# Patient Record
Sex: MALE | Race: WHITE | NOT HISPANIC OR LATINO | Employment: OTHER | URBAN - NONMETROPOLITAN AREA
[De-identification: names, ages, dates, MRNs, and addresses within clinical notes are randomized per-mention and may not be internally consistent; named-entity substitution may affect disease eponyms.]

---

## 2017-02-21 ENCOUNTER — OFFICE VISIT (OUTPATIENT)
Dept: GASTROENTEROLOGY | Facility: CLINIC | Age: 67
End: 2017-02-21

## 2017-02-21 VITALS
DIASTOLIC BLOOD PRESSURE: 91 MMHG | SYSTOLIC BLOOD PRESSURE: 158 MMHG | HEART RATE: 76 BPM | WEIGHT: 193.9 LBS | BODY MASS INDEX: 28.72 KG/M2 | HEIGHT: 69 IN

## 2017-02-21 DIAGNOSIS — Z12.11 ENCOUNTER FOR SCREENING FOR MALIGNANT NEOPLASM OF COLON: Primary | ICD-10-CM

## 2017-02-21 PROCEDURE — PTNOCHG PR CUSTOM PT NO CHARGE VISIT: Performed by: INTERNAL MEDICINE

## 2017-02-21 RX ORDER — HYDROCHLOROTHIAZIDE 25 MG/1
12.5 TABLET ORAL DAILY
COMMUNITY

## 2017-02-21 RX ORDER — PRAVASTATIN SODIUM 40 MG
20 TABLET ORAL NIGHTLY
COMMUNITY
End: 2023-02-16

## 2017-02-21 RX ORDER — FINASTERIDE 5 MG/1
5 TABLET, FILM COATED ORAL NIGHTLY
COMMUNITY

## 2017-02-21 RX ORDER — POTASSIUM CITRATE 5 MEQ/1
10 TABLET, EXTENDED RELEASE ORAL 2 TIMES DAILY WITH MEALS
Status: ON HOLD | COMMUNITY
End: 2017-09-06 | Stop reason: SDUPTHER

## 2017-02-21 RX ORDER — SODIUM, POTASSIUM,MAG SULFATES 17.5-3.13G
1 SOLUTION, RECONSTITUTED, ORAL ORAL EVERY 12 HOURS
Qty: 1 BOTTLE | Refills: 0 | Status: ON HOLD | OUTPATIENT
Start: 2017-02-21 | End: 2017-04-03

## 2017-02-21 RX ORDER — SILDENAFIL 100 MG/1
100 TABLET, FILM COATED ORAL DAILY PRN
COMMUNITY

## 2017-02-21 RX ORDER — DEXTROSE AND SODIUM CHLORIDE 5; .45 G/100ML; G/100ML
30 INJECTION, SOLUTION INTRAVENOUS CONTINUOUS PRN
Status: CANCELLED | OUTPATIENT
Start: 2017-02-21

## 2017-02-21 NOTE — PROGRESS NOTES
"[facility]      Chief Complaint:   Chief Complaint   Patient presents with   • Colonoscopy     ref [facility]       Subjective     HPI:   Patient for screening colonoscopy.  Patient not had a colonoscopy greater than 10 years.    Plan; we'll schedule patient for colonoscopy    Past Medical History:   History reviewed. No pertinent past medical history.    Family History:  History reviewed. No pertinent family history.    Social History:   reports that he has never smoked. He does not have any smokeless tobacco history on file. He reports that he drinks alcohol. He reports that he does not use illicit drugs.    Medications:   Current Outpatient Prescriptions   Medication Sig Dispense Refill   • finasteride (PROSCAR) 5 MG tablet Take 5 mg by mouth Daily.     • hydrochlorothiazide (HYDRODIURIL) 25 MG tablet Take 25 mg by mouth Daily.     • potassium citrate (UROCIT-K) 5 MEQ (540 MG) CR tablet Take 10 mEq by mouth 2 (Two) Times a Day With Meals.     • pravastatin (PRAVACHOL) 40 MG tablet Take 40 mg by mouth Daily.     • sildenafil (VIAGRA) 100 MG tablet Take 100 mg by mouth Daily As Needed for erectile dysfunction.     • sodium-potassium-magnesium sulfates (SUPREP) solution oral solution Take 1 bottle by mouth Every 12 (Twelve) Hours. 1 bottle 0     No current facility-administered medications for this visit.        Allergies:  Review of patient's allergies indicates no known allergies.    ROS:    Review of Systems  Objective     Blood pressure 158/91, pulse 76, height 69\" (175.3 cm), weight 193 lb 14.4 oz (88 kg).    Physical Exam     Assessment/Plan   Vangie was seen today for colonoscopy.    Diagnoses and all orders for this visit:    Encounter for screening for malignant neoplasm of colon  -     Case Request; Standing  -     dextrose 5 % and sodium chloride 0.45 % infusion; Infuse 30 mL/hr into a venous catheter Continuous As Needed (Start Prior to Procedure).  -     Case Request  -     " sodium-potassium-magnesium sulfates (SUPREP) solution oral solution; Take 1 bottle by mouth Every 12 (Twelve) Hours.    Other orders  -     Implement Anesthesia Orders Day of Procedure; Standing  -     Obtain Informed Consent; Standing  -     Verify Informed Consent; Standing  -     POC Glucose Fingerstick; Standing        COLONOSCOPY (N/A)     Diagnosis Plan   1. Encounter for screening for malignant neoplasm of colon  Case Request    dextrose 5 % and sodium chloride 0.45 % infusion    Case Request    sodium-potassium-magnesium sulfates (SUPREP) solution oral solution       Anticipated Surgical Procedure:  No orders of the defined types were placed in this encounter.      The risks, benefits, and alternatives of this procedure have been discussed with the patient or the responsible party- the patient understands and agrees to proceed.

## 2017-04-03 ENCOUNTER — HOSPITAL ENCOUNTER (OUTPATIENT)
Facility: HOSPITAL | Age: 67
Setting detail: HOSPITAL OUTPATIENT SURGERY
Discharge: HOME | End: 2017-04-03
Attending: INTERNAL MEDICINE | Admitting: INTERNAL MEDICINE

## 2017-04-03 ENCOUNTER — ANESTHESIA EVENT (OUTPATIENT)
Dept: GASTROENTEROLOGY | Facility: HOSPITAL | Age: 67
End: 2017-04-03

## 2017-04-03 ENCOUNTER — ANESTHESIA (OUTPATIENT)
Dept: GASTROENTEROLOGY | Facility: HOSPITAL | Age: 67
End: 2017-04-03

## 2017-04-03 VITALS
WEIGHT: 185.41 LBS | DIASTOLIC BLOOD PRESSURE: 62 MMHG | TEMPERATURE: 97.4 F | BODY MASS INDEX: 27.46 KG/M2 | RESPIRATION RATE: 18 BRPM | HEART RATE: 62 BPM | HEIGHT: 69 IN | OXYGEN SATURATION: 95 % | SYSTOLIC BLOOD PRESSURE: 115 MMHG

## 2017-04-03 DIAGNOSIS — Z12.11 ENCOUNTER FOR SCREENING FOR MALIGNANT NEOPLASM OF COLON: ICD-10-CM

## 2017-04-03 PROCEDURE — 25010000002 PROPOFOL 10 MG/ML EMULSION: Performed by: NURSE ANESTHETIST, CERTIFIED REGISTERED

## 2017-04-03 PROCEDURE — 45380 COLONOSCOPY AND BIOPSY: CPT | Performed by: INTERNAL MEDICINE

## 2017-04-03 PROCEDURE — 88305 TISSUE EXAM BY PATHOLOGIST: CPT | Performed by: INTERNAL MEDICINE

## 2017-04-03 PROCEDURE — 88305 TISSUE EXAM BY PATHOLOGIST: CPT | Performed by: PATHOLOGY

## 2017-04-03 PROCEDURE — 25010000002 FENTANYL CITRATE (PF) 100 MCG/2ML SOLUTION: Performed by: NURSE ANESTHETIST, CERTIFIED REGISTERED

## 2017-04-03 PROCEDURE — 25010000002 MIDAZOLAM PER 1 MG: Performed by: NURSE ANESTHETIST, CERTIFIED REGISTERED

## 2017-04-03 RX ORDER — FENTANYL CITRATE 50 UG/ML
INJECTION, SOLUTION INTRAMUSCULAR; INTRAVENOUS AS NEEDED
Status: DISCONTINUED | OUTPATIENT
Start: 2017-04-03 | End: 2017-04-03 | Stop reason: SURG

## 2017-04-03 RX ORDER — DEXTROSE AND SODIUM CHLORIDE 5; .45 G/100ML; G/100ML
30 INJECTION, SOLUTION INTRAVENOUS CONTINUOUS PRN
Status: DISCONTINUED | OUTPATIENT
Start: 2017-04-03 | End: 2017-04-03 | Stop reason: HOSPADM

## 2017-04-03 RX ORDER — MIDAZOLAM HYDROCHLORIDE 1 MG/ML
INJECTION INTRAMUSCULAR; INTRAVENOUS AS NEEDED
Status: DISCONTINUED | OUTPATIENT
Start: 2017-04-03 | End: 2017-04-03 | Stop reason: SURG

## 2017-04-03 RX ORDER — PROPOFOL 10 MG/ML
VIAL (ML) INTRAVENOUS AS NEEDED
Status: DISCONTINUED | OUTPATIENT
Start: 2017-04-03 | End: 2017-04-03 | Stop reason: SURG

## 2017-04-03 RX ADMIN — MIDAZOLAM 1 MG: 1 INJECTION INTRAMUSCULAR; INTRAVENOUS at 11:25

## 2017-04-03 RX ADMIN — FENTANYL CITRATE 50 MCG: 50 INJECTION, SOLUTION INTRAMUSCULAR; INTRAVENOUS at 11:25

## 2017-04-03 RX ADMIN — PROPOFOL 50 MG: 10 INJECTION, EMULSION INTRAVENOUS at 11:45

## 2017-04-03 RX ADMIN — DEXTROSE AND SODIUM CHLORIDE: 5; 450 INJECTION, SOLUTION INTRAVENOUS at 11:24

## 2017-04-03 RX ADMIN — MIDAZOLAM 1 MG: 1 INJECTION INTRAMUSCULAR; INTRAVENOUS at 11:45

## 2017-04-03 RX ADMIN — FENTANYL CITRATE 50 MCG: 50 INJECTION, SOLUTION INTRAMUSCULAR; INTRAVENOUS at 11:45

## 2017-04-03 RX ADMIN — PROPOFOL 50 MG: 10 INJECTION, EMULSION INTRAVENOUS at 11:33

## 2017-04-03 NOTE — ANESTHESIA PREPROCEDURE EVALUATION
Anesthesia Evaluation     Patient summary reviewed and Nursing notes reviewed      Airway   Mallampati: II  TM distance: >3 FB  Neck ROM: full  no difficulty expected  Dental      Pulmonary - negative pulmonary ROS and normal exam   Cardiovascular - negative cardio ROS and normal exam        Neuro/Psych- negative ROS  GI/Hepatic/Renal/Endo - negative ROS     Musculoskeletal (-) negative ROS    Abdominal  - normal exam   Substance History - negative use     OB/GYN negative ob/gyn ROS         Other                                    Anesthesia Plan    ASA 3     MAC     intravenous induction

## 2017-04-03 NOTE — ANESTHESIA POSTPROCEDURE EVALUATION
Patient: Vangie Lopez    Procedure Summary     Date Anesthesia Start Anesthesia Stop Room / Location    04/03/17 1129 1149 [facility] ENDOSCOPY 1 / [facility] ENDOSCOPY       Procedure Diagnosis Surgeon Provider    COLONOSCOPY (N/A ) Encounter for screening for malignant neoplasm of colon  (Encounter for screening for malignant neoplasm of colon [Z12.11]) MD Chandrika Irwin, SHIVA          Anesthesia Type: MAC  Last vitals  /62 (04/03/17 1148)    Temp 97.9 °F (36.6 °C) (04/03/17 1134)    Pulse 60 (04/03/17 1148)   Resp 18 (04/03/17 1148)    SpO2 95 % (04/03/17 1148)      Post Anesthesia Care and Evaluation    Patient location during evaluation: bedside  Patient participation: complete - patient participated  Level of consciousness: awake and alert  Pain management: adequate  Airway patency: patent  Anesthetic complications: No anesthetic complications    Cardiovascular status: acceptable  Respiratory status: acceptable  Hydration status: acceptable

## 2017-04-03 NOTE — H&P
"   Progress Notes  Encounter Date: 4/3/2017  Michael Ang MD   Gastroenterology   Expand All Collapse All    []Hide copied text  []Hover for attribution information  [facility]        Chief Complaint:        Chief Complaint   Patient presents with   • Colonoscopy       [facility]            Subjective      HPI:   Patient for screening colonoscopy. Patient not had a colonoscopy greater than 10 years.     Plan; we'll schedule patient for colonoscopy     Past Medical History:    Medical History    History reviewed. No pertinent past medical history.        Family History:  History reviewed. No pertinent family history.     Social History:   reports that he has never smoked. He does not have any smokeless tobacco history on file. He reports that he drinks alcohol. He reports that he does not use illicit drugs.     Medications:    Current Medications           Current Outpatient Prescriptions   Medication Sig Dispense Refill   • finasteride (PROSCAR) 5 MG tablet Take 5 mg by mouth Daily.       • hydrochlorothiazide (HYDRODIURIL) 25 MG tablet Take 25 mg by mouth Daily.       • potassium citrate (UROCIT-K) 5 MEQ (540 MG) CR tablet Take 10 mEq by mouth 2 (Two) Times a Day With Meals.       • pravastatin (PRAVACHOL) 40 MG tablet Take 40 mg by mouth Daily.       • sildenafil (VIAGRA) 100 MG tablet Take 100 mg by mouth Daily As Needed for erectile dysfunction.       • sodium-potassium-magnesium sulfates (SUPREP) solution oral solution Take 1 bottle by mouth Every 12 (Twelve) Hours. 1 bottle 0      No current facility-administered medications for this visit.             Allergies:  Review of patient's allergies indicates no known allergies.     ROS:   Review of Systems     Objective          Blood pressure 158/91, pulse 76, height 69\" (175.3 cm), weight 193 lb 14.4 oz (88 kg).     Physical Exam         Assessment/Plan       Vangie was seen today for colonoscopy.     Diagnoses and all orders for this " visit:     Encounter for screening for malignant neoplasm of colon  - Case Request; Standing  - dextrose 5 % and sodium chloride 0.45 % infusion; Infuse 30 mL/hr into a venous catheter Continuous As Needed (Start Prior to Procedure).  - Case Request  - sodium-potassium-magnesium sulfates (SUPREP) solution oral solution; Take 1 bottle by mouth Every 12 (Twelve) Hours.     Other orders  - Implement Anesthesia Orders Day of Procedure; Standing  - Obtain Informed Consent; Standing  - Verify Informed Consent; Standing  - POC Glucose Fingerstick; Standing           COLONOSCOPY (N/A)       Diagnosis Plan   1. Encounter for screening for malignant neoplasm of colon  Case Request     dextrose 5 % and sodium chloride 0.45 % infusion     Case Request     sodium-potassium-magnesium sulfates (SUPREP) solution oral solution         Anticipated Surgical Procedure:  No orders of the defined types were placed in this encounter.        The risks, benefits, and alternatives of this procedure have been discussed with the patient or the responsible party- the patient understands and agrees to proceed.                                                                                                           Office Visit on 2/21/2017              Detailed Report

## 2017-04-05 LAB
LAB AP CASE REPORT: NORMAL
Lab: NORMAL
PATH REPORT.FINAL DX SPEC: NORMAL
PATH REPORT.GROSS SPEC: NORMAL

## 2017-05-11 ENCOUNTER — OFFICE VISIT (OUTPATIENT)
Dept: GASTROENTEROLOGY | Facility: CLINIC | Age: 67
End: 2017-05-11

## 2017-05-11 VITALS
HEIGHT: 69 IN | DIASTOLIC BLOOD PRESSURE: 92 MMHG | HEART RATE: 89 BPM | WEIGHT: 187.9 LBS | BODY MASS INDEX: 27.83 KG/M2 | SYSTOLIC BLOOD PRESSURE: 152 MMHG

## 2017-05-11 DIAGNOSIS — K63.5 COLON POLYPS: Primary | ICD-10-CM

## 2017-05-11 PROCEDURE — 99213 OFFICE O/P EST LOW 20 MIN: CPT | Performed by: INTERNAL MEDICINE

## 2017-09-06 ENCOUNTER — APPOINTMENT (OUTPATIENT)
Dept: CT IMAGING | Facility: HOSPITAL | Age: 67
End: 2017-09-06

## 2017-09-06 ENCOUNTER — APPOINTMENT (OUTPATIENT)
Dept: GENERAL RADIOLOGY | Facility: HOSPITAL | Age: 67
End: 2017-09-06

## 2017-09-06 ENCOUNTER — ANESTHESIA EVENT (OUTPATIENT)
Dept: PERIOP | Facility: HOSPITAL | Age: 67
End: 2017-09-06

## 2017-09-06 ENCOUNTER — PREP FOR SURGERY (OUTPATIENT)
Dept: OTHER | Facility: HOSPITAL | Age: 67
End: 2017-09-06

## 2017-09-06 ENCOUNTER — HOSPITAL ENCOUNTER (OUTPATIENT)
Facility: HOSPITAL | Age: 67
Setting detail: OBSERVATION
End: 2017-09-11
Attending: EMERGENCY MEDICINE | Admitting: FAMILY MEDICINE

## 2017-09-06 ENCOUNTER — ANESTHESIA (OUTPATIENT)
Dept: PERIOP | Facility: HOSPITAL | Age: 67
End: 2017-09-06

## 2017-09-06 DIAGNOSIS — S93.315A: ICD-10-CM

## 2017-09-06 DIAGNOSIS — S82.009A PATELLA FRACTURE: ICD-10-CM

## 2017-09-06 DIAGNOSIS — Z74.09 IMPAIRED PHYSICAL MOBILITY: ICD-10-CM

## 2017-09-06 DIAGNOSIS — Z74.09 IMPAIRED MOBILITY AND ACTIVITIES OF DAILY LIVING: ICD-10-CM

## 2017-09-06 DIAGNOSIS — S82.009A PATELLA FRACTURE: Primary | ICD-10-CM

## 2017-09-06 DIAGNOSIS — S92.415A CLOSED NONDISPLACED FRACTURE OF PROXIMAL PHALANX OF LEFT GREAT TOE, INITIAL ENCOUNTER: ICD-10-CM

## 2017-09-06 DIAGNOSIS — S92.252A CLOSED DISPLACED FRACTURE OF NAVICULAR BONE OF LEFT FOOT, INITIAL ENCOUNTER: ICD-10-CM

## 2017-09-06 DIAGNOSIS — Z78.9 IMPAIRED MOBILITY AND ACTIVITIES OF DAILY LIVING: ICD-10-CM

## 2017-09-06 DIAGNOSIS — S63.501A SPRAIN OF RIGHT WRIST, INITIAL ENCOUNTER: ICD-10-CM

## 2017-09-06 DIAGNOSIS — S82.041A CLOSED DISPLACED COMMINUTED FRACTURE OF RIGHT PATELLA, INITIAL ENCOUNTER: Primary | ICD-10-CM

## 2017-09-06 LAB
ANION GAP SERPL CALCULATED.3IONS-SCNC: 12 MMOL/L (ref 5–15)
BUN BLD-MCNC: 19 MG/DL (ref 7–21)
BUN/CREAT SERPL: 23.5 (ref 7–25)
CALCIUM SPEC-SCNC: 9.3 MG/DL (ref 8.4–10.2)
CHLORIDE SERPL-SCNC: 97 MMOL/L (ref 95–110)
CO2 SERPL-SCNC: 26 MMOL/L (ref 22–31)
CREAT BLD-MCNC: 0.81 MG/DL (ref 0.7–1.3)
GFR SERPL CREATININE-BSD FRML MDRD: 95 ML/MIN/1.73 (ref 60–113)
GLUCOSE BLD-MCNC: 166 MG/DL (ref 60–100)
POTASSIUM BLD-SCNC: 4.4 MMOL/L (ref 3.5–5.1)
SODIUM BLD-SCNC: 135 MMOL/L (ref 137–145)

## 2017-09-06 PROCEDURE — 28576 TREAT FOOT DISLOCATION: CPT | Performed by: PODIATRIST

## 2017-09-06 PROCEDURE — 25010000002 ONDANSETRON PER 1 MG: Performed by: NURSE ANESTHETIST, CERTIFIED REGISTERED

## 2017-09-06 PROCEDURE — C1713 ANCHOR/SCREW BN/BN,TIS/BN: HCPCS | Performed by: PODIATRIST

## 2017-09-06 PROCEDURE — G0378 HOSPITAL OBSERVATION PER HR: HCPCS

## 2017-09-06 PROCEDURE — 25010000002 HYDROMORPHONE PER 4 MG: Performed by: EMERGENCY MEDICINE

## 2017-09-06 PROCEDURE — 25010000002 FENTANYL CITRATE (PF) 100 MCG/2ML SOLUTION: Performed by: NURSE ANESTHETIST, CERTIFIED REGISTERED

## 2017-09-06 PROCEDURE — 76000 FLUOROSCOPY <1 HR PHYS/QHP: CPT

## 2017-09-06 PROCEDURE — 99226 PR SBSQ OBSERVATION CARE/DAY 35 MINUTES: CPT | Performed by: PODIATRIST

## 2017-09-06 PROCEDURE — 25010000003 CEFAZOLIN PER 500 MG: Performed by: NURSE ANESTHETIST, CERTIFIED REGISTERED

## 2017-09-06 PROCEDURE — 25010000002 DEXAMETHASONE PER 1 MG: Performed by: NURSE ANESTHETIST, CERTIFIED REGISTERED

## 2017-09-06 PROCEDURE — 93005 ELECTROCARDIOGRAM TRACING: CPT | Performed by: ANESTHESIOLOGY

## 2017-09-06 PROCEDURE — 25010000002 MIDAZOLAM PER 1 MG: Performed by: NURSE ANESTHETIST, CERTIFIED REGISTERED

## 2017-09-06 PROCEDURE — 80048 BASIC METABOLIC PNL TOTAL CA: CPT | Performed by: ANESTHESIOLOGY

## 2017-09-06 PROCEDURE — 99225 PR SBSQ OBSERVATION CARE/DAY 25 MINUTES: CPT | Performed by: ORTHOPAEDIC SURGERY

## 2017-09-06 PROCEDURE — 73700 CT LOWER EXTREMITY W/O DYE: CPT

## 2017-09-06 PROCEDURE — 25010000002 PROPOFOL 10 MG/ML EMULSION: Performed by: NURSE ANESTHETIST, CERTIFIED REGISTERED

## 2017-09-06 PROCEDURE — 73110 X-RAY EXAM OF WRIST: CPT

## 2017-09-06 PROCEDURE — 73562 X-RAY EXAM OF KNEE 3: CPT

## 2017-09-06 PROCEDURE — 25010000002 HYDROMORPHONE PER 4 MG: Performed by: FAMILY MEDICINE

## 2017-09-06 PROCEDURE — 73620 X-RAY EXAM OF FOOT: CPT

## 2017-09-06 PROCEDURE — 93010 ELECTROCARDIOGRAM REPORT: CPT | Performed by: INTERNAL MEDICINE

## 2017-09-06 PROCEDURE — 70450 CT HEAD/BRAIN W/O DYE: CPT

## 2017-09-06 PROCEDURE — 99285 EMERGENCY DEPT VISIT HI MDM: CPT

## 2017-09-06 PROCEDURE — 25010000002 ONDANSETRON PER 1 MG: Performed by: EMERGENCY MEDICINE

## 2017-09-06 DEVICE — IMPLANTABLE DEVICE
Type: IMPLANTABLE DEVICE | Site: FOOT | Status: FUNCTIONAL
Brand: MICROAIRE®

## 2017-09-06 RX ORDER — ONDANSETRON 2 MG/ML
4 INJECTION INTRAMUSCULAR; INTRAVENOUS ONCE
Status: COMPLETED | OUTPATIENT
Start: 2017-09-06 | End: 2017-09-06

## 2017-09-06 RX ORDER — SODIUM CHLORIDE 0.9 % (FLUSH) 0.9 %
1-10 SYRINGE (ML) INJECTION AS NEEDED
Status: DISCONTINUED | OUTPATIENT
Start: 2017-09-06 | End: 2017-09-11 | Stop reason: HOSPADM

## 2017-09-06 RX ORDER — SODIUM CHLORIDE 9 MG/ML
100 INJECTION, SOLUTION INTRAVENOUS CONTINUOUS
Status: DISCONTINUED | OUTPATIENT
Start: 2017-09-06 | End: 2017-09-11 | Stop reason: HOSPADM

## 2017-09-06 RX ORDER — PROPOFOL 10 MG/ML
VIAL (ML) INTRAVENOUS AS NEEDED
Status: DISCONTINUED | OUTPATIENT
Start: 2017-09-06 | End: 2017-09-06 | Stop reason: SURG

## 2017-09-06 RX ORDER — FINASTERIDE 5 MG/1
5 TABLET, FILM COATED ORAL DAILY
Status: DISCONTINUED | OUTPATIENT
Start: 2017-09-06 | End: 2017-09-11 | Stop reason: HOSPADM

## 2017-09-06 RX ORDER — ONDANSETRON 2 MG/ML
INJECTION INTRAMUSCULAR; INTRAVENOUS AS NEEDED
Status: DISCONTINUED | OUTPATIENT
Start: 2017-09-06 | End: 2017-09-06 | Stop reason: SURG

## 2017-09-06 RX ORDER — NALOXONE HCL 0.4 MG/ML
0.2 VIAL (ML) INJECTION AS NEEDED
Status: DISCONTINUED | OUTPATIENT
Start: 2017-09-06 | End: 2017-09-07 | Stop reason: HOSPADM

## 2017-09-06 RX ORDER — BACITRACIN 50000 [IU]/1
INJECTION, POWDER, FOR SOLUTION INTRAMUSCULAR AS NEEDED
Status: DISCONTINUED | OUTPATIENT
Start: 2017-09-06 | End: 2017-09-11 | Stop reason: HOSPADM

## 2017-09-06 RX ORDER — ONDANSETRON 2 MG/ML
4 INJECTION INTRAMUSCULAR; INTRAVENOUS ONCE AS NEEDED
Status: DISCONTINUED | OUTPATIENT
Start: 2017-09-06 | End: 2017-09-07 | Stop reason: HOSPADM

## 2017-09-06 RX ORDER — DEXAMETHASONE SODIUM PHOSPHATE 4 MG/ML
INJECTION, SOLUTION INTRA-ARTICULAR; INTRALESIONAL; INTRAMUSCULAR; INTRAVENOUS; SOFT TISSUE AS NEEDED
Status: DISCONTINUED | OUTPATIENT
Start: 2017-09-06 | End: 2017-09-06 | Stop reason: SURG

## 2017-09-06 RX ORDER — BACTERIOSTATIC SODIUM CHLORIDE 0.9 %
VIAL (ML) INJECTION AS NEEDED
Status: DISCONTINUED | OUTPATIENT
Start: 2017-09-06 | End: 2017-09-11 | Stop reason: HOSPADM

## 2017-09-06 RX ORDER — HYDROCHLOROTHIAZIDE 25 MG/1
25 TABLET ORAL DAILY
Status: DISCONTINUED | OUTPATIENT
Start: 2017-09-07 | End: 2017-09-11 | Stop reason: HOSPADM

## 2017-09-06 RX ORDER — FENTANYL CITRATE 50 UG/ML
INJECTION, SOLUTION INTRAMUSCULAR; INTRAVENOUS AS NEEDED
Status: DISCONTINUED | OUTPATIENT
Start: 2017-09-06 | End: 2017-09-06 | Stop reason: SURG

## 2017-09-06 RX ORDER — SODIUM CHLORIDE 9 MG/ML
INJECTION, SOLUTION INTRAVENOUS CONTINUOUS PRN
Status: DISCONTINUED | OUTPATIENT
Start: 2017-09-06 | End: 2017-09-06 | Stop reason: SURG

## 2017-09-06 RX ORDER — ACETAMINOPHEN 650 MG/1
650 SUPPOSITORY RECTAL ONCE AS NEEDED
Status: DISCONTINUED | OUTPATIENT
Start: 2017-09-06 | End: 2017-09-07 | Stop reason: HOSPADM

## 2017-09-06 RX ORDER — LABETALOL HYDROCHLORIDE 5 MG/ML
5 INJECTION, SOLUTION INTRAVENOUS
Status: DISCONTINUED | OUTPATIENT
Start: 2017-09-06 | End: 2017-09-07 | Stop reason: HOSPADM

## 2017-09-06 RX ORDER — FAMOTIDINE 40 MG/1
40 TABLET, FILM COATED ORAL DAILY
Status: DISCONTINUED | OUTPATIENT
Start: 2017-09-06 | End: 2017-09-11 | Stop reason: HOSPADM

## 2017-09-06 RX ORDER — POTASSIUM CITRATE 10 MEQ/1
10 TABLET, EXTENDED RELEASE ORAL 2 TIMES DAILY WITH MEALS
Status: DISCONTINUED | OUTPATIENT
Start: 2017-09-06 | End: 2017-09-11 | Stop reason: HOSPADM

## 2017-09-06 RX ORDER — DIPHENHYDRAMINE HYDROCHLORIDE 50 MG/ML
12.5 INJECTION INTRAMUSCULAR; INTRAVENOUS
Status: DISCONTINUED | OUTPATIENT
Start: 2017-09-06 | End: 2017-09-07 | Stop reason: HOSPADM

## 2017-09-06 RX ORDER — POTASSIUM CITRATE 10 MEQ/1
10 TABLET, EXTENDED RELEASE ORAL 2 TIMES DAILY WITH MEALS
COMMUNITY

## 2017-09-06 RX ORDER — CEFAZOLIN SODIUM 1 G/3ML
INJECTION, POWDER, FOR SOLUTION INTRAMUSCULAR; INTRAVENOUS AS NEEDED
Status: DISCONTINUED | OUTPATIENT
Start: 2017-09-06 | End: 2017-09-06 | Stop reason: SURG

## 2017-09-06 RX ORDER — ACETAMINOPHEN 325 MG/1
650 TABLET ORAL ONCE AS NEEDED
Status: DISCONTINUED | OUTPATIENT
Start: 2017-09-06 | End: 2017-09-07 | Stop reason: HOSPADM

## 2017-09-06 RX ORDER — SODIUM CHLORIDE, SODIUM GLUCONATE, SODIUM ACETATE, POTASSIUM CHLORIDE, AND MAGNESIUM CHLORIDE 526; 502; 368; 37; 30 MG/100ML; MG/100ML; MG/100ML; MG/100ML; MG/100ML
INJECTION, SOLUTION INTRAVENOUS CONTINUOUS PRN
Status: DISCONTINUED | OUTPATIENT
Start: 2017-09-06 | End: 2017-09-06 | Stop reason: SURG

## 2017-09-06 RX ORDER — LIDOCAINE HYDROCHLORIDE 20 MG/ML
INJECTION, SOLUTION INFILTRATION; PERINEURAL AS NEEDED
Status: DISCONTINUED | OUTPATIENT
Start: 2017-09-06 | End: 2017-09-06 | Stop reason: SURG

## 2017-09-06 RX ORDER — ATORVASTATIN CALCIUM 10 MG/1
10 TABLET, FILM COATED ORAL DAILY
Status: DISCONTINUED | OUTPATIENT
Start: 2017-09-06 | End: 2017-09-11 | Stop reason: HOSPADM

## 2017-09-06 RX ORDER — ROCURONIUM BROMIDE 10 MG/ML
INJECTION, SOLUTION INTRAVENOUS AS NEEDED
Status: DISCONTINUED | OUTPATIENT
Start: 2017-09-06 | End: 2017-09-06 | Stop reason: SURG

## 2017-09-06 RX ORDER — ONDANSETRON 2 MG/ML
4 INJECTION INTRAMUSCULAR; INTRAVENOUS EVERY 6 HOURS PRN
Status: DISCONTINUED | OUTPATIENT
Start: 2017-09-06 | End: 2017-09-11 | Stop reason: HOSPADM

## 2017-09-06 RX ORDER — MIDAZOLAM HYDROCHLORIDE 1 MG/ML
INJECTION INTRAMUSCULAR; INTRAVENOUS AS NEEDED
Status: DISCONTINUED | OUTPATIENT
Start: 2017-09-06 | End: 2017-09-06 | Stop reason: SURG

## 2017-09-06 RX ORDER — SILDENAFIL CITRATE 20 MG/1
20 TABLET ORAL 3 TIMES DAILY
Status: DISCONTINUED | OUTPATIENT
Start: 2017-09-06 | End: 2017-09-11 | Stop reason: HOSPADM

## 2017-09-06 RX ORDER — FLUMAZENIL 0.1 MG/ML
0.2 INJECTION INTRAVENOUS AS NEEDED
Status: DISCONTINUED | OUTPATIENT
Start: 2017-09-06 | End: 2017-09-07 | Stop reason: HOSPADM

## 2017-09-06 RX ORDER — EPHEDRINE SULFATE 50 MG/ML
5 INJECTION, SOLUTION INTRAVENOUS ONCE AS NEEDED
Status: DISCONTINUED | OUTPATIENT
Start: 2017-09-06 | End: 2017-09-07 | Stop reason: HOSPADM

## 2017-09-06 RX ADMIN — PROPOFOL 30 MG: 10 INJECTION, EMULSION INTRAVENOUS at 22:35

## 2017-09-06 RX ADMIN — HYDROMORPHONE HYDROCHLORIDE 1 MG: 1 INJECTION, SOLUTION INTRAMUSCULAR; INTRAVENOUS; SUBCUTANEOUS at 14:48

## 2017-09-06 RX ADMIN — MIDAZOLAM 2 MG: 1 INJECTION INTRAMUSCULAR; INTRAVENOUS at 21:44

## 2017-09-06 RX ADMIN — LIDOCAINE HYDROCHLORIDE 40 MG: 20 INJECTION, SOLUTION INFILTRATION; PERINEURAL at 21:54

## 2017-09-06 RX ADMIN — FENTANYL CITRATE 50 MCG: 50 INJECTION, SOLUTION INTRAMUSCULAR; INTRAVENOUS at 23:40

## 2017-09-06 RX ADMIN — FENTANYL CITRATE 100 MCG: 50 INJECTION, SOLUTION INTRAMUSCULAR; INTRAVENOUS at 21:54

## 2017-09-06 RX ADMIN — SODIUM CHLORIDE: 9 INJECTION, SOLUTION INTRAVENOUS at 21:40

## 2017-09-06 RX ADMIN — SODIUM CHLORIDE: 9 INJECTION, SOLUTION INTRAVENOUS at 23:30

## 2017-09-06 RX ADMIN — ROCURONIUM BROMIDE 30 MG: 10 INJECTION INTRAVENOUS at 21:54

## 2017-09-06 RX ADMIN — PROPOFOL 140 MG: 10 INJECTION, EMULSION INTRAVENOUS at 21:54

## 2017-09-06 RX ADMIN — FENTANYL CITRATE 50 MCG: 50 INJECTION, SOLUTION INTRAMUSCULAR; INTRAVENOUS at 22:35

## 2017-09-06 RX ADMIN — ONDANSETRON 4 MG: 2 INJECTION INTRAMUSCULAR; INTRAVENOUS at 12:34

## 2017-09-06 RX ADMIN — PROPOFOL 20 MG: 10 INJECTION, EMULSION INTRAVENOUS at 23:33

## 2017-09-06 RX ADMIN — SODIUM CHLORIDE, SODIUM GLUCONATE, SODIUM ACETATE, POTASSIUM CHLORIDE, AND MAGNESIUM CHLORIDE: 526; 502; 368; 37; 30 INJECTION, SOLUTION INTRAVENOUS at 22:13

## 2017-09-06 RX ADMIN — HYDROMORPHONE HYDROCHLORIDE 1 MG: 1 INJECTION, SOLUTION INTRAMUSCULAR; INTRAVENOUS; SUBCUTANEOUS at 19:46

## 2017-09-06 RX ADMIN — ONDANSETRON 4 MG: 2 INJECTION INTRAMUSCULAR; INTRAVENOUS at 23:30

## 2017-09-06 RX ADMIN — HYDROMORPHONE HYDROCHLORIDE 1 MG: 1 INJECTION, SOLUTION INTRAMUSCULAR; INTRAVENOUS; SUBCUTANEOUS at 17:00

## 2017-09-06 RX ADMIN — CEFAZOLIN SODIUM 2 G: 1 INJECTION, POWDER, FOR SOLUTION INTRAMUSCULAR; INTRAVENOUS at 21:58

## 2017-09-06 RX ADMIN — DEXAMETHASONE SODIUM PHOSPHATE 4 MG: 4 INJECTION, SOLUTION INTRAMUSCULAR; INTRAVENOUS at 22:03

## 2017-09-06 RX ADMIN — HYDROMORPHONE HYDROCHLORIDE 1 MG: 1 INJECTION, SOLUTION INTRAMUSCULAR; INTRAVENOUS; SUBCUTANEOUS at 12:35

## 2017-09-07 PROBLEM — S92.252A CLOSED DISPLACED FRACTURE OF NAVICULAR BONE OF LEFT FOOT: Status: ACTIVE | Noted: 2017-09-06

## 2017-09-07 PROBLEM — S93.315A: Status: ACTIVE | Noted: 2017-09-06

## 2017-09-07 LAB
DEPRECATED RDW RBC AUTO: 41.4 FL (ref 35.1–43.9)
ERYTHROCYTE [DISTWIDTH] IN BLOOD BY AUTOMATED COUNT: 12.6 % (ref 11.5–14.5)
HCT VFR BLD AUTO: 35.9 % (ref 39–49)
HGB BLD-MCNC: 12.6 G/DL (ref 13.7–17.3)
MCH RBC QN AUTO: 31.6 PG (ref 26.5–34)
MCHC RBC AUTO-ENTMCNC: 35.1 G/DL (ref 31.5–36.3)
MCV RBC AUTO: 90 FL (ref 80–98)
PLATELET # BLD AUTO: 160 10*3/MM3 (ref 150–450)
PMV BLD AUTO: 9.5 FL (ref 8–12)
RBC # BLD AUTO: 3.99 10*6/MM3 (ref 4.37–5.74)
WBC NRBC COR # BLD: 10.16 10*3/MM3 (ref 3.2–9.8)

## 2017-09-07 PROCEDURE — 25010000002 HYDROMORPHONE PER 4 MG: Performed by: FAMILY MEDICINE

## 2017-09-07 PROCEDURE — G0378 HOSPITAL OBSERVATION PER HR: HCPCS

## 2017-09-07 PROCEDURE — 94799 UNLISTED PULMONARY SVC/PX: CPT

## 2017-09-07 PROCEDURE — 94760 N-INVAS EAR/PLS OXIMETRY 1: CPT

## 2017-09-07 PROCEDURE — 85027 COMPLETE CBC AUTOMATED: CPT | Performed by: FAMILY MEDICINE

## 2017-09-07 PROCEDURE — 99224 PR SBSQ OBSERVATION CARE/DAY 15 MINUTES: CPT | Performed by: ORTHOPAEDIC SURGERY

## 2017-09-07 RX ORDER — HYDROMORPHONE HCL 110MG/55ML
1 PATIENT CONTROLLED ANALGESIA SYRINGE INTRAVENOUS
Status: DISCONTINUED | OUTPATIENT
Start: 2017-09-07 | End: 2017-09-08 | Stop reason: SDUPTHER

## 2017-09-07 RX ORDER — ACETAMINOPHEN 325 MG/1
650 TABLET ORAL EVERY 6 HOURS PRN
Status: DISCONTINUED | OUTPATIENT
Start: 2017-09-07 | End: 2017-09-11 | Stop reason: HOSPADM

## 2017-09-07 RX ADMIN — HYDROMORPHONE HYDROCHLORIDE 1 MG: 2 INJECTION, SOLUTION INTRAMUSCULAR; INTRAVENOUS; SUBCUTANEOUS at 13:57

## 2017-09-07 RX ADMIN — SODIUM CHLORIDE 100 ML/HR: 900 INJECTION, SOLUTION INTRAVENOUS at 01:40

## 2017-09-07 RX ADMIN — HYDROCHLOROTHIAZIDE 25 MG: 25 TABLET ORAL at 09:09

## 2017-09-07 RX ADMIN — SODIUM CHLORIDE 100 ML/HR: 900 INJECTION, SOLUTION INTRAVENOUS at 11:57

## 2017-09-07 RX ADMIN — SILDENAFIL CITRATE 20 MG: 20 TABLET, FILM COATED ORAL at 09:09

## 2017-09-07 RX ADMIN — FINASTERIDE 5 MG: 5 TABLET, FILM COATED ORAL at 01:47

## 2017-09-07 RX ADMIN — POTASSIUM CITRATE 10 MEQ: 10 TABLET ORAL at 09:09

## 2017-09-07 RX ADMIN — HYDROMORPHONE HYDROCHLORIDE 1 MG: 2 INJECTION, SOLUTION INTRAMUSCULAR; INTRAVENOUS; SUBCUTANEOUS at 18:08

## 2017-09-07 RX ADMIN — FAMOTIDINE 40 MG: 40 TABLET ORAL at 01:47

## 2017-09-07 RX ADMIN — SILDENAFIL CITRATE 20 MG: 20 TABLET, FILM COATED ORAL at 01:47

## 2017-09-07 RX ADMIN — FAMOTIDINE 40 MG: 40 TABLET ORAL at 09:09

## 2017-09-07 RX ADMIN — HYDROMORPHONE HYDROCHLORIDE 1 MG: 2 INJECTION, SOLUTION INTRAMUSCULAR; INTRAVENOUS; SUBCUTANEOUS at 23:14

## 2017-09-07 RX ADMIN — SODIUM CHLORIDE 100 ML/HR: 900 INJECTION, SOLUTION INTRAVENOUS at 03:15

## 2017-09-07 RX ADMIN — POTASSIUM CITRATE 10 MEQ: 10 TABLET ORAL at 18:38

## 2017-09-07 RX ADMIN — HYDROMORPHONE HYDROCHLORIDE 1 MG: 2 INJECTION, SOLUTION INTRAMUSCULAR; INTRAVENOUS; SUBCUTANEOUS at 09:10

## 2017-09-07 RX ADMIN — ATORVASTATIN CALCIUM 10 MG: 10 TABLET, FILM COATED ORAL at 01:47

## 2017-09-07 RX ADMIN — SILDENAFIL CITRATE 20 MG: 20 TABLET, FILM COATED ORAL at 18:07

## 2017-09-07 NOTE — ANESTHESIA PREPROCEDURE EVALUATION
Anesthesia Evaluation     no history of anesthetic complications:  NPO Solid Status: > 8 hours  NPO Liquid Status: > 8 hours     Airway   Mallampati: II  TM distance: >3 FB  Neck ROM: full  possible difficult intubation  Dental - normal exam         Pulmonary - negative pulmonary ROS and normal exam   (-) not a smoker  Cardiovascular - normal exam  Exercise tolerance: good (4-7 METS)    ECG reviewed  Rhythm: regular  Rate: normal    (+) hypertension, hyperlipidemia  (-) angina, murmur      Neuro/Psych- negative ROS  GI/Hepatic/Renal/Endo    (+)  renal disease (hx of stones) stones,     ROS Comment: BPH    Musculoskeletal         ROS comment: Left ankle fracture  Pain 10/10 now  Abdominal    Substance History   (+) alcohol use (occ),   (-) drug use     OB/GYN          Other   (+) arthritis (hands)     History of cancer: skin.                                Anesthesia Plan    ASA 3 - emergent     general     intravenous induction   Anesthetic plan and risks discussed with patient.

## 2017-09-07 NOTE — ANESTHESIA POSTPROCEDURE EVALUATION
Patient: Vangie Lopez    Procedure Summary     Date Anesthesia Start Anesthesia Stop Room / Location    09/06/17 2146 2356  [facility] OR 11 / [facility]       Procedure Diagnosis Surgeon Provider    ANKLE EXTERNAL FIXATOR APPLICATION (Left Ankle) Closed displaced fracture of navicular bone of left foot, initial encounter; Closed dislocation of navicular bone of left foot, initial encounter  (Closed displaced fracture of navicular bone of left foot, initial encounter [S92.252A]; Closed dislocation of navicular bone of left foot, initial encounter [S93.315A]) ROBERT Issa MD          Anesthesia Type: general  Last vitals  BP   149/84 (09/06/17 2350)    Temp   98.5 °F (36.9 °C) (09/06/17 2350)    Pulse   86 (09/06/17 2350)   Resp   14 (09/06/17 2350)    SpO2   98 % (09/06/17 2350)      Post Anesthesia Care and Evaluation    Patient location during evaluation: PACU  Patient participation: complete - patient participated  Level of consciousness: awake and alert  Pain score: 2  Pain management: adequate  Airway patency: patent  Anesthetic complications: No anesthetic complications  PONV Status: none  Cardiovascular status: acceptable  Respiratory status: acceptable  Hydration status: acceptable

## 2017-09-07 NOTE — ANESTHESIA PROCEDURE NOTES
Airway    General Information and Staff    Patient location during procedure: OR  CRNA: RASHMI MERRITT    Indications and Patient Condition  Indications for airway management: airway protection    Preoxygenated: yes  Mask difficulty assessment: 1 - vent by mask    Final Airway Details  Final airway type: endotracheal airway      Successful airway: ETT  Cuffed: yes   Successful intubation technique: video laryngoscopy  Facilitating devices/methods: intubating stylet  Endotracheal tube insertion site: oral  Blade: Rupinder  Blade size: #3  ETT size: 7.5 mm  Cormack-Lehane Classification: grade I - full view of glottis  Placement verified by: chest auscultation and capnometry   Cuff volume (mL): 7  Measured from: lips  ETT to lips (cm): 22  Number of attempts at approach: 1

## 2017-09-08 ENCOUNTER — ANESTHESIA EVENT (OUTPATIENT)
Dept: PERIOP | Facility: HOSPITAL | Age: 67
End: 2017-09-08

## 2017-09-08 ENCOUNTER — ANESTHESIA (OUTPATIENT)
Dept: PERIOP | Facility: HOSPITAL | Age: 67
End: 2017-09-08

## 2017-09-08 ENCOUNTER — APPOINTMENT (OUTPATIENT)
Dept: GENERAL RADIOLOGY | Facility: HOSPITAL | Age: 67
End: 2017-09-08

## 2017-09-08 PROBLEM — I10 ESSENTIAL HYPERTENSION: Chronic | Status: ACTIVE | Noted: 2017-09-08

## 2017-09-08 PROBLEM — E78.2 MIXED HYPERLIPIDEMIA: Chronic | Status: ACTIVE | Noted: 2017-09-08

## 2017-09-08 LAB
ALBUMIN SERPL-MCNC: 3.4 G/DL (ref 3.4–4.8)
ALBUMIN/GLOB SERPL: 1.4 G/DL (ref 1.1–1.8)
ALP SERPL-CCNC: 84 U/L (ref 38–126)
ALT SERPL W P-5'-P-CCNC: 39 U/L (ref 21–72)
ANION GAP SERPL CALCULATED.3IONS-SCNC: 7 MMOL/L (ref 5–15)
AST SERPL-CCNC: 23 U/L (ref 17–59)
BASOPHILS # BLD AUTO: 0.02 10*3/MM3 (ref 0–0.2)
BASOPHILS NFR BLD AUTO: 0.2 % (ref 0–2)
BILIRUB SERPL-MCNC: 0.8 MG/DL (ref 0.2–1.3)
BUN BLD-MCNC: 16 MG/DL (ref 7–21)
BUN/CREAT SERPL: 20 (ref 7–25)
CALCIUM SPEC-SCNC: 8.6 MG/DL (ref 8.4–10.2)
CHLORIDE SERPL-SCNC: 100 MMOL/L (ref 95–110)
CO2 SERPL-SCNC: 29 MMOL/L (ref 22–31)
CREAT BLD-MCNC: 0.8 MG/DL (ref 0.7–1.3)
DEPRECATED RDW RBC AUTO: 41.4 FL (ref 35.1–43.9)
EOSINOPHIL # BLD AUTO: 0.21 10*3/MM3 (ref 0–0.7)
EOSINOPHIL NFR BLD AUTO: 2.5 % (ref 0–7)
ERYTHROCYTE [DISTWIDTH] IN BLOOD BY AUTOMATED COUNT: 12.7 % (ref 11.5–14.5)
GFR SERPL CREATININE-BSD FRML MDRD: 97 ML/MIN/1.73 (ref 60–113)
GLOBULIN UR ELPH-MCNC: 2.4 GM/DL (ref 2.3–3.5)
GLUCOSE BLD-MCNC: 137 MG/DL (ref 60–100)
HCT VFR BLD AUTO: 34 % (ref 39–49)
HGB BLD-MCNC: 11.9 G/DL (ref 13.7–17.3)
IMM GRANULOCYTES # BLD: 0.04 10*3/MM3 (ref 0–0.02)
IMM GRANULOCYTES NFR BLD: 0.5 % (ref 0–0.5)
LYMPHOCYTES # BLD AUTO: 1.45 10*3/MM3 (ref 0.6–4.2)
LYMPHOCYTES NFR BLD AUTO: 17.5 % (ref 10–50)
MCH RBC QN AUTO: 31.5 PG (ref 26.5–34)
MCHC RBC AUTO-ENTMCNC: 35 G/DL (ref 31.5–36.3)
MCV RBC AUTO: 89.9 FL (ref 80–98)
MONOCYTES # BLD AUTO: 1.01 10*3/MM3 (ref 0–0.9)
MONOCYTES NFR BLD AUTO: 12.2 % (ref 0–12)
NEUTROPHILS # BLD AUTO: 5.54 10*3/MM3 (ref 2–8.6)
NEUTROPHILS NFR BLD AUTO: 67.1 % (ref 37–80)
PLATELET # BLD AUTO: 144 10*3/MM3 (ref 150–450)
PMV BLD AUTO: 9.4 FL (ref 8–12)
POTASSIUM BLD-SCNC: 3.6 MMOL/L (ref 3.5–5.1)
PROT SERPL-MCNC: 5.8 G/DL (ref 6.3–8.6)
RBC # BLD AUTO: 3.78 10*6/MM3 (ref 4.37–5.74)
SODIUM BLD-SCNC: 136 MMOL/L (ref 137–145)
WBC NRBC COR # BLD: 8.27 10*3/MM3 (ref 3.2–9.8)

## 2017-09-08 PROCEDURE — 27524 TREAT KNEECAP FRACTURE: CPT | Performed by: ORTHOPAEDIC SURGERY

## 2017-09-08 PROCEDURE — 25010000002 HYDROMORPHONE PER 4 MG: Performed by: FAMILY MEDICINE

## 2017-09-08 PROCEDURE — 99024 POSTOP FOLLOW-UP VISIT: CPT | Performed by: PODIATRIST

## 2017-09-08 PROCEDURE — 25010000002 MORPHINE PER 10 MG: Performed by: ORTHOPAEDIC SURGERY

## 2017-09-08 PROCEDURE — 25010000002 MIDAZOLAM PER 1 MG: Performed by: NURSE ANESTHETIST, CERTIFIED REGISTERED

## 2017-09-08 PROCEDURE — 76000 FLUOROSCOPY <1 HR PHYS/QHP: CPT

## 2017-09-08 PROCEDURE — G0378 HOSPITAL OBSERVATION PER HR: HCPCS

## 2017-09-08 PROCEDURE — 25010000002 HYDROMORPHONE PER 4 MG: Performed by: NURSE ANESTHETIST, CERTIFIED REGISTERED

## 2017-09-08 PROCEDURE — 25010000002 PROPOFOL 10 MG/ML EMULSION: Performed by: NURSE ANESTHETIST, CERTIFIED REGISTERED

## 2017-09-08 PROCEDURE — 80053 COMPREHEN METABOLIC PANEL: CPT | Performed by: FAMILY MEDICINE

## 2017-09-08 PROCEDURE — 85025 COMPLETE CBC W/AUTO DIFF WBC: CPT | Performed by: FAMILY MEDICINE

## 2017-09-08 PROCEDURE — C1713 ANCHOR/SCREW BN/BN,TIS/BN: HCPCS | Performed by: ORTHOPAEDIC SURGERY

## 2017-09-08 PROCEDURE — 73560 X-RAY EXAM OF KNEE 1 OR 2: CPT

## 2017-09-08 PROCEDURE — 25010000002 ONDANSETRON PER 1 MG: Performed by: NURSE ANESTHETIST, CERTIFIED REGISTERED

## 2017-09-08 PROCEDURE — 94799 UNLISTED PULMONARY SVC/PX: CPT

## 2017-09-08 PROCEDURE — 25010000003 CEFAZOLIN PER 500 MG: Performed by: ORTHOPAEDIC SURGERY

## 2017-09-08 PROCEDURE — 25010000002 FENTANYL CITRATE (PF) 100 MCG/2ML SOLUTION: Performed by: NURSE ANESTHETIST, CERTIFIED REGISTERED

## 2017-09-08 PROCEDURE — L1830 KO IMMOB CANVAS LONG PRE OTS: HCPCS | Performed by: ORTHOPAEDIC SURGERY

## 2017-09-08 DEVICE — IMPLANTABLE DEVICE: Type: IMPLANTABLE DEVICE | Site: PATELLA | Status: FUNCTIONAL

## 2017-09-08 RX ORDER — LIDOCAINE HYDROCHLORIDE 20 MG/ML
INJECTION, SOLUTION INFILTRATION; PERINEURAL AS NEEDED
Status: DISCONTINUED | OUTPATIENT
Start: 2017-09-08 | End: 2017-09-08 | Stop reason: SURG

## 2017-09-08 RX ORDER — ONDANSETRON 2 MG/ML
4 INJECTION INTRAMUSCULAR; INTRAVENOUS ONCE AS NEEDED
Status: DISCONTINUED | OUTPATIENT
Start: 2017-09-08 | End: 2017-09-08 | Stop reason: HOSPADM

## 2017-09-08 RX ORDER — HYDROMORPHONE HCL 110MG/55ML
0.5 PATIENT CONTROLLED ANALGESIA SYRINGE INTRAVENOUS
Status: DISCONTINUED | OUTPATIENT
Start: 2017-09-08 | End: 2017-09-08 | Stop reason: HOSPADM

## 2017-09-08 RX ORDER — MORPHINE SULFATE 4 MG/ML
4 INJECTION, SOLUTION INTRAMUSCULAR; INTRAVENOUS
Status: DISCONTINUED | OUTPATIENT
Start: 2017-09-08 | End: 2017-09-11 | Stop reason: HOSPADM

## 2017-09-08 RX ORDER — DOCUSATE SODIUM 100 MG/1
100 CAPSULE, LIQUID FILLED ORAL 2 TIMES DAILY PRN
Status: DISCONTINUED | OUTPATIENT
Start: 2017-09-08 | End: 2017-09-11 | Stop reason: HOSPADM

## 2017-09-08 RX ORDER — WARFARIN SODIUM 2.5 MG/1
2.5 TABLET ORAL
Status: DISCONTINUED | OUTPATIENT
Start: 2017-09-08 | End: 2017-09-09

## 2017-09-08 RX ORDER — PROPOFOL 10 MG/ML
VIAL (ML) INTRAVENOUS AS NEEDED
Status: DISCONTINUED | OUTPATIENT
Start: 2017-09-08 | End: 2017-09-08 | Stop reason: SURG

## 2017-09-08 RX ORDER — MORPHINE SULFATE 10 MG/ML
INJECTION, SOLUTION INTRAMUSCULAR; INTRAVENOUS AS NEEDED
Status: DISCONTINUED | OUTPATIENT
Start: 2017-09-08 | End: 2017-09-11 | Stop reason: HOSPADM

## 2017-09-08 RX ORDER — MIDAZOLAM HYDROCHLORIDE 1 MG/ML
INJECTION INTRAMUSCULAR; INTRAVENOUS AS NEEDED
Status: DISCONTINUED | OUTPATIENT
Start: 2017-09-08 | End: 2017-09-08 | Stop reason: SURG

## 2017-09-08 RX ORDER — ONDANSETRON 2 MG/ML
INJECTION INTRAMUSCULAR; INTRAVENOUS AS NEEDED
Status: DISCONTINUED | OUTPATIENT
Start: 2017-09-08 | End: 2017-09-08 | Stop reason: SURG

## 2017-09-08 RX ORDER — FENTANYL CITRATE 50 UG/ML
INJECTION, SOLUTION INTRAMUSCULAR; INTRAVENOUS AS NEEDED
Status: DISCONTINUED | OUTPATIENT
Start: 2017-09-08 | End: 2017-09-08 | Stop reason: SURG

## 2017-09-08 RX ORDER — BUPIVACAINE HYDROCHLORIDE AND EPINEPHRINE 2.5; 5 MG/ML; UG/ML
INJECTION, SOLUTION EPIDURAL; INFILTRATION; INTRACAUDAL; PERINEURAL AS NEEDED
Status: DISCONTINUED | OUTPATIENT
Start: 2017-09-08 | End: 2017-09-11 | Stop reason: HOSPADM

## 2017-09-08 RX ORDER — HYDROCODONE BITARTRATE AND ACETAMINOPHEN 10; 325 MG/1; MG/1
1 TABLET ORAL EVERY 4 HOURS PRN
Status: DISCONTINUED | OUTPATIENT
Start: 2017-09-08 | End: 2017-09-11 | Stop reason: HOSPADM

## 2017-09-08 RX ORDER — POTASSIUM CITRATE 10 MEQ/1
10 TABLET, EXTENDED RELEASE ORAL 2 TIMES DAILY WITH MEALS
Status: DISCONTINUED | OUTPATIENT
Start: 2017-09-08 | End: 2017-09-08 | Stop reason: SDUPTHER

## 2017-09-08 RX ORDER — ONDANSETRON 4 MG/1
4 TABLET, FILM COATED ORAL EVERY 6 HOURS PRN
Status: DISCONTINUED | OUTPATIENT
Start: 2017-09-08 | End: 2017-09-11 | Stop reason: HOSPADM

## 2017-09-08 RX ORDER — NALOXONE HCL 0.4 MG/ML
0.4 VIAL (ML) INJECTION
Status: DISCONTINUED | OUTPATIENT
Start: 2017-09-08 | End: 2017-09-11 | Stop reason: HOSPADM

## 2017-09-08 RX ADMIN — POTASSIUM CITRATE 10 MEQ: 10 TABLET ORAL at 08:59

## 2017-09-08 RX ADMIN — FINASTERIDE 5 MG: 5 TABLET, FILM COATED ORAL at 21:08

## 2017-09-08 RX ADMIN — CEFAZOLIN SODIUM 2 G: 1 INJECTION, POWDER, FOR SOLUTION INTRAMUSCULAR; INTRAVENOUS at 18:23

## 2017-09-08 RX ADMIN — SODIUM CHLORIDE 100 ML/HR: 900 INJECTION, SOLUTION INTRAVENOUS at 09:00

## 2017-09-08 RX ADMIN — FENTANYL CITRATE 25 MCG: 50 INJECTION, SOLUTION INTRAMUSCULAR; INTRAVENOUS at 14:11

## 2017-09-08 RX ADMIN — FENTANYL CITRATE 25 MCG: 50 INJECTION, SOLUTION INTRAMUSCULAR; INTRAVENOUS at 14:56

## 2017-09-08 RX ADMIN — FENTANYL CITRATE 25 MCG: 50 INJECTION, SOLUTION INTRAMUSCULAR; INTRAVENOUS at 15:20

## 2017-09-08 RX ADMIN — HYDROMORPHONE HYDROCHLORIDE 1 MG: 2 INJECTION, SOLUTION INTRAMUSCULAR; INTRAVENOUS; SUBCUTANEOUS at 06:00

## 2017-09-08 RX ADMIN — HYDROCHLOROTHIAZIDE 25 MG: 25 TABLET ORAL at 08:59

## 2017-09-08 RX ADMIN — SILDENAFIL CITRATE 20 MG: 20 TABLET, FILM COATED ORAL at 08:59

## 2017-09-08 RX ADMIN — HYDROMORPHONE HYDROCHLORIDE 0.5 MG: 2 INJECTION, SOLUTION INTRAMUSCULAR; INTRAVENOUS; SUBCUTANEOUS at 16:28

## 2017-09-08 RX ADMIN — FAMOTIDINE 40 MG: 40 TABLET ORAL at 08:59

## 2017-09-08 RX ADMIN — MORPHINE SULFATE 4 MG: 4 INJECTION, SOLUTION INTRAMUSCULAR; INTRAVENOUS at 19:11

## 2017-09-08 RX ADMIN — SODIUM CHLORIDE 100 ML/HR: 900 INJECTION, SOLUTION INTRAVENOUS at 04:27

## 2017-09-08 RX ADMIN — FENTANYL CITRATE 25 MCG: 50 INJECTION, SOLUTION INTRAMUSCULAR; INTRAVENOUS at 14:32

## 2017-09-08 RX ADMIN — LIDOCAINE HYDROCHLORIDE 50 MG: 20 INJECTION, SOLUTION INFILTRATION; PERINEURAL at 14:16

## 2017-09-08 RX ADMIN — HYDROMORPHONE HYDROCHLORIDE 0.5 MG: 2 INJECTION, SOLUTION INTRAMUSCULAR; INTRAVENOUS; SUBCUTANEOUS at 16:42

## 2017-09-08 RX ADMIN — ONDANSETRON 4 MG: 2 INJECTION INTRAMUSCULAR; INTRAVENOUS at 15:51

## 2017-09-08 RX ADMIN — SILDENAFIL CITRATE 20 MG: 20 TABLET, FILM COATED ORAL at 21:08

## 2017-09-08 RX ADMIN — WARFARIN SODIUM 2.5 MG: 2.5 TABLET ORAL at 18:22

## 2017-09-08 RX ADMIN — MIDAZOLAM 2 MG: 1 INJECTION INTRAMUSCULAR; INTRAVENOUS at 14:11

## 2017-09-08 RX ADMIN — PROPOFOL 150 MG: 10 INJECTION, EMULSION INTRAVENOUS at 14:16

## 2017-09-08 RX ADMIN — ATORVASTATIN CALCIUM 10 MG: 10 TABLET, FILM COATED ORAL at 08:59

## 2017-09-08 NOTE — ANESTHESIA PREPROCEDURE EVALUATION
Anesthesia Evaluation     no history of anesthetic complications:  NPO Solid Status: > 8 hours  NPO Liquid Status: > 8 hours     Airway   Mallampati: II  TM distance: >3 FB  Neck ROM: full  possible difficult intubation  Dental - normal exam         Pulmonary - negative pulmonary ROS and normal exam   (-) not a smoker  Cardiovascular - normal exam  Exercise tolerance: good (4-7 METS)    ECG reviewed  Rhythm: regular  Rate: normal    (+) hypertension, hyperlipidemia  (-) angina, murmur    ROS comment: Normal sinus rhythm  Normal ECG  No previous ECGs available  Confirmed by AMINATA    Neuro/Psych- negative ROS  GI/Hepatic/Renal/Endo    (+)  renal disease (hx of stones) stones,     ROS Comment: BPH    Musculoskeletal         ROS comment: Fell off ladder  Abdominal    Substance History   (+) alcohol use (occ),   (-) drug use     OB/GYN          Other   (+) arthritis (hands)   history of cancer (skin)                                      Anesthesia Plan    ASA 3     general and regional     intravenous induction   Anesthetic plan and risks discussed with patient.

## 2017-09-08 NOTE — ANESTHESIA POSTPROCEDURE EVALUATION
Patient: Vangie Lopez    Procedure Summary     Date Anesthesia Start Anesthesia Stop Room / Location    09/08/17 [id number] [facility] OR 08 / [facility]       Procedure Diagnosis Surgeon Provider    OPEN REDUCTION INTERNAL FIXATION RIGHT PATELLA       (C-ARM#3) (Right Knee) Patella fracture  (Patella fracture [S82.009A]) MD Wade Caraballo MD          Anesthesia Type: general, regional  Last vitals  BP        Temp        Pulse       Resp        SpO2          Post Anesthesia Care and Evaluation    Patient location during evaluation: PACU  Patient participation: complete - patient participated  Level of consciousness: awake and alert  Pain management: adequate  Airway patency: patent  Anesthetic complications: No anesthetic complications    Cardiovascular status: acceptable  Respiratory status: acceptable  Hydration status: acceptable

## 2017-09-08 NOTE — ANESTHESIA PROCEDURE NOTES
Peripheral Block    Patient location during procedure: OR  Reason for block: at surgeon's request and post-op pain management  Performed by  Anesthesiologist: LUIS EDUARDO KEVIN  Preanesthetic Checklist  Completed: patient identified, site marked, surgical consent, pre-op evaluation, timeout performed, IV checked, risks and benefits discussed and monitors and equipment checked  Prep:  Pt Position: supine  Sterile barriers:cap, gloves and mask  Prep: ChloraPrep  Patient monitoring: blood pressure monitoring, continuous pulse oximetry and EKG  Procedure  Sedation:yes    Guidance:ultrasound guided  ULTRASOUND INTERPRETATION. Using ultrasound guidance a gauge needle was placed in close proximity to the femoral nerve, at which point, under ultrasound guidance anesthetic was injected in the area of the nerve and spread of the anesthesia was seen on ultrasound in close proximity thereto.  There were no abnormalities seen on ultrasound; a digital image was taken; and the patient tolerated the procedure with no complications. Images:still images obtained    Laterality:right  Block Type:femoral  Injection Technique:single-shot  Needle Type:long-bevel  Needle Gauge:21 G    Medications  Comment:UltraSound guided  Needle seen throughout  Local surrounded nerve well  No complications  Local Injected:ropivacaine 0.5% Local Amount Injected:30mL  Post Assessment  Injection Assessment: negative aspiration for heme, no paresthesia on injection and incremental injection  Patient Tolerance:comfortable throughout block  Complications:no

## 2017-09-08 NOTE — ANESTHESIA PROCEDURE NOTES
Airway  Urgency: elective    Airway not difficult    General Information and Staff    Patient location during procedure: OR  CRNA: ULISES ROD    Indications and Patient Condition  Indications for airway management: airway protection    Preoxygenated: yes  MILS maintained throughout  Mask difficulty assessment: 0 - not attempted    Final Airway Details  Final airway type: supraglottic airway      Successful airway: classic  Size 4    Number of attempts at approach: 1

## 2017-09-09 PROBLEM — S82.009A PATELLA FRACTURE: Status: ACTIVE | Noted: 2017-09-06

## 2017-09-09 LAB
HOLD SPECIMEN: NORMAL
INR PPP: 1.11 (ref 0.8–1.2)
PROTHROMBIN TIME: 14.2 SECONDS (ref 11.1–15.3)

## 2017-09-09 PROCEDURE — 93010 ELECTROCARDIOGRAM REPORT: CPT | Performed by: INTERNAL MEDICINE

## 2017-09-09 PROCEDURE — 85610 PROTHROMBIN TIME: CPT | Performed by: ORTHOPAEDIC SURGERY

## 2017-09-09 PROCEDURE — 25010000002 ENOXAPARIN PER 10 MG: Performed by: ORTHOPAEDIC SURGERY

## 2017-09-09 PROCEDURE — G8981 BODY POS CURRENT STATUS: HCPCS

## 2017-09-09 PROCEDURE — G0378 HOSPITAL OBSERVATION PER HR: HCPCS

## 2017-09-09 PROCEDURE — 97162 PT EVAL MOD COMPLEX 30 MIN: CPT

## 2017-09-09 PROCEDURE — 97530 THERAPEUTIC ACTIVITIES: CPT

## 2017-09-09 PROCEDURE — G8982 BODY POS GOAL STATUS: HCPCS

## 2017-09-09 PROCEDURE — 25010000002 MORPHINE PER 10 MG: Performed by: ORTHOPAEDIC SURGERY

## 2017-09-09 PROCEDURE — 93005 ELECTROCARDIOGRAM TRACING: CPT | Performed by: INTERNAL MEDICINE

## 2017-09-09 PROCEDURE — 25010000003 CEFAZOLIN PER 500 MG: Performed by: ORTHOPAEDIC SURGERY

## 2017-09-09 PROCEDURE — 97535 SELF CARE MNGMENT TRAINING: CPT

## 2017-09-09 RX ORDER — WARFARIN SODIUM 5 MG/1
5 TABLET ORAL
Status: DISCONTINUED | OUTPATIENT
Start: 2017-09-09 | End: 2017-09-11

## 2017-09-09 RX ADMIN — FINASTERIDE 5 MG: 5 TABLET, FILM COATED ORAL at 20:21

## 2017-09-09 RX ADMIN — ATORVASTATIN CALCIUM 10 MG: 10 TABLET, FILM COATED ORAL at 08:50

## 2017-09-09 RX ADMIN — HYDROCODONE BITARTRATE AND ACETAMINOPHEN 1 TABLET: 10; 325 TABLET ORAL at 08:56

## 2017-09-09 RX ADMIN — POTASSIUM CITRATE 10 MEQ: 10 TABLET ORAL at 17:48

## 2017-09-09 RX ADMIN — HYDROCODONE BITARTRATE AND ACETAMINOPHEN 1 TABLET: 10; 325 TABLET ORAL at 22:13

## 2017-09-09 RX ADMIN — CEFAZOLIN SODIUM 2 G: 1 INJECTION, POWDER, FOR SOLUTION INTRAMUSCULAR; INTRAVENOUS at 01:30

## 2017-09-09 RX ADMIN — MORPHINE SULFATE 4 MG: 4 INJECTION, SOLUTION INTRAMUSCULAR; INTRAVENOUS at 11:21

## 2017-09-09 RX ADMIN — HYDROCODONE BITARTRATE AND ACETAMINOPHEN 1 TABLET: 10; 325 TABLET ORAL at 06:03

## 2017-09-09 RX ADMIN — ENOXAPARIN SODIUM 40 MG: 40 INJECTION SUBCUTANEOUS at 23:30

## 2017-09-09 RX ADMIN — MORPHINE SULFATE 4 MG: 4 INJECTION, SOLUTION INTRAMUSCULAR; INTRAVENOUS at 14:20

## 2017-09-09 RX ADMIN — SODIUM CHLORIDE 100 ML/HR: 900 INJECTION, SOLUTION INTRAVENOUS at 11:21

## 2017-09-09 RX ADMIN — MORPHINE SULFATE 4 MG: 4 INJECTION, SOLUTION INTRAMUSCULAR; INTRAVENOUS at 17:48

## 2017-09-09 RX ADMIN — SILDENAFIL CITRATE 20 MG: 20 TABLET, FILM COATED ORAL at 20:21

## 2017-09-09 RX ADMIN — SODIUM CHLORIDE 100 ML/HR: 900 INJECTION, SOLUTION INTRAVENOUS at 01:34

## 2017-09-09 RX ADMIN — WARFARIN SODIUM 5 MG: 5 TABLET ORAL at 17:48

## 2017-09-09 RX ADMIN — HYDROCHLOROTHIAZIDE 25 MG: 25 TABLET ORAL at 08:50

## 2017-09-09 RX ADMIN — SILDENAFIL CITRATE 20 MG: 20 TABLET, FILM COATED ORAL at 08:50

## 2017-09-09 RX ADMIN — FAMOTIDINE 40 MG: 40 TABLET ORAL at 08:50

## 2017-09-09 RX ADMIN — HYDROCODONE BITARTRATE AND ACETAMINOPHEN 1 TABLET: 10; 325 TABLET ORAL at 01:34

## 2017-09-09 RX ADMIN — ENOXAPARIN SODIUM 40 MG: 40 INJECTION SUBCUTANEOUS at 01:29

## 2017-09-09 RX ADMIN — METOPROLOL TARTRATE 25 MG: 25 TABLET ORAL at 02:18

## 2017-09-09 RX ADMIN — SILDENAFIL CITRATE 20 MG: 20 TABLET, FILM COATED ORAL at 17:48

## 2017-09-09 RX ADMIN — MORPHINE SULFATE 4 MG: 4 INJECTION, SOLUTION INTRAMUSCULAR; INTRAVENOUS at 03:01

## 2017-09-09 RX ADMIN — POTASSIUM CITRATE 10 MEQ: 10 TABLET ORAL at 08:51

## 2017-09-10 LAB
ALBUMIN SERPL-MCNC: 3.4 G/DL (ref 3.4–4.8)
ALBUMIN/GLOB SERPL: 1.3 G/DL (ref 1.1–1.8)
ALP SERPL-CCNC: 94 U/L (ref 38–126)
ALT SERPL W P-5'-P-CCNC: 37 U/L (ref 21–72)
ANION GAP SERPL CALCULATED.3IONS-SCNC: 8 MMOL/L (ref 5–15)
AST SERPL-CCNC: 50 U/L (ref 17–59)
BASOPHILS # BLD AUTO: 0.01 10*3/MM3 (ref 0–0.2)
BASOPHILS NFR BLD AUTO: 0.1 % (ref 0–2)
BILIRUB SERPL-MCNC: 1.1 MG/DL (ref 0.2–1.3)
BUN BLD-MCNC: 15 MG/DL (ref 7–21)
BUN/CREAT SERPL: 20.8 (ref 7–25)
CALCIUM SPEC-SCNC: 9.1 MG/DL (ref 8.4–10.2)
CHLORIDE SERPL-SCNC: 98 MMOL/L (ref 95–110)
CO2 SERPL-SCNC: 30 MMOL/L (ref 22–31)
CREAT BLD-MCNC: 0.72 MG/DL (ref 0.7–1.3)
DEPRECATED RDW RBC AUTO: 40.9 FL (ref 35.1–43.9)
EOSINOPHIL # BLD AUTO: 0.25 10*3/MM3 (ref 0–0.7)
EOSINOPHIL NFR BLD AUTO: 2.5 % (ref 0–7)
ERYTHROCYTE [DISTWIDTH] IN BLOOD BY AUTOMATED COUNT: 12.5 % (ref 11.5–14.5)
GFR SERPL CREATININE-BSD FRML MDRD: 109 ML/MIN/1.73 (ref 49–113)
GLOBULIN UR ELPH-MCNC: 2.7 GM/DL (ref 2.3–3.5)
GLUCOSE BLD-MCNC: 159 MG/DL (ref 60–100)
HCT VFR BLD AUTO: 37 % (ref 39–49)
HCT VFR BLD AUTO: 37 % (ref 39–49)
HGB BLD-MCNC: 13.2 G/DL (ref 13.7–17.3)
HGB BLD-MCNC: 13.2 G/DL (ref 13.7–17.3)
HOLD SPECIMEN: NORMAL
IMM GRANULOCYTES # BLD: 0.03 10*3/MM3 (ref 0–0.02)
IMM GRANULOCYTES NFR BLD: 0.3 % (ref 0–0.5)
INR PPP: 1.3 (ref 0.8–1.2)
LYMPHOCYTES # BLD AUTO: 1.11 10*3/MM3 (ref 0.6–4.2)
LYMPHOCYTES NFR BLD AUTO: 10.9 % (ref 10–50)
MCH RBC QN AUTO: 32 PG (ref 26.5–34)
MCHC RBC AUTO-ENTMCNC: 35.7 G/DL (ref 31.5–36.3)
MCV RBC AUTO: 89.8 FL (ref 80–98)
MONOCYTES # BLD AUTO: 1.2 10*3/MM3 (ref 0–0.9)
MONOCYTES NFR BLD AUTO: 11.8 % (ref 0–12)
NEUTROPHILS # BLD AUTO: 7.58 10*3/MM3 (ref 2–8.6)
NEUTROPHILS NFR BLD AUTO: 74.4 % (ref 37–80)
NRBC BLD MANUAL-RTO: 0 /100 WBC (ref 0–0)
PLATELET # BLD AUTO: 206 10*3/MM3 (ref 150–450)
PMV BLD AUTO: 9.4 FL (ref 8–12)
POTASSIUM BLD-SCNC: 3.7 MMOL/L (ref 3.5–5.1)
PROT SERPL-MCNC: 6.1 G/DL (ref 6.3–8.6)
PROTHROMBIN TIME: ABNORMAL SECONDS (ref 11.1–15.3)
RBC # BLD AUTO: 4.12 10*6/MM3 (ref 4.37–5.74)
SODIUM BLD-SCNC: 136 MMOL/L (ref 137–145)
WBC NRBC COR # BLD: 10.18 10*3/MM3 (ref 3.2–9.8)

## 2017-09-10 PROCEDURE — 85014 HEMATOCRIT: CPT | Performed by: ORTHOPAEDIC SURGERY

## 2017-09-10 PROCEDURE — 80053 COMPREHEN METABOLIC PANEL: CPT | Performed by: FAMILY MEDICINE

## 2017-09-10 PROCEDURE — 85610 PROTHROMBIN TIME: CPT | Performed by: ORTHOPAEDIC SURGERY

## 2017-09-10 PROCEDURE — G0378 HOSPITAL OBSERVATION PER HR: HCPCS

## 2017-09-10 PROCEDURE — 25010000002 ENOXAPARIN PER 10 MG: Performed by: ORTHOPAEDIC SURGERY

## 2017-09-10 PROCEDURE — 97530 THERAPEUTIC ACTIVITIES: CPT

## 2017-09-10 PROCEDURE — 85025 COMPLETE CBC W/AUTO DIFF WBC: CPT | Performed by: FAMILY MEDICINE

## 2017-09-10 PROCEDURE — 85018 HEMOGLOBIN: CPT | Performed by: ORTHOPAEDIC SURGERY

## 2017-09-10 PROCEDURE — 25010000002 MORPHINE PER 10 MG: Performed by: ORTHOPAEDIC SURGERY

## 2017-09-10 RX ADMIN — POTASSIUM CITRATE 10 MEQ: 10 TABLET ORAL at 17:23

## 2017-09-10 RX ADMIN — SILDENAFIL CITRATE 20 MG: 20 TABLET, FILM COATED ORAL at 17:23

## 2017-09-10 RX ADMIN — HYDROCODONE BITARTRATE AND ACETAMINOPHEN 1 TABLET: 10; 325 TABLET ORAL at 06:27

## 2017-09-10 RX ADMIN — SILDENAFIL CITRATE 20 MG: 20 TABLET, FILM COATED ORAL at 20:36

## 2017-09-10 RX ADMIN — FAMOTIDINE 40 MG: 40 TABLET ORAL at 09:43

## 2017-09-10 RX ADMIN — FINASTERIDE 5 MG: 5 TABLET, FILM COATED ORAL at 20:36

## 2017-09-10 RX ADMIN — POTASSIUM CITRATE 10 MEQ: 10 TABLET ORAL at 09:43

## 2017-09-10 RX ADMIN — WARFARIN SODIUM 5 MG: 5 TABLET ORAL at 17:22

## 2017-09-10 RX ADMIN — SILDENAFIL CITRATE 20 MG: 20 TABLET, FILM COATED ORAL at 09:43

## 2017-09-10 RX ADMIN — HYDROCHLOROTHIAZIDE 25 MG: 25 TABLET ORAL at 09:43

## 2017-09-10 RX ADMIN — MORPHINE SULFATE 4 MG: 4 INJECTION, SOLUTION INTRAMUSCULAR; INTRAVENOUS at 13:17

## 2017-09-10 RX ADMIN — ATORVASTATIN CALCIUM 10 MG: 10 TABLET, FILM COATED ORAL at 09:43

## 2017-09-10 RX ADMIN — ENOXAPARIN SODIUM 40 MG: 40 INJECTION SUBCUTANEOUS at 23:01

## 2017-09-10 RX ADMIN — HYDROCODONE BITARTRATE AND ACETAMINOPHEN 1 TABLET: 10; 325 TABLET ORAL at 15:47

## 2017-09-10 RX ADMIN — SODIUM CHLORIDE 100 ML/HR: 900 INJECTION, SOLUTION INTRAVENOUS at 09:43

## 2017-09-11 ENCOUNTER — HOSPITAL ENCOUNTER (INPATIENT)
Facility: HOSPITAL | Age: 67
LOS: 11 days | Discharge: HOME HEALTH | End: 2017-09-22
Attending: FAMILY MEDICINE | Admitting: FAMILY MEDICINE

## 2017-09-11 VITALS
HEART RATE: 119 BPM | WEIGHT: 150.5 LBS | DIASTOLIC BLOOD PRESSURE: 76 MMHG | OXYGEN SATURATION: 94 % | TEMPERATURE: 98.5 F | SYSTOLIC BLOOD PRESSURE: 142 MMHG | RESPIRATION RATE: 18 BRPM | HEIGHT: 69 IN | BODY MASS INDEX: 22.29 KG/M2

## 2017-09-11 DIAGNOSIS — S92.252D CLOSED DISPLACED FRACTURE OF NAVICULAR BONE OF LEFT FOOT WITH ROUTINE HEALING, SUBSEQUENT ENCOUNTER: ICD-10-CM

## 2017-09-11 DIAGNOSIS — S82.041D CLOSED DISPLACED COMMINUTED FRACTURE OF RIGHT PATELLA WITH ROUTINE HEALING, SUBSEQUENT ENCOUNTER: ICD-10-CM

## 2017-09-11 DIAGNOSIS — Z78.9 IMPAIRED MOBILITY AND ADLS: ICD-10-CM

## 2017-09-11 DIAGNOSIS — T07.XXXA MULTIPLE TRAUMA: Primary | ICD-10-CM

## 2017-09-11 DIAGNOSIS — R26.9 ABNORMALITY OF GAIT AND MOBILITY: ICD-10-CM

## 2017-09-11 DIAGNOSIS — Z74.09 IMPAIRED MOBILITY AND ADLS: ICD-10-CM

## 2017-09-11 DIAGNOSIS — M62.81 MUSCLE WEAKNESS (GENERALIZED): ICD-10-CM

## 2017-09-11 PROBLEM — S62.002A: Status: ACTIVE | Noted: 2017-09-06

## 2017-09-11 PROBLEM — Z51.89 ENCOUNTER FOR REHABILITATION: Status: ACTIVE | Noted: 2017-09-11

## 2017-09-11 PROBLEM — S63.035A: Status: ACTIVE | Noted: 2017-09-06

## 2017-09-11 PROBLEM — S82.001A FRACTURE OF PATELLA, RIGHT, CLOSED: Status: ACTIVE | Noted: 2017-09-06

## 2017-09-11 LAB
HOLD SPECIMEN: NORMAL
INR PPP: 1.64 (ref 0.8–1.2)
PROTHROMBIN TIME: 19.5 SECONDS (ref 11.1–15.3)
WHOLE BLOOD HOLD SPECIMEN: NORMAL

## 2017-09-11 PROCEDURE — G8979 MOBILITY GOAL STATUS: HCPCS | Performed by: PHYSICAL THERAPIST

## 2017-09-11 PROCEDURE — 97167 OT EVAL HIGH COMPLEX 60 MIN: CPT

## 2017-09-11 PROCEDURE — 97530 THERAPEUTIC ACTIVITIES: CPT | Performed by: PHYSICAL THERAPIST

## 2017-09-11 PROCEDURE — 97542 WHEELCHAIR MNGMENT TRAINING: CPT

## 2017-09-11 PROCEDURE — 94799 UNLISTED PULMONARY SVC/PX: CPT

## 2017-09-11 PROCEDURE — 97110 THERAPEUTIC EXERCISES: CPT

## 2017-09-11 PROCEDURE — 99024 POSTOP FOLLOW-UP VISIT: CPT | Performed by: PODIATRIST

## 2017-09-11 PROCEDURE — 25010000002 ENOXAPARIN PER 10 MG: Performed by: FAMILY MEDICINE

## 2017-09-11 PROCEDURE — G8988 SELF CARE GOAL STATUS: HCPCS

## 2017-09-11 PROCEDURE — 99222 1ST HOSP IP/OBS MODERATE 55: CPT | Performed by: FAMILY MEDICINE

## 2017-09-11 PROCEDURE — G0378 HOSPITAL OBSERVATION PER HR: HCPCS

## 2017-09-11 PROCEDURE — 97535 SELF CARE MNGMENT TRAINING: CPT

## 2017-09-11 PROCEDURE — 85610 PROTHROMBIN TIME: CPT | Performed by: ORTHOPAEDIC SURGERY

## 2017-09-11 PROCEDURE — G8987 SELF CARE CURRENT STATUS: HCPCS

## 2017-09-11 PROCEDURE — 97163 PT EVAL HIGH COMPLEX 45 MIN: CPT | Performed by: PHYSICAL THERAPIST

## 2017-09-11 PROCEDURE — G8978 MOBILITY CURRENT STATUS: HCPCS | Performed by: PHYSICAL THERAPIST

## 2017-09-11 PROCEDURE — 97140 MANUAL THERAPY 1/> REGIONS: CPT

## 2017-09-11 PROCEDURE — 97530 THERAPEUTIC ACTIVITIES: CPT

## 2017-09-11 RX ORDER — CALCIUM CARBONATE 200(500)MG
2 TABLET,CHEWABLE ORAL 2 TIMES DAILY PRN
Status: DISCONTINUED | OUTPATIENT
Start: 2017-09-11 | End: 2017-09-11 | Stop reason: HOSPADM

## 2017-09-11 RX ORDER — ACETAMINOPHEN 325 MG/1
650 TABLET ORAL EVERY 4 HOURS PRN
Status: DISCONTINUED | OUTPATIENT
Start: 2017-09-11 | End: 2017-09-11 | Stop reason: HOSPADM

## 2017-09-11 RX ORDER — SENNA AND DOCUSATE SODIUM 50; 8.6 MG/1; MG/1
1 TABLET, FILM COATED ORAL 2 TIMES DAILY
Status: DISCONTINUED | OUTPATIENT
Start: 2017-09-11 | End: 2017-09-11 | Stop reason: HOSPADM

## 2017-09-11 RX ORDER — SODIUM CHLORIDE 0.9 % (FLUSH) 0.9 %
1-10 SYRINGE (ML) INJECTION AS NEEDED
Status: DISCONTINUED | OUTPATIENT
Start: 2017-09-11 | End: 2017-09-11

## 2017-09-11 RX ORDER — FAMOTIDINE 40 MG/1
40 TABLET, FILM COATED ORAL DAILY
Status: CANCELLED | OUTPATIENT
Start: 2017-09-12

## 2017-09-11 RX ORDER — ONDANSETRON 4 MG/1
4 TABLET, FILM COATED ORAL EVERY 6 HOURS PRN
Status: CANCELLED | OUTPATIENT
Start: 2017-09-11

## 2017-09-11 RX ORDER — ONDANSETRON 4 MG/1
4 TABLET, FILM COATED ORAL EVERY 6 HOURS PRN
Status: DISCONTINUED | OUTPATIENT
Start: 2017-09-11 | End: 2017-09-22 | Stop reason: HOSPADM

## 2017-09-11 RX ORDER — ACETAMINOPHEN 325 MG/1
650 TABLET ORAL EVERY 6 HOURS PRN
Status: DISCONTINUED | OUTPATIENT
Start: 2017-09-11 | End: 2017-09-22 | Stop reason: HOSPADM

## 2017-09-11 RX ORDER — FINASTERIDE 5 MG/1
5 TABLET, FILM COATED ORAL NIGHTLY
Status: DISCONTINUED | OUTPATIENT
Start: 2017-09-11 | End: 2017-09-22 | Stop reason: HOSPADM

## 2017-09-11 RX ORDER — WARFARIN SODIUM 4 MG/1
4 TABLET ORAL
Status: CANCELLED | OUTPATIENT
Start: 2017-09-11

## 2017-09-11 RX ORDER — POLYETHYLENE GLYCOL 3350 17 G/17G
17 POWDER, FOR SOLUTION ORAL DAILY
Status: CANCELLED | OUTPATIENT
Start: 2017-09-11

## 2017-09-11 RX ORDER — ATORVASTATIN CALCIUM 10 MG/1
10 TABLET, FILM COATED ORAL DAILY
Status: CANCELLED | OUTPATIENT
Start: 2017-09-12

## 2017-09-11 RX ORDER — SILDENAFIL CITRATE 20 MG/1
20 TABLET ORAL 3 TIMES DAILY
Status: CANCELLED | OUTPATIENT
Start: 2017-09-11

## 2017-09-11 RX ORDER — POTASSIUM CITRATE 10 MEQ/1
10 TABLET, EXTENDED RELEASE ORAL 2 TIMES DAILY WITH MEALS
Status: DISCONTINUED | OUTPATIENT
Start: 2017-09-11 | End: 2017-09-22 | Stop reason: HOSPADM

## 2017-09-11 RX ORDER — ACETAMINOPHEN 325 MG/1
650 TABLET ORAL EVERY 6 HOURS PRN
Status: CANCELLED | OUTPATIENT
Start: 2017-09-11

## 2017-09-11 RX ORDER — HYDROCODONE BITARTRATE AND ACETAMINOPHEN 10; 325 MG/1; MG/1
1 TABLET ORAL EVERY 4 HOURS PRN
Status: CANCELLED | OUTPATIENT
Start: 2017-09-11 | End: 2017-09-18

## 2017-09-11 RX ORDER — SENNA AND DOCUSATE SODIUM 50; 8.6 MG/1; MG/1
1 TABLET, FILM COATED ORAL 2 TIMES DAILY
Status: DISCONTINUED | OUTPATIENT
Start: 2017-09-11 | End: 2017-09-22 | Stop reason: HOSPADM

## 2017-09-11 RX ORDER — NALOXONE HCL 0.4 MG/ML
0.4 VIAL (ML) INJECTION
Status: CANCELLED | OUTPATIENT
Start: 2017-09-11

## 2017-09-11 RX ORDER — POTASSIUM CITRATE 10 MEQ/1
10 TABLET, EXTENDED RELEASE ORAL 2 TIMES DAILY WITH MEALS
Status: CANCELLED | OUTPATIENT
Start: 2017-09-11

## 2017-09-11 RX ORDER — BISACODYL 10 MG
10 SUPPOSITORY, RECTAL RECTAL DAILY PRN
Status: CANCELLED | OUTPATIENT
Start: 2017-09-11

## 2017-09-11 RX ORDER — HYDROCHLOROTHIAZIDE 25 MG/1
25 TABLET ORAL DAILY
Status: DISCONTINUED | OUTPATIENT
Start: 2017-09-12 | End: 2017-09-22 | Stop reason: HOSPADM

## 2017-09-11 RX ORDER — ATORVASTATIN CALCIUM 10 MG/1
10 TABLET, FILM COATED ORAL DAILY
Status: DISCONTINUED | OUTPATIENT
Start: 2017-09-12 | End: 2017-09-22 | Stop reason: HOSPADM

## 2017-09-11 RX ORDER — FAMOTIDINE 40 MG/1
40 TABLET, FILM COATED ORAL DAILY
Status: DISCONTINUED | OUTPATIENT
Start: 2017-09-12 | End: 2017-09-22 | Stop reason: HOSPADM

## 2017-09-11 RX ORDER — CALCIUM CARBONATE 200(500)MG
2 TABLET,CHEWABLE ORAL 2 TIMES DAILY PRN
Status: DISCONTINUED | OUTPATIENT
Start: 2017-09-11 | End: 2017-09-22 | Stop reason: HOSPADM

## 2017-09-11 RX ORDER — BISACODYL 10 MG
10 SUPPOSITORY, RECTAL RECTAL DAILY PRN
Status: DISCONTINUED | OUTPATIENT
Start: 2017-09-11 | End: 2017-09-22 | Stop reason: HOSPADM

## 2017-09-11 RX ORDER — SODIUM CHLORIDE 9 MG/ML
100 INJECTION, SOLUTION INTRAVENOUS CONTINUOUS
Status: CANCELLED | OUTPATIENT
Start: 2017-09-11

## 2017-09-11 RX ORDER — WARFARIN SODIUM 4 MG/1
4 TABLET ORAL
Status: DISCONTINUED | OUTPATIENT
Start: 2017-09-11 | End: 2017-09-14

## 2017-09-11 RX ORDER — HYDROCODONE BITARTRATE AND ACETAMINOPHEN 10; 325 MG/1; MG/1
1 TABLET ORAL EVERY 4 HOURS PRN
Status: DISCONTINUED | OUTPATIENT
Start: 2017-09-11 | End: 2017-09-22 | Stop reason: HOSPADM

## 2017-09-11 RX ORDER — POLYETHYLENE GLYCOL 3350 17 G/17G
17 POWDER, FOR SOLUTION ORAL DAILY
Status: DISCONTINUED | OUTPATIENT
Start: 2017-09-11 | End: 2017-09-11 | Stop reason: HOSPADM

## 2017-09-11 RX ORDER — CALCIUM CARBONATE 200(500)MG
2 TABLET,CHEWABLE ORAL 2 TIMES DAILY PRN
Status: CANCELLED | OUTPATIENT
Start: 2017-09-11

## 2017-09-11 RX ORDER — HYDROCHLOROTHIAZIDE 25 MG/1
25 TABLET ORAL DAILY
Status: CANCELLED | OUTPATIENT
Start: 2017-09-12

## 2017-09-11 RX ORDER — MORPHINE SULFATE 4 MG/ML
4 INJECTION, SOLUTION INTRAMUSCULAR; INTRAVENOUS
Status: CANCELLED | OUTPATIENT
Start: 2017-09-11 | End: 2017-09-18

## 2017-09-11 RX ORDER — DOCUSATE SODIUM 100 MG/1
100 CAPSULE, LIQUID FILLED ORAL 2 TIMES DAILY PRN
Status: CANCELLED | OUTPATIENT
Start: 2017-09-11

## 2017-09-11 RX ORDER — FINASTERIDE 5 MG/1
5 TABLET, FILM COATED ORAL DAILY
Status: CANCELLED | OUTPATIENT
Start: 2017-09-11

## 2017-09-11 RX ORDER — SODIUM CHLORIDE 0.9 % (FLUSH) 0.9 %
1-10 SYRINGE (ML) INJECTION AS NEEDED
Status: CANCELLED | OUTPATIENT
Start: 2017-09-11

## 2017-09-11 RX ORDER — WARFARIN SODIUM 4 MG/1
4 TABLET ORAL
Status: DISCONTINUED | OUTPATIENT
Start: 2017-09-11 | End: 2017-09-11 | Stop reason: HOSPADM

## 2017-09-11 RX ORDER — ONDANSETRON 2 MG/ML
4 INJECTION INTRAMUSCULAR; INTRAVENOUS EVERY 6 HOURS PRN
Status: CANCELLED | OUTPATIENT
Start: 2017-09-11

## 2017-09-11 RX ORDER — BISACODYL 10 MG
10 SUPPOSITORY, RECTAL RECTAL DAILY PRN
Status: DISCONTINUED | OUTPATIENT
Start: 2017-09-11 | End: 2017-09-11 | Stop reason: HOSPADM

## 2017-09-11 RX ORDER — POLYETHYLENE GLYCOL 3350 17 G/17G
17 POWDER, FOR SOLUTION ORAL DAILY
Status: DISCONTINUED | OUTPATIENT
Start: 2017-09-11 | End: 2017-09-22 | Stop reason: HOSPADM

## 2017-09-11 RX ORDER — SENNA AND DOCUSATE SODIUM 50; 8.6 MG/1; MG/1
1 TABLET, FILM COATED ORAL 2 TIMES DAILY
Status: CANCELLED | OUTPATIENT
Start: 2017-09-11

## 2017-09-11 RX ADMIN — POTASSIUM CITRATE 10 MEQ: 10 TABLET ORAL at 17:19

## 2017-09-11 RX ADMIN — HYDROCHLOROTHIAZIDE 25 MG: 25 TABLET ORAL at 09:27

## 2017-09-11 RX ADMIN — HYDROCODONE BITARTRATE AND ACETAMINOPHEN 1 TABLET: 10; 325 TABLET ORAL at 10:22

## 2017-09-11 RX ADMIN — SODIUM CHLORIDE 100 ML/HR: 900 INJECTION, SOLUTION INTRAVENOUS at 07:15

## 2017-09-11 RX ADMIN — SILDENAFIL CITRATE 20 MG: 20 TABLET, FILM COATED ORAL at 09:27

## 2017-09-11 RX ADMIN — HYDROCODONE BITARTRATE AND ACETAMINOPHEN 1 TABLET: 10; 325 TABLET ORAL at 15:33

## 2017-09-11 RX ADMIN — ENOXAPARIN SODIUM 40 MG: 40 INJECTION SUBCUTANEOUS at 20:20

## 2017-09-11 RX ADMIN — WARFARIN SODIUM 4 MG: 4 TABLET ORAL at 17:19

## 2017-09-11 RX ADMIN — ATORVASTATIN CALCIUM 10 MG: 10 TABLET, FILM COATED ORAL at 09:27

## 2017-09-11 RX ADMIN — SENNOSIDES AND DOCUSATE SODIUM 1 TABLET: 8.6; 5 TABLET ORAL at 17:19

## 2017-09-11 RX ADMIN — POLYETHYLENE GLYCOL 3350 17 G: 17 POWDER, FOR SOLUTION ORAL at 15:17

## 2017-09-11 RX ADMIN — FINASTERIDE 5 MG: 5 TABLET, FILM COATED ORAL at 20:21

## 2017-09-11 RX ADMIN — FAMOTIDINE 40 MG: 40 TABLET ORAL at 09:27

## 2017-09-11 RX ADMIN — POTASSIUM CITRATE 10 MEQ: 10 TABLET ORAL at 09:26

## 2017-09-11 NOTE — PROGRESS NOTES
LOS: 0 days   Patient Care Team:  Renan Rodríguez MD as PCP - General (Family Medicine)    Chief Complaint:  S/p orif of rt patella fracture.      Subjective  no significant pain.      Objective dressing dry. No complaints.      Vital Signs  Temp:  [97.8 °F (36.6 °C)-99.6 °F (37.6 °C)] 97.8 °F (36.6 °C)  Heart Rate:  [] 108  Resp:  [18] 18  BP: (117-142)/(56-76) 136/66      Assessment/Plan  s/p orif of patellar fracture rt.      Principal Problem:    Multiple trauma  Active Problems:    Closed displaced comminuted fracture of right patella    Closed displaced fracture of navicular bone of left foot    Essential hypertension    Mixed hyperlipidemia    Osteoarthritis of hands, bilateral    BPH (benign prostatic hyperplasia)    Glaucoma    Encounter for rehabilitation        Checo Rutherford MD  09/11/17  3:36 PM

## 2017-09-11 NOTE — PLAN OF CARE
Problem: Patient Care Overview (Adult)  Goal: Plan of Care Review  Outcome: Ongoing (interventions implemented as appropriate)    09/11/17 1459   Coping/Psychosocial Response Interventions   Plan Of Care Reviewed With patient   Patient Care Overview   Progress no change   Outcome Evaluation   Outcome Summary/Follow up Plan new admit         Problem: Fall Risk (Adult)  Goal: Identify Related Risk Factors and Signs and Symptoms  Outcome: Outcome(s) achieved Date Met:  09/11/17  Goal: Absence of Falls  Outcome: Ongoing (interventions implemented as appropriate)

## 2017-09-11 NOTE — H&P
[address] [address]  T - [phone]     H/P NOTE         SUBJECTIVE:   Patient Care Team:  Renan Rodríguez MD as PCP - General (Family Medicine)    Chief Complaint:    Multiple trauma of the bilateral lower extremities    Subjective     Patient is 67 y.o. male presents with trauma to both lower extremities.  He was working on his roof on September 6 as he was coming off of the roof he fell off of the latter to the ground.  He sustained an navicular fracture dislocation of the left foot and a comminuted fracture of the right patella.  He was evaluated in the emergency room and it was felt that his foot was at risk for transformation to an open fracture and he underwent closed reduction with pinning per Dr. Rivera of podiatry.  He was admitted to the hospital followed by Dr. Rivera.,  Dr. Rutherford orthopedics and the hospitalist service.  On 9/8/17 he underwent open reduction internal fixation of the patella by Dr. Rutherford with pinning and closure.  His workup otherwise was unremarkable and he's done fairly well.  We have been asked to admit him to the acute rehabilitation unit for intensive rehabilitation at this time he is nonweightbearing on both lower extremities but he does plan to return home and needs to be able to transfer and do activities of daily living.  He'll not be ambulatory until he is weightbearing. .      ROS/HISTORY/ CURRENT MEDICATIONS/OBJECTIVE/VS/PE:       History:     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   • Hyperlipidemia    • Multiple trauma 09/06/2017   • Osteoarthritis of hands, bilateral     retired due to  arthritis,    He's had glaucoma and he is blind in his right eye he retired due to osteoarthritis in his hands.  He does have a history of kidney stones and been removed at times as well      Past Surgical History:   Procedure Laterality Date   • COLONOSCOPY  12/08/2006   • COLONOSCOPY N/A 4/3/2017    Procedure: COLONOSCOPY;  Surgeon: Michael Ang MD;  Location: [facility], [place] ENDOSCOPY;  Service:    • CYSTOSCOPY     • EXTERNAL FIXATION ANKLE FRACTURE Left 9/6/2017    Procedure: ANKLE EXTERNAL FIXATOR APPLICATION;  Surgeon: Simone Rivera DPM;  Location: [facility] [place] OR;  Service:    • KNEE SURGERY     • PATELLA OPEN REDUCTION INTERNAL FIXATION Right 9/8/2017    Procedure: OPEN REDUCTION INTERNAL FIXATION RIGHT PATELLA       (C-ARM#3);  Surgeon: Checo Rutherford MD;  Location: [facility] [place] OR;  Service:    • SCAPHOID FRACTURE SURGERY Left 09/07/2017    fracture dislocation left foot, closed reduction with pinning   • SHOULDER SURGERY       Family History   Problem Relation Age of Onset   • Alzheimer's disease Mother    • Heart attack Father      Social History   Substance Use Topics   • Smoking status: Never Smoker   • Smokeless tobacco: None   • Alcohol use Yes      Comment: social drinker/rarely uses alcohol     He lives in a house with his wife.  There are 2 steps to enter the house he does not have to go up steps in the home.  Prior to this he was independent did not use any assistive device.    Prescriptions Prior to Admission   Medication Sig Dispense Refill Last Dose   • finasteride (PROSCAR) 5 MG tablet Take 5 mg by mouth Every Night.   9/11/2017 at 2100   • hydrochlorothiazide (HYDRODIURIL) 25 MG tablet Take 12.5 mg by mouth Daily. Pt takes 1/2 (25 mg) tablet = 12.5 mg per dose.   9/11/2017 at 0700   • potassium citrate (UROCIT-K) 10 MEQ (1080 MG) CR tablet Take 10 mEq by mouth 2 (Two) Times a Day With Meals.   9/11/2017 at 0700   • pravastatin (PRAVACHOL) 40 MG tablet Take 20 mg by mouth Every Night. Pt takes 1/2 (40 mg)  tablet = 20 mg per dose.   9/11/2017 at 2100   • sildenafil (VIAGRA) 100 MG tablet Take 100 mg by mouth Daily As Needed for erectile dysfunction.   9/11/2017 at Unknown time     Allergies:  Review of patient's allergies indicates no known allergies.    Current Medications:     Current Facility-Administered Medications:   •  acetaminophen (TYLENOL) tablet 650 mg, 650 mg, Oral, Q6H PRN, Alec Stephen MD  •  [START ON 9/12/2017] atorvastatin (LIPITOR) tablet 10 mg, 10 mg, Oral, Daily, Alec Stephen MD  •  bisacodyl (DULCOLAX) suppository 10 mg, 10 mg, Rectal, Daily PRN, Alec Stephen MD  •  calcium carbonate (TUMS) chewable tablet 500 mg (200 mg elemental), 2 tablet, Oral, BID PRN, Alec Stephen MD  •  [START ON 9/12/2017] enoxaparin (LOVENOX) syringe 40 mg, 40 mg, Subcutaneous, Q24H, Alec Stephen MD  •  [START ON 9/12/2017] famotidine (PEPCID) tablet 40 mg, 40 mg, Oral, Daily, Alec Stephen MD  •  finasteride (PROSCAR) tablet 5 mg, 5 mg, Oral, Daily, Alec Stephen MD  •  [START ON 9/12/2017] hydrochlorothiazide (HYDRODIURIL) tablet 25 mg, 25 mg, Oral, Daily, Alec Stephen MD  •  HYDROcodone-acetaminophen (NORCO)  MG per tablet 1 tablet, 1 tablet, Oral, Q4H PRN, Alec Stephen MD  •  magnesium hydroxide (MILK OF MAGNESIA) suspension 2400 mg/10mL 10 mL, 10 mL, Oral, BID PRN, Alec Stephen MD  •  ondansetron (ZOFRAN) tablet 4 mg, 4 mg, Oral, Q6H PRN, Alec Stephen MD  •  Pharmacy to dose warfarin, , Does not apply, Continuous PRN, Alec Stephen MD  •  polyethylene glycol (MIRALAX) powder 17 g, 17 g, Oral, Daily, Alec Stephen MD  •  potassium citrate (UROCIT-K) CR tablet 10 mEq, 10 mEq, Oral, BID With Meals, Alec Stephen MD  •  sennosides-docusate sodium (SENOKOT-S) 8.6-50 MG tablet 1 tablet, 1 tablet, Oral, BID, Alec Stephen MD  •  sodium chloride 0.9 % flush 1-10 mL, 1-10 mL, Intravenous, PRN, Alec Stephen MD  •   warfarin (COUMADIN) tablet 4 mg, 4 mg, Oral, Daily, Alec Stephen MD      REVIEW OF SYSTEMS:  Review of Systems   Constitutional: Positive for activity change. Negative for chills, fatigue and fever.        Activity changed at the time of his accident because of inability to ambulate   HENT: Negative.    Eyes: Positive for visual disturbance.        He can see only in the right eye because of history of glaucoma and this has not changed recently   Respiratory: Negative.    Cardiovascular: Negative.    Gastrointestinal: Negative.    Endocrine: Negative.    Genitourinary: Positive for frequency. Negative for dysuria, enuresis and flank pain.        He does use Viagra for erectile dysfunction  He has had kidney stones removed in the past   Musculoskeletal: Positive for arthralgias and gait problem.        Prior to his injury he had complaints of pain in his hands from time to time his back  Since his injury he's had some pain in his wrist bilaterally in the fall   Skin: Negative.    Allergic/Immunologic: Negative.    Neurological: Negative for dizziness, seizures, syncope, facial asymmetry, speech difficulty, weakness, light-headedness, numbness and headaches.   Hematological: Negative.    Psychiatric/Behavioral: Negative.        Objective     Physical Exam:   Temp:  [97.8 °F (36.6 °C)-99.6 °F (37.6 °C)] 97.8 °F (36.6 °C)  Heart Rate:  [] 108  Resp:  [18] 18  BP: (117-142)/(56-76) 136/66    Physical Exam:    Physical Exam   Constitutional: He is oriented to person, place, and time. He appears well-developed and well-nourished. No distress.   HENT:   Head: Normocephalic.   Nose: Nose normal.   Mouth/Throat: Oropharynx is clear and moist. No oropharyngeal exudate.   He does have abrasions in the anterior scalp eschar present   Eyes: Conjunctivae and EOM are normal. Pupils are equal, round, and reactive to light. Right eye exhibits no discharge. Left eye exhibits no discharge. No scleral icterus.   Neck:  Normal range of motion. Neck supple. No thyromegaly present.   Cardiovascular: Normal rate and regular rhythm.  Exam reveals no gallop and no friction rub.    No murmur heard.  Pulmonary/Chest: Effort normal and breath sounds normal. No respiratory distress. He has no wheezes. He has no rales. He exhibits no tenderness.   Abdominal: Soft. Bowel sounds are normal. He exhibits no distension.   Musculoskeletal: He exhibits tenderness. He exhibits no edema.   He has a knee immobilizer and dressing on the right leg down to the foot  On the left leg he has thick dressing and posterior splint present above the knee to the toes  No swelling present at the toes   Neurological: He is alert and oriented to person, place, and time. He displays normal reflexes. No cranial nerve deficit. Coordination normal.   Skin: Skin is warm and dry. He is not diaphoretic.   Multiple abrasions noted at the wrist at the left elbow and the scalp anteriorly    Lower extremities are enlarged dressings and these were not removed they been changed today   Psychiatric: He has a normal mood and affect. His behavior is normal. Judgment and thought content normal.   Nursing note and vitals reviewed.           Results Review:      INR today 1.64  Sodium 136 potassium 3.7 BUN 15 creatinine 0.72  It is noted that his fingerstick was 159 this morning 139 yesterday  Hemoglobin 13.2 hematocrit 37.4                  I did review the x-ray films and fluoroscopy during surgery    I reviewed the patient's  clinical results.  I reviewed the patient's  imaging results and agree with the interpretation.   I reviewed the therapy notes   ASSESSMENT/PLAN:   Assessment/Plan   Principal Problem:    Multiple trauma  Active Problems:    Closed displaced comminuted fracture of right patella    Closed displaced fracture of navicular bone of left foot    Osteoarthritis of hands, bilateral    BPH (benign prostatic hyperplasia)    Glaucoma    Essential hypertension    Mixed  hyperlipidemia    Encounter for rehabilitation    He does have hyperglycemia as well    He is to be admitted to the acute rehabilitation unit for intensive rehabilitation.  At this time he is not ambulatory until weightbearing restrictions are decreased.  During his stay we will follow his anticoagulation and also recheck sugar with a hemoglobin A1c tomorrow.  It is expected that he'll participate 3 hours a day make measurable improvement and be able to return home at discharge      I discussed the patients findings and my recommendations with patient.      Alec Stephen MD  09/11/17  2:26 PM

## 2017-09-11 NOTE — THERAPY EVALUATION
Inpatient Rehabilitation - Physical Therapy Initial Evaluation  [facility]     Patient Name: Vangie Lopez  : 1950  MRN: [id number]  Today's Date: 2017   Onset of Illness/Injury or Date of Surgery Date: 17  Date of Referral to PT: 17  Referring Physician: Dr. Stephen      Admit Date: 2017     Visit Dx:    ICD-10-CM ICD-9-CM   1. Abnormality of gait and mobility R26.9 781.2   2. Muscle weakness (generalized) M62.81 728.87     Patient Active Problem List   Diagnosis   • Encounter for screening for malignant neoplasm of colon   • Closed displaced comminuted fracture of right patella   • Closed displaced fracture of navicular bone of left foot   • Closed dislocation of navicular bone of left foot   • Patella fracture   • Essential hypertension   • Mixed hyperlipidemia   • Osteoarthritis of hands, bilateral   • BPH (benign prostatic hyperplasia)   • Glaucoma   • Hyperlipidemia   • Closed fracture of navicular bone with dislocation of perilunate joint of left wrist   • Fracture of patella, right, closed   • Multiple trauma   • Encounter for rehabilitation     Past Medical History:   Diagnosis Date   • BPH (benign prostatic hyperplasia)    • Cancer     skin cancer   • Closed fracture of navicular bone with dislocation of perilunate joint of left wrist 2017    closed reduction pinning  17   • Essential hypertension 2017   • Fracture of patella, right, closed 2017    orif 17   • Glaucoma     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   • Hyperlipidemia    • Multiple trauma 2017   • Osteoarthritis of hands, bilateral     retired due to arthritis,      Past Surgical History:   Procedure Laterality Date   • COLONOSCOPY  2006   • COLONOSCOPY N/A 4/3/2017    Procedure: COLONOSCOPY;  Surgeon: Michael Ang MD;  Location: [facility] ENDOSCOPY;  Service:    • CYSTOSCOPY     • EXTERNAL FIXATION ANKLE FRACTURE Left 2017    Procedure: ANKLE EXTERNAL FIXATOR APPLICATION;   Surgeon: Simone Rivera DPM;  Location: [facility];  Service:    • KNEE SURGERY     • PATELLA OPEN REDUCTION INTERNAL FIXATION Right 9/8/2017    Procedure: OPEN REDUCTION INTERNAL FIXATION RIGHT PATELLA       (C-ARM#3);  Surgeon: Checo Rutherford MD;  Location: [facility];  Service:    • SCAPHOID FRACTURE SURGERY Left 09/07/2017    fracture dislocation left foot, closed reduction with pinning   • SHOULDER SURGERY            PT ASSESSMENT (last 72 hours)      PT Evaluation       09/11/17 1500 09/11/17 1458    Rehab Evaluation    Document Type evaluation  -     Subjective Information agree to therapy;complains of;pain  -LM     Patient Effort, Rehab Treatment good  -LM     Symptoms Noted During/After Treatment increased pain  -LM     Symptoms Noted Comment Dr. Rutherford entered room during initial evaluation.  He states he does not want pt in a w/c - only wants sliding transfers to stretcher chair at this time.  PT relayed info to Dr. Stephen post eval.  -     General Information    Patient Profile Review yes  -LM     Onset of Illness/Injury or Date of Surgery Date 09/06/17  -     Referring Physician Dr. Stephen  -     General Observations Pt lying in bed with HOB elevated.  Noted to have KI on LLE.  Pt very pleasant and agreeable to PT eval.  -LM     Pertinent History Of Current Problem Pt fell off a ladder when working on his roof on 9/6/17.  s/p closed reduction and percutaneous fixation of L navicular fx/dislocation - 9/7/17 by Dr. Rivera;  s/p ORIF of R patella fx - 9/8/17 by Dr. Rutherford.  Pt transferred to ARU on 9/11/17 for further therapy.  -LM     Precautions/Limitations fall precautions;non-weight bearing status   NWB BLE's; R Knee Immobilizer on at all times  -LM     Prior Level of Function independent:;all household mobility;gait;ADL's;cooking;cleaning;driving;yard work  -     Equipment Currently Used at Home commode;bath bench;shower chair;walker, rolling;wheelchair   Tripod Cane; Did not use any DME -  belonged to his mother  -LM none;wheelchair   bsc  -MB    Plans/Goals Discussed With patient;agreed upon  -LM     Risks Reviewed patient:;LOB;increased discomfort  -LM     Benefits Reviewed patient:;improve function;increase independence;increase strength;increase balance  -LM     Living Environment    Lives With spouse  -LM     Living Arrangements house  -LM     Home Accessibility stairs to enter home  -LM     Number of Stairs to Enter Home 2  -LM     Stair Railings at Home none  -LM     Transportation Available car;family or friend will provide  -LM     Living Environment Comment Pt's wife is currently in [place].  Wife waiting on grandAdvanced Patient Careby to be born and will be traveling back with the .  Spoke with pt about importance of building a ramp.  -LM     Clinical Impression    Date of Referral to PT 17  -LM     PT Diagnosis Abnormality of gait and mobility; Muscle Weakness  -LM     Criteria for Skilled Therapeutic Interventions Met yes;treatment indicated  -LM     Pathology/Pathophysiology Noted (Describe Specifically for Each System) musculoskeletal  -LM     Impairments Found (describe specific impairments) aerobic capacity/endurance;gait, locomotion, and balance;muscle performance;ROM  -LM     Functional Limitations in Following Categories (Describe Specific Limitations) self-care;home management  -LM     Rehab Potential good, to achieve stated therapy goals  -LM     Predicted Duration of Therapy Intervention (days/wks) Until discharge or all goals met  -LM     Vital Signs    Pre Systolic BP Rehab 138  -LM     Pre Treatment Diastolic BP 70  -LM     Post Systolic BP Rehab 149  -LM     Post Treatment Diastolic BP 72  -LM     Pretreatment Heart Rate (beats/min) 95  -LM     Posttreatment Heart Rate (beats/min) 94  -LM     Pre SpO2 (%) 98  -LM     O2 Delivery Pre Treatment room air  -LM     Post SpO2 (%) 99  -LM     O2 Delivery Post Treatment room air  -LM     Pre Patient Position Supine  -LM     Post  Patient Position Supine  -LM     Pain Assessment    Pain Assessment 0-10  -LM     Pain Score 4  -LM     Post Pain Score 6  -LM     Pain Type Surgical pain  -LM     Pain Location Knee  -LM     Pain Orientation Right  -LM     Pain Intervention(s) Medication (See MAR);Repositioned;Ambulation/increased activity   RN notified and gave pain meds  -LM     Vision Assessment/Intervention    Visual Impairment WFL with corrective lenses   Reading Glasses  -LM     Cognitive Assessment/Intervention    Current Cognitive/Communication Assessment functional  -LM     Orientation Status oriented x 4  -LM     Follows Commands/Answers Questions 75% of the time;100% of the time  -LM     Personal Safety WNL/WFL  -LM     Personal Safety Interventions fall prevention program maintained;gait belt;muscle strengthening facilitated;nonskid shoes/slippers when out of bed  -LM     ROM (Range of Motion)    General ROM Detail RLE - Decreased active hip flex - AAROM WFL; Knee not tested; Decreased active ankle DF; LLE - Hip AROM WFL; Knee flex - 79 degrees; Knee ext AROM WFL; Ankle - NT due to casted - pt able to actively wiggle toes  -LM     General LE Assessment    ROM Detail L Knee flex - 79 degrees  -LM     MMT (Manual Muscle Testing)    General MMT Assessment Detail No resistance given 2* to pt NWB bilaterally; Due to ROM - RLE - Hip flex - 2-/5; Knee - not tested; Ankle DF - 2+/5; LLE - Hip flex - 3/5; Knee flex - 2+/5; Knee ext - 3/5; Ankle - not tested  -LM     Mobility Assessment/Training    Extremity Weight-Bearing Status left lower extremity;right lower extremity  -LM     Left Lower Extremity Weight-Bearing non weight-bearing  -LM     Right Lower Extremity Weight-Bearing non weight-bearing  -LM     Bed Mobility, Assessment/Treatment    Bed Mobility, Assistive Device overhead trapeze  -LM     Bed Mob, Supine to Sit, [place] minimum assist (75% patient effort)  -LM     Bed Mob, Sit to Supine, [place] contact guard assist  -LM      Transfer Assessment/Treatment    Transfers, Bed-Chair [place] minimum assist (75% patient effort);1 person + 1 person to manage equipment  -LM     Transfers, Chair-Bed [place] minimum assist (75% patient effort);1 person + 1 person to manage equipment  -LM     Transfers, Sit-Stand [place] not appropriate to assess  -LM     Transfers, Stand-Sit [place] not appropriate to assess  -LM     Transfer, Impairments ROM decreased;strength decreased  -LM     Transfer, Comment Stretcher chair flattened and placed flush with the bed.  One person stabilized RLE and one person held stretcher chair to make sure it didn't move.  -LM     Gait Assessment/Treatment    Gait, [place] Level not appropriate to assess  -LM     Stairs Assessment/Treatment    Stairs, [place] Level not appropriate to assess  -LM     Sensory Assessment/Intervention    Light Touch LLE;RLE  -LM     LLE Light Touch mild impairment   Unable to feel light touch on big toe  -LM     RLE Light Touch WNL  -LM     Edema Management    Edema Amount right:;left:;minimal;moderate   LE's  -LM     Positioning and Restraints    Pre-Treatment Position in bed  -LM     Post Treatment Position bed  -LM     In Bed supine;call light within reach;encouraged to call for assist  -LM       09/11/17 1040 09/11/17 0921    Rehab Evaluation    Document Type therapy note (daily note)  -AM evaluation  -RB    Subjective Information agree to therapy;complains of;pain  -AM agree to therapy;complains of;pain  -RB    Patient Effort, Rehab Treatment good  -AM good  -RB    Symptoms Noted During/After Treatment none  -AM increased pain  -RB    General Information    Patient Profile Review  yes  -RB    Onset of Illness/Injury or Date of Surgery Date  09/06/17  -RB    Referring Physician  Migue Martin MD.  -RB    General Observations  Supine in bed with LEs elevated and IV.  -RB    Pertinent History Of Current Problem  Hosp after fall from ladder where he sustained fx of L  mavicular bone and R patella.  ORIF R patilla 9/8/17 and navicular fixation on 9/7/17.  -RB    Precautions/Limitations brace on when up;fall precautions;non-weight bearing status  -AM fall precautions;non-weight bearing status  -RB    Prior Level of Function  independent:;gait;transfer;ADL's  -RB    Equipment Currently Used at Home  none   has W/C and BSC.  -RB    Plans/Goals Discussed With  patient  -RB    Risks Reviewed  patient:  -RB    Barriers to Rehab  none identified  -RB    Living Environment    Lives With  spouse  -RB    Living Arrangements  house  -RB    Home Accessibility  stairs to enter home  -RB    Number of Stairs to Enter Home  2  -RB    Stair Railings at Home  none  -RB    Living Environment Comment  Wife not home for ~ one wk.  Pt has T/S with non skid surface and seat.  -RB    Vital Signs    Pre Systolic BP Rehab 144  -  -RB    Pre Treatment Diastolic BP 78  -AM 76  -RB    Post Systolic BP Rehab 138  -  -RB    Post Treatment Diastolic BP 75  -AM 73  -RB    Pretreatment Heart Rate (beats/min) 106  -  -RB    Posttreatment Heart Rate (beats/min) 111  -  -RB    Pre SpO2 (%) 95  -AM 95  -RB    O2 Delivery Pre Treatment room air  -AM room air  -RB    Post SpO2 (%) 96  -AM 93  -RB    O2 Delivery Post Treatment room air  -AM room air  -RB    Pre Patient Position Sitting  -AM Supine  -RB    Intra Patient Position Supine  -AM     Post Patient Position Sitting  -AM Sitting  -RB    Pain Assessment    Pain Assessment 0-10  -AM 0-10  -RB    Pain Score 6  -AM 4  -RB    Post Pain Score 4  -AM 6  -RB    Pain Type Acute pain;Surgical pain  -AM Acute pain  -RB    Pain Location Knee  -AM Knee  -RB    Pain Orientation Right  -AM Right  -RB    Pain Descriptors Sore  -AM     Pain Frequency Constant/continuous  -AM     Date Pain First Started 09/06/17  -AM     Clinical Progression Gradually improving  -AM     Patient's Stated Pain Goal No pain  -AM     Pain Intervention(s) Medication (See  MAR);Repositioned  -AM Repositioned  -RB    Result of Injury Yes  -AM     Work-Related Injury No  -AM     Multiple Pain Sites No  -AM     Vision Assessment/Intervention    Visual Impairment  WFL with corrective lenses   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  -RB    Cognitive Assessment/Intervention    Current Cognitive/Communication Assessment functional  -AM functional  -RB    Orientation Status oriented x 4  -AM oriented x 4  -RB    Follows Commands/Answers Questions 100% of the time  -% of the time  -RB    Personal Safety  WNL/WFL  -RB    Personal Safety Interventions gait belt;nonskid shoes/slippers when out of bed;supervised activity  -AM gait belt;nonskid shoes/slippers when out of bed;supervised activity  -RB    ROM (Range of Motion)    General ROM lower extremity range of motion deficits identified  -AM no range of motion deficits identified  -RB    General ROM Detail  B UEs.  -RB    General LE Assessment    ROM knee, right: LE ROM deficit;ankle, left: LE ROM deficit  -AM     MMT (Manual Muscle Testing)    General MMT Assessment  upper extremity strength deficits identified  -RB    General MMT Assessment Detail  3+/5 , 4/5 elbows and wrists and 4+/5 for B shoulders.  -RB    Mobility Assessment/Training    Extremity Weight-Bearing Status left lower extremity;right lower extremity  -AM left lower extremity;right lower extremity  -RB    Left Lower Extremity Weight-Bearing non weight-bearing  -AM non weight-bearing  -RB    Right Lower Extremity Weight-Bearing non weight-bearing  -AM non weight-bearing  -RB    Bed Mobility, Assessment/Treatment    Bed Mobility, Assistive Device head of bed elevated  -AM bed rails;head of bed elevated  -RB    Bed Mobility, Roll Left, [place] not tested  -AM     Bed Mobility, Roll Right, [place] not tested  -AM moderate assist (50% patient effort)  -RB    Bed Mobility, Scoot/Bridge, [place] not tested  -AM minimum assist (75% patient effort)  -RB    Bed Mob, Supine  to Sit, [place] contact guard assist  -AM     Bed Mob, Sit to Supine, [place] contact guard assist  -AM     Bed Mob, Sidelying to Sit, [place] not tested  -AM     Bed Mob, Sit to Sidelying, [place] not tested  -AM     Bed Mobility, Impairments ROM decreased;strength decreased;pain  -AM     Transfer Assessment/Treatment    Transfers, Bed-Chair [place] contact guard assist  -AM dependent (less than 25% patient effort)   of 3.  -RB    Transfers, Chair-Bed [place] contact guard assist  -AM     Transfers, Bed-Chair-Bed, Assist Device other (see comments)   none  -AM     Transfers, Sit-Stand [place] not appropriate to assess  -AM     Transfers, Stand-Sit [place] not appropriate to assess  -AM     Toilet Transfer, [place] not tested  -AM     Walk-In Shower Transfer, [place] not tested  -AM     Bathtub Transfer, [place] not tested  -AM     Transfer, Maintain Weight Bearing Status able to maintain weight bearing status  -AM     Transfer, Impairments ROM decreased;strength decreased;pain  -AM     Gait Assessment/Treatment    Gait, [place] Level not appropriate to assess  -AM     Stairs Assessment/Treatment    Stairs, [place] Level not appropriate to assess  -AM     Orthotics Prosthetics    Additional Documentation Orthosis Location (Group);Orthosis Management/Training (Group)  -AM     Orthosis Location    Orthosis Location/Type lower extremity  -AM     Orthosis, Lower Extremity Right:;knee immobilizer  -AM     Orthosis Management/Training    Orthosis Fabrication Detail Knee Immobilizer  -AM     Orthosis Indications restrict motion  -AM     Orthosis Wear Schedule wear full time  -AM     Sensory Assessment/Intervention    Light Touch  LUE;RUE  -RB    LUE Light Touch  WNL  -RB    RUE Light Touch  WNL  -RB    Positioning and Restraints    Pre-Treatment Position sitting in chair/recliner  -AM in bed  -RB    Post Treatment Position wheelchair  -AM chair  -RB    In Bed   call light within reach;supine  -RB    In Wheelchair sitting;call light within reach;encouraged to call for assist;legs elevated  -AM       09/10/17 1300 09/10/17 0831    Rehab Evaluation    Document Type therapy note (daily note)  -MAGNO therapy note (daily note)  -MAGNO    Subjective Information agree to therapy  -MAGNO agree to therapy  -MAGNO    Patient Effort, Rehab Treatment good  -MAGNO good  -MAGNO    General Information    Precautions/Limitations fall precautions;non-weight bearing status;other (see comments)   vital signs. NWB R LE and L LE  -MAGNO fall precautions;non-weight bearing status;other (see comments)   vital signs. NWB R LE and L LE  -MAGNO    Vital Signs    Pre Systolic BP Rehab 130  -MAGNO 142  -MAGNO    Pre Treatment Diastolic BP 64  -MAGNO 70  -MAGNO    Post Systolic BP Rehab 141  -MAGNO 156  -MAGNO    Post Treatment Diastolic BP 66  -MAGNO 77  -MAGON    Pretreatment Heart Rate (beats/min) 108  -MAGNO 111  -MAGNO    Posttreatment Heart Rate (beats/min) 111  -MAGNO 108  -MAGNO    Pre SpO2 (%) 95  -MAGNO 94  -MAGNO    O2 Delivery Pre Treatment room air  -MAGNO room air  -MAGNO    Post SpO2 (%) 97  -MAGNO 96  -MAGNO    O2 Delivery Post Treatment room air  -MAGNO room air  -MAGNO    Pre Patient Position Sitting  -MAGNO Supine  -MAGNO    Post Patient Position Supine  -MAGNO Sitting  -MAGNO    Pain Assessment    Pain Assessment 0-10  -MAGNO 0-10  -MAGNO    Pain Score 8  -MAGNO 5  -MAGNO    Post Pain Score 8  -MAGNO 5  -MAGNO    Pain Type Acute pain  -MAGNO Acute pain  -MAGNO    Pain Location Knee  -MAGNO     Pain Orientation Right  -MAGNO     Pain Intervention(s) Repositioned   nsg notified of pts pain  -MAGNO Medication (See MAR)  -MAGNO    Vision Assessment/Intervention    Visual Impairment visual impairment, right   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  -MAGNO visual impairment, right   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  -MAGNO    Cognitive Assessment/Intervention    Current Cognitive/Communication Assessment functional  -MAGNO functional  -MAGNO    Orientation Status oriented x 4  -MAGNO oriented x 4  -MAGNO    Follows Commands/Answers Questions 100% of  the time  -MAGNO 100% of the time  -MAGNO    Personal Safety WNL/WFL  -MAGNO WNL/WFL  -MAGNO    Mobility Assessment/Training    Extremity Weight-Bearing Status left lower extremity;right lower extremity  -MAGNO     Left Lower Extremity Weight-Bearing non weight-bearing  -MAGNO     Right Lower Extremity Weight-Bearing non weight-bearing   unless otherwise oredered by MD  -MAGNO     Bed Mobility, Assessment/Treatment    Bed Mobility, Roll Left, [place] not tested  -MAGNO not tested  -    Transfer Assessment/Treatment    Transfers, Bed-Chair [place]  dependent (less than 25% patient effort)   assist x3  -MAGNO    Transfers, Chair-Bed [place] dependent (less than 25% patient effort)   assist x3.   -MAGNO     Positioning and Restraints    Pre-Treatment Position sitting in chair/recliner  -MAGNO in bed  -MAGNO    Post Treatment Position bed  -MAGNO chair  -MAGNO    In Bed supine;call light within reach;encouraged to call for assist;exit alarm on;patient within staff view   all needs met.   -MAGNO     In Chair  reclined;call light within reach;encouraged to call for assist   all needs met.   -MAGNO      09/10/17 0733 09/09/17 0954    Rehab Evaluation    Document Type  evaluation  -JCA    Subjective Information  agree to therapy;complains of;pain  -JCA    Patient Effort, Rehab Treatment  good  -JCA    General Information    Patient Profile Review  yes  -JCA    Onset of Illness/Injury or Date of Surgery Date  09/06/17  -JCA    Referring Physician  Dr. Checo Rutherford  -JCA    General Observations  Pt supine;noted IV, LE's elevated, bed gatched, exit alarm armed  -JCA    Pertinent History Of Current Problem  Pt admitted to [facility] after fall from ladder fxing L navicula and R patella. Pt underwent closed reduction and percutaneous fixation of L navicular fx/dislocation 9/7/2017 and ORIF R patella 9/8/2017  -JCA    Precautions/Limitations  fall precautions;non-weight bearing status;other (see comments)   vital signs  -JCA    Prior Level of Function   independent:;all household mobility;community mobility;ADL's;home management;using stairs;driving  -JCA    Equipment Currently Used at Home  none   has w/c and BSC   -JCA    Plans/Goals Discussed With  patient  -JCA    Risks Reviewed  patient:  -JCA    Benefits Reviewed  patient:  -JCA    Barriers to Rehab  none identified  -JCA    Living Environment    Lives With  spouse  -JCA    Living Arrangements  house  -JCA    Home Accessibility  stairs to enter home  -JCA    Number of Stairs to Enter Home  2  -JCA    Stair Railings at Home  none  -JCA    Living Environment Comment  Pt independent with ADL's,IADL's prior. Wife has been staying in hospital in [place] with dtr since 4th of July in hospital there. Dtr is expecting and is being treated for heart failure  -JCA    Clinical Impression    Date of Referral to PT  09/08/17  -JCA    PT Diagnosis  impaired mobility due to s/p multitrauma with ORIF fx R patella and closed reduction/fixiation L navicula.  -JCA    Prognosis  per MD  -JCA    Patient/Family Goals Statement  Return home with assistance  -JCA    Criteria for Skilled Therapeutic Interventions Met  yes  -JCA    Rehab Potential  good, to achieve stated therapy goals  -JCA    Predicted Duration of Therapy Intervention (days/wks)  until discharge  -JCA    Vital Signs    Pre Systolic BP Rehab  118  -JCA    Pre Treatment Diastolic BP  56  -JCA    Pretreatment Heart Rate (beats/min)  87  -JCA    Posttreatment Heart Rate (beats/min)  89  -JCA    Pre SpO2 (%)  97  -JCA    O2 Delivery Pre Treatment  room air  -JCA    Post SpO2 (%)  93  -JCA    O2 Delivery Post Treatment  room air  -JCA    Pre Patient Position  Supine  -JCA    Post Patient Position  Sitting   recling in orthochair  -JCA    Pain Assessment    Pain Assessment  0-10  -JCA    Pain Score  10  -JCA    Post Pain Score  8  -JCA    Pain Type  Acute pain  -JCA    Pain Location  Knee  -JCA    Pain Orientation  Right  -JCA    Pain Intervention(s)   "Repositioned;Medication (See MAR)   RN notified, checking to see if pt due pain med  -[facility]    Vision Assessment/Intervention    Visual Impairment  WFL with corrective lenses   \"blind R eye due to glaucoma\"  -[facility]    Cognitive Assessment/Intervention    Current Cognitive/Communication Assessment  functional  -[facility]    Orientation Status  oriented x 4  -A    Follows Commands/Answers Questions  100% of the time  -[facility]    Personal Safety  WNL/WFL  -[facility]    ROM (Range of Motion)    General ROM  upper extremity range of motion deficits identified;lower extremity range of motion deficits identified  -[facility]    General UE Assessment    ROM Detail  RUE: AROM WFL;LUE: AROM WFL except pt unable to make complete fist due to edema/soreness L wrist s/p fall(pt wearing splint for comfort)  -[facility]    General LE Assessment    ROM Detail  RLE: AROM WFL ankle/foot, knee not tested due to immobilizer,pain/s/p ORIF; hip WFL;LLE: pt able to move toes slightly foot/ankle stabilized by splint s/p surgeryAROM WFL knee, hip  -[facility]    MMT (Manual Muscle Testing)    General MMT Assessment  upper extremity strength deficits identified;lower extremity strength deficits identified  -[facility]    Upper Extremity    Upper Ext Manual Muscle Testing Detail  RUE: 5/5; LUE: 3-/5  due to wrist pain/swelling ;wrist not tested due to splint/pains/p fall;4+/5 elbow, shoulder  -[facility]    Lower Extremity    Lower Ext Manual Muscle Testing Detail  RLE: resistance not given due to pain/s/p surgery observed at least 3/5 with range assessment, knee not tested due to immobilizer,pain and s/p surgery, hip grossly 4/5  -[facility]    Mobility Assessment/Training    Extremity Weight-Bearing Status  left lower extremity;right lower extremity  -A    Left Lower Extremity Weight-Bearing  non weight-bearing  -[facility]    Right Lower Extremity Weight-Bearing  non weight-bearing   unless otherwise ordered by MD  -A    Bed Mobility, Assessment/Treatment    Bed Mobility, Roll Left, [place]  " conditional independence  -JCA    Transfer Assessment/Treatment    Transfers, Bed-Chair [place]  dependent (less than 25% patient effort)   o4 to ortho chair  -JCA    Gait Assessment/Treatment    Gait, [place] Level  not tested   at time of eval;order for transfer to stretchair  -JCA    Gait, Comment  Pt NWB BLE at present;gait not applicable at this time  -JCA    Sensory Assessment/Intervention    Light Touch LUE;RUE;LLE;RLE  -CM LUE;RUE;LLE;RLE  -JCA    LUE Light Touch WNL  -CM WNL  -JCA    RUE Light Touch WNL  -CM WNL  -JCA    LLE Light Touch WNL  -CM WNL  -JCA    RLE Light Touch WNL  -CM WNL  -JCA    Edema Management    Edema Amount  right:;left:;minimal;moderate  -JCA    Edema Interventions  elevation  -JCA    Positioning and Restraints    Pre-Treatment Position  in bed  -JCA    Post Treatment Position  chair  -JCA    In Chair  call light within reach;encouraged to call for assist;reclined;RLE elevated;LLE elevated  -JCA      User Key  (r) = Recorded By, (t) = Taken By, (c) = Cosigned By    Initials Name Provider Type    LM Isela Chris, PT Physical Therapist    RB Axel Perez, OT Occupational Therapist    HELEN Abel, PT Physical Therapist    CM Sherrie Peña, RN Registered Nurse    BUDDY Dominique, RN Registered Nurse    MAGNO Pendleton, PTA Physical Therapy Assistant    SUSIE Young, PTA Physical Therapy Assistant          Physical Therapy Education     Title: PT OT SLP Therapies (Active)     Topic: Physical Therapy (Active)     Point: Mobility training (Active)    Learning Progress Summary    Learner Readiness Method Response Comment Documented by Status   Patient Acceptance E NR Reviewed safety with transfers.  Explained that Dr. Rutherford only wants stretcher chair used at this time. LM 09/11/17 6071 Active               Point: Precautions (Active)    Learning Progress Summary    Learner Readiness Method Response Comment Documented by Status   Patient Acceptance E NR Reviewed safety  with transfers.  Explained that Dr. Rutherford only wants stretcher chair used at this time. LM 09/11/17 1636 Active                      User Key     Initials Effective Dates Name Provider Type Discipline    LM 06/15/16 -  Isela Chris, PT Physical Therapist PT                PT Recommendation and Plan  Anticipated Discharge Disposition: home with home health  Planned Therapy Interventions: balance training, bed mobility training, home exercise program, motor coordination training, neuromuscular re-education, patient/family education, postural re-education, ROM (Range of Motion), strengthening, stretching, transfer training  PT Frequency: other (see comments) (5-14 times/wk)  Plan of Care Review  Plan Of Care Reviewed With: patient  Outcome Summary/Follow up Plan: PT evaluation completed on this date.  Pt completed bed mobility with Juan Carlos and t/f'd from bed<-->stretcher chair with MinAx1 + 1 person to hold stretcher chair in place.  Pt is normally very independent and will benefit from skilled PT to improve mobility and safety prior to d/c.           IP PT Goals       09/11/17 1500 09/11/17 1040 09/10/17 1300    Bed Mobility PT LTG    Bed Mobility PT LTG, Date Established 09/11/17  -LM      Bed Mobility PT LTG, Time to Achieve by discharge  -LM      Bed Mobility PT LTG, Activity Type supine to sit/sit to supine  -LM      Bed Mobility PT LTG, [place] Level conditional independence  -LM      Bed Mobility PT Goal  LTG, Assist Device overhead trapeze;bed rails  -LM      Bed Mobility PT LTG, Outcome goal ongoing  -LM      Transfer Training PT LTG    Transfer Training PT LTG, Date Established 09/11/17  -LM      Transfer Training PT LTG, Time to Achieve by discharge  -LM      Transfer Training PT LTG, Activity Type bed to chair /chair to bed  -LM      Transfer Training PT LTG, [place] Level conditional independence  -LM      Transfer Training PT LTG, Assist Device --   AAD  -LM      Transfer Training PT  LTG, Date Goal  Reviewed  09/11/17  -AM 09/10/17  -MAGNO    Transfer Training PT LTG, Outcome goal ongoing  -LM goal not met  -AM goal ongoing  -MAGNO    Transfer Training PT LTG, Reason Goal Not Met  discharged from facility  -AM     Range of Motion PT LTG    Range of Motion Goal PT LTG, Date Established 09/11/17  -LM      Range of Motion Goal PT LTG, Time to Achieve by discharge  -LM      Range fo Motion Goal PT LTG, Joint L knee  -LM      Range of Motion Goal PT LTG, AROM Measure Knee flex - 110 degrees  -LM      Range of Motion Goal PT LTG, Outcome goal ongoing  -LM      Patient Education PT LTG    Patient Education PT LTG, Date Goal Reviewed  09/11/17  -AM 09/10/17  -MAGNO    Patient Education PT LTG Outcome  goal met  -AM goal ongoing  -MAGNO    Caregiver Training PT LTG    Caregiver Training PT LTG, Date Goal Reviewed  09/11/17  -AM 09/10/17  -    Caregiver Training PT LTG, Outcome  goal not met  -AM goal ongoing  -MAGNO    Caregiver Training PT LTG, Reason Goal Not Met  discharged from facility  -AM     Physical Therapy PT STG    Physical Therapy PT STG, Date Goal Reviewed   09/10/17  -    Physical Therapy PT STG, Outcome   goal met  -MAGNO      09/09/17 1626          Transfer Training PT LTG    Transfer Training PT LTG, Date Established 09/09/17  -JCA      Transfer Training PT LTG, Time to Achieve by discharge  -JCA      Transfer Training PT LTG, Activity Type bed to chair /chair to bed  -JCA      Transfer Training PT LTG, Additional Goal Once MD allows, pt will perform lateral transfer with conditional independence  -A      Transfer Training PT LTG, Outcome goal ongoing  -JCA      Patient Education PT LTG    Patient Education PT LTG, Date Established 09/09/17  -JCA      Patient Education PT LTG, Time to Achieve by discharge  -JCA      Patient Education PT LTG, Education Type precaution per surgeon;transfers;bed mobility;pain management;home safety  -JCA      Patient Education PT LTG Outcome goal ongoing  -JCA      Caregiver Training  PT LTG    Caregiver Training PT LTG, Date Established 09/09/17  -[facility]      Caregiver Training PT LTG, Time to Achieve by discharge  -[facility]      Caregiver Training PT LTG, Activity Type home caregiver will demonstrate understanding assisting pt as needed with transfers/mobility as well as verbalize understanding of home care instructions  -[facility]      Caregiver Training PT LTG, Outcome goal ongoing  -[facility]      Physical Therapy PT STG    Physical Therapy PT STG, Date Established 09/09/17  -[facility]      Physical Therapy PT STG, Time to Achieve by discharge  -[facility]      Physical Therapy PT STG, Activity Type Pt will tolerate OOB 3-4 hours per day  -[facility]      Physical Therapy PT STG, Outcome goal ongoing  -[facility]        User Key  (r) = Recorded By, (t) = Taken By, (c) = Cosigned By    Initials Name Provider Type    LM Isela Chris, PT Physical Therapist    HELEN Abel, PT Physical Therapist    MAGNO Pendleton, PTA Physical Therapy Assistant    AM Jim Young, PTA Physical Therapy Assistant                Outcome Measures       09/11/17 1500 09/11/17 1040 09/11/17 0921    How much help from another person do you currently need...    Turning from your back to your side while in flat bed without using bedrails? 3  -LM 3  -AM     Moving from lying on back to sitting on the side of a flat bed without bedrails? 3  -LM 3  -AM     Moving to and from a bed to a chair (including a wheelchair)? 3  -LM 3  -AM     Standing up from a chair using your arms (e.g., wheelchair, bedside chair)? 1  -LM 1  -AM     Climbing 3-5 steps with a railing? 1  -LM 1  -AM     To walk in hospital room? 1  -LM 1  -AM     AM-PAC 6 Clicks Score 12  -LM 12  -AM     How much help from another is currently needed...    Putting on and taking off regular lower body clothing?   2  -RB    Bathing (including washing, rinsing, and drying)   2  -RB    Toileting (which includes using toilet bed pan or urinal)   2  -RB    Putting on and taking off regular upper body  clothing   3  -RB    Taking care of personal grooming (such as brushing teeth)   3  -RB    Eating meals   4  -RB    Score   16  -RB    Functional Assessment    Outcome Measure Options AM-PAC 6 Clicks Basic Mobility (PT)  -LM AM-PAC 6 Clicks Basic Mobility (PT)  -AM AM-PAC 6 Clicks Daily Activity (OT)  -RB      09/10/17 0831 09/09/17 0954       How much help from another person do you currently need...    Turning from your back to your side while in flat bed without using bedrails? 3  -MAGNO 3  -JCA     Moving from lying on back to sitting on the side of a flat bed without bedrails? 3  -MAGNO 3  -JCA     Moving to and from a bed to a chair (including a wheelchair)? 2  -MAGNO 2  -JCA     Standing up from a chair using your arms (e.g., wheelchair, bedside chair)? 1  -MAGNO 1  -JCA     Climbing 3-5 steps with a railing? 1  -MAGNO 1  -JCA     To walk in hospital room? 1  -MAGNO 1  -JCA     AM-PAC 6 Clicks Score 11  -MAGNO 11  -JCA     Functional Assessment    Outcome Measure Options AM-PAC 6 Clicks Basic Mobility (PT)  -MAGNO AM-PAC 6 Clicks Basic Mobility (PT)  -JCA       User Key  (r) = Recorded By, (t) = Taken By, (c) = Cosigned By    Initials Name Provider Type    YOEL Chris, PT Physical Therapist    RB Axel Perez, OT Occupational Therapist    HELEN Abel, PT Physical Therapist    MAGNO All Pendleton, PTA Physical Therapy Assistant    AM Jim Young, PTA Physical Therapy Assistant           Time Calculation:         PT Charges       09/11/17 1500 09/11/17 1040       Time Calculation    Start Time 1500  -LM 1040  -AM     Stop Time 1555  -LM 1120  -AM     Time Calculation (min) 55 min  -LM 40 min  -AM     PT Received On 09/11/17  -LM 09/11/17  -AM     PT - Next Appointment  09/11/17  -AM     PT Goal Re-Cert Due Date 09/24/17  -LM      Time Calculation- PT    Total Timed Code Minutes- PT 25 minute(s)  -LM 40 minute(s)  -AM       User Key  (r) = Recorded By, (t) = Taken By, (c) = Cosigned By    Initials Name Provider Type    LM  Isela Chris, PT Physical Therapist    AM Jim Young PTA Physical Therapy Assistant          Therapy Charges for Today     Code Description Service Date Service Provider Modifiers Qty    32834312371 HC PT MOBILITY CURRENT 9/11/2017 Isela Chris, PT GP, CL 1    87821962287 HC PT MOBILITY PROJECTED 9/11/2017 Isela Chris, PT GP, CJ 1    62143262885 HC PT EVAL HIGH COMPLEXITY 2 9/11/2017 Isela Chris PT GP 1    79071744573 HC PT THERAPEUTIC ACT EA 15 MIN 9/11/2017 Isela Chris PT GP 2          PT G-Codes  PT Professional Judgement Used?: Yes  Outcome Measure Options: AM-PAC 6 Clicks Basic Mobility (PT)  Score: 12  Functional Limitation: Mobility: Walking and moving around  Mobility: Walking and Moving Around Current Status (): At least 60 percent but less than 80 percent impaired, limited or restricted  Mobility: Walking and Moving Around Goal Status (): At least 20 percent but less than 40 percent impaired, limited or restricted      Isela Chris PT  9/11/2017

## 2017-09-11 NOTE — PROGRESS NOTES
LOS: 0 days   Patient Care Team:  Renan Rodríguez MD as PCP - General (Family Medicine)    Chief Complaint: Multiple lower extremity trauma    Subjective   Patient seen at bedside resting comfortably.  No acute distress.  Splint to left lower extremity is clean, dry and intact.    History taken from: patient    Objective     Vital Signs  Temp:  [97.8 °F (36.6 °C)-99.6 °F (37.6 °C)] 97.8 °F (36.6 °C)  Heart Rate:  [] 108  Resp:  [18] 18  BP: (117-142)/(56-76) 136/66    Objective    Left lower extremity exam: Splint is clean, dry and intact.  Capillary fill time is immediate to the distal toes.  Sensation is intact to the distal digits. Fracture blister noted to dorsal foot.  Negative Homans    Results Review:     I reviewed the patient's new clinical results.      Assessment/Plan     Principal Problem:    Multiple trauma  Active Problems:    Closed displaced comminuted fracture of right patella    Closed displaced fracture of navicular bone of left foot    Essential hypertension    Mixed hyperlipidemia    Osteoarthritis of hands, bilateral    BPH (benign prostatic hyperplasia)    Glaucoma    Encounter for rehabilitation      Assessment & Plan    Nonweightbearing to left lower extremity   Ice behind knee and elevate  Sterile dressing change  Fracture blister drained and dressed  Pain control   DVT prophylaxis  Please do not hesitate to call with questions.    Simone Rivera DPM  09/11/17  6:53 PM

## 2017-09-11 NOTE — PLAN OF CARE
Problem: Patient Care Overview (Adult)  Goal: Plan of Care Review  Outcome: Ongoing (interventions implemented as appropriate)    09/11/17 1500   Coping/Psychosocial Response Interventions   Plan Of Care Reviewed With patient   Outcome Evaluation   Outcome Summary/Follow up Plan PT evaluation completed on this date. Pt completed bed mobility with Juan Carlos and t/f'd from bed<-->stretcher chair with MinAx1 + 1 person to hold stretcher chair in place. Pt is normally very independent and will benefit from skilled PT to improve mobility and safety prior to d/c.          Problem: Inpatient Physical Therapy  Goal: Bed Mobility Goal LTG- PT  Outcome: Ongoing (interventions implemented as appropriate)    09/11/17 1500   Bed Mobility PT LTG   Bed Mobility PT LTG, Date Established 09/11/17   Bed Mobility PT LTG, Time to Achieve by discharge   Bed Mobility PT LTG, Activity Type supine to sit/sit to supine   Bed Mobility PT LTG, [place] Level conditional independence   Bed Mobility PT Goal LTG, Assist Device overhead trapeze;bed rails   Bed Mobility PT LTG, Outcome goal ongoing       Goal: Transfer Training Goal 1 LTG- PT  Outcome: Ongoing (interventions implemented as appropriate)    09/11/17 1500   Transfer Training PT LTG   Transfer Training PT LTG, Date Established 09/11/17   Transfer Training PT LTG, Time to Achieve by discharge   Transfer Training PT LTG, Activity Type bed to chair /chair to bed   Transfer Training PT LTG, [place] Level conditional independence   Transfer Training PT LTG, Assist Device (AAD)   Transfer Training PT LTG, Outcome goal ongoing       Goal: Range of Motion Goal LTG- PT  Outcome: Ongoing (interventions implemented as appropriate)    09/11/17 1500   Range of Motion PT LTG   Range of Motion Goal PT LTG, Date Established 09/11/17   Range of Motion Goal PT LTG, Time to Achieve by discharge   Range fo Motion Goal PT LTG, Joint L knee   Range of Motion Goal PT LTG, AROM Measure Knee flex - 110  degrees   Range of Motion Goal PT LTG, Outcome goal ongoing

## 2017-09-11 NOTE — NURSING NOTE
"Patient was given a copy of the \"Privacy Act Statement-Health Care Records\" concerning data collection information for patients in Inpatient Rehabilitation Facilities.  "

## 2017-09-12 LAB
ALBUMIN SERPL-MCNC: 3.6 G/DL (ref 3.4–4.8)
ALBUMIN/GLOB SERPL: 1.2 G/DL (ref 1.1–1.8)
ALP SERPL-CCNC: 97 U/L (ref 38–126)
ALT SERPL W P-5'-P-CCNC: 92 U/L (ref 21–72)
ANION GAP SERPL CALCULATED.3IONS-SCNC: 10 MMOL/L (ref 5–15)
AST SERPL-CCNC: 54 U/L (ref 17–59)
BASOPHILS # BLD AUTO: 0.02 10*3/MM3 (ref 0–0.2)
BASOPHILS NFR BLD AUTO: 0.2 % (ref 0–2)
BILIRUB SERPL-MCNC: 1.1 MG/DL (ref 0.2–1.3)
BUN BLD-MCNC: 18 MG/DL (ref 7–21)
BUN/CREAT SERPL: 24 (ref 7–25)
CALCIUM SPEC-SCNC: 9.2 MG/DL (ref 8.4–10.2)
CHLORIDE SERPL-SCNC: 95 MMOL/L (ref 95–110)
CO2 SERPL-SCNC: 30 MMOL/L (ref 22–31)
CREAT BLD-MCNC: 0.75 MG/DL (ref 0.7–1.3)
DEPRECATED RDW RBC AUTO: 39.9 FL (ref 35.1–43.9)
EOSINOPHIL # BLD AUTO: 0.38 10*3/MM3 (ref 0–0.7)
EOSINOPHIL NFR BLD AUTO: 3.9 % (ref 0–7)
ERYTHROCYTE [DISTWIDTH] IN BLOOD BY AUTOMATED COUNT: 12.5 % (ref 11.5–14.5)
GFR SERPL CREATININE-BSD FRML MDRD: 104 ML/MIN/1.73 (ref 49–113)
GLOBULIN UR ELPH-MCNC: 2.9 GM/DL (ref 2.3–3.5)
GLUCOSE BLD-MCNC: 171 MG/DL (ref 60–100)
GLUCOSE BLDC GLUCOMTR-MCNC: 275 MG/DL (ref 70–130)
HBA1C MFR BLD: 5.9 % (ref 4–5.6)
HCT VFR BLD AUTO: 33.4 % (ref 39–49)
HGB BLD-MCNC: 12 G/DL (ref 13.7–17.3)
IMM GRANULOCYTES # BLD: 0.03 10*3/MM3 (ref 0–0.02)
IMM GRANULOCYTES NFR BLD: 0.3 % (ref 0–0.5)
INR PPP: 2.01 (ref 0.8–1.2)
IRON 24H UR-MRATE: <10 MCG/DL (ref 49–181)
IRON SATN MFR SERPL: ABNORMAL % (ref 20–55)
LYMPHOCYTES # BLD AUTO: 1.06 10*3/MM3 (ref 0.6–4.2)
LYMPHOCYTES NFR BLD AUTO: 10.9 % (ref 10–50)
MAGNESIUM SERPL-MCNC: 2 MG/DL (ref 1.6–2.3)
MCH RBC QN AUTO: 31.5 PG (ref 26.5–34)
MCHC RBC AUTO-ENTMCNC: 35.9 G/DL (ref 31.5–36.3)
MCV RBC AUTO: 87.7 FL (ref 80–98)
MONOCYTES # BLD AUTO: 1.11 10*3/MM3 (ref 0–0.9)
MONOCYTES NFR BLD AUTO: 11.4 % (ref 0–12)
NEUTROPHILS # BLD AUTO: 7.1 10*3/MM3 (ref 2–8.6)
NEUTROPHILS NFR BLD AUTO: 73.3 % (ref 37–80)
NRBC BLD MANUAL-RTO: 0 /100 WBC (ref 0–0)
PLATELET # BLD AUTO: 312 10*3/MM3 (ref 150–450)
PMV BLD AUTO: 8.9 FL (ref 8–12)
POTASSIUM BLD-SCNC: 3.7 MMOL/L (ref 3.5–5.1)
PROT SERPL-MCNC: 6.5 G/DL (ref 6.3–8.6)
PROTHROMBIN TIME: 22.9 SECONDS (ref 11.1–15.3)
RBC # BLD AUTO: 3.81 10*6/MM3 (ref 4.37–5.74)
SODIUM BLD-SCNC: 135 MMOL/L (ref 137–145)
TIBC SERPL-MCNC: 249 MCG/DL (ref 261–462)
TSH SERPL DL<=0.05 MIU/L-ACNC: 2 MIU/ML (ref 0.46–4.68)
WBC NRBC COR # BLD: 9.7 10*3/MM3 (ref 3.2–9.8)

## 2017-09-12 PROCEDURE — 83540 ASSAY OF IRON: CPT | Performed by: FAMILY MEDICINE

## 2017-09-12 PROCEDURE — 97164 PT RE-EVAL EST PLAN CARE: CPT | Performed by: PHYSICAL THERAPIST

## 2017-09-12 PROCEDURE — 25010000002 INFLUENZA VAC SPLIT QUAD SUSPENSION: Performed by: FAMILY MEDICINE

## 2017-09-12 PROCEDURE — 85025 COMPLETE CBC W/AUTO DIFF WBC: CPT | Performed by: FAMILY MEDICINE

## 2017-09-12 PROCEDURE — 83550 IRON BINDING TEST: CPT | Performed by: FAMILY MEDICINE

## 2017-09-12 PROCEDURE — 97167 OT EVAL HIGH COMPLEX 60 MIN: CPT

## 2017-09-12 PROCEDURE — 97535 SELF CARE MNGMENT TRAINING: CPT

## 2017-09-12 PROCEDURE — G8987 SELF CARE CURRENT STATUS: HCPCS

## 2017-09-12 PROCEDURE — 99232 SBSQ HOSP IP/OBS MODERATE 35: CPT | Performed by: FAMILY MEDICINE

## 2017-09-12 PROCEDURE — 84443 ASSAY THYROID STIM HORMONE: CPT | Performed by: FAMILY MEDICINE

## 2017-09-12 PROCEDURE — 83735 ASSAY OF MAGNESIUM: CPT | Performed by: FAMILY MEDICINE

## 2017-09-12 PROCEDURE — 80053 COMPREHEN METABOLIC PANEL: CPT | Performed by: FAMILY MEDICINE

## 2017-09-12 PROCEDURE — G0008 ADMIN INFLUENZA VIRUS VAC: HCPCS | Performed by: FAMILY MEDICINE

## 2017-09-12 PROCEDURE — G8988 SELF CARE GOAL STATUS: HCPCS

## 2017-09-12 PROCEDURE — 97110 THERAPEUTIC EXERCISES: CPT

## 2017-09-12 PROCEDURE — 97110 THERAPEUTIC EXERCISES: CPT | Performed by: PHYSICAL THERAPIST

## 2017-09-12 PROCEDURE — 97530 THERAPEUTIC ACTIVITIES: CPT

## 2017-09-12 PROCEDURE — 90682 RIV4 VACC RECOMBINANT DNA IM: CPT | Performed by: FAMILY MEDICINE

## 2017-09-12 PROCEDURE — 85610 PROTHROMBIN TIME: CPT | Performed by: FAMILY MEDICINE

## 2017-09-12 PROCEDURE — 82962 GLUCOSE BLOOD TEST: CPT

## 2017-09-12 PROCEDURE — 83036 HEMOGLOBIN GLYCOSYLATED A1C: CPT | Performed by: FAMILY MEDICINE

## 2017-09-12 PROCEDURE — 97542 WHEELCHAIR MNGMENT TRAINING: CPT | Performed by: PHYSICAL THERAPIST

## 2017-09-12 PROCEDURE — 97530 THERAPEUTIC ACTIVITIES: CPT | Performed by: PHYSICAL THERAPIST

## 2017-09-12 RX ORDER — FERROUS SULFATE TAB EC 324 MG (65 MG FE EQUIVALENT) 324 (65 FE) MG
324 TABLET DELAYED RESPONSE ORAL
Status: DISCONTINUED | OUTPATIENT
Start: 2017-09-13 | End: 2017-09-22 | Stop reason: HOSPADM

## 2017-09-12 RX ADMIN — Medication 1 TABLET: at 13:16

## 2017-09-12 RX ADMIN — FAMOTIDINE 40 MG: 40 TABLET ORAL at 08:34

## 2017-09-12 RX ADMIN — HYDROCODONE BITARTRATE AND ACETAMINOPHEN 1 TABLET: 10; 325 TABLET ORAL at 17:06

## 2017-09-12 RX ADMIN — POTASSIUM CITRATE 10 MEQ: 10 TABLET ORAL at 17:06

## 2017-09-12 RX ADMIN — ATORVASTATIN CALCIUM 10 MG: 10 TABLET, FILM COATED ORAL at 08:34

## 2017-09-12 RX ADMIN — POTASSIUM CITRATE 10 MEQ: 10 TABLET ORAL at 08:34

## 2017-09-12 RX ADMIN — WARFARIN SODIUM 4 MG: 4 TABLET ORAL at 17:07

## 2017-09-12 RX ADMIN — HYDROCODONE BITARTRATE AND ACETAMINOPHEN 1 TABLET: 10; 325 TABLET ORAL at 08:38

## 2017-09-12 RX ADMIN — HYDROCODONE BITARTRATE AND ACETAMINOPHEN 1 TABLET: 10; 325 TABLET ORAL at 13:16

## 2017-09-12 RX ADMIN — SENNOSIDES AND DOCUSATE SODIUM 1 TABLET: 8.6; 5 TABLET ORAL at 17:07

## 2017-09-12 RX ADMIN — FINASTERIDE 5 MG: 5 TABLET, FILM COATED ORAL at 20:09

## 2017-09-12 RX ADMIN — ACETAMINOPHEN 650 MG: 325 TABLET ORAL at 11:20

## 2017-09-12 RX ADMIN — HYDROCHLOROTHIAZIDE 25 MG: 25 TABLET ORAL at 08:34

## 2017-09-12 RX ADMIN — SENNOSIDES AND DOCUSATE SODIUM 1 TABLET: 8.6; 5 TABLET ORAL at 08:34

## 2017-09-12 RX ADMIN — INFLUENZA A VIRUS A/MICHIGAN/45/2015 X-275 (H1N1) ANTIGEN (FORMALDEHYDE INACTIVATED), INFLUENZA A VIRUS A/HONG KONG/4801/2014 X-263B (H3N2) ANTIGEN (FORMALDEHYDE INACTIVATED), INFLUENZA B VIRUS B/PHUKET/3073/2013 ANTIGEN (FORMALDEHYDE INACTIVATED), AND INFLUENZA B VIRUS B/BRISBANE/60/2008 ANTIGEN (FORMALDEHYDE INACTIVATED) 0.5 ML: 15; 15; 15; 15 INJECTION, SUSPENSION INTRAMUSCULAR at 18:21

## 2017-09-12 RX ADMIN — POLYETHYLENE GLYCOL 3350 17 G: 17 POWDER, FOR SOLUTION ORAL at 08:35

## 2017-09-12 NOTE — PROGRESS NOTES
"Anticoagulation by Pharmacy - Warfarin Day 5 of 28    Vangie Lopez is a 67 y.o.male  [Ht: 69\" (175.3 cm); Wt:  ] on Warfarin 4 mg PO  for indication of VTE prophylaxis s/p ortho procedure.    Goal INR: 1.7-2.5  Today's INR:   Lab Results   Component Value Date    INR 2.01 (H) 09/12/2017         Lab Results   Component Value Date    INR 2.01 (H) 09/12/2017    INR 1.64 (H) 09/11/2017    INR 1.30 (H) 09/10/2017    PROTIME 22.9 (H) 09/12/2017    PROTIME 19.5 (H) 09/11/2017    PROTIME  09/10/2017      Comment:      Value not calculated for this test- fingerstick pt performed     Lab Results   Component Value Date    HGB 12.0 (L) 09/12/2017    HGB 13.2 (L) 09/10/2017    HGB 13.2 (L) 09/10/2017     Lab Results   Component Value Date    HCT 33.4 (L) 09/12/2017    HCT 37.0 (L) 09/10/2017    HCT 37.0 (L) 09/10/2017     Lab Results   Component Value Date     09/12/2017     09/10/2017     (L) 09/08/2017     Assessment/Plan:  Reviewed above labs. INR is 2.01.  INR is at goal. No changes in medication list. Concurrent medications include none. Pt did receive dose of warfarin last night.  Will continue current dose of  4 mg. Rx will continue to follow and adjust dose accordingly.  Today is day 5 of therapy.  .    Sagrario Nair [facility]  09/12/17 2:03 PM     "

## 2017-09-12 NOTE — PLAN OF CARE
Problem: Patient Care Overview (Adult)  Goal: Plan of Care Review  Outcome: Ongoing (interventions implemented as appropriate)    09/12/17 0755   Coping/Psychosocial Response Interventions   Plan Of Care Reviewed With patient   Outcome Evaluation   Outcome Summary/Follow up Plan OT evaluation completed this date. Pt. required mod VC at times to maintain precautions/WB status as per MD. Pt. required average set-up A for all grooming and UB dressing, mod/max A for bed mobility, max A to bathe back and buttocks, max/total A for LB bathing and LB dressing. Pt. would benefit from continued skilled OT services to address adherence to precautions/WB status, ADL performance, bed mobility, t/f, and independence with daily tasks. Recommend d/c to home with 24/7 care and  therapy or SNF.         Problem: Inpatient Occupational Therapy  Goal: Bed Mobility Goal LTG- OT  Outcome: Ongoing (interventions implemented as appropriate)    09/12/17 0755   Bed Mobility OT LTG   Bed Mobility OT LTG, Date Established 09/12/17   Bed Mobility OT LTG, Time to Achieve by discharge   Bed Mobility OT LTG, Activity Type all bed mobility   Bed Mobility OT LTG, [place] Level supervision required;minimum assist (75% patient effort)  (AE/adaptive techniques- RLE in knee immobilizer and extended)       Goal: Transfer Training Goal 1 LTG- OT  Outcome: Ongoing (interventions implemented as appropriate)    09/12/17 0755   Transfer Training OT LTG   Transfer Training OT LTG, Date Established 09/12/17   Transfer Training OT LTG, Time to Achieve by discharge   Transfer Training OT LTG, Activity Type (BSC, w/c)   Transfer Training OT LTG, [place] Level minimum assist (75% patient effort);contact guard assist  (RLE in knee immobilizer and extended at all times.)       Goal: Patient Education Goal LTG- OT  Outcome: Ongoing (interventions implemented as appropriate)    09/12/17 0755   Patient Education OT LTG   Patient Education OT LTG, Date  Established 09/12/17   Patient Education OT LTG, Time to Achieve by discharge   Patient Education OT LTG, Education Type HEP;precautions per surgeon;positioning;posture/body mechanics;home safety;adaptive equipment mgmt   Patient Education OT LTG, Education Understanding independent;demonstrates adequately;verbalizes understanding       Goal: ADL Goal LTG- OT  Outcome: Ongoing (interventions implemented as appropriate)    09/12/17 0755   ADL OT LTG   ADL OT LTG, Date Established 09/12/17   ADL OT LTG, Time to Achieve by discharge   ADL OT LTG, Activity Type ADL skills   ADL OT LTG, Additional Goal set-up with self-feeding, grooming, UB dressing/bathing; min A with toileting; mod A with LB dressing/bathing

## 2017-09-12 NOTE — PLAN OF CARE
Problem: Patient Care Overview (Adult)  Goal: Plan of Care Review  Outcome: Ongoing (interventions implemented as appropriate)  Goal: Adult Individualization and Mutuality  Outcome: Ongoing (interventions implemented as appropriate)  Goal: Discharge Needs Assessment  Outcome: Ongoing (interventions implemented as appropriate)    Problem: Orthopaedic Fracture (Adult)  Goal: Signs and Symptoms of Listed Potential Problems Will be Absent or Manageable (Orthopaedic Fracture)  Outcome: Ongoing (interventions implemented as appropriate)    Problem: Fall Risk (Adult)  Goal: Absence of Falls  Outcome: Ongoing (interventions implemented as appropriate)

## 2017-09-12 NOTE — PLAN OF CARE
Problem: Patient Care Overview (Adult)  Goal: Plan of Care Review  Outcome: Ongoing (interventions implemented as appropriate)    09/12/17 1028 09/12/17 1455   Coping/Psychosocial Response Interventions   Plan Of Care Reviewed With --  patient   Patient Care Overview   Progress --  progress toward functional goals as expected   Outcome Evaluation   Outcome Summary/Follow up Plan pt transfered to EOB with min assist to keep R LE supported, pt will bemnefit from continued PT services --          Problem: Inpatient Physical Therapy  Goal: Bed Mobility Goal LTG- PT  Outcome: Ongoing (interventions implemented as appropriate)    09/11/17 1500   Bed Mobility PT LTG   Bed Mobility PT LTG, Date Established 09/11/17   Bed Mobility PT LTG, Time to Achieve by discharge   Bed Mobility PT LTG, Activity Type supine to sit/sit to supine   Bed Mobility PT LTG, [place] Level conditional independence   Bed Mobility PT Goal LTG, Assist Device overhead trapeze;bed rails   Bed Mobility PT LTG, Outcome goal ongoing       Goal: Transfer Training Goal 1 LTG- PT  Outcome: Ongoing (interventions implemented as appropriate)    09/11/17 1500   Transfer Training PT LTG   Transfer Training PT LTG, Date Established 09/11/17   Transfer Training PT LTG, Time to Achieve by discharge   Transfer Training PT LTG, Activity Type bed to chair /chair to bed   Transfer Training PT LTG, [place] Level conditional independence   Transfer Training PT LTG, Assist Device (AAD)   Transfer Training PT LTG, Outcome goal ongoing       Goal: Range of Motion Goal LTG- PT  Outcome: Ongoing (interventions implemented as appropriate)    09/11/17 1500   Range of Motion PT LTG   Range of Motion Goal PT LTG, Date Established 09/11/17   Range of Motion Goal PT LTG, Time to Achieve by discharge   Range fo Motion Goal PT LTG, Joint L knee   Range of Motion Goal PT LTG, AROM Measure Knee flex - 110 degrees   Range of Motion Goal PT LTG, Outcome goal ongoing

## 2017-09-12 NOTE — THERAPY TREATMENT NOTE
Inpatient Rehabilitation - Occupational Therapy Treatment Note  [facility]     Patient Name: Vangie Lopez  : 1950  MRN: [id number]  Today's Date: 2017  Onset of Illness/Injury or Date of Surgery Date: 17  Date of Referral to OT: 17  Referring Physician: JOSE ALEJANDRO Stephen MD      Admit Date: 2017    Visit Dx:     ICD-10-CM ICD-9-CM   1. Abnormality of gait and mobility R26.9 781.2   2. Muscle weakness (generalized) M62.81 728.87   3. Impaired mobility and ADLs Z74.09 799.89     Patient Active Problem List   Diagnosis   • Encounter for screening for malignant neoplasm of colon   • Closed displaced comminuted fracture of right patella   • Closed displaced fracture of navicular bone of left foot   • Closed dislocation of navicular bone of left foot   • Patella fracture   • Essential hypertension   • Mixed hyperlipidemia   • Osteoarthritis of hands, bilateral   • BPH (benign prostatic hyperplasia)   • Glaucoma   • Hyperlipidemia   • Closed fracture of navicular bone with dislocation of perilunate joint of left wrist   • Fracture of patella, right, closed   • Multiple trauma   • Encounter for rehabilitation             Adult Rehabilitation Note       17 1455 17 1028 17 1040    Rehab Assessment/Intervention    Discipline occupational therapy assistant  -RC (P)  physical therapy assistant  -TA physical therapy assistant  -AM    Document Type therapy note (daily note)  -RC (P)  therapy note (daily note)  -TA therapy note (daily note)  -AM    Subjective Information agree to therapy  -RC (P)  agree to therapy  -TA agree to therapy;complains of;pain  -AM    Patient Effort, Rehab Treatment good  -RC (P)  good  -TA good  -AM    Symptoms Noted During/After Treatment   none  -AM    Symptoms Noted Comment  (P)  BLE non weight bearing,knee immobilizer R LE  -TA     Precautions/Limitations fall precautions;non-weight bearing status;brace on when up   brace on all time elevate r  lle all time l le after up 3o mi  -RC (P)  fall precautions;non-weight bearing status   Non weight bearing B LE  -TA brace on when up;fall precautions;non-weight bearing status  -AM    Specific Treatment Considerations --   in w/c only 2 hr at a time, keep r knee straght  -      Equipment Issued to Patient   gait belt  -AM    Recorded by [RC] IRASEMA Herring/L [TA] Geri Juan PTA [AM] Jim Young PTA    Vital Signs    Pre Systolic BP Rehab  (P)  146  -  -AM    Pre Treatment Diastolic BP  (P)  73  -TA 78  -AM    Post Systolic BP Rehab  (P)  147  -  -AM    Post Treatment Diastolic BP  (P)  70  -TA 75  -AM    Pretreatment Heart Rate (beats/min)  (P)  102  -  -AM    Intratreatment Heart Rate (beats/min)  (P)  96  -TA     Posttreatment Heart Rate (beats/min)  (P)  103  -  -AM    Pre SpO2 (%)  (P)  98  -TA 95  -AM    O2 Delivery Pre Treatment  (P)  room air  -TA room air  -AM    Intra SpO2 (%)  (P)  98  -TA     Post SpO2 (%)  (P)  97  -TA 96  -AM    O2 Delivery Post Treatment   room air  -AM    Pre Patient Position  (P)  Supine  -TA Sitting  -AM    Intra Patient Position   Supine  -AM    Post Patient Position  (P)  Supine  -TA Sitting  -AM    Recorded by  [TA] Geri Juan PTA [AM] Jim Young PTA    Pain Assessment    Pain Assessment  (P)  0-10  -TA 0-10  -AM    Pain Score 0  -RC  6  -AM    Post Pain Score 0  -RC  4  -AM    Pain Type   Acute pain;Surgical pain  -AM    Pain Location   Knee  -AM    Pain Orientation   Right  -AM    Pain Descriptors   Sore  -AM    Pain Frequency   Constant/continuous  -AM    Date Pain First Started   09/06/17  -AM    Clinical Progression   Gradually improving  -AM    Patient's Stated Pain Goal   No pain  -AM    Pain Intervention(s)   Medication (See MAR);Repositioned  -AM    Result of Injury   Yes  -AM    Work-Related Injury   No  -AM    Multiple Pain Sites   No  -AM    Recorded by [RC] IRASEMA Herring/L [TA] Geri Juan PTA [AM]  Jim Young PTA    Cognitive Assessment/Intervention    Current Cognitive/Communication Assessment   functional  -AM    Orientation Status   oriented x 4  -AM    Follows Commands/Answers Questions   100% of the time  -AM    Personal Safety Interventions   gait belt;nonskid shoes/slippers when out of bed;supervised activity  -AM    Recorded by   [AM] Jim Young PTA    ROM (Range of Motion)    General ROM   lower extremity range of motion deficits identified  -AM    Recorded by   [AM] Jim Young PTA    General LE Assessment    ROM   knee, right: LE ROM deficit;ankle, left: LE ROM deficit  -AM    Recorded by   [AM] Jim Young PTA    Mobility Assessment/Training    Extremity Weight-Bearing Status   left lower extremity;right lower extremity  -AM    Left Lower Extremity Weight-Bearing   non weight-bearing  -AM    Right Lower Extremity Weight-Bearing   non weight-bearing  -AM    Recorded by   [AM] Jim Young PTA    Bed Mobility, Assessment/Treatment    Bed Mobility, Assistive Device   head of bed elevated  -AM    Bed Mobility, Roll Left, Piatt   not tested  -AM    Bed Mobility, Roll Right, Piatt   not tested  -AM    Bed Mobility, Scoot/Bridge, Piatt   not tested  -AM    Bed Mob, Supine to Sit, Piatt   contact guard assist  -AM    Bed Mob, Sit to Supine, Piatt   contact guard assist  -AM    Bed Mob, Sidelying to Sit, Piatt   not tested  -AM    Bed Mob, Sit to Sidelying, Piatt   not tested  -AM    Bed Mobility, Impairments   ROM decreased;strength decreased;pain  -AM    Recorded by   [AM] Jim Young PTA    Transfer Assessment/Treatment    Transfers, Bed-Chair Piatt   contact guard assist  -AM    Transfers, Chair-Bed Piatt minimum assist (75% patient effort)  -RC  contact guard assist  -AM    Transfers, Bed-Chair-Bed, Assist Device sliding board  -RC  other (see comments)   none  -AM    Transfers, Sit-Stand Piatt   not  appropriate to assess  -AM    Transfers, Stand-Sit Gable   not appropriate to assess  -AM    Toilet Transfer, Gable   not tested  -AM    Walk-In Shower Transfer, Gable   not tested  -AM    Bathtub Transfer, Gable   not tested  -AM    Transfer, Maintain Weight Bearing Status   able to maintain weight bearing status  -AM    Transfer, Impairments   ROM decreased;strength decreased;pain  -AM    Recorded by [RC] IRASEMA Herring/FREEDOM  [AM] Jim Young PTA    Gait Assessment/Treatment    Gait, Gable Level   not appropriate to assess  -AM    Recorded by   [AM] Jim Young PTA    Stairs Assessment/Treatment    Stairs, Gable Level   not appropriate to assess  -AM    Recorded by   [AM] Jim Young PTA    Wheelchair Training/Management    Wheelchair Transfer Type --   sliding board to l side  -      Wheelchair Task Training Comment --   requires someont to hold r le and vc's  -      Wheelchair, Distance Propelled 200  -RC      Recorded by [RC] IRASEMA Herring/L      Therapy Exercises    Bilateral Upper Extremity AROM:;15 reps  -      BUE Resistance theraband   rowing blue band 2 sets  -RC      Recorded by [RC] IRASEMA Herring/L      Orthotics Prosthetics    Additional Documentation   Orthosis Location (Group);Orthosis Management/Training (Group)  -AM    Recorded by   [AM] Jim Young PTA    Orthosis Location    Orthosis Location/Type   lower extremity  -AM    Orthosis, Lower Extremity   Right:;knee immobilizer  -AM    Recorded by   [AM] Jim Young PTA    Orthosis Management/Training    Orthosis Fabrication Detail   Knee Immobilizer  -AM    Orthosis Indications   restrict motion  -AM    Orthosis Wear Schedule   wear full time  -AM    Recorded by   [AM] Jim Young PTA    Positioning and Restraints    Pre-Treatment Position sitting in chair/recliner  -  sitting in chair/recliner  -AM    Post Treatment Position bed  -  wheelchair  -AM     In Bed call light within reach;encouraged to call for assist;with nsg  -RC      In Wheelchair   sitting;call light within reach;encouraged to call for assist;legs elevated  -AM    Recorded by [RC] IRASEMA Herring/FREEDOM  [AM] Jim Young PTA      09/10/17 1300 09/10/17 0831       Rehab Assessment/Intervention    Discipline physical therapy assistant  -MAGNO physical therapy assistant  -MAGNO     Document Type therapy note (daily note)  -MAGNO therapy note (daily note)  -MAGNO     Subjective Information agree to therapy  -MAGNO agree to therapy  -MAGON     Patient Effort, Rehab Treatment good  -MAGNO good  -MAGNO     Precautions/Limitations fall precautions;non-weight bearing status;other (see comments)   vital signs. NWB R LE and L LE  -MAGNO fall precautions;non-weight bearing status;other (see comments)   vital signs. NWB R LE and L LE  -MAGNO     Recorded by [MANGO] All Pendleton PTA [MAGNO] All Pendleton PTA     Vital Signs    Pre Systolic BP Rehab 130  -MAGNO 142  -MAGNO     Pre Treatment Diastolic BP 64  -MAGNO 70  -MAGNO     Post Systolic BP Rehab 141  -MAGNO 156  -MAGNO     Post Treatment Diastolic BP 66  -MAGNO 77  -MAGNO     Pretreatment Heart Rate (beats/min) 108  -MAGNO 111  -MAGNO     Posttreatment Heart Rate (beats/min) 111  -MAGNO 108  -MAGNO     Pre SpO2 (%) 95  -MAGNO 94  -MAGNO     O2 Delivery Pre Treatment room air  -MAGNO room air  -MAGNO     Post SpO2 (%) 97  -MAGNO 96  -MAGNO     O2 Delivery Post Treatment room air  -MAGNO room air  -MAGNO     Pre Patient Position Sitting  -MAGNO Supine  -MAGNO     Post Patient Position Supine  -MAGNO Sitting  -MAGNO     Recorded by [MAGNO] All Pendleton PTA [MAGNO] All Pendleton PTA     Pain Assessment    Pain Assessment 0-10  -MAGNO 0-10  -MAGNO     Pain Score 8  -MAGNO 5  -MAGNO     Post Pain Score 8  -MAGNO 5  -MAGNO     Pain Type Acute pain  -MAGNO Acute pain  -MAGNO     Pain Location Knee  -MAGNO      Pain Orientation Right  -MAGNO      Pain Intervention(s) Repositioned   nsg notified of pts pain  -MAGNO Medication (See MAR)  -MAGNO     Recorded by [MAGNO] All Pendleton PTA [MAGNO]  All Pendleton PTA     Vision Assessment/Intervention    Visual Impairment visual impairment, right   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  -MAGNO visual impairment, right   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  -MAGNO     Recorded by [MAGNO] All Pendleton PTA [MAGNO] All Pendleton PTA     Cognitive Assessment/Intervention    Current Cognitive/Communication Assessment functional  -MAGNO functional  -MAGNO     Orientation Status oriented x 4  -MAGNO oriented x 4  -MAGNO     Follows Commands/Answers Questions 100% of the time  -MAGNO 100% of the time  -MAGNO     Personal Safety WNL/WFL  -MAGNO WNL/WFL  -MAGNO     Recorded by [MAGNO] All Pendleton PTA [MAGNO] All Pendleton PTA     Mobility Assessment/Training    Extremity Weight-Bearing Status left lower extremity;right lower extremity  -MAGON      Left Lower Extremity Weight-Bearing non weight-bearing  -MAGNO      Right Lower Extremity Weight-Bearing non weight-bearing   unless otherwise oredered by MD  -MAGNO      Recorded by [MAGNO] All Pendleton PTA      Bed Mobility, Assessment/Treatment    Bed Mobility, Roll Left, [place] not tested  -MAGNO not tested  -MAGNO     Recorded by [MAGNO] All Pendleton PTA [MAGNO] All Pendleton PTA     Transfer Assessment/Treatment    Transfers, Bed-Chair Waller  dependent (less than 25% patient effort)   assist x3  -MAGNO     Transfers, Chair-Bed Waller dependent (less than 25% patient effort)   assist x3.   -MAGNO      Recorded by [MAGNO] All Pendleton PTA [MAGNO] All Pendleton PTA     Positioning and Restraints    Pre-Treatment Position sitting in chair/recliner  -MAGNO in bed  -MAGNO     Post Treatment Position bed  -MAGNO chair  -MAGNO     In Bed supine;call light within reach;encouraged to call for assist;exit alarm on;patient within staff view   all needs met.   -MAGNO      In Chair  reclined;call light within reach;encouraged to call for assist   all needs met.   -MAGNO     Recorded by [MAGNO] All Pendleton PTA [MAGNO] All Pendleton PTA       User Key  (r) = Recorded By, (t) = Taken By, (c) =  Cosigned By    Initials Name Effective Dates    TA Geri Juan, PTA 10/17/16 -     MAGNO All Pendleton, PTA 10/17/16 -     AM Jim Young, PTA 10/17/16 -     RC Shruthi Jean-Baptiste, VILLALPANDO/L 10/17/16 -                 OT Goals       09/12/17 1455 09/12/17 0755 09/11/17 1609    Bed Mobility OT LTG    Bed Mobility OT LTG, Date Established  09/12/17  -BH (r) SP (t) BH (c)     Bed Mobility OT LTG, Time to Achieve  by discharge  -BH (r) SP (t) BH (c)     Bed Mobility OT LTG, Activity Type  all bed mobility  -BH (r) SP (t) BH (c)     Bed Mobility OT LTG, [place] Level  supervision required;minimum assist (75% patient effort)   AE/adaptive techniques- RLE in knee immobilizer and extended  -BH (r) SP (t) BH (c)     Bed Mobility OT LTG, Date Goal Reviewed 09/12/17  -      Bed Mobility OT LTG, Outcome goal ongoing  -RC      Transfer Training OT LTG    Transfer Training OT LTG, Date Established  09/12/17  -BH (r) SP (t) BH (c)     Transfer Training OT LTG, Time to Achieve  by discharge  -BH (r) SP (t) BH (c)     Transfer Training OT LTG, Activity Type  --   BSC, w/c  -BH (r) SP (t) BH (c)     Transfer Training OT LTG, [place] Level  minimum assist (75% patient effort);contact guard assist   RLE in knee immobilizer and extended at all times.  -BH (r) SP (t) BH (c)     Transfer Training OT LTG, Date Goal Reviewed 09/12/17  -RC  09/04/17  -SG    Transfer Training OT LTG, Outcome goal ongoing  -RC  goal not met  -SG    Transfer Training OT LTG, Reason Goal Not Met   discharged from facility  -SG    Strength OT LTG    Strength Goal OT LTG, Date Goal Reviewed   09/11/17  -SG    Strength Goal OT LTG, Outcome   goal not met  -SG    Strength Goal OT LTG, Reason Goal Not Met   discharged from facility  -SG    Dynamic Sitting Balance OT LTG    Dynamic Sitting Balance OT LTG, Date Goal Reviewed   09/11/17  -SG    Dynamic Sitting Balance OT LTG, Outcome   goal not met  -SG    Dynamic Sitting Balance OT LTG, Reason Goal Not Met    discharged from facility  -    Patient Education OT LTG    Patient Education OT LTG, Date Established  09/12/17  -BH (r) SP (t) BH (c)     Patient Education OT LTG, Time to Achieve  by discharge  -BH (r) SP (t) BH (c)     Patient Education OT LTG, Education Type  HEP;precautions per surgeon;positioning;posture/body mechanics;home safety;adaptive equipment mgmt  -BH (r) SP (t) BH (c)     Patient Education OT LTG, Education Understanding  independent;demonstrates adequately;verbalizes understanding  -BH (r) SP (t) BH (c)     Patient Education OT LTG, Date Goal Reviewed 09/12/17  -      Patient Education OT LTG Outcome goal ongoing  -      ADL OT LTG    ADL OT LTG, Date Established  09/12/17  -BH (r) SP (t) BH (c)     ADL OT LTG, Time to Achieve  by discharge  -BH (r) SP (t) BH (c)     ADL OT LTG, Activity Type  ADL skills  -BH (r) SP (t) BH (c)     ADL OT LTG, Additional Goal  set-up with self-feeding, grooming, UB dressing/bathing; min A with toileting; mod A with LB dressing/bathing  -BH (r) SP (t) BH (c)     ADL OT LTG, Date Goal Reviewed 09/12/17  -  09/11/17  -    ADL OT LTG, Outcome goal ongoing  -  goal not met  -    ADL OT LTG, Reason Goal Not Met   discharged from facility  -      09/11/17 1333          Transfer Training OT LTG    Transfer Training OT LTG, Date Established 09/11/17  -RB      Transfer Training OT LTG, Time to Achieve by discharge  -RB      Transfer Training OT LTG, Activity Type all transfers  -RB      Transfer Training OT LTG, [place] Level supervision required;contact guard assist  -RB      Transfer Training OT LTG, Assist Device other (see comments)   Sliding board if needed.  -RB      Strength OT LTG    Strength Goal OT LTG, Date Established 09/11/17  -RB      Strength Goal OT LTG, Time to Achieve by discharge  -RB      Strength Goal OT LTG, Measure to Achieve 2 sets of 10 reps all planes with 3-4 lb dumbells for B UE strength to increase independence with functional  mobility/transfers.  -RB      Dynamic Sitting Balance OT LTG    Dynamic Sitting Balance OT LTG, Date Established 09/11/17  -RB      Dynamic Sitting Balance OT LTG, Time to Achieve by discharge  -RB      Dynamic Sitting Balance OT LTG, [place] Level supervision required   10-15 minutes with functional activity.  -RB      Dynamic Sitting Balance OT LTG, Assist Device bed rails  -RB      ADL OT LTG    ADL OT LTG, Date Established 09/11/17  -RB      ADL OT LTG, Time to Achieve by discharge  -RB      ADL OT LTG, Activity Type ADL skills   Sponge bath and dress.  -RB      ADL OT LTG, [place] Level contact guard;min assist  -RB        User Key  (r) = Recorded By, (t) = Taken By, (c) = Cosigned By    Initials Name Provider Type    RB Axel Perez, OT Occupational Therapist    BH Isabel Jones, OTR/L Occupational Therapist    RC Shruthi Jean-Baptiste, VILLALPANDO/L Occupational Therapy Assistant    SP Kenya Juárez, OT Student OT Student    SAHARA Rai, OT Occupational Therapist          Occupational Therapy Education     Title: PT OT SLP Therapies (Active)     Topic: Occupational Therapy (Active)     Point: ADL training (Done)    Description: Instruct learner(s) on proper safety adaptation and remediation techniques during self care or transfers.   Instruct in proper use of assistive devices.    Learning Progress Summary    Learner Readiness Method Response Comment Documented by Status   Patient Acceptance E VU Educated on OT and POC. Educated about adhering to weight bearing precautions. Educated about safety with ADL and bed mobility. SP 09/12/17 1159 Done               Point: Precautions (Done)    Description: Instruct learner(s) on prescribed precautions during self-care and functional transfers.    Learning Progress Summary    Learner Readiness Method Response Comment Documented by Status   Patient Acceptance E VU Educated on OT and POC. Educated about adhering to weight bearing precautions. Educated about safety  with ADL and bed mobility.  09/12/17 1159 Done               Point: Body mechanics (Done)    Description: Instruct learner(s) on proper positioning and spine alignment during self-care, functional mobility activities and/or exercises.    Learning Progress Summary    Learner Readiness Method Response Comment Documented by Status   Patient Acceptance E VU Educated on OT and POC. Educated about adhering to weight bearing precautions. Educated about safety with ADL and bed mobility.  09/12/17 1159 Done                      User Key     Initials Effective Dates Name Provider Type Discipline     01/31/17 -  Kenya Juárez OT Student OT Student OT                  OT Recommendation and Plan  Anticipated Equipment Needs At Discharge: hospital bed  Anticipated Discharge Disposition: home with 24/7 care, home with home health  Planned Therapy Interventions: activity intolerance, adaptive equipment training, ADL retraining, bed mobility training, balance training, energy conservation, fine motor coordination training, home exercise program, motor coordination training, strengthening, transfer training  Therapy Frequency: other (see comments) (3-14x/wk)  Plan of Care Review  Plan Of Care Reviewed With: patient  Progress: progress toward functional goals as expected  Outcome Summary/Follow up Plan: pt required @ to keep r le elevated during transfer   cont poc        Outcome Measures       09/12/17 0755 09/11/17 1500 09/11/17 1040    How much help from another person do you currently need...    Turning from your back to your side while in flat bed without using bedrails?  3  -LM 3  -AM    Moving from lying on back to sitting on the side of a flat bed without bedrails?  3  -LM 3  -AM    Moving to and from a bed to a chair (including a wheelchair)?  3  -LM 3  -AM    Standing up from a chair using your arms (e.g., wheelchair, bedside chair)?  1  -LM 1  -AM    Climbing 3-5 steps with a railing?  1  -LM 1  -AM    To walk in  hospital room?  1  -LM 1  -AM    AM-PAC 6 Clicks Score  12  -LM 12  -AM    How much help from another is currently needed...    Putting on and taking off regular lower body clothing? 1  -BH (r) SP (t) BH (c)      Bathing (including washing, rinsing, and drying) 2  -BH (r) SP (t) BH (c)      Toileting (which includes using toilet bed pan or urinal) 2  -BH (r) SP (t) BH (c)      Putting on and taking off regular upper body clothing 3  -BH (r) SP (t) BH (c)      Taking care of personal grooming (such as brushing teeth) 3  -BH (r) SP (t) BH (c)      Eating meals 4  -BH (r) SP (t) BH (c)      Score 15  -BH (r) SP (t)      Functional Assessment    Outcome Measure Options AM-PAC 6 Clicks Daily Activity (OT)  -BH (r) SP (t) BH (c) AM-PAC 6 Clicks Basic Mobility (PT)  -LM AM-PAC 6 Clicks Basic Mobility (PT)  -AM      09/11/17 0921 09/10/17 0831       How much help from another person do you currently need...    Turning from your back to your side while in flat bed without using bedrails?  3  -MAGNO     Moving from lying on back to sitting on the side of a flat bed without bedrails?  3  -MAGNO     Moving to and from a bed to a chair (including a wheelchair)?  2  -MAGNO     Standing up from a chair using your arms (e.g., wheelchair, bedside chair)?  1  -MAGNO     Climbing 3-5 steps with a railing?  1  -MAGNO     To walk in hospital room?  1  -MAGNO     AM-PAC 6 Clicks Score  11  -MAGNO     How much help from another is currently needed...    Putting on and taking off regular lower body clothing? 2  -RB      Bathing (including washing, rinsing, and drying) 2  -RB      Toileting (which includes using toilet bed pan or urinal) 2  -RB      Putting on and taking off regular upper body clothing 3  -RB      Taking care of personal grooming (such as brushing teeth) 3  -RB      Eating meals 4  -RB      Score 16  -RB      Functional Assessment    Outcome Measure Options AM-PAC 6 Clicks Daily Activity (OT)  -RB AM-PAC 6 Clicks Basic Mobility (PT)  -MAGNO        User Key  (r) = Recorded By, (t) = Taken By, (c) = Cosigned By    Initials Name Provider Type    LM Isela Chris, PT Physical Therapist    RB Axel Perez, OT Occupational Therapist     Isabel Jones, OTR/L Occupational Therapist    MAGNO Pendleton, PTA Physical Therapy Assistant    AM Jim Young, PTA Physical Therapy Assistant    SP Kenya Juárez, OT Student OT Student           Time Calculation:         Time Calculation- OT       09/12/17 1513 09/12/17 1406       Time Calculation- OT    OT Start Time 1455  - 0755  -     OT Stop Time 1510  -RC 0925  -     OT Time Calculation (min) 15 min  - 90 min  -     Total Timed Code Minutes- OT 15 minute(s)  - 70 minute(s)  -     OT Received On 09/12/17  - 09/12/17  -     OT Goal Re-Cert Due Date  09/25/17  -       User Key  (r) = Recorded By, (t) = Taken By, (c) = Cosigned By    Initials Name Provider Type     Isabel Jones, OTR/L Occupational Therapist     Shruthi Jean-Baptiste VILLALPANDO/L Occupational Therapy Assistant           Therapy Charges for Today     Code Description Service Date Service Provider Modifiers Qty    88568035893 HC OT THER PROC EA 15 MIN 9/12/2017 IRASEMA Herring/L GO 1          OT G-codes  OT Professional Judgement Used?: Yes  OT Functional Scales Options: AM-PAC 6 Clicks Daily Activity (OT)  Score: 15  Functional Limitation: Self care  Self Care Current Status (): At least 40 percent but less than 60 percent impaired, limited or restricted  Self Care Goal Status (): At least 20 percent but less than 40 percent impaired, limited or restricted    IRASEMA Herring/FREEDOM  9/12/2017

## 2017-09-12 NOTE — PLAN OF CARE
Problem: Patient Care Overview (Adult)  Goal: Plan of Care Review  Outcome: Ongoing (interventions implemented as appropriate)    09/12/17 1455   Coping/Psychosocial Response Interventions   Plan Of Care Reviewed With patient   Patient Care Overview   Progress progress toward functional goals as expected   Outcome Evaluation   Outcome Summary/Follow up Plan pt required @ to keep r le elevated during transfer cont poc         Problem: Inpatient Occupational Therapy  Goal: Bed Mobility Goal LTG- OT  Outcome: Ongoing (interventions implemented as appropriate)    09/12/17 1455   Bed Mobility OT LTG   Bed Mobility OT LTG, Date Goal Reviewed 09/12/17   Bed Mobility OT LTG, Outcome goal ongoing       Goal: Transfer Training Goal 1 LTG- OT  Outcome: Ongoing (interventions implemented as appropriate)    09/12/17 1455   Transfer Training OT LTG   Transfer Training OT LTG, Date Goal Reviewed 09/12/17   Transfer Training OT LTG, Outcome goal ongoing       Goal: Patient Education Goal LTG- OT  Outcome: Ongoing (interventions implemented as appropriate)    09/12/17 1455   Patient Education OT LTG   Patient Education OT LTG, Date Goal Reviewed 09/12/17   Patient Education OT LTG Outcome goal ongoing       Goal: ADL Goal LTG- OT  Outcome: Ongoing (interventions implemented as appropriate)    09/12/17 1455   ADL OT LTG   ADL OT LTG, Date Goal Reviewed 09/12/17   ADL OT LTG, Outcome goal ongoing

## 2017-09-12 NOTE — THERAPY RE-EVALUATION
Inpatient Rehabilitation - Physical Therapy Re-Evaluation  [facility]     Patient Name: Vangie Lopez  : 1950  MRN: [id number]  Today's Date: 2017   Onset of Illness/Injury or Date of Surgery Date: 17  Date of Referral to PT: 17  Referring Physician: Dr. Stephen      Admit Date: 2017     Visit Dx:    ICD-10-CM ICD-9-CM   1. Abnormality of gait and mobility R26.9 781.2   2. Muscle weakness (generalized) M62.81 728.87   3. Impaired mobility and ADLs Z74.09 799.89     Patient Active Problem List   Diagnosis   • Encounter for screening for malignant neoplasm of colon   • Closed displaced comminuted fracture of right patella   • Closed displaced fracture of navicular bone of left foot   • Closed dislocation of navicular bone of left foot   • Patella fracture   • Essential hypertension   • Mixed hyperlipidemia   • Osteoarthritis of hands, bilateral   • BPH (benign prostatic hyperplasia)   • Glaucoma   • Hyperlipidemia   • Closed fracture of navicular bone with dislocation of perilunate joint of left wrist   • Fracture of patella, right, closed   • Multiple trauma   • Encounter for rehabilitation     Past Medical History:   Diagnosis Date   • BPH (benign prostatic hyperplasia)    • Cancer     skin cancer   • Closed fracture of navicular bone with dislocation of perilunate joint of left wrist 2017    closed reduction pinning  17   • Essential hypertension 2017   • Fracture of patella, right, closed 2017    orif 17   • Glaucoma     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   • Hyperlipidemia    • Multiple trauma 2017   • Osteoarthritis of hands, bilateral     retired due to arthritis,      Past Surgical History:   Procedure Laterality Date   • COLONOSCOPY  2006   • COLONOSCOPY N/A 4/3/2017    Procedure: COLONOSCOPY;  Surgeon: Michael Ang MD;  Location: [facility] ENDOSCOPY;  Service:    • CYSTOSCOPY     • EXTERNAL FIXATION ANKLE FRACTURE Left 2017    Procedure:  ANKLE EXTERNAL FIXATOR APPLICATION;  Surgeon: Simone Rivera DPM;  Location: [facility];  Service:    • KNEE SURGERY     • PATELLA OPEN REDUCTION INTERNAL FIXATION Right 9/8/2017    Procedure: OPEN REDUCTION INTERNAL FIXATION RIGHT PATELLA       (C-ARM#3);  Surgeon: Checo Ruthreford MD;  Location: [facility];  Service:    • SCAPHOID FRACTURE SURGERY Left 09/07/2017    fracture dislocation left foot, closed reduction with pinning   • SHOULDER SURGERY            PT ASSESSMENT (last 72 hours)      PT Evaluation       09/12/17 1455 09/12/17 1355    Rehab Evaluation    Document Type therapy note (daily note)  -RC re-evaluation  -LM    Subjective Information agree to therapy  -RC agree to therapy;complains of;pain  -LM    Patient Effort, Rehab Treatment good  -RC good  -LM    Symptoms Noted Comment  New orders written from Dr. Stephen after he spoke with Dr. Rutherford.  Pt okay to transfer to w/c with R knee extended at all times and LLE extended if sitting 30 minutes or greater.  Should not be up in w/c longer than 2 hours.  Continue NWB BLE.  -LM    General Information    Patient Profile Review  yes  -LM    Referring Physician  Dr. Stephen  -LM    Precautions/Limitations fall precautions;non-weight bearing status;brace on when up   brace on all time elevate r lle all time l le after up 3o mi  -RC fall precautions;non-weight bearing status   NWB BLE; R Knee Immobilizer at all times  -LM    Plans/Goals Discussed With  patient;agreed upon  -LM    Risks Reviewed  patient:;LOB;increased discomfort  -LM    Benefits Reviewed  patient:;increase independence;improve function;increase strength;increase balance  -LM    Vital Signs    Pre Systolic BP Rehab  141  -LM    Pre Treatment Diastolic BP  73  -LM    Post Systolic BP Rehab  134  -LM    Post Treatment Diastolic BP  71  -LM    Pretreatment Heart Rate (beats/min)  102  -LM    Posttreatment Heart Rate (beats/min)  101  -LM    Pre SpO2 (%)  97  -LM    O2 Delivery Pre Treatment  room  air  -LM    Post SpO2 (%)  97  -LM    O2 Delivery Post Treatment  room air  -LM    Pre Patient Position  Supine  -LM    Post Patient Position  Sitting  -LM    Pain Assessment    Pain Assessment  0-10  -LM    Pain Score 0  -RC 4  -LM    Post Pain Score 0  -RC 6  -LM    Pain Type  Surgical pain  -LM    Pain Location  Knee   And L Ankle  -LM    Pain Orientation  Right  -LM    Pain Intervention(s)  Repositioned  -LM    Cognitive Assessment/Intervention    Current Cognitive/Communication Assessment  functional  -LM    Orientation Status  oriented x 4  -LM    Follows Commands/Answers Questions  100% of the time  -LM    Personal Safety  WNL/WFL  -LM    Personal Safety Interventions  fall prevention program maintained;gait belt;muscle strengthening facilitated;nonskid shoes/slippers when out of bed  -LM    Bed Mobility, Assessment/Treatment    Bed Mobility, Assistive Device  bed rails;head of bed elevated  -LM    Bed Mob, Supine to Sit, [place]  minimum assist (75% patient effort)  -LM    Bed Mob, Sit to Supine, [place]  not tested  -LM    Transfer Assessment/Treatment    Transfers, Bed-Chair [place]  minimum assist (75% patient effort)  -LM    Transfers, Chair-Bed [place] minimum assist (75% patient effort)  -RC not tested  -LM    Transfers, Bed-Chair-Bed, Assist Device sliding board  -RC sliding board  -LM    Gait Assessment/Treatment    Gait, [place] Level  not appropriate to assess  -LM    Stairs Assessment/Treatment    Stairs, [place] Level  not appropriate to assess  -LM    Wheelchair Training/Management    Wheelchair Transfer Type --   sliding board to l side  -     Wheelchair Task Training Comment --   requires someont to hold r le and vc's  -     Wheelchair, Distance Propelled 200  - 200 feet with SBA  -    Wheelchair Training Comment  Pt educated on how/when to use brakes & how to self-propel.  Leg rests switched out with troughs for better support/comfort.  -LM    Therapy  Exercises    Right Lower Extremity  AROM:;15 reps;sitting;ankle pumps/circles  -LM    Left Lower Extremity  AROM:;15 reps;sitting;heel slides;LAQ;hip flexion   Completed HS for approximately 3 minutes  -    Bilateral Upper Extremity AROM:;15 reps  -RC     BUE Resistance theraband   rowing blue band 2 sets  -RC     Positioning and Restraints    Pre-Treatment Position sitting in chair/recliner  -RC in bed  -LM    Post Treatment Position bed  -RC wheelchair  -LM    In Bed call light within reach;encouraged to call for assist;with nsg  -RC sitting;with OT  -LM      09/12/17 1037 09/12/17 1028    Rehab Evaluation    Document Type  therapy note (daily note)  -TA    Subjective Information  agree to therapy  -TA    Patient Effort, Rehab Treatment  good  -TA    Symptoms Noted Comment  BLE non weight bearing,knee immobilizer R LE  -TA    General Information    Precautions/Limitations  fall precautions;non-weight bearing status   Non weight bearing B LE  -TA    Living Environment    Lives With spouse  -CC     Living Arrangements house  -     Vital Signs    Pre Systolic BP Rehab  146  -TA    Pre Treatment Diastolic BP  73  -TA    Post Systolic BP Rehab  147  -TA    Post Treatment Diastolic BP  70  -TA    Pretreatment Heart Rate (beats/min)  102  -TA    Intratreatment Heart Rate (beats/min)  96  -TA    Posttreatment Heart Rate (beats/min)  103  -TA    Pre SpO2 (%)  98  -TA    O2 Delivery Pre Treatment  room air  -TA    Intra SpO2 (%)  98  -TA    Post SpO2 (%)  97  -TA    Pre Patient Position  Supine  -TA    Post Patient Position  Supine  -TA    Pain Assessment    Pain Assessment  0-10  -TA    Pain Score  0  -TA    Post Pain Score  6  -TA    Pain Type  Surgical pain  -TA    Pain Location  Knee  -TA    Pain Orientation  Right  -TA    Cognitive Assessment/Intervention    Current Cognitive/Communication Assessment  functional  -TA    Orientation Status  oriented x 4  -TA    Follows Commands/Answers Questions  100% of the time   -TA    Personal Safety  mild impairment  -TA    Personal Safety Interventions  fall prevention program maintained  -TA    Bed Mobility, Assessment/Treatment    Bed Mobility, Assistive Device  bed rails;head of bed elevated;overhead trapeze  -TA    Bed Mobility, Roll Left, [place]  minimum assist (75% patient effort);nonverbal cues required (demo/gesture)   x 3  -TA    Bed Mobility, Roll Right, [place]  minimum assist (75% patient effort);verbal cues required   x 3  -TA    Bed Mobility, Scoot/Bridge, [place]  contact guard assist  -TA    Bed Mob, Supine to Sit, [place]  minimum assist (75% patient effort)   for R LE x 2  -TA    Bed Mob, Sit to Supine, [place]  minimum assist (75% patient effort)    for  R LE x 2  -TA    Bed Mobility, Comment  pt Assisted to EOB x 2, pt's R  LE supported by PTA  -TA    Therapy Exercises    Right Lower Extremity  AROM:;20 reps;ankle pumps/circles;supine   2 sets  -TA    Left Lower Extremity  AROM:;20 reps;quad sets;supine   2 sets  -TA    Bilateral Lower Extremities  AROM:;20 reps;supine;glut sets   2 sets  -TA    Trunk Exercises  supine;10 reps   pull-ups  with trapeze bar  -TA    Positioning and Restraints    Pre-Treatment Position  in bed  -TA    Post Treatment Position  bed  -TA    In Bed  supine;call light within reach;with family/caregiver  -TA      09/12/17 0755 09/12/17 0718    Rehab Evaluation    Document Type evaluation  -BH (r) SP (t) BH (c)     Subjective Information agree to therapy;pain  -BH (r) SP (t) BH (c)     Patient Effort, Rehab Treatment good  -BH (r) SP (t) BH (c)     Symptoms Noted During/After Treatment increased pain;fatigue  -BH (r) SP (t) BH (c)     Symptoms Noted Comment BLE non weight bear, knee immobilizer on at all times with RLE and RLE to be extended at all times. Dr. Stephen spoke with Dr. Price, pt allowed to t/f to BSC and w/c with RLE extended and LLE supported (if longer than 30 minutes ). Pt can only be up in w/c for 2  hours. Please see OT order/Dr. Stephen notes for further clarity. Pt has a left wrist cock up splint for LUE for comfort during act as pt wants, MD stated nothing broken can wear as needed for comfort.   - (r) SP (t) BH (c)     General Information    Patient Profile Review yes  -BH (r) SP (t) BH (c)     Onset of Illness/Injury or Date of Surgery Date 09/06/17  - (r) SP (t) BH (c)     Referring Physician JOSE ALEJANDRO Stephen MD  - (r) SP (t) BH (c)     General Observations Pt. supine in bed, HOB elevated. ORIF of R patella. Room air.  - (r) SP (t) BH (c)     Pertinent History Of Current Problem Pt. fell off a ladder when working on his roof 9/6/17. s/p closed reduction and percutaneous fixation of L navicular fx/dislocation 9/7/17  - (r) SP (t) BH (c)     Precautions/Limitations fall precautions;non-weight bearing status   NWB BLE, R knee immobilizer on at all times  - (r) SP (t) BH (c)     Prior Level of Function independent:;all household mobility;transfer;ADL's;home management;driving;using stairs  - (r) SP (t) BH (c)     Equipment Currently Used at Home --   BSC, bath bench, shower chair, w/c, tripod cane; not used  - (r) SP (t) BH (c)     Plans/Goals Discussed With patient  - (r) SP (t) BH (c)     Risks Reviewed patient:;LOB;dizziness;increased discomfort;change in vital signs  - (r) SP (t) BH (c)     Benefits Reviewed patient:;improve function;increase independence;increase strength;increase balance  - (r) SP (t) BH (c)     Barriers to Rehab medically complex  - (r) SP (t) BH (c)     Living Environment    Lives With spouse  - (r) SP (t) BH (c)     Living Arrangements house  - (r) SP (t) BH (c)     Home Accessibility stairs to enter home;bed and bath on same level;tub/shower is not walk in  - (r) SP (t) BH (c)     Number of Stairs to Enter Home 2  -BH (r) SP (t) BH (c)     Stair Railings at Home none  -BH (r) SP (t) BH (c)     Transportation Available car;family or friend will provide  -BH  (r) SP (t) BH (c)     Living Environment Comment Pt. wife in [place]. Wife waiting on grandchild to be born and will travel back with .Pt. reports he will be building ramps.  -BH (r) SP (t) BH (c)     Vital Signs    Pre Systolic BP Rehab 124  -BH (r) SP (t) BH (c)     Pre Treatment Diastolic BP 72  -BH (r) SP (t) BH (c)     Post Systolic BP Rehab 132  -BH (r) SP (t) BH (c)     Post Treatment Diastolic BP 74  -BH (r) SP (t) BH (c)     Pretreatment Heart Rate (beats/min) 102  -BH (r) SP (t) BH (c)     Intratreatment Heart Rate (beats/min) 106  -BH (r) SP (t) BH (c)     Posttreatment Heart Rate (beats/min) 114  -BH (r) SP (t) BH (c)     Pre SpO2 (%) 96  -BH (r) SP (t) BH (c)     O2 Delivery Pre Treatment room air  -BH (r) SP (t) BH (c)     Intra SpO2 (%) 98  -BH (r) SP (t) BH (c)     O2 Delivery Intra Treatment room air  -BH (r) SP (t) BH (c)     Post SpO2 (%) 96  -BH (r) SP (t) BH (c)     O2 Delivery Post Treatment room air  -BH (r) SP (t) BH (c)     Pre Patient Position Supine  -BH (r) SP (t) BH (c)     Intra Patient Position Supine  -BH (r) SP (t) BH (c)     Post Patient Position Supine  -BH (r) SP (t) BH (c)     Pain Assessment    Pain Assessment 0-10  -BH (r) SP (t) BH (c)     Pain Score 6  -BH (r) SP (t) BH (c)     Post Pain Score 4   gas pains, R knee pain  -BH (r) SP (t) BH (c)     Pain Type Surgical pain  -BH (r) SP (t) BH (c)     Pain Location --   L foot, R knee, occasional burning in L hip  -BH (r) SP (t) BH (c)     Pain Descriptors Sore;Burning  -BH (r) SP (t) BH (c)     Pain Frequency Constant/continuous   L hip with certain movements  -BH (r) SP (t) BH (c)     Pain Intervention(s) Medication (See MAR);Repositioned   RN administered meds  -BH (r) SP (t) BH (c)     Vision Assessment/Intervention    Visual Impairment WFL with corrective lenses   reading glasses- decreased low vision in R eye  -BH (r) SP (t) BH (c)     Cognitive Assessment/Intervention    Current Cognitive/Communication Assessment  functional  - (r) SP (t)  (c)     Orientation Status oriented x 4  - (r) SP (t)  (c)     Follows Commands/Answers Questions 100% of the time  - (r) SP (t)  (c)     Personal Safety mild impairment   difficulty maintaining WB status for bed mobility  - (r) SP (t)  (c)     Personal Safety Interventions fall prevention program maintained;muscle strengthening facilitated;supervised activity  - (r) SP (t)  (c)     ROM (Range of Motion)    General ROM no range of motion deficits identified  - (r) SP (t)  (c)     General ROM Detail RUE WFL, good digit to nose, good digit to thumb opposition  - (r) SP (t)  (c)     MMT (Manual Muscle Testing)    General MMT Assessment upper extremity strength deficits identified  - (r) SP (t)  (c)     General MMT Assessment Detail grossly B  4+/5, grossly BUE 4/5  - (r) SP (t)  (c)     Bed Mobility, Assessment/Treatment    Bed Mobility, Assistive Device overhead trapeze;bed rails;draw sheet  - (r) SP (t)  (c)     Bed Mobility, Roll Left, Foard verbal cues required;moderate assist (50% patient effort)  - (r) SP (t)  (c)     Bed Mobility, Comment Required mod-max A with draw sheet for repositioning toward HOB. Assist with rolling was for protection of RLE to stay in proper position, pt was able to use his hands and bed rails to roll.   - (r) SP (t)  (c)     Transfer Assessment/Treatment    Transfer, Comment defer  - (r) SP (t)  (c)     Sensory Assessment/Intervention    Light Touch LUE;RUE  - (r) SP (t)  (c) LLE;RLE  -MC    LUE Light Touch WNL  - (r) SP (t) BH (c) WNL  -MC    RUE Light Touch WNL  - (r) SP (t) BH (c) WNL  -MC    LLE Light Touch  mild impairment   Unable to feel light touch on big toe  -    RLE Light Touch  WNL  -    Edema Management    Edema Amount left:   in LLE, noted moderate edema; couldn't fully assess RLE  - (r) SP (t) BH (c)     Positioning and Restraints    Pre-Treatment Position in bed  -BH (r)  SP (t) BH (c)     Post Treatment Position bed  -BH (r) SP (t) BH (c)     In Bed call light within reach;encouraged to call for assist;notified nsg  -BH (r) SP (t) BH (c)       09/11/17 2100 09/11/17 1500    Rehab Evaluation    Document Type  evaluation  -LM    Subjective Information  agree to therapy;complains of;pain  -LM    Patient Effort, Rehab Treatment  good  -LM    Symptoms Noted During/After Treatment  increased pain  -LM    Symptoms Noted Comment  Dr. Rutherford entered room during initial evaluation.  He states he does not want pt in a w/c - only wants sliding transfers to stretcher chair at this time.  PT relayed info to Dr. Stephen post eval.  -LM    General Information    Patient Profile Review  yes  -LM    Onset of Illness/Injury or Date of Surgery Date  09/06/17  -    Referring Physician  Dr. Stephen  -    General Observations  Pt lying in bed with HOB elevated.  Noted to have KI on LLE.  Pt very pleasant and agreeable to PT eval.  -LM    Pertinent History Of Current Problem  Pt fell off a ladder when working on his roof on 9/6/17.  s/p closed reduction and percutaneous fixation of L navicular fx/dislocation - 9/7/17 by Dr. Rivera;  s/p ORIF of R patella fx - 9/8/17 by Dr. Rutherford.  Pt transferred to ARU on 9/11/17 for further therapy.  -LM    Precautions/Limitations  fall precautions;non-weight bearing status   NWB BLE's; R Knee Immobilizer on at all times  -LM    Prior Level of Function  independent:;all household mobility;gait;ADL's;cooking;cleaning;driving;yard work  -LM    Equipment Currently Used at Home  commode;bath bench;shower chair;walker, rolling;wheelchair   Tripod Cane; Did not use any DME - belonged to his mother  -LM    Plans/Goals Discussed With  patient;agreed upon  -LM    Risks Reviewed  patient:;LOB;increased discomfort  -LM    Benefits Reviewed  patient:;improve function;increase independence;increase strength;increase balance  -LM    Living Environment    Lives With  spouse  -LM     Living Arrangements  house  -LM    Home Accessibility  stairs to enter home  -LM    Number of Stairs to Enter Home  2  -LM    Stair Railings at Home  none  -LM    Transportation Available  car;family or friend will provide  -LM    Living Environment Comment  Pt's wife is currently in [place].  Wife waiting on grandbaby to be born and will be traveling back with the .  Spoke with pt about importance of building a ramp.  -LM    Clinical Impression    Date of Referral to PT  17  -LM    PT Diagnosis  Abnormality of gait and mobility; Muscle Weakness  -LM    Criteria for Skilled Therapeutic Interventions Met  yes;treatment indicated  -LM    Pathology/Pathophysiology Noted (Describe Specifically for Each System)  musculoskeletal  -LM    Impairments Found (describe specific impairments)  aerobic capacity/endurance;gait, locomotion, and balance;muscle performance;ROM  -LM    Functional Limitations in Following Categories (Describe Specific Limitations)  self-care;home management  -LM    Rehab Potential  good, to achieve stated therapy goals  -LM    Predicted Duration of Therapy Intervention (days/wks)  Until discharge or all goals met  -LM    Vital Signs    Pre Systolic BP Rehab  138  -LM    Pre Treatment Diastolic BP  70  -LM    Post Systolic BP Rehab  149  -LM    Post Treatment Diastolic BP  72  -LM    Pretreatment Heart Rate (beats/min)  95  -LM    Posttreatment Heart Rate (beats/min)  94  -LM    Pre SpO2 (%)  98  -LM    O2 Delivery Pre Treatment  room air  -LM    Post SpO2 (%)  99  -LM    O2 Delivery Post Treatment  room air  -LM    Pre Patient Position  Supine  -LM    Post Patient Position  Supine  -LM    Pain Assessment    Pain Assessment  0-10  -LM    Pain Score  4  -LM    Post Pain Score  6  -LM    Pain Type  Surgical pain  -LM    Pain Location  Knee  -LM    Pain Orientation  Right  -LM    Pain Intervention(s)  Medication (See MAR);Repositioned;Ambulation/increased activity   RN notified and gave pain  meds  -LM    Vision Assessment/Intervention    Visual Impairment  WFL with corrective lenses   Reading Glasses  -LM    Cognitive Assessment/Intervention    Current Cognitive/Communication Assessment  functional  -LM    Orientation Status  oriented x 4  -LM    Follows Commands/Answers Questions  75% of the time;100% of the time  -LM    Personal Safety  WNL/WFL  -LM    Personal Safety Interventions  fall prevention program maintained;gait belt;muscle strengthening facilitated;nonskid shoes/slippers when out of bed  -LM    ROM (Range of Motion)    General ROM Detail  RLE - Decreased active hip flex - AAROM WFL; Knee not tested; Decreased active ankle DF; LLE - Hip AROM WFL; Knee flex - 79 degrees; Knee ext AROM WFL; Ankle - NT due to casted - pt able to actively wiggle toes  -LM    General LE Assessment    ROM Detail  L Knee flex - 79 degrees  -LM    MMT (Manual Muscle Testing)    General MMT Assessment Detail  No resistance given 2* to pt NWB bilaterally; Due to ROM - RLE - Hip flex - 2-/5; Knee - not tested; Ankle DF - 2+/5; LLE - Hip flex - 3/5; Knee flex - 2+/5; Knee ext - 3/5; Ankle - not tested  -LM    Mobility Assessment/Training    Extremity Weight-Bearing Status  left lower extremity;right lower extremity  -LM    Left Lower Extremity Weight-Bearing  non weight-bearing  -LM    Right Lower Extremity Weight-Bearing  non weight-bearing  -LM    Bed Mobility, Assessment/Treatment    Bed Mobility, Assistive Device  overhead trapeze  -LM    Bed Mob, Supine to Sit, [place]  minimum assist (75% patient effort)  -LM    Bed Mob, Sit to Supine, [place]  contact guard assist  -LM    Transfer Assessment/Treatment    Transfers, Bed-Chair [place]  minimum assist (75% patient effort);1 person + 1 person to manage equipment  -LM    Transfers, Chair-Bed [place]  minimum assist (75% patient effort);1 person + 1 person to manage equipment  -LM    Transfers, Sit-Stand [place]  not appropriate to assess  -LM     Transfers, Stand-Sit [place]  not appropriate to assess  -LM    Transfer, Impairments  ROM decreased;strength decreased  -LM    Transfer, Comment  Stretcher chair flattened and placed flush with the bed.  One person stabilized RLE and one person held stretcher chair to make sure it didn't move.  -LM    Gait Assessment/Treatment    Gait, [place] Level  not appropriate to assess  -LM    Stairs Assessment/Treatment    Stairs, [place] Level  not appropriate to assess  -LM    Sensory Assessment/Intervention    Light Touch LLE;RLE  -NW LLE;RLE  -LM    LUE Light Touch WNL  -NW     RUE Light Touch WNL  -NW     LLE Light Touch mild impairment   Unable to feel light touch on big toe  -NW mild impairment   Unable to feel light touch on big toe  -LM    RLE Light Touch WNL  -NW WNL  -LM    Edema Management    Edema Amount  right:;left:;minimal;moderate   LE's  -LM    Positioning and Restraints    Pre-Treatment Position  in bed  -LM    Post Treatment Position  bed  -LM    In Bed  supine;call light within reach;encouraged to call for assist  -LM      09/11/17 1458 09/11/17 1040    Rehab Evaluation    Document Type  therapy note (daily note)  -AM    Subjective Information  agree to therapy;complains of;pain  -AM    Patient Effort, Rehab Treatment  good  -AM    Symptoms Noted During/After Treatment  none  -AM    General Information    Precautions/Limitations  brace on when up;fall precautions;non-weight bearing status  -AM    Equipment Currently Used at Home none;wheelchair   bsc  -MB     Vital Signs    Pre Systolic BP Rehab  144  -AM    Pre Treatment Diastolic BP  78  -AM    Post Systolic BP Rehab  138  -AM    Post Treatment Diastolic BP  75  -AM    Pretreatment Heart Rate (beats/min)  106  -AM    Posttreatment Heart Rate (beats/min)  111  -AM    Pre SpO2 (%)  95  -AM    O2 Delivery Pre Treatment  room air  -AM    Post SpO2 (%)  96  -AM    O2 Delivery Post Treatment  room air  -AM    Pre Patient Position  Sitting   -AM    Intra Patient Position  Supine  -AM    Post Patient Position  Sitting  -AM    Pain Assessment    Pain Assessment  0-10  -AM    Pain Score  6  -AM    Post Pain Score  4  -AM    Pain Type  Acute pain;Surgical pain  -AM    Pain Location  Knee  -AM    Pain Orientation  Right  -AM    Pain Descriptors  Sore  -AM    Pain Frequency  Constant/continuous  -AM    Date Pain First Started  09/06/17  -AM    Clinical Progression  Gradually improving  -AM    Patient's Stated Pain Goal  No pain  -AM    Pain Intervention(s)  Medication (See MAR);Repositioned  -AM    Result of Injury  Yes  -AM    Work-Related Injury  No  -AM    Multiple Pain Sites  No  -AM    Cognitive Assessment/Intervention    Current Cognitive/Communication Assessment  functional  -AM    Orientation Status  oriented x 4  -AM    Follows Commands/Answers Questions  100% of the time  -AM    Personal Safety Interventions  gait belt;nonskid shoes/slippers when out of bed;supervised activity  -AM    ROM (Range of Motion)    General ROM  lower extremity range of motion deficits identified  -AM    General LE Assessment    ROM  knee, right: LE ROM deficit;ankle, left: LE ROM deficit  -AM    Mobility Assessment/Training    Extremity Weight-Bearing Status  left lower extremity;right lower extremity  -AM    Left Lower Extremity Weight-Bearing  non weight-bearing  -AM    Right Lower Extremity Weight-Bearing  non weight-bearing  -AM    Bed Mobility, Assessment/Treatment    Bed Mobility, Assistive Device  head of bed elevated  -AM    Bed Mobility, Roll Left, [place]  not tested  -AM    Bed Mobility, Roll Right, [place]  not tested  -AM    Bed Mobility, Scoot/Bridge, [place]  not tested  -AM    Bed Mob, Supine to Sit, [place]  contact guard assist  -AM    Bed Mob, Sit to Supine, [place]  contact guard assist  -AM    Bed Mob, Sidelying to Sit, [place]  not tested  -AM    Bed Mob, Sit to Sidelying, [place]  not tested  -AM    Bed Mobility,  Impairments  ROM decreased;strength decreased;pain  -AM    Transfer Assessment/Treatment    Transfers, Bed-Chair [place]  contact guard assist  -AM    Transfers, Chair-Bed [place]  contact guard assist  -AM    Transfers, Bed-Chair-Bed, Assist Device  other (see comments)   none  -AM    Transfers, Sit-Stand [place]  not appropriate to assess  -AM    Transfers, Stand-Sit [place]  not appropriate to assess  -AM    Toilet Transfer, [place]  not tested  -AM    Walk-In Shower Transfer, [place]  not tested  -AM    Bathtub Transfer, [place]  not tested  -AM    Transfer, Maintain Weight Bearing Status  able to maintain weight bearing status  -AM    Transfer, Impairments  ROM decreased;strength decreased;pain  -AM    Gait Assessment/Treatment    Gait, [place] Level  not appropriate to assess  -AM    Stairs Assessment/Treatment    Stairs, [place] Level  not appropriate to assess  -AM    Orthotics Prosthetics    Additional Documentation  Orthosis Location (Group);Orthosis Management/Training (Group)  -AM    Orthosis Location    Orthosis Location/Type  lower extremity  -AM    Orthosis, Lower Extremity  Right:;knee immobilizer  -AM    Orthosis Management/Training    Orthosis Fabrication Detail  Knee Immobilizer  -AM    Orthosis Indications  restrict motion  -AM    Orthosis Wear Schedule  wear full time  -AM    Positioning and Restraints    Pre-Treatment Position  sitting in chair/recliner  -AM    Post Treatment Position  wheelchair  -AM    In Wheelchair  sitting;call light within reach;encouraged to call for assist;legs elevated  -AM      09/11/17 0921 09/10/17 1300    Rehab Evaluation    Document Type evaluation  -RB therapy note (daily note)  -MAGNO    Subjective Information agree to therapy;complains of;pain  -RB agree to therapy  -MAGNO    Patient Effort, Rehab Treatment good  -RB good  -MAGNO    Symptoms Noted During/After Treatment increased pain  -RB     General Information    Patient  Profile Review yes  -RB     Onset of Illness/Injury or Date of Surgery Date 09/06/17  -RB     Referring Physician Migue Martin MD.  -RB     General Observations Supine in bed with LEs elevated and IV.  -RB     Pertinent History Of Current Problem Hosp after fall from ladder where he sustained fx of L mavicular bone and R patella.  ORIF R patilla 9/8/17 and navicular fixation on 9/7/17.  -RB     Precautions/Limitations fall precautions;non-weight bearing status  -RB fall precautions;non-weight bearing status;other (see comments)   vital signs. NWB R LE and L LE  -MAGNO    Prior Level of Function independent:;gait;transfer;ADL's  -RB     Equipment Currently Used at Home none   has W/C and BSC.  -RB     Plans/Goals Discussed With patient  -RB     Risks Reviewed patient:  -RB     Barriers to Rehab none identified  -RB     Living Environment    Lives With spouse  -RB     Living Arrangements house  -RB     Home Accessibility stairs to enter home  -RB     Number of Stairs to Enter Home 2  -RB     Stair Railings at Home none  -RB     Living Environment Comment Wife not home for ~ one wk.  Pt has T/S with non skid surface and seat.  -RB     Vital Signs    Pre Systolic BP Rehab 142  -  -MAGNO    Pre Treatment Diastolic BP 76  -RB 64  -MAGNO    Post Systolic BP Rehab 132  -  -MAGNO    Post Treatment Diastolic BP 73  -RB 66  -MAGNO    Pretreatment Heart Rate (beats/min) 116  -  -MAGNO    Posttreatment Heart Rate (beats/min) 105  -  -MAGNO    Pre SpO2 (%) 95  -RB 95  -MAGNO    O2 Delivery Pre Treatment room air  -RB room air  -MAGNO    Post SpO2 (%) 93  -RB 97  -MAGNO    O2 Delivery Post Treatment room air  -RB room air  -MAGNO    Pre Patient Position Supine  -RB Sitting  -MAGNO    Post Patient Position Sitting  -RB Supine  -MAGNO    Pain Assessment    Pain Assessment 0-10  -RB 0-10  -MAGNO    Pain Score 4  -RB 8  -MAGNO    Post Pain Score 6  -RB 8  -MAGNO    Pain Type Acute pain  -RB Acute pain  -MAGNO    Pain Location Knee  -RB Knee  -MAGNO    Pain  Orientation Right  -RB Right  -MAGNO    Pain Intervention(s) Repositioned  -RB Repositioned   nsg notified of pts pain  -MAGNO    Vision Assessment/Intervention    Visual Impairment WFL with corrective lenses   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  -RB visual impairment, right   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  -MAGNO    Cognitive Assessment/Intervention    Current Cognitive/Communication Assessment functional  -RB functional  -MAGNO    Orientation Status oriented x 4  -RB oriented x 4  -MAGNO    Follows Commands/Answers Questions 100% of the time  -% of the time  -MAGNO    Personal Safety WNL/WFL  -RB WNL/WFL  -MAGNO    Personal Safety Interventions gait belt;nonskid shoes/slippers when out of bed;supervised activity  -RB     ROM (Range of Motion)    General ROM no range of motion deficits identified  -RB     General ROM Detail B UEs.  -RB     MMT (Manual Muscle Testing)    General MMT Assessment upper extremity strength deficits identified  -RB     General MMT Assessment Detail 3+/5 , 4/5 elbows and wrists and 4+/5 for B shoulders.  -RB     Mobility Assessment/Training    Extremity Weight-Bearing Status left lower extremity;right lower extremity  -RB left lower extremity;right lower extremity  -MAGNO    Left Lower Extremity Weight-Bearing non weight-bearing  -RB non weight-bearing  -MAGNO    Right Lower Extremity Weight-Bearing non weight-bearing  -RB non weight-bearing   unless otherwise oredered by MD  -MAGNO    Bed Mobility, Assessment/Treatment    Bed Mobility, Assistive Device bed rails;head of bed elevated  -RB     Bed Mobility, Roll Left, [place]  not tested  -MAGNO    Bed Mobility, Roll Right, [place] moderate assist (50% patient effort)  -RB     Bed Mobility, Scoot/Bridge, [place] minimum assist (75% patient effort)  -RB     Transfer Assessment/Treatment    Transfers, Bed-Chair [place] dependent (less than 25% patient effort)   of 3.  -RB     Transfers, Chair-Bed [place]  dependent (less than 25% patient effort)    assist x3.   -MAGNO    Sensory Assessment/Intervention    Light Touch LUE;RUE  -RB     LUE Light Touch WNL  -RB     RUE Light Touch WNL  -RB     Positioning and Restraints    Pre-Treatment Position in bed  -RB sitting in chair/recliner  -MAGNO    Post Treatment Position chair  -RB bed  -MAGNO    In Bed call light within reach;supine  -RB supine;call light within reach;encouraged to call for assist;exit alarm on;patient within staff view   all needs met.   -MAGNO      09/10/17 0831 09/10/17 0733    Rehab Evaluation    Document Type therapy note (daily note)  -MAGNO     Subjective Information agree to therapy  -MAGNO     Patient Effort, Rehab Treatment good  -MAGNO     General Information    Precautions/Limitations fall precautions;non-weight bearing status;other (see comments)   vital signs. NWB R LE and L LE  -     Vital Signs    Pre Systolic BP Rehab 142  -MAGNO     Pre Treatment Diastolic BP 70  -MAGNO     Post Systolic BP Rehab 156  -MAGNO     Post Treatment Diastolic BP 77  -MAGNO     Pretreatment Heart Rate (beats/min) 111  -MAGNO     Posttreatment Heart Rate (beats/min) 108  -MAGNO     Pre SpO2 (%) 94  -MAGNO     O2 Delivery Pre Treatment room air  -MAGNO     Post SpO2 (%) 96  -MAGNO     O2 Delivery Post Treatment room air  -MAGNO     Pre Patient Position Supine  -MAGNO     Post Patient Position Sitting  -     Pain Assessment    Pain Assessment 0-10  -MAGNO     Pain Score 5  -MAGNO     Post Pain Score 5  -MAGNO     Pain Type Acute pain  -MAGNO     Pain Intervention(s) Medication (See MAR)  -MAGNO     Vision Assessment/Intervention    Visual Impairment visual impairment, right   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  -     Cognitive Assessment/Intervention    Current Cognitive/Communication Assessment functional  -     Orientation Status oriented x 4  -MAGNO     Follows Commands/Answers Questions 100% of the time  -MAGNO     Personal Safety WNL/WFL  -MAGNO     Bed Mobility, Assessment/Treatment    Bed Mobility, Roll Left, [place] not tested  -MAGNO     Transfer Assessment/Treatment     Transfers, Bed-Chair [place] dependent (less than 25% patient effort)   assist x3  -MAGNO     Sensory Assessment/Intervention    Light Touch  LUE;RUE;LLE;RLE  -CM    LUE Light Touch  WNL  -CM    RUE Light Touch  WNL  -CM    LLE Light Touch  WNL  -CM    RLE Light Touch  WNL  -CM    Positioning and Restraints    Pre-Treatment Position in bed  -MAGNO     Post Treatment Position chair  -MAGNO     In Chair reclined;call light within reach;encouraged to call for assist   all needs met.   -MAGNO       User Key  (r) = Recorded By, (t) = Taken By, (c) = Cosigned By    Initials Name Provider Type    LM Isela Chris, PT Physical Therapist    RB Axel Perez, OT Occupational Therapist    BH Isabel Jones, OTR/L Occupational Therapist    CM Sherrie Peña, RN Registered Nurse    CC Lisa Larios, \[facility]\""W     MB Rosanne Dominique, RN Registered Nurse    SARY Abreu, RN Registered Nurse    NW Gladis Ring, RN Registered Nurse    TA Geri Juan, PTA Physical Therapy Assistant    MAGNO Pendleton, PTA Physical Therapy Assistant    AM Jim Young, PTA Physical Therapy Assistant    RC Shruthi Jean-Baptiste, VILLALPANDO/L Occupational Therapy Assistant    SP Kenya Juárez, OT Student OT Student          Physical Therapy Education     Title: PT OT SLP Therapies (Active)     Topic: Physical Therapy (Active)     Point: Mobility training (Active)    Learning Progress Summary    Learner Readiness Method Response Comment Documented by Status   Patient Acceptance E NR Reviewed transferring towards stronger side and maintaining NWB with activity.  09/12/17 1608 Active    Acceptance E NR  TA 09/12/17 1515 Active    Acceptance E NR Reviewed safety with transfers.  Explained that Dr. Rutherford only wants stretcher chair used at this time.  09/11/17 1636 Active               Point: Precautions (Active)    Learning Progress Summary    Learner Readiness Method Response Comment Documented by Status   Patient Acceptance E NR Reviewed  transferring towards stronger side and maintaining NWB with activity. LM 09/12/17 1608 Active    Acceptance E NR  TA 09/12/17 1515 Active    Acceptance E NR Reviewed safety with transfers.  Explained that Dr. Rutherford only wants stretcher chair used at this time. LM 09/11/17 1636 Active                      User Key     Initials Effective Dates Name Provider Type Discipline    LM 06/15/16 -  Isela Chris, PT Physical Therapist PT    TA 10/17/16 -  Geri Juan PTA Physical Therapy Assistant PT                PT Recommendation and Plan  Anticipated Discharge Disposition: home with home health  Planned Therapy Interventions: balance training, bed mobility training, home exercise program, motor coordination training, neuromuscular re-education, patient/family education, postural re-education, ROM (Range of Motion), strengthening, stretching, transfer training  PT Frequency: other (see comments) (5-14 times/wk)  Plan of Care Review  Plan Of Care Reviewed With: patient  Outcome Summary/Follow up Plan: PT re-evaluation completed 2* to new orders received from Dr. Stephen/Sangeeta.  Goals modified.  Pt transferred to w/c using SB with [place] and self propelled w/c 200 feet with SBA.  Pt will benefit from skilled PT to improve mobility and safety prior to d/c.          IP PT Goals       09/12/17 1355 09/11/17 1500 09/11/17 1040    Bed Mobility PT LTG    Bed Mobility PT LTG, Date Established  09/11/17  -LM     Bed Mobility PT LTG, Time to Achieve  by discharge  -LM     Bed Mobility PT LTG, Activity Type  supine to sit/sit to supine  -LM     Bed Mobility PT LTG, [place] Level  conditional independence  -LM     Bed Mobility PT Goal  LTG, Assist Device  overhead trapeze;bed rails  -LM     Bed Mobility PT LTG, Date Goal Reviewed 09/12/17  -LM      Bed Mobility PT LTG, Outcome goal ongoing  -LM goal ongoing  -LM     Transfer Training PT LTG    Transfer Training PT LTG, Date Established  09/11/17  -LM     Transfer Training PT LTG,  Time to Achieve  by discharge  -LM     Transfer Training PT LTG, Activity Type  bed to chair /chair to bed  -LM     Transfer Training PT LTG, [place] Level  conditional independence  -LM     Transfer Training PT LTG, Assist Device  --   AAD  -LM     Transfer Training PT  LTG, Date Goal Reviewed 09/12/17  -LM  09/11/17  -AM    Transfer Training PT LTG, Outcome goal ongoing  -LM goal ongoing  -LM goal not met  -AM    Transfer Training PT LTG, Reason Goal Not Met   discharged from facility  -AM    Range of Motion PT LTG    Range of Motion Goal PT LTG, Date Established  09/11/17  -LM     Range of Motion Goal PT LTG, Time to Achieve  by discharge  -LM     Range fo Motion Goal PT LTG, Joint  L knee  -LM     Range of Motion Goal PT LTG, AROM Measure  Knee flex - 110 degrees  -LM     Range of Motion Goal PT LTG, Date Goal Reviewed 09/12/17  -LM      Range of Motion Goal PT LTG, Outcome goal ongoing  -LM goal ongoing  -LM     Patient Education PT LTG    Patient Education PT LTG, Date Goal Reviewed   09/11/17  -AM    Patient Education PT LTG Outcome   goal met  -AM    Wheelchair Propulsion PT LTG    Wheelchair Propulsion Goal PT LTG, Date Established 09/12/17  -LM      Wheelchair Propulsion Goal PT LTG, Time to Achieve by discharge  -LM      Wheelchair Propulsion Goal PT LTG, [place] Level conditional independence  -LM      Wheelchair Propulsion Goal PT LTG, Distance to Achieve 300 feet  -LM      Wheelchair Propulsion Goal PT LTG, Outcome goal ongoing  -LM      Caregiver Training PT LTG    Caregiver Training PT LTG, Date Goal Reviewed   09/11/17  -AM    Caregiver Training PT LTG, Outcome   goal not met  -AM    Caregiver Training PT LTG, Reason Goal Not Met   discharged from facility  -AM    Physical Therapy PT LTG    Physical Therapy PT LTG, Date Established 09/12/17  -LM      Physical Therapy PT LTG, Time to Achieve by discharge  -LM      Physical Therapy PT LTG, Activity Type Pt will self propel w/c up/down ramp.   -      Physical Therapy PT LTG, [place] Level conditional independence  -      Physical Therapy PT LTG, Outcome goal ongoing  -LM        09/10/17 1300 09/09/17 1626       Transfer Training PT LTG    Transfer Training PT LTG, Date Established  09/09/17  -     Transfer Training PT LTG, Time to Achieve  by discharge  -     Transfer Training PT LTG, Activity Type  bed to chair /chair to bed  -     Transfer Training PT LTG, Additional Goal  Once MD allows, pt will perform lateral transfer with conditional independence  -     Transfer Training PT  LTG, Date Goal Reviewed 09/10/17  -[facility]      Transfer Training PT LTG, Outcome goal ongoing  -A goal ongoing  -     Patient Education PT LTG    Patient Education PT LTG, Date Established  09/09/17  -     Patient Education PT LTG, Time to Achieve  by discharge  -     Patient Education PT LTG, Education Type  precaution per surgeon;transfers;bed mobility;pain management;home safety  -     Patient Education PT LTG, Date Goal Reviewed 09/10/17  -[facility]      Patient Education PT LTG Outcome goal ongoing  -A goal ongoing  -     Caregiver Training PT LTG    Caregiver Training PT LTG, Date Established  09/09/17  -     Caregiver Training PT LTG, Time to Achieve  by discharge  -     Caregiver Training PT LTG, Activity Type  home caregiver will demonstrate understanding assisting pt as needed with transfers/mobility as well as verbalize understanding of home care instructions  -     Caregiver Training PT LTG, Date Goal Reviewed 09/10/17  -[facility]      Caregiver Training PT LTG, Outcome goal ongoing  -A goal ongoing  -     Physical Therapy PT STG    Physical Therapy PT STG, Date Established  09/09/17  -     Physical Therapy PT STG, Time to Achieve  by discharge  -     Physical Therapy PT STG, Activity Type  Pt will tolerate OOB 3-4 hours per day  -     Physical Therapy PT STG, Date Goal Reviewed 09/10/17  -[facility]      Physical Therapy PT STG, Outcome goal  met  -JCA goal ongoing  -MAGNO       User Key  (r) = Recorded By, (t) = Taken By, (c) = Cosigned By    Initials Name Provider Type    LM Isela Chris, PT Physical Therapist    MAGNO Abel, PT Physical Therapist    HELEN Pendleton, PTA Physical Therapy Assistant    SUSIE Young, DARIEN Physical Therapy Assistant                Outcome Measures       09/12/17 1500 09/12/17 1355 09/12/17 0755    How much help from another person do you currently need...    Turning from your back to your side while in flat bed without using bedrails? 3  -TA 3  -LM     Moving from lying on back to sitting on the side of a flat bed without bedrails? 3  -TA 3  -LM     Moving to and from a bed to a chair (including a wheelchair)? 3  -TA 3  -LM     Standing up from a chair using your arms (e.g., wheelchair, bedside chair)? 1  -TA 1  -LM     Climbing 3-5 steps with a railing? 1  -TA 1  -LM     To walk in hospital room? 1  -TA 1  -LM     AM-PAC 6 Clicks Score 12  -TA 12  -LM     How much help from another is currently needed...    Putting on and taking off regular lower body clothing?   1  -BH (r) SP (t) BH (c)    Bathing (including washing, rinsing, and drying)   2  -BH (r) SP (t) BH (c)    Toileting (which includes using toilet bed pan or urinal)   2  -BH (r) SP (t) BH (c)    Putting on and taking off regular upper body clothing   3  -BH (r) SP (t) BH (c)    Taking care of personal grooming (such as brushing teeth)   3  -BH (r) SP (t) BH (c)    Eating meals   4  -BH (r) SP (t) BH (c)    Score   15  -BH (r) SP (t)    Functional Assessment    Outcome Measure Options AM-PAC 6 Clicks Basic Mobility (PT)  -TA AM-PAC 6 Clicks Basic Mobility (PT)  -LM AM-PAC 6 Clicks Daily Activity (OT)  -BH (r) SP (t) BH (c)      09/11/17 1500 09/11/17 1040 09/11/17 0921    How much help from another person do you currently need...    Turning from your back to your side while in flat bed without using bedrails? 3  -LM 3  -AM     Moving from lying on back  to sitting on the side of a flat bed without bedrails? 3  -LM 3  -AM     Moving to and from a bed to a chair (including a wheelchair)? 3  -LM 3  -AM     Standing up from a chair using your arms (e.g., wheelchair, bedside chair)? 1  -LM 1  -AM     Climbing 3-5 steps with a railing? 1  -LM 1  -AM     To walk in hospital room? 1  -LM 1  -AM     AM-PAC 6 Clicks Score 12  -LM 12  -AM     How much help from another is currently needed...    Putting on and taking off regular lower body clothing?   2  -RB    Bathing (including washing, rinsing, and drying)   2  -RB    Toileting (which includes using toilet bed pan or urinal)   2  -RB    Putting on and taking off regular upper body clothing   3  -RB    Taking care of personal grooming (such as brushing teeth)   3  -RB    Eating meals   4  -RB    Score   16  -RB    Functional Assessment    Outcome Measure Options AM-PAC 6 Clicks Basic Mobility (PT)  -LM AM-PAC 6 Clicks Basic Mobility (PT)  -AM AM-PAC 6 Clicks Daily Activity (OT)  -RB      09/10/17 0831          How much help from another person do you currently need...    Turning from your back to your side while in flat bed without using bedrails? 3  -MAGNO      Moving from lying on back to sitting on the side of a flat bed without bedrails? 3  -MAGNO      Moving to and from a bed to a chair (including a wheelchair)? 2  -MAGNO      Standing up from a chair using your arms (e.g., wheelchair, bedside chair)? 1  -MAGNO      Climbing 3-5 steps with a railing? 1  -MAGNO      To walk in hospital room? 1  -MAGNO      AM-PAC 6 Clicks Score 11  -MAGNO      Functional Assessment    Outcome Measure Options AM-PAC 6 Clicks Basic Mobility (PT)  -MAGNO        User Key  (r) = Recorded By, (t) = Taken By, (c) = Cosigned By    Initials Name Provider Type    YOEL Chris, PT Physical Therapist    RB Axel Perez, OT Occupational Therapist    BH Isabel Jones, OTR/L Occupational Therapist    HOLLIS Juan, PTA Physical Therapy Assistant    MAGNO Pendleton,  PTA Physical Therapy Assistant    AM Jim Young, PTA Physical Therapy Assistant    DANY Juárez, OT Student OT Student           Time Calculation:         PT Charges       09/12/17 1519 09/12/17 1355       Time Calculation    Start Time 1028  -TA 1355  -LM     Stop Time 1124  -TA 1442  -LM     Time Calculation (min) 56 min  -TA 47 min  -LM     PT Received On  09/12/17  -LM     PT Goal Re-Cert Due Date  09/25/17  -LM     Time Calculation- PT    Total Timed Code Minutes- PT 56 minute(s)  -TA 47 minute(s)  -LM       User Key  (r) = Recorded By, (t) = Taken By, (c) = Cosigned By    Initials Name Provider Type    LM Isela Chris, PT Physical Therapist    HOLLIS Juan, PTA Physical Therapy Assistant          Therapy Charges for Today     Code Description Service Date Service Provider Modifiers Qty    43750509503 HC PT MOBILITY CURRENT 9/11/2017 Isela Chris, PT GP, CL 1    54443100623 HC PT MOBILITY PROJECTED 9/11/2017 Isela Chris, PT GP, CJ 1    51586109273 HC PT EVAL HIGH COMPLEXITY 2 9/11/2017 Isela Chris, PT GP 1    16209207708 HC PT THERAPEUTIC ACT EA 15 MIN 9/11/2017 Isela Chris, PT GP 2    81340102132 HC PT THERAPEUTIC ACT EA 15 MIN 9/12/2017 Isela Chris PT GP 1    66949932537 HC PT THER PROC EA 15 MIN 9/12/2017 Isela Chris, PT GP 1    90676671221 HC PT RE-EVAL ESTABLISHED PLAN 2 9/12/2017 Isela Chris, PT GP 1          PT G-Codes  PT Professional Judgement Used?: Yes  Outcome Measure Options: AM-PAC 6 Clicks Basic Mobility (PT)  Score: 12  Functional Limitation: Mobility: Walking and moving around  Mobility: Walking and Moving Around Current Status (): At least 60 percent but less than 80 percent impaired, limited or restricted  Mobility: Walking and Moving Around Goal Status (): At least 20 percent but less than 40 percent impaired, limited or restricted      Isela Chris PT  9/12/2017

## 2017-09-12 NOTE — PLAN OF CARE
Problem: Patient Care Overview (Adult)  Goal: Plan of Care Review  Outcome: Ongoing (interventions implemented as appropriate)  Goal: Adult Individualization and Mutuality  Outcome: Ongoing (interventions implemented as appropriate)  Goal: Discharge Needs Assessment  Outcome: Ongoing (interventions implemented as appropriate)    Problem: Orthopaedic Fracture (Adult)  Goal: Signs and Symptoms of Listed Potential Problems Will be Absent or Manageable (Orthopaedic Fracture)  Outcome: Ongoing (interventions implemented as appropriate)    Problem: Fall Risk (Adult)  Goal: Absence of Falls  Outcome: Ongoing (interventions implemented as appropriate)    09/12/17 0415   Fall Risk (Adult)   Absence of Falls achieves outcome

## 2017-09-12 NOTE — PROGRESS NOTES
[facility]     Patient Name: Vangie Lopez  MRN: [id number]  Today's Date: 9/12/2017    Admit Date: 9/11/2017 09/12/17 1037    Living Environment    Lives With spouse    Living Arrangements house               Demographic Summary       09/12/17 1027    Referral Information    Admission Type inpatient   acute rehab admission    Arrived From --   [facility]    Referral Source other (see comments)   [facility] psychosocial assessment    Record Reviewed medical record;history and physical    Primary Care Physician Information    Name Dr. Rodríguez ([facility])            Functional Status       09/12/17 1028    Functional Status Current    Ambulation 3-->assistive equipment and person    Transferring 3-->assistive equipment and person    Toileting 3-->assistive equipment and person    Bathing 2-->assistive person    Dressing 2-->assistive person    Eating 0-->independent    Communication 0-->understands/communicates without difficulty    Change in Functional Status Since Onset of Current Illness/Injury yes    Functional Status Prior    Ambulation 0-->independent    Transferring 0-->independent    Toileting 0-->independent    Bathing 0-->independent    Dressing 0-->independent    Eating 0-->independent    Communication 0-->understands/communicates without difficulty    Swallowing 0-->swallows foods/liquids without difficulty    Prior Functional Level Comment Pt was independent prior.    IADL    Medications independent    Meal Preparation independent    Housekeeping independent    Laundry independent    Shopping independent    Oral Care independent    IADL Comments Pt independent with IADLs, spouse performed some of these as well.    Activity Tolerance    Current Activity Limitations other (see comments)   pt reports nonweighbearing both lower extremities    Cognitive/Perceptual/Developmental    Current Mental Status/Cognitive Functioning no deficits noted    Recent Changes in Mental Status/Cognitive  Functioning no changes    Developmental Stage (Eriksson's Stages of Development) Stage 8 (66 years-death/Late Adulthood) Integrity vs. Despair    Cognitive/Perceptual/Developmental Comments no concerns    Employment/Financial    Employment/Finance Comments reported concerned about hospital bill- Nadine Poon CM discussed hema program to offset costs v/s payment plan with financial services.  [facility] and any new prescriptions he would like info. sent to Dr. Rodríguez to see if he is willing to order it for pt, so [facility] can [facility].            Psychosocial       09/12/17 1032    Values/Beliefs    Spiritual Care Comment no concerns noted    Emotional/Psychological    Affect no deficits noted   eutymic/appropriate    Mood congruent to situation   neutral    Verbal Skills no deficits noted    Current Interpersonal Conduct/Behavior appropriate to situation   firendly, cooperative    Mental Health Conditions/Symptoms denies   experiencing some situational stressors- hospitalized, injury, spouse with daughter, daughter to deliver Monday (2nd high risk pregnancy- lost first baby after birth)- concerned for daughter's health and baby's health, eager for wife to return home     Thought Process Alterations no deficits noted    Mental Health Treatment none    Emotional/Psychological Comments coping well with situational stressors, positive outlook, working toward learning as much as he can and making as much progress as he can so that he can return home, mood even, positive natural supports    Coping/Stress    Major Change/Loss/Stressor loss of independence;other (see comments);hospitalization   as above (concern for daughter and unborn grandchild)    Patient Personal Strengths able to adapt;strong support system;tolerant    Sources Of Support other family members;friend(s);spouse;adult child(lo)    Reaction To Health Status adjusting    Understanding Of Condition And Treatment adequate  understanding of medical condition;adequate understanding of treatment            Abuse/Neglect       09/12/17 1036    Home Safety    Feels Safe Living In Home yes    Abuse Screen    Do You Feel That You Are Treated Well By Your Partner/Spouse/Family Member? yes             Lisa Larios LCSW

## 2017-09-12 NOTE — THERAPY EVALUATION
Inpatient Rehabilitation - Occupational Therapy Initial Evaluation  [facility]     Patient Name: Vangie Lopez  : 1950  MRN: [id number]  Today's Date: 2017  Onset of Illness/Injury or Date of Surgery Date: 17  Date of Referral to OT: 17  Referring Physician: JOSE ALEJANDRO Stephen MD    Admit Date: 2017       ICD-10-CM ICD-9-CM   1. Abnormality of gait and mobility R26.9 781.2   2. Muscle weakness (generalized) M62.81 728.87   3. Impaired mobility and ADLs Z74.09 799.89     Patient Active Problem List   Diagnosis   • Encounter for screening for malignant neoplasm of colon   • Closed displaced comminuted fracture of right patella   • Closed displaced fracture of navicular bone of left foot   • Closed dislocation of navicular bone of left foot   • Patella fracture   • Essential hypertension   • Mixed hyperlipidemia   • Osteoarthritis of hands, bilateral   • BPH (benign prostatic hyperplasia)   • Glaucoma   • Hyperlipidemia   • Closed fracture of navicular bone with dislocation of perilunate joint of left wrist   • Fracture of patella, right, closed   • Multiple trauma   • Encounter for rehabilitation     Past Medical History:   Diagnosis Date   • BPH (benign prostatic hyperplasia)    • Cancer     skin cancer   • Closed fracture of navicular bone with dislocation of perilunate joint of left wrist 2017    closed reduction pinning  17   • Essential hypertension 2017   • Fracture of patella, right, closed 2017    orif 17   • Glaucoma     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   • Hyperlipidemia    • Multiple trauma 2017   • Osteoarthritis of hands, bilateral     retired due to arthritis,      Past Surgical History:   Procedure Laterality Date   • COLONOSCOPY  2006   • COLONOSCOPY N/A 4/3/2017    Procedure: COLONOSCOPY;  Surgeon: Michael Ang MD;  Location: [facility] ENDOSCOPY;  Service:    • CYSTOSCOPY     • EXTERNAL FIXATION ANKLE FRACTURE Left 2017    Procedure: ANKLE  EXTERNAL FIXATOR APPLICATION;  Surgeon: Simone Rivera DPM;  Location: [facility];  Service:    • KNEE SURGERY     • PATELLA OPEN REDUCTION INTERNAL FIXATION Right 9/8/2017    Procedure: OPEN REDUCTION INTERNAL FIXATION RIGHT PATELLA       (C-ARM#3);  Surgeon: Checo Rutherford MD;  Location: [facility];  Service:    • SCAPHOID FRACTURE SURGERY Left 09/07/2017    fracture dislocation left foot, closed reduction with pinning   • SHOULDER SURGERY            OT ASSESSMENT FLOWSHEET (last 72 hours)      OT Evaluation       09/12/17 1355 09/12/17 1037 09/12/17 1028 09/12/17 0755 09/12/17 0718    Rehab Evaluation    Document Type re-evaluation  -LM   evaluation  -BH (r) SP (t) BH (c)     Subjective Information agree to therapy;complains of;pain  -LM   agree to therapy;pain  -BH (r) SP (t) BH (c)     Patient Effort, Rehab Treatment    good  -BH (r) SP (t) BH (c)     Symptoms Noted During/After Treatment    increased pain;fatigue  -BH (r) SP (t) BH (c)     Symptoms Noted Comment    BLE non weight bear, knee immobilizer on at all times with RLE and RLE to be extended at all times. Dr. Stephen spoke with Dr. Price, pt allowed to t/f to BSC and w/c with RLE extended and LLE supported (if longer than 30 minutes ). Pt can only be up in w/c for 2 hours. Please see OT order/Dr. Stephen notes for further clarity. Pt has a left wrist cock up splint for LUE for comfort during act as pt wantMD dario stated nothing broken can wear as needed for comfort.   -BH (r) SP (t) BH (c)     General Information    Patient Profile Review yes  -LM   yes  -BH (r) SP (t) BH (c)     Onset of Illness/Injury or Date of Surgery Date    09/06/17  -BH (r) SP (t) BH (c)     Referring Physician    JOSE ALEJANDRO Stephen MD  -BH (r) SP (t) BH (c)     General Observations    Pt. supine in bed, HOB elevated. ORIF of R patella. Room air.  -BH (r) SP (t) BH (c)     Pertinent History Of Current Problem    Pt. fell off a ladder when working on his roof 9/6/17. s/p closed  reduction and percutaneous fixation of L navicular fx/dislocation 17  - (r) SP (t) BH (c)     Precautions/Limitations    fall precautions;non-weight bearing status   NWB BLE, R knee immobilizer on at all times  - (r) SP (t) BH (c)     Prior Level of Function    independent:;all household mobility;transfer;ADL's;home management;driving;using stairs  - (r) SP (t) BH (c)     Equipment Currently Used at Home    --   BSC, bath bench, shower chair, w/c, tripod cane; not used  - (r) SP (t) BH (c)     Plans/Goals Discussed With    patient  - (r) SP (t) BH (c)     Risks Reviewed    patient:;LOB;dizziness;increased discomfort;change in vital signs  - (r) SP (t) BH (c)     Benefits Reviewed    patient:;improve function;increase independence;increase strength;increase balance  - (r) SP (t) BH (c)     Barriers to Rehab    medically complex  - (r) SP (t) BH (c)     Living Environment    Lives With  spouse  -CC  spouse  - (r) SP (t) BH (c)     Living Arrangements  house  -  house  - (r) SP (t) BH (c)     Home Accessibility    stairs to enter home;bed and bath on same level;tub/shower is not walk in  - (r) SP (t) BH (c)     Number of Stairs to Enter Home    2  -BH (r) SP (t) BH (c)     Stair Railings at Home    none  - (r) SP (t) BH (c)     Transportation Available    car;family or friend will provide  - (r) SP (t) BH (c)     Living Environment Comment    Pt. wife in [place]. Wife waiting on grandchild to be born and will travel back with .Pt. reports he will be building ramps.  -BH (r) SP (t) BH (c)     Clinical Impression    Date of Referral to OT    17  -BH (r) SP (t) BH (c)     OT Diagnosis    impaired mobility and ADL  -BH (r) SP (t) BH (c)     Prognosis    good  -BH (r) SP (t) BH (c)     Functional Level At Time Of Evaluation    Pt. was able to groom himself with set-up A. He required mod/max A with bed mobility to ensure adherence to WB restrictions. Pt. was able to bathe his UB  with set-up A and required max A for bathing his back. Pt. required average of mod A for LB bathing. Required increased assist for vijaya cleaning and cleaning toes. Pt. required max A with LB dressing (total A for doffing/donning RLE, max A for LLE). Pt. required mod VC at times to maintain WB status. Pt. WFL BUE ROM with slight weakness in BUE.   -BH (r) SP (t) BH (c)     Impairments Found (describe specific impairments)    aerobic capacity/endurance;ergonomics and body mechanics;gait, locomotion, and balance;motor function;joint integrity and mobility;muscle performance  -BH (r) SP (t) BH (c)     Patient/Family Goals Statement    Pt. wants to return to home at [facility].  -BH (r) SP (t) BH (c)     Criteria for Skilled Therapeutic Interventions Met    yes;treatment indicated  -BH (r) SP (t) BH (c)     Rehab Potential    good, to achieve stated therapy goals  -BH (r) SP (t) BH (c)     Therapy Frequency    other (see comments)   3-14x/wk  -BH (r) SP (t) BH (c)     Predicted Duration of Therapy Intervention (days/wks)    until d/c  or until all goals met  -BH (r) SP (t) BH (c)     Anticipated Equipment Needs At Discharge    hospital bed  -BH (r) SP (t) BH (c)     Anticipated Discharge Disposition    home with /7 care;home with home health  -BH (r) SP (t) BH (c)     Functional Level Prior    Ambulation   0-->independent  -CC 0-->independent  -BH (r) SP (t) BH (c)     Transferring   0-->independent  -CC 0-->independent  -BH (r) SP (t) BH (c)     Toileting   0-->independent  -CC 0-->independent  -BH (r) SP (t) BH (c)     Bathing   0-->independent  -CC 0-->independent  -BH (r) SP (t) BH (c)     Dressing   0-->independent  -CC 0-->independent  -BH (r) SP (t) BH (c)     Eating   0-->independent  -CC 0-->independent  -BH (r) SP (t) BH (c)     Communication   0-->understands/communicates without difficulty  -CC 0-->understands/communicates without difficulty  -BH (r) SP (t) BH (c)     Swallowing   0-->swallows foods/liquids  without difficulty  -CC 0-->swallows foods/liquids without difficulty  -BH (r) SP (t) BH (c)     Prior Functional Level Comment   Pt was independent prior.  -CC      Vital Signs    Pre Systolic BP Rehab 141  -LM   124  -BH (r) SP (t) BH (c)     Pre Treatment Diastolic BP 73  -LM   72  -BH (r) SP (t) BH (c)     Post Systolic BP Rehab    132  -BH (r) SP (t) BH (c)     Post Treatment Diastolic BP    74  -BH (r) SP (t) BH (c)     Pretreatment Heart Rate (beats/min) 102  -LM   102  -BH (r) SP (t) BH (c)     Intratreatment Heart Rate (beats/min)    106  -BH (r) SP (t) BH (c)     Posttreatment Heart Rate (beats/min)    114  -BH (r) SP (t) BH (c)     Pre SpO2 (%) 97  -LM   96  -BH (r) SP (t) BH (c)     O2 Delivery Pre Treatment room air  -LM   room air  -BH (r) SP (t) BH (c)     Intra SpO2 (%)    98  -BH (r) SP (t) BH (c)     O2 Delivery Intra Treatment    room air  -BH (r) SP (t) BH (c)     Post SpO2 (%)    96  -BH (r) SP (t) BH (c)     O2 Delivery Post Treatment    room air  -BH (r) SP (t) BH (c)     Pre Patient Position    Supine  -BH (r) SP (t) BH (c)     Intra Patient Position    Supine  -BH (r) SP (t) BH (c)     Post Patient Position    Supine  -BH (r) SP (t) BH (c)     Pain Assessment    Pain Assessment 0-10  -LM   0-10  -BH (r) SP (t) BH (c)     Pain Score 4  -LM   6  -BH (r) SP (t) BH (c)     Post Pain Score    4   gas pains, R knee pain  -BH (r) SP (t) BH (c)     Pain Type Surgical pain  -LM   Surgical pain  -BH (r) SP (t) BH (c)     Pain Location Knee   And L Ankle  -LM   --   L foot, R knee, occasional burning in L hip  -BH (r) SP (t) BH (c)     Pain Orientation Right  -LM        Pain Descriptors    Sore;Burning  -BH (r) SP (t) BH (c)     Pain Frequency    Constant/continuous   L hip with certain movements  -BH (r) SP (t) BH (c)     Pain Intervention(s)    Medication (See MAR);Repositioned   RN administered meds  -BH (r) SP (t) BH (c)     Vision Assessment/Intervention    Visual Impairment    WFL with corrective  lenses   reading glasses- decreased low vision in R eye  - (r)  (t)  (c)     Cognitive Assessment/Intervention    Current Cognitive/Communication Assessment    functional  - (r)  (t)  (c)     Orientation Status    oriented x 4  - (r)  (t)  (c)     Follows Commands/Answers Questions    100% of the time  - (r) SP (t)  (c)     Personal Safety    mild impairment   difficulty maintaining WB status for bed mobility  - (r)  (t)  (c)     Personal Safety Interventions    fall prevention program maintained;muscle strengthening facilitated;supervised activity  - (r)  (t)  (c)     ROM (Range of Motion)    General ROM    no range of motion deficits identified  - (r) SP (t)  (c)     General ROM Detail    RUE WFL, good digit to nose, good digit to thumb opposition  - (r)  (t)  (c)     MMT (Manual Muscle Testing)    General MMT Assessment    upper extremity strength deficits identified  - (r)  (t)  (c)     General MMT Assessment Detail    grossly B  4+/5, grossly BUE 4/5  - (r)  (t)  (c)     Bed Mobility, Assessment/Treatment    Bed Mobility, Assistive Device    overhead trapeze;bed rails;draw sheet  - (r)  (t)  (c)     Bed Mobility, Roll Left, [place]    verbal cues required;moderate assist (50% patient effort)  - (r)  (t)  (c)     Bed Mobility, Comment    Required mod-max A with draw sheet for repositioning toward HOB. Assist with rolling was for protection of RLE to stay in proper position, pt was able to use his hands and bed rails to roll.   - (r) SP (t)  (c)     Transfer Assessment/Treatment    Transfer, Comment    defer  - (r) SP (t)  (c)     Functional Mobility    Functional Mobility- Comment    defer  - (r) SP (t)  (c)     Upper Body Bathing Assessment/Training    UB Bathing Assess/Train Assistive Device    other (see comments)   sponge bath with wipes  - (r) SP (t)  (c)     UB Bathing Assess/Train, Position    supine   HOB  elevated  - (r) SP (t) BH (c)     UB Bathing Assess/Train, [place] Level    set up required;supervision required;verbal cues required  - (r) SP (t) BH (c)     UB Bathing Assess/Train, Comment    Required max A for back.   - (r) SP (t) BH (c)     Lower Body Bathing Assessment/Training    LB Bathing Assess/Train Assistive Device    other (see comments)   sponge bath with wipes  - (r) SP (t) BH (c)     LB Bathing Assess/Train, Position    supine   HOB elevated  - (r) SP (t) BH (c)     LB Bathing Assess/Train, [place] Level    set up required;supervision required;moderate assist (50% patient effort)   total A with vijaya cleaning and cleaning L toes  - (r) SP (t) BH (c)     LB Bathing Assess/Train, Impairments    decreased flexibility;ROM decreased;strength decreased;impaired balance;coordination impaired;motor control impaired;pain;unable to maintain weight bearing status  - (r) SP (t)  (c)     LB Bathing Assess/Train, Comment    Required increased A while rolling to maintain WB status. Only cleaned LE with uncasted/braced areas.   - (r) SP (t)  (c)     Upper Body Dressing Assessment/Training    UB Dressing Assess/Train, Clothing Type    doffing:;donning:;t-shirt  - (r) SP (t)  (c)     UB Dressing Assess/Train, Position    supine   HOB elevated  - (r) SP (t) BH (c)     UB Dressing Assess/Train, [place]    set up required;supervision required;verbal cues required  - (r) SP (t)  (c)     UB Dressing Assess/Train, Impairments    ROM decreased;strength decreased;impaired balance;coordination impaired;motor control impaired;postural control impaired;pain  - (r) SP (t) BH (c)     UB Dressing Assess/Train, Comment    Had difficulty with donning and doffing shirt. Reports he has difficulty dressing in supine. extended time and effort required.   - (r) SP (t)  (c)     Lower Body Dressing Assessment/Training    LB Dressing Assess/Train, Clothing Type    doffing:;donning:;shorts   - (r) SP (t) BH (c)     LB Dressing Assess/Train, Position    supine  - (r) SP (t) BH (c)     LB Dressing Assess/Train, [place]    maximum assist (25% patient effort);1 person + 1 person to manage equipment;dependent (less than 25% patient effort)  - (r) SP (t) BH (c)     LB Dressing Assess/Train, Impairments    decreased flexibility;ROM decreased;strength decreased;impaired balance;coordination impaired;postural control impaired;pain;unable to maintain weight bearing status  - (r) SP (t) BH (c)     LB Dressing Assess/Train, Comment    Difficulty maintaining WB status with rolls, required mod VC. Total A for donning/doffing RLE, with mod A required for LLE.  - (r) SP (t) BH (c)     Grooming Assessment/Training    Grooming Assess/Train, Assistive Device    electric toothbrush  - (r) SP (t) BH (c)     Grooming Assess/Train, Position    supine   HOB elevated  - (r) SP (t) BH (c)     Grooming Assess/Train, [place] Level    set up required  - (r) SP (t)  (c)     Grooming Assess/Train, Comment    Pt. brushed teeth and shaved face while supine with HOB elevated, required set-up.   - (r) SP (t) BH (c)     Sensory Assessment/Intervention    Light Touch    LUE;RUE  - (r) SP (t) BH (c) LLE;RLE  -    LUE Light Touch    WNL  - (r) SP (t) BH (c) WNL  -    RUE Light Touch    WNL  - (r) SP (t) BH (c) WNL  -    LLE Light Touch     mild impairment   Unable to feel light touch on big toe  -    RLE Light Touch     WNL  -MC    Edema Management    Edema Amount    left:   in LLE, noted moderate edema; couldn't fully assess RLE  - (r) SP (t) BH (c)     General Therapy Interventions    Planned Therapy Interventions    activity intolerance;adaptive equipment training;ADL retraining;bed mobility training;balance training;energy conservation;fine motor coordination training;home exercise program;motor coordination training;strengthening;transfer training  - (r) SP (t) BH (c)     Positioning and  Restraints    Pre-Treatment Position    in bed  -BH (r) SP (t) BH (c)     Post Treatment Position    bed  -BH (r) SP (t) BH (c)     In Bed    call light within reach;encouraged to call for assist;notified nsg  -BH (r) SP (t) BH (c)       09/11/17 2100 09/11/17 1614 09/11/17 1502 09/11/17 1500 09/11/17 1458    Rehab Evaluation    Document Type    evaluation  -LM     Subjective Information    agree to therapy;complains of;pain  -LM     Patient Effort, Rehab Treatment    good  -LM     Symptoms Noted During/After Treatment    increased pain  -LM     Symptoms Noted Comment    Dr. Rutherford entered room during initial evaluation.  He states he does not want pt in a w/c - only wants sliding transfers to stretcher chair at this time.  PT relayed info to Dr. Stephen post eval.  -     General Information    Patient Profile Review    yes  -LM     Onset of Illness/Injury or Date of Surgery Date    09/06/17  -     Referring Physician    Dr. Stephen  -     General Observations    Pt lying in bed with HOB elevated.  Noted to have KI on LLE.  Pt very pleasant and agreeable to PT eval.  -LM     Pertinent History Of Current Problem    Pt fell off a ladder when working on his roof on 9/6/17.  s/p closed reduction and percutaneous fixation of L navicular fx/dislocation - 9/7/17 by Dr. Rivera;  s/p ORIF of R patella fx - 9/8/17 by Dr. Rutherford.  Pt transferred to ARU on 9/11/17 for further therapy.  -LM     Precautions/Limitations    fall precautions;non-weight bearing status   NWB BLE's; R Knee Immobilizer on at all times  -LM     Prior Level of Function    independent:;all household mobility;gait;ADL's;cooking;cleaning;driving;yard work  -LM     Equipment Currently Used at Home    commode;bath bench;shower chair;walker, rolling;wheelchair   Tripod Cane; Did not use any DME - belonged to his mother  -LM none;wheelchair   bsc  -MB    Plans/Goals Discussed With    patient;agreed upon  -LM     Risks Reviewed    patient:;LOB;increased  discomfort  -LM     Benefits Reviewed    patient:;improve function;increase independence;increase strength;increase balance  -LM     Living Environment    Lives With    spouse  -LM     Living Arrangements    house  -LM     Home Accessibility    stairs to enter home  -LM     Number of Stairs to Enter Home    2  -LM     Stair Railings at Home    none  -LM     Transportation Available    car;family or friend will provide  -LM     Living Environment Comment    Pt's wife is currently in [place].  Wife waiting on grandbaby to be born and will be traveling back with the .  Spoke with pt about importance of building a ramp.  -LM     Clinical Impression    Anticipated Discharge Disposition  inpatient rehabilitation facility  -SG       Functional Level Prior    Ambulation   0-->independent  -MC      Transferring   0-->independent  -MC      Toileting   0-->independent  -MC      Bathing   0-->independent  -MC      Dressing   0-->independent  -MC      Eating   0-->independent  -MC      Communication   0-->understands/communicates without difficulty  -MC      Swallowing   0-->swallows foods/liquids without difficulty  -MC      Prior Functional Level Comment   independent   -MC      Vital Signs    Pre Systolic BP Rehab    138  -LM     Pre Treatment Diastolic BP    70  -LM     Post Systolic BP Rehab    149  -LM     Post Treatment Diastolic BP    72  -LM     Pretreatment Heart Rate (beats/min)    95  -LM     Posttreatment Heart Rate (beats/min)    94  -LM     Pre SpO2 (%)    98  -LM     O2 Delivery Pre Treatment    room air  -LM     Post SpO2 (%)    99  -LM     O2 Delivery Post Treatment    room air  -LM     Pre Patient Position    Supine  -LM     Post Patient Position    Supine  -LM     Pain Assessment    Pain Assessment    0-10  -LM     Pain Score    4  -LM     Post Pain Score    6  -LM     Pain Type    Surgical pain  -LM     Pain Location    Knee  -LM     Pain Orientation    Right  -LM     Pain Intervention(s)     Medication (See MAR);Repositioned;Ambulation/increased activity   RN notified and gave pain meds  -LM     Vision Assessment/Intervention    Visual Impairment    WFL with corrective lenses   Reading Glasses  -LM     Cognitive Assessment/Intervention    Current Cognitive/Communication Assessment    functional  -LM     Orientation Status    oriented x 4  -LM     Follows Commands/Answers Questions    75% of the time;100% of the time  -LM     Personal Safety    WNL/WFL  -LM     Personal Safety Interventions    fall prevention program maintained;gait belt;muscle strengthening facilitated;nonskid shoes/slippers when out of bed  -LM     ROM (Range of Motion)    General ROM Detail    RLE - Decreased active hip flex - AAROM WFL; Knee not tested; Decreased active ankle DF; LLE - Hip AROM WFL; Knee flex - 79 degrees; Knee ext AROM WFL; Ankle - NT due to casted - pt able to actively wiggle toes  -LM     MMT (Manual Muscle Testing)    General MMT Assessment Detail    No resistance given 2* to pt NWB bilaterally; Due to ROM - RLE - Hip flex - 2-/5; Knee - not tested; Ankle DF - 2+/5; LLE - Hip flex - 3/5; Knee flex - 2+/5; Knee ext - 3/5; Ankle - not tested  -LM     Mobility Assessment/Training    Extremity Weight-Bearing Status    left lower extremity;right lower extremity  -LM     Left Lower Extremity Weight-Bearing    non weight-bearing  -LM     Right Lower Extremity Weight-Bearing    non weight-bearing  -LM     Bed Mobility, Assessment/Treatment    Bed Mobility, Assistive Device    overhead trapeze  -LM     Bed Mob, Supine to Sit, [place]    minimum assist (75% patient effort)  -LM     Bed Mob, Sit to Supine, [place]    contact guard assist  -LM     Transfer Assessment/Treatment    Transfers, Bed-Chair [place]    minimum assist (75% patient effort);1 person + 1 person to manage equipment  -LM     Transfers, Chair-Bed [place]    minimum assist (75% patient effort);1 person + 1 person to manage equipment  -LM      Transfers, Sit-Stand [place]    not appropriate to assess  -LM     Transfers, Stand-Sit [place]    not appropriate to assess  -LM     Transfer, Impairments    ROM decreased;strength decreased  -LM     Transfer, Comment    Stretcher chair flattened and placed flush with the bed.  One person stabilized RLE and one person held stretcher chair to make sure it didn't move.  -LM     Stairs Assessment/Treatment    Stairs, [place] Level    not appropriate to assess  -LM     Sensory Assessment/Intervention    Light Touch LLE;RLE  -NW   LLE;RLE  -LM     LUE Light Touch WNL  -NW        RUE Light Touch WNL  -NW        LLE Light Touch mild impairment   Unable to feel light touch on big toe  -NW   mild impairment   Unable to feel light touch on big toe  -LM     RLE Light Touch WNL  -NW   WNL  -LM     Edema Management    Edema Amount    right:;left:;minimal;moderate   LE's  -LM     Positioning and Restraints    Pre-Treatment Position    in bed  -LM     Post Treatment Position    bed  -LM     In Bed    supine;call light within reach;encouraged to call for assist  -LM     General LE Assessment    ROM Detail    L Knee flex - 79 degrees  -LM       09/11/17 1040 09/11/17 0921 09/10/17 1300 09/10/17 0831 09/10/17 0733    Rehab Evaluation    Document Type therapy note (daily note)  -AM evaluation  -RB therapy note (daily note)  -MAGNO therapy note (daily note)  -MAGNO     Subjective Information agree to therapy;complains of;pain  -AM agree to therapy;complains of;pain  -RB agree to therapy  -MAGNO agree to therapy  -MAGNO     Patient Effort, Rehab Treatment good  -AM good  -RB good  -MAGNO good  -MAGNO     Symptoms Noted During/After Treatment none  -AM increased pain  -RB       General Information    Patient Profile Review  yes  -RB       Onset of Illness/Injury or Date of Surgery Date  09/06/17  -RB       Referring Physician  Migue Martin MD.  -RB       General Observations  Supine in bed with LEs elevated and IV.  -RB       Pertinent  History Of Current Problem  Hosp after fall from ladder where he sustained fx of L mavicular bone and R patella.  ORIF R patilla 9/8/17 and navicular fixation on 9/7/17.  -RB       Precautions/Limitations brace on when up;fall precautions;non-weight bearing status  -AM fall precautions;non-weight bearing status  -RB fall precautions;non-weight bearing status;other (see comments)   vital signs. NWB R LE and L LE  -MAGNO fall precautions;non-weight bearing status;other (see comments)   vital signs. NWB R LE and L LE  -MAGNO     Prior Level of Function  independent:;gait;transfer;ADL's  -RB       Equipment Currently Used at Home  none   has W/C and BSC.  -RB       Plans/Goals Discussed With  patient  -RB       Risks Reviewed  patient:  -RB       Barriers to Rehab  none identified  -RB       Living Environment    Lives With  spouse  -RB       Living Arrangements  house  -RB       Home Accessibility  stairs to enter home  -RB       Number of Stairs to Enter Home  2  -RB       Stair Railings at Home  none  -RB       Living Environment Comment  Wife not home for ~ one wk.  Pt has T/S with non skid surface and seat.  -RB       Clinical Impression    Date of Referral to OT  09/10/17  -RB       OT Diagnosis  Impaired mobility and ADLs.  -RB       Functional Level At Time Of Evaluation  Impaired mobility and ADLs.  -RB       Impairments Found (describe specific impairments)  gait, locomotion, and balance;joint integrity and mobility  -RB       Criteria for Skilled Therapeutic Interventions Met  yes;treatment indicated  -RB       Rehab Potential  good, to achieve stated therapy goals  -RB       Therapy Frequency  --   3-14x/wk  -RB       Predicted Duration of Therapy Intervention (days/wks)  Until D/C.  -RB       Anticipated Equipment Needs At Discharge  front wheeled walker;wheelchair;hospital bed  -RB       Anticipated Discharge Disposition  home with 24/7 care;skilled nursing facility;inpatient rehabilitation facility  -RB        Functional Level Prior    Ambulation  0-->independent  -RB       Transferring  0-->independent  -RB       Toileting  0-->independent  -RB       Bathing  0-->independent  -RB       Dressing  0-->independent  -RB       Eating  0-->independent  -RB       Communication  0-->understands/communicates without difficulty  -RB       Swallowing  0-->swallows foods/liquids without difficulty  -RB       Vital Signs    Pre Systolic BP Rehab 144  -  -  -MAGNO 142  -MAGNO     Pre Treatment Diastolic BP 78  -AM 76  -RB 64  -MAGNO 70  -MAGNO     Post Systolic BP Rehab 138  -  -  -MAGNO 156  -MAGNO     Post Treatment Diastolic BP 75  -AM 73  -RB 66  -MAGNO 77  -MAGNO     Pretreatment Heart Rate (beats/min) 106  -  -  -MAGNO 111  -MAGNO     Posttreatment Heart Rate (beats/min) 111  -  -  -MAGNO 108  -MAGNO     Pre SpO2 (%) 95  -AM 95  -RB 95  -MAGNO 94  -MAGNO     O2 Delivery Pre Treatment room air  -AM room air  -RB room air  -MAGNO room air  -MAGNO     Post SpO2 (%) 96  -AM 93  -RB 97  -MAGNO 96  -MAGNO     O2 Delivery Post Treatment room air  -AM room air  -RB room air  -MAGNO room air  -MAGNO     Pre Patient Position Sitting  -AM Supine  -RB Sitting  -MAGNO Supine  -MAGNO     Intra Patient Position Supine  -AM        Post Patient Position Sitting  -AM Sitting  -RB Supine  -MAGNO Sitting  -MAGNO     Pain Assessment    Pain Assessment 0-10  -AM 0-10  -RB 0-10  -MAGNO 0-10  -MAGNO     Pain Score 6  -AM 4  -RB 8  -MAGNO 5  -MAGNO     Post Pain Score 4  -AM 6  -RB 8  -MAGNO 5  -MAGNO     Pain Type Acute pain;Surgical pain  -AM Acute pain  -RB Acute pain  -MAGNO Acute pain  -MAGNO     Pain Location Knee  -AM Knee  -RB Knee  -MAGNO      Pain Orientation Right  -AM Right  -RB Right  -MAGNO      Pain Descriptors Sore  -AM        Pain Frequency Constant/continuous  -AM        Date Pain First Started 09/06/17  -AM        Clinical Progression Gradually improving  -AM        Patient's Stated Pain Goal No pain  -AM        Pain Intervention(s) Medication (See MAR);Repositioned  -AM Repositioned   -RB Repositioned   nsg notified of pts pain  -MAGNO Medication (See MAR)  -MAGNO     Result of Injury Yes  -AM        Work-Related Injury No  -AM        Multiple Pain Sites No  -AM        Vision Assessment/Intervention    Visual Impairment  WFL with corrective lenses   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  -RB visual impairment, right   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  -MAGNO visual impairment, right   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  -MAGNO     Cognitive Assessment/Intervention    Current Cognitive/Communication Assessment functional  -AM functional  -RB functional  -MAGNO functional  -MAGNO     Orientation Status oriented x 4  -AM oriented x 4  -RB oriented x 4  -MAGNO oriented x 4  -MAGNO     Follows Commands/Answers Questions 100% of the time  -% of the time  -% of the time  -MAGNO 100% of the time  -MAGNO     Personal Safety  WNL/WFL  -RB WNL/WFL  -MAGNO WNL/WFL  -MAGNO     Personal Safety Interventions gait belt;nonskid shoes/slippers when out of bed;supervised activity  -AM gait belt;nonskid shoes/slippers when out of bed;supervised activity  -RB       ROM (Range of Motion)    General ROM lower extremity range of motion deficits identified  -AM no range of motion deficits identified  -RB       General ROM Detail  B UEs.  -RB       MMT (Manual Muscle Testing)    General MMT Assessment  upper extremity strength deficits identified  -RB       General MMT Assessment Detail  3+/5 , 4/5 elbows and wrists and 4+/5 for B shoulders.  -RB       Mobility Assessment/Training    Extremity Weight-Bearing Status left lower extremity;right lower extremity  -AM left lower extremity;right lower extremity  -RB left lower extremity;right lower extremity  -MAGNO      Left Lower Extremity Weight-Bearing non weight-bearing  -AM non weight-bearing  -RB non weight-bearing  -MAGNO      Right Lower Extremity Weight-Bearing non weight-bearing  -AM non weight-bearing  -RB non weight-bearing   unless otherwise oredered by MD  -MAGNO      Bed Mobility, Assessment/Treatment    Bed  Mobility, Assistive Device head of bed elevated  -AM bed rails;head of bed elevated  -RB       Bed Mobility, Roll Left, [place] not tested  -AM  not tested  -MAGNO not tested  -MAGNO     Bed Mobility, Roll Right, [place] not tested  -AM moderate assist (50% patient effort)  -RB       Bed Mobility, Scoot/Bridge, [place] not tested  -AM minimum assist (75% patient effort)  -RB       Bed Mob, Supine to Sit, [place] contact guard assist  -AM        Bed Mob, Sit to Supine, [place] contact guard assist  -AM        Bed Mob, Sidelying to Sit, [place] not tested  -AM        Bed Mob, Sit to Sidelying, [place] not tested  -AM        Bed Mobility, Impairments ROM decreased;strength decreased;pain  -AM        Transfer Assessment/Treatment    Transfers, Bed-Chair [place] contact guard assist  -AM dependent (less than 25% patient effort)   of 3.  -RB  dependent (less than 25% patient effort)   assist x3  -MAGNO     Transfers, Chair-Bed [place] contact guard assist  -AM  dependent (less than 25% patient effort)   assist x3.   -MAGNO      Transfers, Bed-Chair-Bed, Assist Device other (see comments)   none  -AM        Transfers, Sit-Stand [place] not appropriate to assess  -AM        Transfers, Stand-Sit [place] not appropriate to assess  -AM        Toilet Transfer, [place] not tested  -AM        Walk-In Shower Transfer, [place] not tested  -AM        Bathtub Transfer, [place] not tested  -AM        Transfer, Maintain Weight Bearing Status able to maintain weight bearing status  -AM        Transfer, Impairments ROM decreased;strength decreased;pain  -AM        Stairs Assessment/Treatment    Stairs, [place] Level not appropriate to assess  -AM        Orthotics Prosthetics    Additional Documentation Orthosis Location (Group);Orthosis Management/Training (Group)  -AM        Orthosis Location    Orthosis Location/Type lower extremity  -AM        Orthosis, Lower Extremity  Right:;knee immobilizer  -AM        Orthosis Management/Training    Orthosis Fabrication Detail Knee Immobilizer  -AM        Orthosis Indications restrict motion  -AM        Orthosis Wear Schedule wear full time  -AM        Sensory Assessment/Intervention    Light Touch  LUE;RUE  -RB   LUE;RUE;LLE;RLE  -CM    LUE Light Touch  WNL  -RB   WNL  -CM    RUE Light Touch  WNL  -RB   WNL  -CM    LLE Light Touch     WNL  -CM    RLE Light Touch     WNL  -CM    General Therapy Interventions    Planned Therapy Interventions  activity intolerance;adaptive equipment training;ADL retraining;balance training;bed mobility training;strengthening;transfer training  -RB       Activity Intolerance  fair  -RB       Positioning and Restraints    Pre-Treatment Position sitting in chair/recliner  -AM in bed  -RB sitting in chair/recliner  -MAGNO in bed  -MAGNO     Post Treatment Position wheelchair  -AM chair  -RB bed  -MAGNO chair  -MAGNO     In Bed  call light within reach;supine  -RB supine;call light within reach;encouraged to call for assist;exit alarm on;patient within staff view   all needs met.   -MAGNO      In Chair    reclined;call light within reach;encouraged to call for assist   all needs met.   -MAGNO     In Wheelchair sitting;call light within reach;encouraged to call for assist;legs elevated  -AM        General LE Assessment    ROM knee, right: LE ROM deficit;ankle, left: LE ROM deficit  -AM          User Key  (r) = Recorded By, (t) = Taken By, (c) = Cosigned By    Initials Name Effective Dates    LM Isela Chris, PT 06/15/16 -     RB Axel Perez, OT 06/15/16 -     BH Isabel Jones, OTR/L 10/17/16 -     CM Sherrie Peña, RN 10/17/16 -     CC Lisa Larios, [facility] 10/17/16 -     MB Rosanne Dominique, RN 10/17/16 -     MC Pauly Abreu, RN 10/17/16 -     NW Gladis Ring, RN 10/17/16 -     MAGNO All Pendleton, PTA 10/17/16 -     AM Jim Young, PTA 10/17/16 -     SP Kenya Juárez, OT Student 01/31/17 -     SG Aylin Rai, OT 08/03/17  -            Occupational Therapy Education     Title: PT OT SLP Therapies (Active)     Topic: Occupational Therapy (Active)     Point: ADL training (Done)    Description: Instruct learner(s) on proper safety adaptation and remediation techniques during self care or transfers.   Instruct in proper use of assistive devices.    Learning Progress Summary    Learner Readiness Method Response Comment Documented by Status   Patient Acceptance E VU Educated on OT and POC. Educated about adhering to weight bearing precautions. Educated about safety with ADL and bed mobility. SP 09/12/17 1159 Done               Point: Precautions (Done)    Description: Instruct learner(s) on prescribed precautions during self-care and functional transfers.    Learning Progress Summary    Learner Readiness Method Response Comment Documented by Status   Patient Acceptance E VU Educated on OT and POC. Educated about adhering to weight bearing precautions. Educated about safety with ADL and bed mobility. SP 09/12/17 1159 Done               Point: Body mechanics (Done)    Description: Instruct learner(s) on proper positioning and spine alignment during self-care, functional mobility activities and/or exercises.    Learning Progress Summary    Learner Readiness Method Response Comment Documented by Status   Patient Acceptance E VU Educated on OT and POC. Educated about adhering to weight bearing precautions. Educated about safety with ADL and bed mobility. SP 09/12/17 1159 Done                      User Key     Initials Effective Dates Name Provider Type Discipline     01/31/17 -  Kenya Juárez OT Student OT Student OT                  OT Recommendation and Plan  Anticipated Equipment Needs At Discharge: hospital bed  Anticipated Discharge Disposition: home with 24/7 care, home with home health  Planned Therapy Interventions: activity intolerance, adaptive equipment training, ADL retraining, bed mobility training, balance training, energy  conservation, fine motor coordination training, home exercise program, motor coordination training, strengthening, transfer training  Therapy Frequency: other (see comments) (3-14x/wk)  Plan of Care Review  Plan Of Care Reviewed With: patient  Outcome Summary/Follow up Plan: OT evaluation completed this date. Pt. required mod VC at times to maintain precautions/WB status as per MD. Pt. required average set-up A for all grooming and UB dressing, mod/max A for bed mobility, max A to bathe back and buttocks, max/total A for LB bathing and LB dressing. Pt. would benefit from continued skilled OT services to address adherence to precautions/WB status, ADL performance, bed mobility, t/f, and independence with daily tasks. Recommend d/c to home with 24/7 care and  therapy or SNF.          OT Goals       09/12/17 0755 09/11/17 1609 09/11/17 1333    Bed Mobility OT LTG    Bed Mobility OT LTG, Date Established 09/12/17  -BH (r) SP (t) BH (c)      Bed Mobility OT LTG, Time to Achieve by discharge  -BH (r) SP (t) BH (c)      Bed Mobility OT LTG, Activity Type all bed mobility  -BH (r) SP (t) BH (c)      Bed Mobility OT LTG, [place] Level supervision required;minimum assist (75% patient effort)   AE/adaptive techniques- RLE in knee immobilizer and extended  -BH (r) SP (t) BH (c)      Transfer Training OT LTG    Transfer Training OT LTG, Date Established 09/12/17  -BH (r) SP (t) BH (c)  09/11/17  -RB    Transfer Training OT LTG, Time to Achieve by discharge  -BH (r) SP (t) BH (c)  by discharge  -RB    Transfer Training OT LTG, Activity Type --   BSC, w/c  -BH (r) SP (t) BH (c)  all transfers  -RB    Transfer Training OT LTG, [place] Level minimum assist (75% patient effort);contact guard assist   RLE in knee immobilizer and extended at all times.  -BH (r) SP (t) BH (c)  supervision required;contact guard assist  -RB    Transfer Training OT LTG, Assist Device   other (see comments)   Sliding board if needed.  -RB     Transfer Training OT LTG, Date Goal Reviewed  09/04/17  -SG     Transfer Training OT LTG, Outcome  goal not met  -SG     Transfer Training OT LTG, Reason Goal Not Met  discharged from facility  -SG     Strength OT LTG    Strength Goal OT LTG, Date Established   09/11/17  -RB    Strength Goal OT LTG, Time to Achieve   by discharge  -RB    Strength Goal OT LTG, Measure to Achieve   2 sets of 10 reps all planes with 3-4 lb dumbells for B UE strength to increase independence with functional mobility/transfers.  -RB    Strength Goal OT LTG, Date Goal Reviewed  09/11/17  -SG     Strength Goal OT LTG, Outcome  goal not met  -SG     Strength Goal OT LTG, Reason Goal Not Met  discharged from facility  -SG     Dynamic Sitting Balance OT LTG    Dynamic Sitting Balance OT LTG, Date Established   09/11/17  -RB    Dynamic Sitting Balance OT LTG, Time to Achieve   by discharge  -RB    Dynamic Sitting Balance OT LTG, [place] Level   supervision required   10-15 minutes with functional activity.  -RB    Dynamic Sitting Balance OT LTG, Assist Device   bed rails  -RB    Dynamic Sitting Balance OT LTG, Date Goal Reviewed  09/11/17  -SG     Dynamic Sitting Balance OT LTG, Outcome  goal not met  -SG     Dynamic Sitting Balance OT LTG, Reason Goal Not Met  discharged from facility  -SG     Patient Education OT LTG    Patient Education OT LTG, Date Established 09/12/17  -BH (r) SP (t) BH (c)      Patient Education OT LTG, Time to Achieve by discharge  -BH (r) SP (t) BH (c)      Patient Education OT LTG, Education Type HEP;precautions per surgeon;positioning;posture/body mechanics;home safety;adaptive equipment mgmt  -BH (r) SP (t) BH (c)      Patient Education OT LTG, Education Understanding independent;demonstrates adequately;verbalizes understanding  -BH (r) SP (t) BH (c)      ADL OT LTG    ADL OT LTG, Date Established 09/12/17  -BH (r) SP (t) BH (c)  09/11/17  -RB    ADL OT LTG, Time to Achieve by discharge  -BH (r) SP (t) BH (c)   by discharge  -RB    ADL OT LTG, Activity Type ADL skills  -BH (r) SP (t) BH (c)  ADL skills   Sponge bath and dress.  -RB    ADL OT LTG, [place] Level   contact guard;min assist  -RB    ADL OT LTG, Additional Goal set-up with self-feeding, grooming, UB dressing/bathing; min A with toileting; mod A with LB dressing/bathing  -BH (r) SP (t) BH (c)      ADL OT LTG, Date Goal Reviewed  09/11/17  -SG     ADL OT LTG, Outcome  goal not met  -SG     ADL OT LTG, Reason Goal Not Met  discharged from facility  -SG       User Key  (r) = Recorded By, (t) = Taken By, (c) = Cosigned By    Initials Name Provider Type    CLAIR Perez, OT Occupational Therapist    NATALIA Jones, OTR/L Occupational Therapist    DANY Juárez, OT Student OT Student    SAHARA Rai, OT Occupational Therapist                Outcome Measures       09/12/17 0755 09/11/17 1500 09/11/17 1040    How much help from another person do you currently need...    Turning from your back to your side while in flat bed without using bedrails?  3  -LM 3  -AM    Moving from lying on back to sitting on the side of a flat bed without bedrails?  3  -LM 3  -AM    Moving to and from a bed to a chair (including a wheelchair)?  3  -LM 3  -AM    Standing up from a chair using your arms (e.g., wheelchair, bedside chair)?  1  -LM 1  -AM    Climbing 3-5 steps with a railing?  1  -LM 1  -AM    To walk in hospital room?  1  -LM 1  -AM    AM-PAC 6 Clicks Score  12  -LM 12  -AM    How much help from another is currently needed...    Putting on and taking off regular lower body clothing? 1  -BH (r) SP (t) BH (c)      Bathing (including washing, rinsing, and drying) 2  -BH (r) SP (t) BH (c)      Toileting (which includes using toilet bed pan or urinal) 2  -BH (r) SP (t) BH (c)      Putting on and taking off regular upper body clothing 3  -BH (r) SP (t) BH (c)      Taking care of personal grooming (such as brushing teeth) 3  -BH (r) SP (t) BH (c)      Eating meals 4   -BH (r) SP (t) BH (c)      Score 15  -BH (r) SP (t)      Functional Assessment    Outcome Measure Options AM-PAC 6 Clicks Daily Activity (OT)  -BH (r) SP (t) BH (c) AM-PAC 6 Clicks Basic Mobility (PT)  -LM AM-PAC 6 Clicks Basic Mobility (PT)  -AM      09/11/17 0921 09/10/17 0831       How much help from another person do you currently need...    Turning from your back to your side while in flat bed without using bedrails?  3  -MAGNO     Moving from lying on back to sitting on the side of a flat bed without bedrails?  3  -MAGNO     Moving to and from a bed to a chair (including a wheelchair)?  2  -MAGNO     Standing up from a chair using your arms (e.g., wheelchair, bedside chair)?  1  -MAGNO     Climbing 3-5 steps with a railing?  1  -MAGNO     To walk in hospital room?  1  -MAGON     AM-PAC 6 Clicks Score  11  -MAGNO     How much help from another is currently needed...    Putting on and taking off regular lower body clothing? 2  -RB      Bathing (including washing, rinsing, and drying) 2  -RB      Toileting (which includes using toilet bed pan or urinal) 2  -RB      Putting on and taking off regular upper body clothing 3  -RB      Taking care of personal grooming (such as brushing teeth) 3  -RB      Eating meals 4  -RB      Score 16  -RB      Functional Assessment    Outcome Measure Options AM-PAC 6 Clicks Daily Activity (OT)  -RB AM-PAC 6 Clicks Basic Mobility (PT)  -MAGNO       User Key  (r) = Recorded By, (t) = Taken By, (c) = Cosigned By    Initials Name Provider Type    LM Isela Chris, PT Physical Therapist    RB Axel Perez, OT Occupational Therapist    BH Isabel Jones, OTR/L Occupational Therapist    MAGNO Pendleton, PTA Physical Therapy Assistant    SUSIE Young, PTA Physical Therapy Assistant    DANY Juárez, OT Student OT Student          Time Calculation:   OT Start Time: 0755  OT Stop Time: 0925  OT Time Calculation (min): 90 min        OT G-codes  OT Professional Judgement Used?: Yes  OT Functional Scales  Options: AM-PAC 6 Clicks Daily Activity (OT)  Score: 15  Functional Limitation: Self care  Self Care Current Status (): At least 40 percent but less than 60 percent impaired, limited or restricted  Self Care Goal Status (): At least 20 percent but less than 40 percent impaired, limited or restricted    Kenya Juárze OT Student  9/12/2017

## 2017-09-12 NOTE — PLAN OF CARE
Problem: Patient Care Overview (Adult)  Goal: Plan of Care Review  Outcome: Ongoing (interventions implemented as appropriate)    09/12/17 1355   Coping/Psychosocial Response Interventions   Plan Of Care Reviewed With patient   Outcome Evaluation   Outcome Summary/Follow up Plan PT re-evaluation completed 2* to new orders received from Dr. Stephen/Sangeeta. Goals modified. Pt transferred to w/c using SB with [place] and self propelled w/c 200 feet with SBA. Pt will benefit from skilled PT to improve mobility and safety prior to d/c.         Problem: Inpatient Physical Therapy  Goal: Bed Mobility Goal LTG- PT  Outcome: Ongoing (interventions implemented as appropriate)    09/11/17 1500 09/12/17 1355   Bed Mobility PT LTG   Bed Mobility PT LTG, Date Established 09/11/17 --    Bed Mobility PT LTG, Time to Achieve by discharge --    Bed Mobility PT LTG, Activity Type supine to sit/sit to supine --    Bed Mobility PT LTG, [place] Level conditional independence --    Bed Mobility PT Goal LTG, Assist Device overhead trapeze;bed rails --    Bed Mobility PT LTG, Date Goal Reviewed --  09/12/17   Bed Mobility PT LTG, Outcome --  goal ongoing       Goal: Transfer Training Goal 1 LTG- PT  Outcome: Ongoing (interventions implemented as appropriate)    09/11/17 1500 09/12/17 1355   Transfer Training PT LTG   Transfer Training PT LTG, Date Established 09/11/17 --    Transfer Training PT LTG, Time to Achieve by discharge --    Transfer Training PT LTG, Activity Type bed to chair /chair to bed --    Transfer Training PT LTG, [place] Level conditional independence --    Transfer Training PT LTG, Assist Device (AAD) --    Transfer Training PT LTG, Date Goal Reviewed --  09/12/17   Transfer Training PT LTG, Outcome --  goal ongoing       Goal: Range of Motion Goal LTG- PT  Outcome: Ongoing (interventions implemented as appropriate)    09/11/17 1500 09/12/17 1355   Range of Motion PT LTG   Range of Motion Goal PT LTG, Date  Established 09/11/17 --    Range of Motion Goal PT LTG, Time to Achieve by discharge --    Range fo Motion Goal PT LTG, Joint L knee --    Range of Motion Goal PT LTG, AROM Measure Knee flex - 110 degrees --    Range of Motion Goal PT LTG, Date Goal Reviewed --  09/12/17   Range of Motion Goal PT LTG, Outcome --  goal ongoing       Goal: Wheelchair Propulsion Goal LTG- PT  Outcome: Ongoing (interventions implemented as appropriate)    09/12/17 1355   Wheelchair Propulsion PT LTG   Wheelchair Propulsion Goal PT LTG, Date Established 09/12/17   Wheelchair Propulsion Goal PT LTG, Time to Achieve by discharge   Wheelchair Propulsion Goal PT LTG, [place] Level conditional independence   Wheelchair Propulsion Goal PT LTG, Distance to Achieve 300 feet   Wheelchair Propulsion Goal PT LTG, Outcome goal ongoing       Goal: Physical Therapy Goal LTG- PT  Outcome: Ongoing (interventions implemented as appropriate)    09/12/17 1355   Physical Therapy PT LTG   Physical Therapy PT LTG, Date Established 09/12/17   Physical Therapy PT LTG, Time to Achieve by discharge   Physical Therapy PT LTG, Activity Type Pt will self propel w/c up/down ramp.   Physical Therapy PT LTG, [place] Level conditional independence   Physical Therapy PT LTG, Outcome goal ongoing

## 2017-09-12 NOTE — PROGRESS NOTES
LOS: 1 day   Patient Care Team:  Renan Rodríguez MD as PCP - General (Family Medicine)    Chief Complaint:   Multiple trauma          Interval History:     SUBJECTIVE:  Pain is fairly well controlled.  Slept well.  Eating fairly well.  Has not had a bowel movement and discussed options with him  History taken from:  The patient chart nursing therapy and discussed with Dr. Rutherford also reviewed Dr. Rivera chart notes    Objective     Vital Signs  Temp:  [97.8 °F (36.6 °C)-98.9 °F (37.2 °C)] 98.9 °F (37.2 °C)  Heart Rate:  [108-109] 109  Resp:  [18] 18  BP: (135-136)/(66-70) 135/70  There were no vitals filed for this visit.      Physical Exam:     General Appearance:    Alert, cooperative, in no acute distress   Head:    Normocephalic, without obvious abnormality, atraumatic   Eyes:            Lids and lashes normal, conjunctivae and sclerae normal, no   icterus, no pallor, corneas clear, PERRLA   Throat:   No oral lesions, no thrush, oral mucosa moist   Neck:   No adenopathy, supple, trachea midline, no thyromegaly, no   carotid bruit, no JVD       Lungs:     Clear to auscultation,respirations regular, even and                  Unlabored     Heart:    Regular rhythm and normal rate, normal S1 and S2, no            murmur, no gallop, no rub, no click    Chest Wall:    No abnormalities observed   Abdomen:     Normal bowel sounds, no masses, no organomegaly, soft        non-tender, non-distended, no guarding, no rebound                Tenderness    Extremities:   Knee immobilizer on the right leg dressing and posterior splint on the left leg        Skin:   No bleeding, bruising or rash   Lymph nodes:   No palpable adenopathy   Neurologic:   Cranial nerves 2 - 12 grossly intact, sensation intact, DTR       present and equal bilaterally        RESULTS REVIEW:     Lab Results (last 24 hours)     Procedure Component Value Units Date/Time    Protime-INR [[id number]]  (Abnormal) Collected:  09/12/17 0521    Specimen:  Blood  Updated:  09/12/17 0702     Protime 22.9 (H) Seconds      INR 2.01 (H)    Narrative:       Therapeutic range for most indications is 2.0-3.0 INR,  or 2.5-3.5 for mechanical heart valves.    CBC Auto Differential [[id number]]  (Abnormal) Collected:  09/12/17 0521    Specimen:  Blood Updated:  09/12/17 0703     WBC 9.70 10*3/mm3      RBC 3.81 (L) 10*6/mm3      Hemoglobin 12.0 (L) g/dL      Hematocrit 33.4 (L) %      MCV 87.7 fL      MCH 31.5 pg      MCHC 35.9 g/dL      RDW 12.5 %      RDW-SD 39.9 fl      MPV 8.9 fL      Platelets 312 10*3/mm3      Neutrophil % 73.3 %      Lymphocyte % 10.9 %      Monocyte % 11.4 %      Eosinophil % 3.9 %      Basophil % 0.2 %      Immature Grans % 0.3 %      Neutrophils, Absolute 7.10 10*3/mm3      Lymphocytes, Absolute 1.06 10*3/mm3      Monocytes, Absolute 1.11 (H) 10*3/mm3      Eosinophils, Absolute 0.38 10*3/mm3      Basophils, Absolute 0.02 10*3/mm3      Immature Grans, Absolute 0.03 (H) 10*3/mm3      nRBC 0.0 /100 WBC     Iron Profile [[id number]]  (Abnormal) Collected:  09/12/17 0521    Specimen:  Blood Updated:  09/12/17 0708     Iron <10 (L) mcg/dL      TIBC 249 (L) mcg/dL      Iron Saturation -- %       Unable to calculate.       Magnesium [[id number]]  (Normal) Collected:  09/12/17 0521    Specimen:  Blood Updated:  09/12/17 0718     Magnesium 2.0 mg/dL     Comprehensive Metabolic Panel [[id number]]  (Abnormal) Collected:  09/12/17 0521    Specimen:  Blood Updated:  09/12/17 0725     Glucose 171 (H) mg/dL      BUN 18 mg/dL      Creatinine 0.75 mg/dL      Sodium 135 (L) mmol/L      Potassium 3.7 mmol/L      Chloride 95 mmol/L      CO2 30.0 mmol/L      Calcium 9.2 mg/dL      Total Protein 6.5 g/dL      Albumin 3.60 g/dL      ALT (SGPT) 92 (H) U/L      AST (SGOT) 54 U/L      Alkaline Phosphatase 97 U/L      Total Bilirubin 1.1 mg/dL      eGFR Non African Amer 104 mL/min/1.73      Globulin 2.9 gm/dL      A/G Ratio 1.2 g/dL      BUN/Creatinine Ratio 24.0     Anion Gap 10.0 mmol/L      TSH [[id number]]  (Normal) Collected:  09/12/17 0521    Specimen:  Blood Updated:  09/12/17 0728     TSH 2.000 mIU/mL     Hemoglobin A1c [[id number]]  (Abnormal) Collected:  09/12/17 0521    Specimen:  Blood Updated:  09/12/17 0824     Hemoglobin A1C 5.9 (H) %         Imaging Results (last 72 hours)     ** No results found for the last 72 hours. **          Assessment/Plan     Principal Problem:    Multiple trauma  Active Problems:    Closed displaced comminuted fracture of right patella    Closed displaced fracture of navicular bone of left foot    Osteoarthritis of hands, bilateral    BPH (benign prostatic hyperplasia)    Glaucoma    Essential hypertension    Mixed hyperlipidemia    Encounter for rehabilitation        Lab work was reviewed today we'll replace his iron.  Discussed with Dr. Rutherford and will have right leg extended at all times.    Per Dr. Rivera recommendation extend the left leg when he is in wheelchair for any length of time  Glucose still mildly elevated but hemoglobin A1c 5.9 we'll check his fingersticks twice a day and follow  Blood pressure adequately controlled  He is anticoagulated and we'll DC Lovenox  Replace iron as well      Alec Stephen MD  09/12/17  10:53 AM

## 2017-09-12 NOTE — THERAPY TREATMENT NOTE
Inpatient Rehabilitation - Physical Therapy Treatment Note  [facility]     Patient Name: Vangie Lopez  : 1950  MRN: [id number]  Today's Date: 2017  Onset of Illness/Injury or Date of Surgery Date: 17  Date of Referral to PT: 17  Referring Physician: JOSE ALEJANDRO Stephen MD    Admit Date: 2017    Visit Dx:    ICD-10-CM ICD-9-CM   1. Abnormality of gait and mobility R26.9 781.2   2. Muscle weakness (generalized) M62.81 728.87   3. Impaired mobility and ADLs Z74.09 799.89     Patient Active Problem List   Diagnosis   • Encounter for screening for malignant neoplasm of colon   • Closed displaced comminuted fracture of right patella   • Closed displaced fracture of navicular bone of left foot   • Closed dislocation of navicular bone of left foot   • Patella fracture   • Essential hypertension   • Mixed hyperlipidemia   • Osteoarthritis of hands, bilateral   • BPH (benign prostatic hyperplasia)   • Glaucoma   • Hyperlipidemia   • Closed fracture of navicular bone with dislocation of perilunate joint of left wrist   • Fracture of patella, right, closed   • Multiple trauma   • Encounter for rehabilitation               Adult Rehabilitation Note       17 1455 17 1028 17 1040    Rehab Assessment/Intervention    Discipline occupational therapy assistant  -RC physical therapy assistant  -TA physical therapy assistant  -AM    Document Type therapy note (daily note)  -RC therapy note (daily note)  -TA therapy note (daily note)  -AM    Subjective Information agree to therapy  -RC agree to therapy  -TA agree to therapy;complains of;pain  -AM    Patient Effort, Rehab Treatment good  -RC good  -TA good  -AM    Symptoms Noted During/After Treatment   none  -AM    Symptoms Noted Comment  BLE non weight bearing,knee immobilizer R LE  -TA     Precautions/Limitations fall precautions;non-weight bearing status;brace on when up   brace on all time elevate r lle all time l le after up 3o  mi  -RC fall precautions;non-weight bearing status   Non weight bearing B LE  -TA brace on when up;fall precautions;non-weight bearing status  -AM    Specific Treatment Considerations --   in w/c only 2 hr at a time, keep r knee straght  -RC      Equipment Issued to Patient   gait belt  -AM    Recorded by [RC] IRASEMA Herring/FREEDOM [TA] Geri Juan, DARIEN [AM] Jim Young PTA    Vital Signs    Pre Systolic BP Rehab  146  -  -AM    Pre Treatment Diastolic BP  73  -TA 78  -AM    Post Systolic BP Rehab  147  -  -AM    Post Treatment Diastolic BP  70  -TA 75  -AM    Pretreatment Heart Rate (beats/min)  102  -  -AM    Intratreatment Heart Rate (beats/min)  96  -TA     Posttreatment Heart Rate (beats/min)  103  -  -AM    Pre SpO2 (%)  98  -TA 95  -AM    O2 Delivery Pre Treatment  room air  -TA room air  -AM    Intra SpO2 (%)  98  -TA     Post SpO2 (%)  97  -TA 96  -AM    O2 Delivery Post Treatment   room air  -AM    Pre Patient Position  Supine  -TA Sitting  -AM    Intra Patient Position   Supine  -AM    Post Patient Position  Supine  -TA Sitting  -AM    Recorded by  [TA] Geri Juan PTA [AM] Jim Young PTA    Pain Assessment    Pain Assessment  0-10  -TA 0-10  -AM    Pain Score 0  -RC 0  -TA 6  -AM    Post Pain Score 0  -RC 6  -TA 4  -AM    Pain Type  Surgical pain  -TA Acute pain;Surgical pain  -AM    Pain Location  Knee  -TA Knee  -AM    Pain Orientation  Right  -TA Right  -AM    Pain Descriptors   Sore  -AM    Pain Frequency   Constant/continuous  -AM    Date Pain First Started   09/06/17  -AM    Clinical Progression   Gradually improving  -AM    Patient's Stated Pain Goal   No pain  -AM    Pain Intervention(s)   Medication (See MAR);Repositioned  -AM    Result of Injury   Yes  -AM    Work-Related Injury   No  -AM    Multiple Pain Sites   No  -AM    Recorded by [RC] IRASEMA Herring/FREEDOM [TA] Geri Juan PTA [AM] Jim Young PTA    Cognitive Assessment/Intervention     Current Cognitive/Communication Assessment  functional  -TA functional  -AM    Orientation Status  oriented x 4  -TA oriented x 4  -AM    Follows Commands/Answers Questions  100% of the time  -% of the time  -AM    Personal Safety  mild impairment  -TA     Personal Safety Interventions  fall prevention program maintained  -TA gait belt;nonskid shoes/slippers when out of bed;supervised activity  -AM    Recorded by  [TA] Geri Juan PTA [AM] Jim Young PTA    ROM (Range of Motion)    General ROM   lower extremity range of motion deficits identified  -AM    Recorded by   [AM] Jim Young PTA    General LE Assessment    ROM   knee, right: LE ROM deficit;ankle, left: LE ROM deficit  -AM    Recorded by   [AM] Jim Young PTA    Mobility Assessment/Training    Extremity Weight-Bearing Status   left lower extremity;right lower extremity  -AM    Left Lower Extremity Weight-Bearing   non weight-bearing  -AM    Right Lower Extremity Weight-Bearing   non weight-bearing  -AM    Recorded by   [AM] Jim Young PTA    Bed Mobility, Assessment/Treatment    Bed Mobility, Assistive Device  bed rails;head of bed elevated;overhead trapeze  -TA head of bed elevated  -AM    Bed Mobility, Roll Left, [place]  minimum assist (75% patient effort);nonverbal cues required (demo/gesture)   x 3  -TA not tested  -AM    Bed Mobility, Roll Right, [place]  minimum assist (75% patient effort);verbal cues required   x 3  -TA not tested  -AM    Bed Mobility, Scoot/Bridge, [place]  contact guard assist  -TA not tested  -AM    Bed Mob, Supine to Sit, [place]  minimum assist (75% patient effort)   for R LE x 2  -TA contact guard assist  -AM    Bed Mob, Sit to Supine, [place]  minimum assist (75% patient effort)    for  R LE x 2  -TA contact guard assist  -AM    Bed Mob, Sidelying to Sit, [place]   not tested  -AM    Bed Mob, Sit to Sidelying, [place]   not tested  -AM    Bed Mobility,  Impairments   ROM decreased;strength decreased;pain  -AM    Bed Mobility, Comment  pt Assisted to EOB x 2, pt's R  LE supported by PTA  -TA     Recorded by  [TA] Geri Juan PTA [AM] Jim Young PTA    Transfer Assessment/Treatment    Transfers, Bed-Chair [place]   contact guard assist  -AM    Transfers, Chair-Bed [place] minimum assist (75% patient effort)  -  contact guard assist  -AM    Transfers, Bed-Chair-Bed, Assist Device sliding board  -  other (see comments)   none  -AM    Transfers, Sit-Stand [place]   not appropriate to assess  -AM    Transfers, Stand-Sit [place]   not appropriate to assess  -AM    Toilet Transfer, [place]   not tested  -AM    Walk-In Shower Transfer, [place]   not tested  -AM    Bathtub Transfer, [place]   not tested  -AM    Transfer, Maintain Weight Bearing Status   able to maintain weight bearing status  -AM    Transfer, Impairments   ROM decreased;strength decreased;pain  -AM    Recorded by [RC] Shruthi Jean-Baptiste VILLALPANDO/L  [AM] Jim Young PTA    Gait Assessment/Treatment    Gait, [place] Level   not appropriate to assess  -AM    Recorded by   [AM] Jim Young PTA    Stairs Assessment/Treatment    Stairs, [place] Level   not appropriate to assess  -AM    Recorded by   [AM] Jim Young PTA    Wheelchair Training/Management    Wheelchair Transfer Type --   sliding board to l side  -      Wheelchair Task Training Comment --   requires someont to hold r le and vc's  -      Wheelchair, Distance Propelled 200  -RC      Recorded by [RC] Shruthi Jean-Baptiste VILLALPANDO/L      Therapy Exercises    Right Lower Extremity  AROM:;20 reps;ankle pumps/circles;supine   2 sets  -TA     Left Lower Extremity  AROM:;20 reps;quad sets;supine   2 sets  -TA     Bilateral Lower Extremities  AROM:;20 reps;supine;glut sets   2 sets  -TA     Bilateral Upper Extremity AROM:;15 reps  -      BUE Resistance theraband   rowing blue band 2 sets  -       Trunk Exercises  supine;10 reps   pull-ups  with trapeze bar  -TA     Recorded by [RC] IRASEMA Herring/FREEDOM [TA] Geri Juan PTA     Orthotics Prosthetics    Additional Documentation   Orthosis Location (Group);Orthosis Management/Training (Group)  -AM    Recorded by   [AM] Jim Young PTA    Orthosis Location    Orthosis Location/Type   lower extremity  -AM    Orthosis, Lower Extremity   Right:;knee immobilizer  -AM    Recorded by   [AM] Jim Young PTA    Orthosis Management/Training    Orthosis Fabrication Detail   Knee Immobilizer  -AM    Orthosis Indications   restrict motion  -AM    Orthosis Wear Schedule   wear full time  -AM    Recorded by   [AM] Jim Young PTA    Positioning and Restraints    Pre-Treatment Position sitting in chair/recliner  -RC in bed  -TA sitting in chair/recliner  -AM    Post Treatment Position bed  -RC bed  -TA wheelchair  -AM    In Bed call light within reach;encouraged to call for assist;with nsg  -RC supine;call light within reach;with family/caregiver  -TA     In Wheelchair   sitting;call light within reach;encouraged to call for assist;legs elevated  -AM    Recorded by [RC] IRASEMA Herring/FREEDOM [TA] Geri Jaun PTA [AM] Jim Young PTA      09/10/17 1300 09/10/17 0831       Rehab Assessment/Intervention    Discipline physical therapy assistant  -MAGNO physical therapy assistant  -MAGNO     Document Type therapy note (daily note)  -MAGNO therapy note (daily note)  -MAGNO     Subjective Information agree to therapy  -MAGNO agree to therapy  -MAGNO     Patient Effort, Rehab Treatment good  -MAGNO good  -MAGNO     Precautions/Limitations fall precautions;non-weight bearing status;other (see comments)   vital signs. NWB R LE and L LE  -MAGNO fall precautions;non-weight bearing status;other (see comments)   vital signs. NWB R LE and L LE  -MAGNO     Recorded by [MAGNO] All Pendleton PTA [MAGNO] All Pendleton PTA     Vital Signs    Pre Systolic BP Rehab 130  -MAGNO 142  -MAGNO     Pre  Treatment Diastolic BP 64  -MAGNO 70  -MAGNO     Post Systolic BP Rehab 141  -MAGNO 156  -MAGNO     Post Treatment Diastolic BP 66  -MAGNO 77  -MAGNO     Pretreatment Heart Rate (beats/min) 108  -MAGNO 111  -MAGNO     Posttreatment Heart Rate (beats/min) 111  -MAGNO 108  -MAGNO     Pre SpO2 (%) 95  -MAGNO 94  -MAGNO     O2 Delivery Pre Treatment room air  -MAGNO room air  -MAGNO     Post SpO2 (%) 97  -MAGNO 96  -MAGNO     O2 Delivery Post Treatment room air  -MAGNO room air  -MAGNO     Pre Patient Position Sitting  -MAGNO Supine  -MAGNO     Post Patient Position Supine  -MAGNO Sitting  -MAGNO     Recorded by [MAGNO] All Pendleton PTA [MAGNO] All Pendleton PTA     Pain Assessment    Pain Assessment 0-10  -MAGNO 0-10  -MAGNO     Pain Score 8  -MAGNO 5  -MAGNO     Post Pain Score 8  -MAGNO 5  -MAGNO     Pain Type Acute pain  -MAGNO Acute pain  -MAGNO     Pain Location Knee  -MAGNO      Pain Orientation Right  -MAGNO      Pain Intervention(s) Repositioned   nsg notified of pts pain  -MAGNO Medication (See MAR)  -MAGNO     Recorded by [MAGNO] All Pendleton PTA [MAGNO] All Pendleton PTA     Vision Assessment/Intervention    Visual Impairment visual impairment, right   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  -MAGNO visual impairment, right   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  -MAGNO     Recorded by [MAGNO] All Pendleton PTA [MAGNO] All Pendleton PTA     Cognitive Assessment/Intervention    Current Cognitive/Communication Assessment functional  -MAGNO functional  -MAGNO     Orientation Status oriented x 4  -MAGNO oriented x 4  -MAGNO     Follows Commands/Answers Questions 100% of the time  -MAGNO 100% of the time  -MAGNO     Personal Safety WNL/WFL  -MAGNO WNL/WFL  -MAGNO     Recorded by [MAGNO] All Pendleton PTA [MAGNO] All Pendleton PTA     Mobility Assessment/Training    Extremity Weight-Bearing Status left lower extremity;right lower extremity  -MAGNO      Left Lower Extremity Weight-Bearing non weight-bearing  -MAGNO      Right Lower Extremity Weight-Bearing non weight-bearing   unless otherwise oredered by MD  -MAGNO      Recorded by [MAGNO] All Pendleton PTA      Bed  Mobility, Assessment/Treatment    Bed Mobility, Roll Left, [place] not tested  -MAGNO not tested  -MAGNO     Recorded by [MAGNO] All Pendleton PTA [MAGNO] All Pendleton PTA     Transfer Assessment/Treatment    Transfers, Bed-Chair [place]  dependent (less than 25% patient effort)   assist x3  -MAGNO     Transfers, Chair-Bed [place] dependent (less than 25% patient effort)   assist x3.   -MAGNO      Recorded by [MAGNO] All Pendleton PTA [MAGNO] All Pendleton PTA     Positioning and Restraints    Pre-Treatment Position sitting in chair/recliner  -MAGNO in bed  -MAGNO     Post Treatment Position bed  -MAGNO chair  -MAGNO     In Bed supine;call light within reach;encouraged to call for assist;exit alarm on;patient within staff view   all needs met.   -MAGNO      In Chair  reclined;call light within reach;encouraged to call for assist   all needs met.   -MAGNO     Recorded by [MAGNO] All Pendleton PTA [MAGNO] All Pendleton PTA       User Key  (r) = Recorded By, (t) = Taken By, (c) = Cosigned By    Initials Name Effective Dates    TA Geri Juan, PTA 10/17/16 -     MAGNO All Pendleton, PTA 10/17/16 -     AM Jim Young, PTA 10/17/16 -     RC Shruthi Jean-Baptiste, VILLALPANDO/FREEDOM 10/17/16 -                 IP PT Goals       09/11/17 1500 09/11/17 1040 09/10/17 1300    Bed Mobility PT LTG    Bed Mobility PT LTG, Date Established 09/11/17  -LM      Bed Mobility PT LTG, Time to Achieve by discharge  -LM      Bed Mobility PT LTG, Activity Type supine to sit/sit to supine  -LM      Bed Mobility PT LTG, [place] Level conditional independence  -LM      Bed Mobility PT Goal  LTG, Assist Device overhead trapeze;bed rails  -LM      Bed Mobility PT LTG, Outcome goal ongoing  -LM      Transfer Training PT LTG    Transfer Training PT LTG, Date Established 09/11/17  -LM      Transfer Training PT LTG, Time to Achieve by discharge  -LM      Transfer Training PT LTG, Activity Type bed to chair /chair to bed  -LM      Transfer Training PT LTG, [place]  Level conditional independence  -LM      Transfer Training PT LTG, Assist Device --   AAD  -LM      Transfer Training PT  LTG, Date Goal Reviewed  09/11/17  -AM 09/10/17  -MAGNO    Transfer Training PT LTG, Outcome goal ongoing  -LM goal not met  -AM goal ongoing  -MAGNO    Transfer Training PT LTG, Reason Goal Not Met  discharged from facility  -AM     Range of Motion PT LTG    Range of Motion Goal PT LTG, Date Established 09/11/17  -LM      Range of Motion Goal PT LTG, Time to Achieve by discharge  -LM      Range fo Motion Goal PT LTG, Joint L knee  -LM      Range of Motion Goal PT LTG, AROM Measure Knee flex - 110 degrees  -LM      Range of Motion Goal PT LTG, Outcome goal ongoing  -LM      Patient Education PT LTG    Patient Education PT LTG, Date Goal Reviewed  09/11/17  -AM 09/10/17  -MAGNO    Patient Education PT LTG Outcome  goal met  -AM goal ongoing  -MAGNO    Caregiver Training PT LTG    Caregiver Training PT LTG, Date Goal Reviewed  09/11/17  -AM 09/10/17  -MAGNO    Caregiver Training PT LTG, Outcome  goal not met  -AM goal ongoing  -MAGNO    Caregiver Training PT LTG, Reason Goal Not Met  discharged from facility  -AM     Physical Therapy PT STG    Physical Therapy PT STG, Date Goal Reviewed   09/10/17  -    Physical Therapy PT STG, Outcome   goal met  -MAGNO      09/09/17 1626          Transfer Training PT LTG    Transfer Training PT LTG, Date Established 09/09/17  -[facility]      Transfer Training PT LTG, Time to Achieve by discharge  -[facility]      Transfer Training PT LTG, Activity Type bed to chair /chair to bed  -[facility]      Transfer Training PT LTG, Additional Goal Once MD allows, pt will perform lateral transfer with conditional independence  -[facility]      Transfer Training PT LTG, Outcome goal ongoing  -[facility]      Patient Education PT LTG    Patient Education PT LTG, Date Established 09/09/17  -[facility]      Patient Education PT LTG, Time to Achieve by discharge  -[facility]      Patient Education PT LTG, Education Type precaution per  surgeon;transfers;bed mobility;pain management;home safety  -[facility]      Patient Education PT LTG Outcome goal ongoing  -[facility]      Caregiver Training PT LTG    Caregiver Training PT LTG, Date Established 09/09/17  -[facility]      Caregiver Training PT LTG, Time to Achieve by discharge  -[facility]      Caregiver Training PT LTG, Activity Type home caregiver will demonstrate understanding assisting pt as needed with transfers/mobility as well as verbalize understanding of home care instructions  -[facility]      Caregiver Training PT LTG, Outcome goal ongoing  -[facility]      Physical Therapy PT STG    Physical Therapy PT STG, Date Established 09/09/17  -[facility]      Physical Therapy PT STG, Time to Achieve by discharge  -[facility]      Physical Therapy PT STG, Activity Type Pt will tolerate OOB 3-4 hours per day  -[facility]      Physical Therapy PT STG, Outcome goal ongoing  -[facility]        User Key  (r) = Recorded By, (t) = Taken By, (c) = Cosigned By    Initials Name Provider Type    LM Isela Chris, PT Physical Therapist    HELEN Abel, PT Physical Therapist    MAGNO Pendleton, PTA Physical Therapy Assistant    SUSIE Young, PTA Physical Therapy Assistant          Physical Therapy Education     Title: PT OT SLP Therapies (Active)     Topic: Physical Therapy (Active)     Point: Mobility training (Active)    Learning Progress Summary    Learner Readiness Method Response Comment Documented by Status   Patient Acceptance E NR  TA 09/12/17 1515 Active    Acceptance E NR Reviewed safety with transfers.  Explained that Dr. Rutherford only wants stretcher chair used at this time.  09/11/17 1636 Active               Point: Precautions (Active)    Learning Progress Summary    Learner Readiness Method Response Comment Documented by Status   Patient Acceptance E NR  TA 09/12/17 1515 Active    Acceptance E NR Reviewed safety with transfers.  Explained that Dr. Rutherford only wants stretcher chair used at this time.  09/11/17 1636 Active                      User Key      Initials Effective Dates Name Provider Type Discipline    LM 06/15/16 -  Isela Chris, PT Physical Therapist PT    TA 10/17/16 -  Geri Juan PTA Physical Therapy Assistant PT                    PT Recommendation and Plan  Anticipated Discharge Disposition: home with home health  Planned Therapy Interventions: balance training, bed mobility training, home exercise program, motor coordination training, neuromuscular re-education, patient/family education, postural re-education, ROM (Range of Motion), strengthening, stretching, transfer training  PT Frequency: other (see comments) (5-14 times/wk)  Plan of Care Review  Outcome Summary/Follow up Plan: pt transfered to EOB with min assist to keep R LE supported, pt will bemnefit from continued PT services          Outcome Measures       09/12/17 1500 09/12/17 0755 09/11/17 1500    How much help from another person do you currently need...    Turning from your back to your side while in flat bed without using bedrails? 3  -TA  3  -LM    Moving from lying on back to sitting on the side of a flat bed without bedrails? 3  -TA  3  -LM    Moving to and from a bed to a chair (including a wheelchair)? 3  -TA  3  -LM    Standing up from a chair using your arms (e.g., wheelchair, bedside chair)? 1  -TA  1  -LM    Climbing 3-5 steps with a railing? 1  -TA  1  -LM    To walk in hospital room? 1  -TA  1  -LM    AM-PAC 6 Clicks Score 12  -TA  12  -LM    How much help from another is currently needed...    Putting on and taking off regular lower body clothing?  1  -BH (r) SP (t) BH (c)     Bathing (including washing, rinsing, and drying)  2  -BH (r) SP (t) BH (c)     Toileting (which includes using toilet bed pan or urinal)  2  -BH (r) SP (t) BH (c)     Putting on and taking off regular upper body clothing  3  -BH (r) SP (t) BH (c)     Taking care of personal grooming (such as brushing teeth)  3  -BH (r) SP (t) BH (c)     Eating meals  4  -BH (r) SP (t) BH (c)     Score  15  -BH  (r) SP (t)     Functional Assessment    Outcome Measure Options AM-PAC 6 Clicks Basic Mobility (PT)  -TA AM-PAC 6 Clicks Daily Activity (OT)  - (r) SP (t) BH (c) AM-PAC 6 Clicks Basic Mobility (PT)  -LM      09/11/17 1040 09/11/17 0921 09/10/17 0831    How much help from another person do you currently need...    Turning from your back to your side while in flat bed without using bedrails? 3  -AM  3  -MAGNO    Moving from lying on back to sitting on the side of a flat bed without bedrails? 3  -AM  3  -MAGNO    Moving to and from a bed to a chair (including a wheelchair)? 3  -AM  2  -MAGNO    Standing up from a chair using your arms (e.g., wheelchair, bedside chair)? 1  -AM  1  -MAGNO    Climbing 3-5 steps with a railing? 1  -AM  1  -MAGNO    To walk in hospital room? 1  -AM  1  -MAGNO    AM-PAC 6 Clicks Score 12  -AM  11  -MAGNO    How much help from another is currently needed...    Putting on and taking off regular lower body clothing?  2  -RB     Bathing (including washing, rinsing, and drying)  2  -RB     Toileting (which includes using toilet bed pan or urinal)  2  -RB     Putting on and taking off regular upper body clothing  3  -RB     Taking care of personal grooming (such as brushing teeth)  3  -RB     Eating meals  4  -RB     Score  16  -RB     Functional Assessment    Outcome Measure Options AM-PAC 6 Clicks Basic Mobility (PT)  -AM AM-PAC 6 Clicks Daily Activity (OT)  -RB AM-PAC 6 Clicks Basic Mobility (PT)  -MAGNO      User Key  (r) = Recorded By, (t) = Taken By, (c) = Cosigned By    Initials Name Provider Type    LM Isela Chris, PT Physical Therapist    RB Axel Perez, OT Occupational Therapist    BH Isabel Jones, OTR/L Occupational Therapist    HOLLIS Juan, PTA Physical Therapy Assistant    MAGNO Pendleton, PTA Physical Therapy Assistant    SUSIE Young, PTA Physical Therapy Assistant    DANY Juárez, OT Student OT Student           Time Calculation:         PT Charges       09/12/17 4350           Time Calculation    Start Time 1028  -TA      Stop Time 1124  -TA      Time Calculation (min) 56 min  -TA      Time Calculation- PT    Total Timed Code Minutes- PT 56 minute(s)  -TA        User Key  (r) = Recorded By, (t) = Taken By, (c) = Cosigned By    Initials Name Provider Type    HOLLIS Juan PTA Physical Therapy Assistant          Therapy Charges for Today     Code Description Service Date Service Provider Modifiers Qty    19508981519 HC PT THERAPEUTIC ACT EA 15 MIN 9/12/2017 Geri Juan PTA GP 2    40051349761 HC PT THER PROC EA 15 MIN 9/12/2017 Geri Juan PTA GP 2          PT G-Codes  PT Professional Judgement Used?: Yes  Outcome Measure Options: AM-PAC 6 Clicks Basic Mobility (PT)  Score: 12  Functional Limitation: Mobility: Walking and moving around  Mobility: Walking and Moving Around Current Status (): At least 60 percent but less than 80 percent impaired, limited or restricted  Mobility: Walking and Moving Around Goal Status (): At least 20 percent but less than 40 percent impaired, limited or restricted    Geri Juan PTA  9/12/2017

## 2017-09-13 PROBLEM — Z87.442 HISTORY OF KIDNEY STONES: Status: ACTIVE | Noted: 2017-09-13

## 2017-09-13 LAB
GLUCOSE BLDC GLUCOMTR-MCNC: 161 MG/DL (ref 70–130)
GLUCOSE BLDC GLUCOMTR-MCNC: 194 MG/DL (ref 70–130)
GLUCOSE BLDC GLUCOMTR-MCNC: 297 MG/DL (ref 70–130)
INR PPP: 2.33 (ref 0.8–1.2)
PROTHROMBIN TIME: 25.8 SECONDS (ref 11.1–15.3)

## 2017-09-13 PROCEDURE — 82962 GLUCOSE BLOOD TEST: CPT

## 2017-09-13 PROCEDURE — 99232 SBSQ HOSP IP/OBS MODERATE 35: CPT | Performed by: FAMILY MEDICINE

## 2017-09-13 PROCEDURE — 97110 THERAPEUTIC EXERCISES: CPT

## 2017-09-13 PROCEDURE — 92523 SPEECH SOUND LANG COMPREHEN: CPT | Performed by: SPEECH-LANGUAGE PATHOLOGIST

## 2017-09-13 PROCEDURE — 97530 THERAPEUTIC ACTIVITIES: CPT

## 2017-09-13 PROCEDURE — 85610 PROTHROMBIN TIME: CPT | Performed by: FAMILY MEDICINE

## 2017-09-13 RX ORDER — BISACODYL 10 MG
10 SUPPOSITORY, RECTAL RECTAL ONCE
Status: COMPLETED | OUTPATIENT
Start: 2017-09-13 | End: 2017-09-13

## 2017-09-13 RX ORDER — TRAZODONE HYDROCHLORIDE 50 MG/1
25 TABLET ORAL NIGHTLY
Status: DISCONTINUED | OUTPATIENT
Start: 2017-09-13 | End: 2017-09-22 | Stop reason: HOSPADM

## 2017-09-13 RX ADMIN — HYDROCODONE BITARTRATE AND ACETAMINOPHEN 1 TABLET: 10; 325 TABLET ORAL at 12:07

## 2017-09-13 RX ADMIN — FERROUS SULFATE TAB EC 324 MG (65 MG FE EQUIVALENT) 324 MG: 324 (65 FE) TABLET DELAYED RESPONSE at 08:35

## 2017-09-13 RX ADMIN — BISACODYL 10 MG: 10 SUPPOSITORY RECTAL at 09:15

## 2017-09-13 RX ADMIN — FAMOTIDINE 40 MG: 40 TABLET ORAL at 08:34

## 2017-09-13 RX ADMIN — POTASSIUM CITRATE 10 MEQ: 10 TABLET ORAL at 08:35

## 2017-09-13 RX ADMIN — HYDROCHLOROTHIAZIDE 25 MG: 25 TABLET ORAL at 08:35

## 2017-09-13 RX ADMIN — Medication 25 MG: at 20:31

## 2017-09-13 RX ADMIN — SENNOSIDES AND DOCUSATE SODIUM 1 TABLET: 8.6; 5 TABLET ORAL at 08:35

## 2017-09-13 RX ADMIN — POTASSIUM CITRATE 10 MEQ: 10 TABLET ORAL at 17:00

## 2017-09-13 RX ADMIN — WARFARIN SODIUM 4 MG: 4 TABLET ORAL at 17:00

## 2017-09-13 RX ADMIN — POLYETHYLENE GLYCOL 3350 17 G: 17 POWDER, FOR SOLUTION ORAL at 08:35

## 2017-09-13 RX ADMIN — MAGNESIUM HYDROXIDE 10 ML: 2400 SUSPENSION ORAL at 09:15

## 2017-09-13 RX ADMIN — HYDROCODONE BITARTRATE AND ACETAMINOPHEN 1 TABLET: 10; 325 TABLET ORAL at 16:09

## 2017-09-13 RX ADMIN — ATORVASTATIN CALCIUM 10 MG: 10 TABLET, FILM COATED ORAL at 08:35

## 2017-09-13 RX ADMIN — HYDROCODONE BITARTRATE AND ACETAMINOPHEN 1 TABLET: 10; 325 TABLET ORAL at 20:31

## 2017-09-13 RX ADMIN — FINASTERIDE 5 MG: 5 TABLET, FILM COATED ORAL at 20:31

## 2017-09-13 RX ADMIN — SENNOSIDES AND DOCUSATE SODIUM 1 TABLET: 8.6; 5 TABLET ORAL at 17:00

## 2017-09-13 RX ADMIN — Medication 1 TABLET: at 08:35

## 2017-09-13 NOTE — PLAN OF CARE
Problem: Patient Care Overview (Adult)  Goal: Plan of Care Review  Outcome: Ongoing (interventions implemented as appropriate)    09/13/17 0207   Coping/Psychosocial Response Interventions   Plan Of Care Reviewed With patient   Patient Care Overview   Progress no change   Outcome Evaluation   Outcome Summary/Follow up Plan pt rested well during night no complaints noted         Problem: Orthopaedic Fracture (Adult)  Goal: Signs and Symptoms of Listed Potential Problems Will be Absent or Manageable (Orthopaedic Fracture)  Outcome: Ongoing (interventions implemented as appropriate)    Problem: Fall Risk (Adult)  Goal: Absence of Falls  Outcome: Ongoing (interventions implemented as appropriate)

## 2017-09-13 NOTE — PLAN OF CARE
Problem: Patient Care Overview (Adult)  Goal: Plan of Care Review  Outcome: Ongoing (interventions implemented as appropriate)    09/13/17 1430   Coping/Psychosocial Response Interventions   Plan Of Care Reviewed With patient   Patient Care Overview   Progress no change   Outcome Evaluation   Outcome Summary/Follow up Plan Pain controlled with interventions. Had large BM today.        Goal: Adult Individualization and Mutuality  Outcome: Ongoing (interventions implemented as appropriate)  Goal: Discharge Needs Assessment  Outcome: Ongoing (interventions implemented as appropriate)    Problem: Orthopaedic Fracture (Adult)  Goal: Signs and Symptoms of Listed Potential Problems Will be Absent or Manageable (Orthopaedic Fracture)  Outcome: Ongoing (interventions implemented as appropriate)    Problem: Fall Risk (Adult)  Goal: Absence of Falls  Outcome: Ongoing (interventions implemented as appropriate)

## 2017-09-13 NOTE — PROGRESS NOTES
LOS: 2 days   Patient Care Team:  Renan Rodríguez MD as PCP - General (Family Medicine)    Chief Complaint:   Multiple trauma          Interval History:     SUBJECTIVE:  Slept poorly, no bm  Pain controlled  No nausea or bloating no abd pain  History taken from: patient chart RN and therapists    Objective     Vital Signs  Temp:  [99 °F (37.2 °C)-99.4 °F (37.4 °C)] 99 °F (37.2 °C)  Heart Rate:  [] 88  Resp:  [18-20] 20  BP: (130-142)/(69-77) 142/72  There were no vitals filed for this visit.      Physical Exam:     General Appearance:    Alert, cooperative, in no acute distress   Head:    Normocephalic, without obvious abnormality, atraumatic   Eyes:            Lids and lashes normal, conjunctivae and sclerae normal, no   icterus, no pallor, corneas clear, PERRLA   Throat:   No oral lesions, no thrush, oral mucosa moist   Neck:   No adenopathy, supple, trachea midline, no thyromegaly, no   carotid bruit, no JVD       Lungs:     Clear to auscultation,respirations regular, even and                  Unlabored     Heart:    Regular rhythm and normal rate, normal S1 and S2, no            murmur, no gallop, no rub, no click    Chest Wall:    No abnormalities observed   Abdomen:     Normal bowel sounds, no masses, no organomegaly, soft        non-tender, non-distended, no guarding, no rebound                Tenderness    Extremities:   Immobilizer on right leg  psot splint and dressing on the left       Skin:   No bleeding, bruising or rash   Lymph nodes:   No palpable adenopathy   Neurologic:   Cranial nerves 2 - 12 grossly intact, sensation intact, DTR       present and equal bilaterally          RESULTS REVIEW:     Lab Results (last 24 hours)     Procedure Component Value Units Date/Time    POC Glucose Fingerstick [[id number]]  (Abnormal) Collected:  09/12/17 1510    Specimen:  Blood Updated:  09/12/17 1626     Glucose 275 (H) mg/dL       RN NotifiedMeter: [id number]: [id number] JAZZMINE MARLOW        Protime-INR [[id number]]  (Abnormal) Collected:  09/13/17 0526    Specimen:  Blood Updated:  09/13/17 0650     Protime 25.8 (H) Seconds      INR 2.33 (H)    Narrative:       Therapeutic range for most indications is 2.0-3.0 INR,  or 2.5-3.5 for mechanical heart valves.    POC Glucose Fingerstick [[id number]]  (Abnormal) Collected:  09/13/17 0833    Specimen:  Blood Updated:  09/13/17 0853     Glucose 297 (H) mg/dL       Meter: [id number]: [id number] [employer] [place]           Imaging Results (last 72 hours)     ** No results found for the last 72 hours. **          Assessment/Plan     Principal Problem:    Multiple trauma  Active Problems:    Closed displaced comminuted fracture of right patella    Closed displaced fracture of navicular bone of left foot    Osteoarthritis of hands, bilateral    BPH (benign prostatic hyperplasia)    Glaucoma    Encounter for rehabilitation    Essential hypertension    Mixed hyperlipidemia    History of kidney stones        Glucose of concern even though being checked after meals, HgA1c 5.9 at admission  Will check before meals and hs. Discussed with him. Does not think sugar has ever been elevated  bp ok   will start trazodone at hs to help with sleep  Dulcolax supp this am and mag citrate and or fleets this pm if no results    Working well with therapy      Alec Stephen MD  09/13/17  10:43 AM

## 2017-09-13 NOTE — PLAN OF CARE
Problem: Patient Care Overview (Adult)  Goal: Plan of Care Review  Outcome: Ongoing (interventions implemented as appropriate)    09/13/17 1533   Coping/Psychosocial Response Interventions   Plan Of Care Reviewed With patient   Outcome Evaluation   Outcome Summary/Follow up Plan pt doing well with transfers and is able to t/f with cga.         Problem: Inpatient Physical Therapy  Goal: Bed Mobility Goal LTG- PT  Outcome: Ongoing (interventions implemented as appropriate)    09/11/17 1500 09/13/17 1533   Bed Mobility PT LTG   Bed Mobility PT LTG, Date Established 09/11/17 --    Bed Mobility PT LTG, Time to Achieve by discharge --    Bed Mobility PT LTG, Activity Type supine to sit/sit to supine --    Bed Mobility PT LTG, [place] Level conditional independence --    Bed Mobility PT Goal LTG, Assist Device overhead trapeze;bed rails --    Bed Mobility PT LTG, Date Goal Reviewed --  09/13/17   Bed Mobility PT LTG, Outcome --  goal ongoing       Goal: Transfer Training Goal 1 LTG- PT  Outcome: Ongoing (interventions implemented as appropriate)    09/11/17 1500 09/13/17 1533   Transfer Training PT LTG   Transfer Training PT LTG, Date Established 09/11/17 --    Transfer Training PT LTG, Time to Achieve by discharge --    Transfer Training PT LTG, Activity Type bed to chair /chair to bed --    Transfer Training PT LTG, [place] Level conditional independence --    Transfer Training PT LTG, Assist Device (AAD) --    Transfer Training PT LTG, Date Goal Reviewed --  09/13/17   Transfer Training PT LTG, Outcome --  goal ongoing       Goal: Range of Motion Goal LTG- PT  Outcome: Ongoing (interventions implemented as appropriate)    09/11/17 1500 09/13/17 1533   Range of Motion PT LTG   Range of Motion Goal PT LTG, Date Established 09/11/17 --    Range of Motion Goal PT LTG, Time to Achieve by discharge --    Range fo Motion Goal PT LTG, Joint L knee --    Range of Motion Goal PT LTG, AROM Measure Knee flex - 110 degrees  --    Range of Motion Goal PT LTG, Date Goal Reviewed --  09/13/17   Range of Motion Goal PT LTG, Outcome --  goal ongoing       Goal: Wheelchair Propulsion Goal LTG- PT  Outcome: Ongoing (interventions implemented as appropriate)    09/12/17 1355 09/13/17 1533   Wheelchair Propulsion PT LTG   Wheelchair Propulsion Goal PT LTG, Date Established 09/12/17 --    Wheelchair Propulsion Goal PT LTG, Time to Achieve by discharge --    Wheelchair Propulsion Goal PT LTG, [place] Level conditional independence --    Wheelchair Propulsion Goal PT LTG, Distance to Achieve 300 feet --    Wheelchair Propulsion Goal PT LTG, Date Goal Reviewed --  09/13/17   Wheelchair Propulsion Goal PT LTG, Outcome --  goal ongoing       Goal: Physical Therapy Goal LTG- PT  Outcome: Ongoing (interventions implemented as appropriate)    09/12/17 1355 09/13/17 1533   Physical Therapy PT LTG   Physical Therapy PT LTG, Date Established 09/12/17 --    Physical Therapy PT LTG, Time to Achieve by discharge --    Physical Therapy PT LTG, Activity Type Pt will self propel w/c up/down ramp. --    Physical Therapy PT LTG, [place] Level conditional independence --    Physical Therapy PT LTG, Date Goal Reviewed --  09/13/17   Physical Therapy PT LTG, Outcome --  goal ongoing

## 2017-09-13 NOTE — PLAN OF CARE
Problem: Patient Care Overview (Adult)  Goal: Plan of Care Review  Outcome: Ongoing (interventions implemented as appropriate)    09/13/17 1552   Coping/Psychosocial Response Interventions   Plan Of Care Reviewed With patient   Patient Care Overview   Progress improving   Outcome Evaluation   Outcome Summary/Follow up Plan improving toward all goals          Problem: Inpatient Occupational Therapy  Goal: Bed Mobility Goal LTG- OT  Outcome: Ongoing (interventions implemented as appropriate)    09/13/17 1552   Bed Mobility OT LTG   Bed Mobility OT LTG, Date Goal Reviewed 09/13/17 09/13/17 1552   Bed Mobility OT LTG   Bed Mobility OT LTG, Date Goal Reviewed 09/13/17   Bed Mobility OT LTG, Outcome goal ongoing       Goal: Transfer Training Goal 1 LTG- OT  Outcome: Ongoing (interventions implemented as appropriate)    09/13/17 1552   Transfer Training OT LTG   Transfer Training OT LTG, Date Goal Reviewed 09/13/17   Transfer Training OT LTG, Outcome goal ongoing       Goal: Patient Education Goal LTG- OT  Outcome: Ongoing (interventions implemented as appropriate)    09/13/17 1552   Patient Education OT LTG   Patient Education OT LTG, Date Goal Reviewed 09/13/17   Patient Education OT LTG Outcome goal ongoing       Goal: ADL Goal LTG- OT  Outcome: Ongoing (interventions implemented as appropriate)    09/13/17 1552   ADL OT LTG   ADL OT LTG, Date Goal Reviewed 09/13/17   ADL OT LTG, Outcome goal ongoing

## 2017-09-13 NOTE — THERAPY DISCHARGE NOTE
Inpatient Rehabilitation - Speech Language Pathology Initial Eval/Discharge  [facility]     Patient Name: Vangie Lopez  : 1950  MRN: [id number]  Today's Date: 2017  Onset of Illness/Injury or Date of Surgery Date: 17  Date of Referral to SLP: 17  Referring Physician: JESSICA sears      Admit Date: 2017   ST evaluation performed. 42/50 on BCAT with deficits for short term memory but improved with occasional or minimal cues. is not appropriate for intervention at this time  Evaluation performed with student clinician and supervision of HESHAM Carrera, CCC-SLP 2017 10:09 AM    Visit Dx:    ICD-10-CM ICD-9-CM   1. Symbolic dysfunction R48.9 784.60   2. Abnormality of gait and mobility R26.9 781.2   3. Muscle weakness (generalized) M62.81 728.87   4. Impaired mobility and ADLs Z74.09 799.89     Patient Active Problem List   Diagnosis   • Encounter for screening for malignant neoplasm of colon   • Closed displaced comminuted fracture of right patella   • Closed displaced fracture of navicular bone of left foot   • Closed dislocation of navicular bone of left foot   • Patella fracture   • Essential hypertension   • Mixed hyperlipidemia   • Osteoarthritis of hands, bilateral   • BPH (benign prostatic hyperplasia)   • Glaucoma   • Hyperlipidemia   • Closed fracture of navicular bone with dislocation of perilunate joint of left wrist   • Fracture of patella, right, closed   • Multiple trauma   • Encounter for rehabilitation     Past Medical History:   Diagnosis Date   • BPH (benign prostatic hyperplasia)    • Cancer     skin cancer   • Closed fracture of navicular bone with dislocation of perilunate joint of left wrist 2017    closed reduction pinning  17   • Essential hypertension 2017   • Fracture of patella, right, closed 2017    orif 17   • Glaucoma     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   • Hyperlipidemia    • Multiple trauma 2017   • Osteoarthritis of  hands, bilateral     retired due to arthritis,      Past Surgical History:   Procedure Laterality Date   • COLONOSCOPY  12/08/2006   • COLONOSCOPY N/A 4/3/2017    Procedure: COLONOSCOPY;  Surgeon: Michael Ang MD;  Location: [facility] ENDOSCOPY;  Service:    • CYSTOSCOPY     • EXTERNAL FIXATION ANKLE FRACTURE Left 9/6/2017    Procedure: ANKLE EXTERNAL FIXATOR APPLICATION;  Surgeon: Simone Rivera DPM;  Location: [facility] OR;  Service:    • KNEE SURGERY     • PATELLA OPEN REDUCTION INTERNAL FIXATION Right 9/8/2017    Procedure: OPEN REDUCTION INTERNAL FIXATION RIGHT PATELLA       (C-ARM#3);  Surgeon: Checo Rutherford MD;  Location: [facility] OR;  Service:    • SCAPHOID FRACTURE SURGERY Left 09/07/2017    fracture dislocation left foot, closed reduction with pinning   • SHOULDER SURGERY            SLP EVALUATION (last 72 hours)      SLP Evaluation       09/13/17 0826                Rehab Evaluation    Document Type evaluation;discharge evaluation/summary  -EC        Subjective Information no complaints;agree to therapy  -EC        General Information    Patient Profile Review yes  -EC        Onset of Illness/Injury 09/06/17  -EC        Date of Surgery 09/08/17  -EC        Referring Physician JESSICA sears  -EC        Pertinent History Of Current Problem fell from a ladder  -EC        Clinical Impression    Date of Referral to SLP 09/12/17  -EC        Patient's Goals For Discharge return home  -EC        Criteria for Skilled Therapeutic Interventions Met not appropriate for cognitive linguistic intervention  -EC        Therapy Frequency evaluation only  -EC          User Key  (r) = Recorded By, (t) = Taken By, (c) = Cosigned By    Initials Name Effective Dates    EC Melva Carrera CCC-SLP 12/30/16 -            EDUCATION  The patient has been educated in the following areas:   Cognitive Impairment.    SLP Recommendation and Plan           Criteria for Skilled Therapeutic Interventions Met: not appropriate for cognitive  linguistic intervention        Therapy Frequency: evaluation only          Plan of Care Review  Plan Of Care Reviewed With: patient  Progress: progress toward functional goals as expected  Outcome Summary/Follow up Plan: ST evaluation performed.  42/50 on BCAT with deficits for short term memory but improved with occasional or minimal cues.  is not appropriate for intervention at this time.              Time Calculation:         Time Calculation- SLP       09/13/17 1007          Time Calculation- SLP    SLP Start Time 0826  -EC      SLP Stop Time 0906  -EC      SLP Time Calculation (min) 40 min  -EC      Total Timed Code Minutes- SLP 40 minute(s)  -EC      SLP Received On 09/13/17  -EC        User Key  (r) = Recorded By, (t) = Taken By, (c) = Cosigned By    Initials Name Provider Type    EC Melva Carrera CCC-SLP Speech and Language Pathologist          Therapy Charges for Today     Code Description Service Date Service Provider Modifiers Qty    [id number]  ST EVAL SPEECH AND PROD W LANG  3 9/13/2017 Melva Carrera CCC-SLP GN 1                        CYNDEE RivasSLP  9/13/2017

## 2017-09-13 NOTE — THERAPY TREATMENT NOTE
Inpatient Rehabilitation - Occupational Therapy Treatment Note  [facility]     Patient Name: Vangie Lopez  : 1950  MRN: [id number]  Today's Date: 2017  Onset of Illness/Injury or Date of Surgery Date: 17  Date of Referral to OT: 17  Referring Physician: Dr. Stephen      Admit Date: 2017    Visit Dx:     ICD-10-CM ICD-9-CM   1. Abnormality of gait and mobility R26.9 781.2   2. Muscle weakness (generalized) M62.81 728.87   3. Impaired mobility and ADLs Z74.09 799.89     Patient Active Problem List   Diagnosis   • Encounter for screening for malignant neoplasm of colon   • Closed displaced comminuted fracture of right patella   • Closed displaced fracture of navicular bone of left foot   • Closed dislocation of navicular bone of left foot   • Patella fracture   • Essential hypertension   • Mixed hyperlipidemia   • Osteoarthritis of hands, bilateral   • BPH (benign prostatic hyperplasia)   • Glaucoma   • Hyperlipidemia   • Closed fracture of navicular bone with dislocation of perilunate joint of left wrist   • Fracture of patella, right, closed   • Multiple trauma   • Encounter for rehabilitation   • History of kidney stones             Adult Rehabilitation Note       17 1440 17 1305 17 1300    Rehab Assessment/Intervention    Discipline physical therapy assistant  -RW occupational therapy assistant  -LM occupational therapy assistant  -LM    Document Type therapy note (daily note)  -RW therapy note (daily note)  -LM therapy note (daily note)  -LM    Subjective Information agree to therapy  -RW agree to therapy  -LM agree to therapy  -LM    Patient Effort, Rehab Treatment good  -RW good  -LM good  -LM    Precautions/Limitations non-weight bearing status;brace on when up  -RW      Recorded by [RW] Balaji Malhotra PTA [LM] IRASEMA Smith/L [LM] NAV SmithA/L    Pain Assessment    Pain Assessment No/denies pain  -RW No/denies pain  -LM 0-10   -LM    Recorded by [RW] Balaji Malhotra PTA [LM] Marce Carter, [place]/L [LM] Marce Carter, [place]/L    Cognitive Assessment/Intervention    Current Cognitive/Communication Assessment functional  -RW functional  -LM functional  -LM    Orientation Status oriented x 4  -RW oriented x 4  -LM oriented x 4  -LM    Follows Commands/Answers Questions 100% of the time  -% of the time  -% of the time  -LM    Personal Safety  WNL/WFL  -LM     Personal Safety Interventions gait belt  -RW      Recorded by [RW] Balaji Malhotra PTA [LM] Marce Carter, [place]/L [LM] Marce Carter, [place]/L    Mobility Assessment/Training    Left Lower Extremity Weight-Bearing non weight-bearing  -RW      Right Lower Extremity Weight-Bearing non weight-bearing  -RW      Recorded by [RW] Balaji Malhotra PTA      Bed Mobility, Assessment/Treatment    Bed Mobility, Assistive Device bed rails;head of bed elevated  -RW      Bed Mob, Supine to Sit, [place] conditional independence  -RW conditional independence  -LM     Bed Mob, Sit to Supine, [place]  conditional independence   to long sitting with grab barin bed   -LM     Recorded by [RW] Balaji Malhotra PTA [LM] Marce Carter, [place]/L     Transfer Assessment/Treatment    Transfers, Bed-Chair [place] contact guard assist  -RW      Transfers, Chair-Bed [place]   minimum assist (75% patient effort)  -LM    Transfers, Bed-Chair-Bed, Assist Device sliding board  -RW      Transfers, Sit-Stand [place]   supervision required  -LM    Transfers, Stand-Sit [place]   supervision required  -LM    Toilet Transfer, [place] contact guard assist  -RW      Toilet Transfer, Assistive Device bedside commode with drop arms   sliding bd  -RW      Recorded by [RW] Balaji Malhotra PTA  [LM] Marce Carter, [place]/L    Gait Assessment/Treatment    Gait, [place] Level not appropriate to assess  -RW      Recorded by [RW] Balaji Malhotra PTA      Stairs  Assessment/Treatment    Stairs, [place] Level not appropriate to assess  -RW      Recorded by [RW] Balaji Malhotra PTA      Wheelchair Training/Management    Wheelchair, Distance Propelled   100 ft   -LM    Wheelchair Safety Comment   --   wc measured placed in fim book 18x16  -LM    Recorded by   [LM] IRASEMA Smith/L    Upper Body Bathing Assessment/Training    UB Bathing Assess/Train, [place] Level  set up required  -LM     Recorded by  [LM] IRASEMA Smith/L     Lower Body Bathing Assessment/Training    LB Bathing Assess/Train, [place] Level  set up required  -LM     Recorded by  [LM] IRASEMA Smith/L     Upper Body Dressing Assessment/Training    UB Dressing Assess/Train, Clothing Type  doffing:;donning:  -LM     UB Dressing Assess/Train, [place]  set up required  -LM     Recorded by  [LM] IRASEMA Smith/L     Lower Body Dressing Assessment/Training    LB Dressing Assess/Train, [place]  minimum assist (75% patient effort);moderate assist (50% patient effort)   reacher dressing stick in supine long sitting  -LM     Recorded by  [LM] ELY Smith     Grooming Assessment/Training    Grooming Assess/Train, [place] Level  independent  -LM     Recorded by  [LM] IRASEMA Smith/L     Balance Skills Training    Sitting-Balance Activities Forward lean;Reaching for objects;Reaching across midline  -RW      Recorded by [RW] Balaji Malhotra PTA      Therapy Exercises    Right Lower Extremity AROM:;ankle pumps/circles;20 reps;supine  -RW      Left Lower Extremity AROM:;heel slides;supine;20 reps  -RW      Bilateral Lower Extremities AROM:;20 reps;supine;glut sets;quad sets  -RW      Bilateral Upper Extremity   AROM:   UE bke x 15 min B UE only   -LM    BUE Resistance   manual resistance- minimal   ther ex with 3 lb wt elbowf elx   -LM    Recorded by [RW] Balaji Malhotra PTA  [LM] ELY Smith    Positioning and Restraints     Pre-Treatment Position in bed  -RW in bed  -LM sitting in chair/recliner  -LM    Post Treatment Position bsc  -RW bed  -LM bed  -LM    On BS commode call light within reach;encouraged to call for assist  -RW      Recorded by [RW] Balaji Malhotra PTA [LM] IRASEMA Smith/L [LM] IRASEMA Smith/FREEDOM      09/13/17 1012 09/12/17 1455 09/12/17 1028    Rehab Assessment/Intervention    Discipline physical therapy assistant  -RW occupational therapy assistant  -RC physical therapy assistant  -TA    Document Type therapy note (daily note)  -RW therapy note (daily note)  -RC therapy note (daily note)  -TA    Subjective Information agree to therapy  -RW agree to therapy  -RC agree to therapy  -TA    Patient Effort, Rehab Treatment good  -RW good  -RC good  -TA    Symptoms Noted Comment   BLE non weight bearing,knee immobilizer R LE  -TA    Precautions/Limitations non-weight bearing status   brace on at all times  -RW fall precautions;non-weight bearing status;brace on when up   brace on all time elevate r lle all time l le after up 3o mi  -RC fall precautions;non-weight bearing status   Non weight bearing B LE  -TA    Specific Treatment Considerations  --   in w/c only 2 hr at a time, keep r knee straght  -RC     Recorded by [RW] Balaji Malhorta PTA [RC] IRASEMA Herring/FREEDOM [TA] Geri Juan PTA    Vital Signs    Pre Systolic BP Rehab   146  -TA    Pre Treatment Diastolic BP   73  -TA    Post Systolic BP Rehab   147  -TA    Post Treatment Diastolic BP   70  -TA    Pretreatment Heart Rate (beats/min)   102  -TA    Intratreatment Heart Rate (beats/min)   96  -TA    Posttreatment Heart Rate (beats/min)   103  -TA    Pre SpO2 (%)   98  -TA    O2 Delivery Pre Treatment   room air  -TA    Intra SpO2 (%)   98  -TA    Post SpO2 (%)   97  -TA    Pre Patient Position   Supine  -TA    Post Patient Position   Supine  -TA    Recorded by   [TA] Geri Juan PTA    Pain Assessment    Pain Assessment   0-10  -TA    Pain  Score --   c/o occ pain in left flank  -RW 0  -RC 0  -TA    Post Pain Score  0  -RC 6  -TA    Pain Type   Surgical pain  -TA    Pain Location   Knee  -TA    Pain Orientation   Right  -TA    Recorded by [RW] Balaji Malhotra PTA [RC] Shruthi Jean-Baptiste, [place]/L [TA] Geri Juan PTA    Cognitive Assessment/Intervention    Current Cognitive/Communication Assessment functional  -RW  functional  -TA    Orientation Status oriented x 4  -RW  oriented x 4  -TA    Follows Commands/Answers Questions 100% of the time  -RW  100% of the time  -TA    Personal Safety   mild impairment  -TA    Personal Safety Interventions gait belt  -RW  fall prevention program maintained  -TA    Recorded by [RW] Balaji Malhotra PTA  [TA] Geri Juan PTA    Mobility Assessment/Training    Left Lower Extremity Weight-Bearing non weight-bearing  -RW      Right Lower Extremity Weight-Bearing non weight-bearing  -RW      Recorded by [RW] Balaji Malhotra PTA      Bed Mobility, Assessment/Treatment    Bed Mobility, Assistive Device bed rails;head of bed elevated  -RW  bed rails;head of bed elevated;overhead trapeze  -TA    Bed Mobility, Roll Left, [place]   minimum assist (75% patient effort);nonverbal cues required (demo/gesture)   x 3  -TA    Bed Mobility, Roll Right, [place]   minimum assist (75% patient effort);verbal cues required   x 3  -TA    Bed Mobility, Scoot/Bridge, [place]   contact guard assist  -TA    Bed Mob, Supine to Sit, [place] supervision required  -RW  minimum assist (75% patient effort)   for R LE x 2  -TA    Bed Mob, Sit to Supine, [place] supervision required  -RW  minimum assist (75% patient effort)    for  R LE x 2  -TA    Bed Mobility, Comment   pt Assisted to EOB x 2, pt's R  LE supported by PTA  -TA    Recorded by [RW] Balaji Malhotra PTA  [TA] Geri Juan PTA    Transfer Assessment/Treatment    Transfers, Bed-Chair [place] minimum assist (75% patient effort);contact guard assist  -RW       Transfers, Chair-Bed [place] minimum assist (75% patient effort);contact guard assist  -RW minimum assist (75% patient effort)  -RC     Transfers, Bed-Chair-Bed, Assist Device sliding board  -RW sliding board  -RC     Transfer, Comment practiced 2-3 times using leg lift strap o right leg  -RW      Recorded by [RW] Balaji Malhotra PTA [RC] Shruthi Jean-Baptiste, [place]/L     Gait Assessment/Treatment    Gait, [place] Level not appropriate to assess  -RW      Recorded by [RW] Balaji Malhotra PTA      Stairs Assessment/Treatment    Stairs, [place] Level not appropriate to assess  -RW      Recorded by [RW] Balaji Malhotra PTA      Wheelchair Training/Management    Wheelchair Transfer Type lateral transfer  -RW --   sliding board to l side  -RC     Wheelchair Task Training Comment  --   requires someont to hold r le and vc's  -     Wheelchair, Distance Propelled 150 ind  -  -RC     Recorded by [RW] Balaji Malhotra PTA [RC] Shruthi Jean-Baptiste, [place]/L     Therapy Exercises    Right Lower Extremity AROM:;15 reps;sitting;ankle pumps/circles;quad sets;glut sets  -RW  AROM:;20 reps;ankle pumps/circles;supine   2 sets  -TA    Left Lower Extremity AROM:;15 reps;sitting;glut sets;quad sets  -RW  AROM:;20 reps;quad sets;supine   2 sets  -TA    Bilateral Lower Extremities   AROM:;20 reps;supine;glut sets   2 sets  -TA    Bilateral Upper Extremity  AROM:;15 reps  -RC     BUE Resistance  theraband   rowing blue band 2 sets  -RC     Trunk Exercises   supine;10 reps   pull-ups  with trapeze bar  -TA    Recorded by [RW] Balaji Malhotra PTA [RC] Shruthi Jean-Baptiste, [place]/L [TA] Geri Juan PTA    Positioning and Restraints    Pre-Treatment Position in bed  -RW sitting in chair/recliner  -RC in bed  -TA    Post Treatment Position wheelchair  -RW bed  -RC bed  -TA    In Bed  call light within reach;encouraged to call for assist;with nsg  -RC supine;call light within reach;with family/caregiver  -TA    Recorded by [RW] Balaji  BERNABE Malhotra PTA [RC] IRASEMA Herring/FREEDOM [TA] Geri Juan PTA      09/11/17 1040          Rehab Assessment/Intervention    Discipline physical therapy assistant  -AM      Document Type therapy note (daily note)  -AM      Subjective Information agree to therapy;complains of;pain  -AM      Patient Effort, Rehab Treatment good  -AM      Symptoms Noted During/After Treatment none  -AM      Precautions/Limitations brace on when up;fall precautions;non-weight bearing status  -AM      Equipment Issued to Patient gait belt  -AM      Recorded by [AM] Jim Young PTA      Vital Signs    Pre Systolic BP Rehab 144  -AM      Pre Treatment Diastolic BP 78  -AM      Post Systolic BP Rehab 138  -AM      Post Treatment Diastolic BP 75  -AM      Pretreatment Heart Rate (beats/min) 106  -AM      Posttreatment Heart Rate (beats/min) 111  -AM      Pre SpO2 (%) 95  -AM      O2 Delivery Pre Treatment room air  -AM      Post SpO2 (%) 96  -AM      O2 Delivery Post Treatment room air  -AM      Pre Patient Position Sitting  -AM      Intra Patient Position Supine  -AM      Post Patient Position Sitting  -AM      Recorded by [AM] Jim Young PTA      Pain Assessment    Pain Assessment 0-10  -AM      Pain Score 6  -AM      Post Pain Score 4  -AM      Pain Type Acute pain;Surgical pain  -AM      Pain Location Knee  -AM      Pain Orientation Right  -AM      Pain Descriptors Sore  -AM      Pain Frequency Constant/continuous  -AM      Date Pain First Started 09/06/17  -AM      Clinical Progression Gradually improving  -AM      Patient's Stated Pain Goal No pain  -AM      Pain Intervention(s) Medication (See MAR);Repositioned  -AM      Result of Injury Yes  -AM      Work-Related Injury No  -AM      Multiple Pain Sites No  -AM      Recorded by [AM] Jim Young PTA      Cognitive Assessment/Intervention    Current Cognitive/Communication Assessment functional  -AM      Orientation Status oriented x 4  -AM      Follows  Commands/Answers Questions 100% of the time  -AM      Personal Safety Interventions gait belt;nonskid shoes/slippers when out of bed;supervised activity  -AM      Recorded by [AM] Jim Young PTA      ROM (Range of Motion)    General ROM lower extremity range of motion deficits identified  -AM      Recorded by [AM] Jim Young PTA      General LE Assessment    ROM knee, right: LE ROM deficit;ankle, left: LE ROM deficit  -AM      Recorded by [AM] Jim Young PTA      Mobility Assessment/Training    Extremity Weight-Bearing Status left lower extremity;right lower extremity  -AM      Left Lower Extremity Weight-Bearing non weight-bearing  -AM      Right Lower Extremity Weight-Bearing non weight-bearing  -AM      Recorded by [AM] Jim Young PTA      Bed Mobility, Assessment/Treatment    Bed Mobility, Assistive Device head of bed elevated  -AM      Bed Mobility, Roll Left, [place] not tested  -AM      Bed Mobility, Roll Right, [place] not tested  -AM      Bed Mobility, Scoot/Bridge, [place] not tested  -AM      Bed Mob, Supine to Sit, [place] contact guard assist  -AM      Bed Mob, Sit to Supine, [place] contact guard assist  -AM      Bed Mob, Sidelying to Sit, [place] not tested  -AM      Bed Mob, Sit to Sidelying, [place] not tested  -AM      Bed Mobility, Impairments ROM decreased;strength decreased;pain  -AM      Recorded by [AM] Jim Young PTA      Transfer Assessment/Treatment    Transfers, Bed-Chair [place] contact guard assist  -AM      Transfers, Chair-Bed [place] contact guard assist  -AM      Transfers, Bed-Chair-Bed, Assist Device other (see comments)   none  -AM      Transfers, Sit-Stand [place] not appropriate to assess  -AM      Transfers, Stand-Sit [place] not appropriate to assess  -AM      Toilet Transfer, [place] not tested  -AM      Walk-In Shower Transfer, [place] not tested  -AM      Bathtub Transfer,  [place] not tested  -AM      Transfer, Maintain Weight Bearing Status able to maintain weight bearing status  -AM      Transfer, Impairments ROM decreased;strength decreased;pain  -AM      Recorded by [AM] Jim Young PTA      Gait Assessment/Treatment    Gait, [place] Level not appropriate to assess  -AM      Recorded by [AM] Jim Young PTA      Stairs Assessment/Treatment    Stairs, [place] Level not appropriate to assess  -AM      Recorded by [AM] Jim Young PTA      Orthotics Prosthetics    Additional Documentation Orthosis Location (Group);Orthosis Management/Training (Group)  -AM      Recorded by [AM] Jim Young PTA      Orthosis Location    Orthosis Location/Type lower extremity  -AM      Orthosis, Lower Extremity Right:;knee immobilizer  -AM      Recorded by [AM] Jim Young PTA      Orthosis Management/Training    Orthosis Fabrication Detail Knee Immobilizer  -AM      Orthosis Indications restrict motion  -AM      Orthosis Wear Schedule wear full time  -AM      Recorded by [AM] Jim Young PTA      Positioning and Restraints    Pre-Treatment Position sitting in chair/recliner  -AM      Post Treatment Position wheelchair  -AM      In Wheelchair sitting;call light within reach;encouraged to call for assist;legs elevated  -AM      Recorded by [AM] Jim Young PTA        User Key  (r) = Recorded By, (t) = Taken By, (c) = Cosigned By    Initials Name Effective Dates    TA Geri Juan, [facility] 10/17/16 -     AM Jim Young, [facility] 10/17/16 -     RW Balaji Malhotra, [facility] 10/17/16 -     RC Shruthi Jean-Baptiste, [place]/L 10/17/16 -     LM Marce Carter, [place]/L 10/17/16 -                 OT Goals       09/13/17 1552 09/12/17 1455 09/12/17 0755    Bed Mobility OT LTG    Bed Mobility OT LTG, Date Established   09/12/17  -BH (r) SP (t) BH (c)    Bed Mobility OT LTG, Time to Achieve   by discharge  -BH (r) SP (t) BH (c)    Bed Mobility OT LTG, Activity Type   all bed  mobility  -BH (r) SP (t) BH (c)    Bed Mobility OT LTG, [place] Level   supervision required;minimum assist (75% patient effort)   AE/adaptive techniques- RLE in knee immobilizer and extended  -BH (r) SP (t) BH (c)    Bed Mobility OT LTG, Date Goal Reviewed 09/13/17  -LM 09/12/17  -     Bed Mobility OT LTG, Outcome goal ongoing  -LM goal ongoing  -RC     Transfer Training OT LTG    Transfer Training OT LTG, Date Established   09/12/17  -BH (r) SP (t) BH (c)    Transfer Training OT LTG, Time to Achieve   by discharge  -BH (r) SP (t) BH (c)    Transfer Training OT LTG, Activity Type   --   BSC, w/c  -BH (r) SP (t) BH (c)    Transfer Training OT LTG, [place] Level   minimum assist (75% patient effort);contact guard assist   RLE in knee immobilizer and extended at all times.  -BH (r) SP (t) BH (c)    Transfer Training OT LTG, Date Goal Reviewed 09/13/17  -LM 09/12/17  -     Transfer Training OT LTG, Outcome goal ongoing  -LM goal ongoing  -RC     Patient Education OT LTG    Patient Education OT LTG, Date Established   09/12/17  -BH (r) SP (t) BH (c)    Patient Education OT LTG, Time to Achieve   by discharge  -BH (r) SP (t) BH (c)    Patient Education OT LTG, Education Type   HEP;precautions per surgeon;positioning;posture/body mechanics;home safety;adaptive equipment mgmt  -BH (r) SP (t) BH (c)    Patient Education OT LTG, Education Understanding   independent;demonstrates adequately;verbalizes understanding  -BH (r) SP (t) BH (c)    Patient Education OT LTG, Date Goal Reviewed 09/13/17  -LM 09/12/17  -     Patient Education OT LTG Outcome goal ongoing  -LM goal ongoing  -RC     ADL OT LTG    ADL OT LTG, Date Established   09/12/17  -BH (r) SP (t) BH (c)    ADL OT LTG, Time to Achieve   by discharge  -BH (r) SP (t) BH (c)    ADL OT LTG, Activity Type   ADL skills  -BH (r) SP (t) BH (c)    ADL OT LTG, Additional Goal   set-up with self-feeding, grooming, UB dressing/bathing; min A with toileting; mod A  with LB dressing/bathing  -BH (r) SP (t) BH (c)    ADL OT LTG, Date Goal Reviewed 09/13/17  -LM 09/12/17  -RC     ADL OT LTG, Outcome goal ongoing  -LM goal ongoing  -RC       09/11/17 1609 09/11/17 1333       Transfer Training OT LTG    Transfer Training OT LTG, Date Established  09/11/17  -RB     Transfer Training OT LTG, Time to Achieve  by discharge  -RB     Transfer Training OT LTG, Activity Type  all transfers  -RB     Transfer Training OT LTG, [place] Level  supervision required;contact guard assist  -RB     Transfer Training OT LTG, Assist Device  other (see comments)   Sliding board if needed.  -RB     Transfer Training OT LTG, Date Goal Reviewed 09/04/17  -SG      Transfer Training OT LTG, Outcome goal not met  -SG      Transfer Training OT LTG, Reason Goal Not Met discharged from facility  -SG      Strength OT LTG    Strength Goal OT LTG, Date Established  09/11/17  -RB     Strength Goal OT LTG, Time to Achieve  by discharge  -RB     Strength Goal OT LTG, Measure to Achieve  2 sets of 10 reps all planes with 3-4 lb dumbells for B UE strength to increase independence with functional mobility/transfers.  -RB     Strength Goal OT LTG, Date Goal Reviewed 09/11/17  -SG      Strength Goal OT LTG, Outcome goal not met  -SG      Strength Goal OT LTG, Reason Goal Not Met discharged from facility  -SG      Dynamic Sitting Balance OT LTG    Dynamic Sitting Balance OT LTG, Date Established  09/11/17  -RB     Dynamic Sitting Balance OT LTG, Time to Achieve  by discharge  -RB     Dynamic Sitting Balance OT LTG, [place] Level  supervision required   10-15 minutes with functional activity.  -RB     Dynamic Sitting Balance OT LTG, Assist Device  bed rails  -RB     Dynamic Sitting Balance OT LTG, Date Goal Reviewed 09/11/17  -SG      Dynamic Sitting Balance OT LTG, Outcome goal not met  -SG      Dynamic Sitting Balance OT LTG, Reason Goal Not Met discharged from facility  -SG      ADL OT LTG    ADL OT LTG, Date  Established  09/11/17  -RB     ADL OT LTG, Time to Achieve  by discharge  -RB     ADL OT LTG, Activity Type  ADL skills   Sponge bath and dress.  -RB     ADL OT LTG, [place] Level  contact guard;min assist  -RB     ADL OT LTG, Date Goal Reviewed 09/11/17  -SG      ADL OT LTG, Outcome goal not met  -SG      ADL OT LTG, Reason Goal Not Met discharged from facility  -SG        User Key  (r) = Recorded By, (t) = Taken By, (c) = Cosigned By    Initials Name Provider Type    RB Axel Perez, OT Occupational Therapist    BH Isabel Jones, OTR/L Occupational Therapist    MARGE Jean-Baptiste, [place]/L Occupational Therapy Assistant    YOEL Carter, [place]/L Occupational Therapy Assistant    DANY Juárez, OT Student OT Student    SAHARA Rai, OT Occupational Therapist          Occupational Therapy Education     Title: PT OT SLP Therapies (Active)     Topic: Occupational Therapy (Active)     Point: ADL training (Done)    Description: Instruct learner(s) on proper safety adaptation and remediation techniques during self care or transfers.   Instruct in proper use of assistive devices.    Learning Progress Summary    Learner Readiness Method Response Comment Documented by Status   Patient Acceptance E VU Educated on OT and POC. Educated about adhering to weight bearing precautions. Educated about safety with ADL and bed mobility. SP 09/12/17 1159 Done               Point: Precautions (Done)    Description: Instruct learner(s) on prescribed precautions during self-care and functional transfers.    Learning Progress Summary    Learner Readiness Method Response Comment Documented by Status   Patient Acceptance E VU Educated on OT and POC. Educated about adhering to weight bearing precautions. Educated about safety with ADL and bed mobility. SP 09/12/17 1159 Done               Point: Body mechanics (Done)    Description: Instruct learner(s) on proper positioning and spine alignment during self-care, functional  mobility activities and/or exercises.    Learning Progress Summary    Learner Readiness Method Response Comment Documented by Status   Patient Acceptance E VU Educated on OT and POC. Educated about adhering to weight bearing precautions. Educated about safety with ADL and bed mobility. SP 09/12/17 1159 Done                      User Key     Initials Effective Dates Name Provider Type Discipline    SP 01/31/17 -  Kenya Juárez OT Student OT Student OT                  OT Recommendation and Plan  Anticipated Equipment Needs At Discharge: hospital bed  Anticipated Discharge Disposition: home with /7 care, home with home health  Planned Therapy Interventions: activity intolerance, adaptive equipment training, ADL retraining, bed mobility training, balance training, energy conservation, fine motor coordination training, home exercise program, motor coordination training, strengthening, transfer training  Therapy Frequency: other (see comments) (3-14x/wk)  Plan of Care Review  Plan Of Care Reviewed With: patient  Progress: improving  Outcome Summary/Follow up Plan: improving toward all goals         Outcome Measures       09/13/17 1100 09/12/17 1500 09/12/17 1355    How much help from another person do you currently need...    Turning from your back to your side while in flat bed without using bedrails? 3  -RW 3  -TA 3  -LM    Moving from lying on back to sitting on the side of a flat bed without bedrails? 3  -RW 3  -TA 3  -LM    Moving to and from a bed to a chair (including a wheelchair)? 3  -RW 3  -TA 3  -LM    Standing up from a chair using your arms (e.g., wheelchair, bedside chair)? 1  -RW 1  -TA 1  -LM    Climbing 3-5 steps with a railing? 1  -RW 1  -TA 1  -LM    To walk in hospital room? 1  -RW 1  -TA 1  -LM    AM-PAC 6 Clicks Score 12  -RW 12  -TA 12  -LM    Functional Assessment    Outcome Measure Options  AM-PAC 6 Clicks Basic Mobility (PT)  -TA AM-PAC 6 Clicks Basic Mobility (PT)  -LM      09/12/17 8892  09/11/17 1500 09/11/17 1040    How much help from another person do you currently need...    Turning from your back to your side while in flat bed without using bedrails?  3  -LM 3  -AM    Moving from lying on back to sitting on the side of a flat bed without bedrails?  3  -LM 3  -AM    Moving to and from a bed to a chair (including a wheelchair)?  3  -LM 3  -AM    Standing up from a chair using your arms (e.g., wheelchair, bedside chair)?  1  -LM 1  -AM    Climbing 3-5 steps with a railing?  1  -LM 1  -AM    To walk in hospital room?  1  -LM 1  -AM    AM-PAC 6 Clicks Score  12  -LM 12  -AM    How much help from another is currently needed...    Putting on and taking off regular lower body clothing? 1  -BH (r) SP (t) BH (c)      Bathing (including washing, rinsing, and drying) 2  -BH (r) SP (t) BH (c)      Toileting (which includes using toilet bed pan or urinal) 2  -BH (r) SP (t) BH (c)      Putting on and taking off regular upper body clothing 3  -BH (r) SP (t) BH (c)      Taking care of personal grooming (such as brushing teeth) 3  -BH (r) SP (t) BH (c)      Eating meals 4  -BH (r) SP (t) BH (c)      Score 15  -BH (r) SP (t)      Functional Assessment    Outcome Measure Options AM-PAC 6 Clicks Daily Activity (OT)  -BH (r) SP (t) BH (c) AM-PAC 6 Clicks Basic Mobility (PT)  -LM AM-PAC 6 Clicks Basic Mobility (PT)  -AM      09/11/17 0921          How much help from another is currently needed...    Putting on and taking off regular lower body clothing? 2  -RB      Bathing (including washing, rinsing, and drying) 2  -RB      Toileting (which includes using toilet bed pan or urinal) 2  -RB      Putting on and taking off regular upper body clothing 3  -RB      Taking care of personal grooming (such as brushing teeth) 3  -RB      Eating meals 4  -RB      Score 16  -RB      Functional Assessment    Outcome Measure Options AM-PAC 6 Clicks Daily Activity (OT)  -RB        User Key  (r) = Recorded By, (t) = Taken By, (c) =  Cosigned By    Initials Name Provider Type    LM Isela Chris, PT Physical Therapist    RB Axel Perez, OT Occupational Therapist    BH Isaebl Jones, OTR/L Occupational Therapist    TA Geri Juan, PTA Physical Therapy Assistant    AM Jim Young, PTA Physical Therapy Assistant    RW Balaji Malhotra, PTA Physical Therapy Assistant    SP Kenya Juárez, OT Student OT Student           Time Calculation:         Time Calculation- OT       09/13/17 1548 09/13/17 1531       Time Calculation- OT    OT Start Time 1305  -LM 1100  -LM     OT Stop Time 1358  -LM 1200  -LM     OT Time Calculation (min) 53 min  -LM 60 min  -LM     Total Timed Code Minutes- OT 53 minute(s)  -LM 60 minute(s)  -LM     OT Received On 09/13/17  -LM 09/13/17  -LM       User Key  (r) = Recorded By, (t) = Taken By, (c) = Cosigned By    Initials Name Provider Type     Marce Carter VILLALPANDO/L Occupational Therapy Assistant           Therapy Charges for Today     Code Description Service Date Service Provider Modifiers Qty    88723916518 HC OT THERAPEUTIC ACT EA 15 MIN 9/13/2017 NAV SmithA/L GO 2    74564389029 HC OT THER PROC EA 15 MIN 9/13/2017 NAV SmithA/L GO 2          OT G-codes  OT Professional Judgement Used?: Yes  OT Functional Scales Options: AM-PAC 6 Clicks Daily Activity (OT)  Score: 15  Functional Limitation: Self care  Self Care Current Status (): At least 40 percent but less than 60 percent impaired, limited or restricted  Self Care Goal Status (): At least 20 percent but less than 40 percent impaired, limited or restricted    IRASEMA Smith/FREEDOM  9/13/2017

## 2017-09-13 NOTE — PLAN OF CARE
Problem: Patient Care Overview (Adult)  Goal: Plan of Care Review  Outcome: Ongoing (interventions implemented as appropriate)    09/13/17 1005   Coping/Psychosocial Response Interventions   Plan Of Care Reviewed With patient   Patient Care Overview   Progress progress toward functional goals as expected   Outcome Evaluation   Outcome Summary/Follow up Plan ST evaluation performed. 42/50 on BCAT with deficits for short term memory but improved with occasional or minimal cues. is not appropriate for intervention at this time.

## 2017-09-13 NOTE — PROGRESS NOTES
"Anticoagulation by Pharmacy - Warfarin 6 of 28    Vangie Lopez is a 67 y.o.male  [Ht: 69\" (175.3 cm); Wt:  ] on Warfarin 4 mg PO  for indication of VTE prophylaxis s/p ortho procedure.    Goal INR: 1.7-2.5  Today's INR:   Lab Results   Component Value Date    INR 2.33 (H) 09/13/2017         Lab Results   Component Value Date    INR 2.33 (H) 09/13/2017    INR 2.01 (H) 09/12/2017    INR 1.64 (H) 09/11/2017    PROTIME 25.8 (H) 09/13/2017    PROTIME 22.9 (H) 09/12/2017    PROTIME 19.5 (H) 09/11/2017     Lab Results   Component Value Date    HGB 12.0 (L) 09/12/2017    HGB 13.2 (L) 09/10/2017    HGB 13.2 (L) 09/10/2017     Lab Results   Component Value Date    HCT 33.4 (L) 09/12/2017    HCT 37.0 (L) 09/10/2017    HCT 37.0 (L) 09/10/2017     Lab Results   Component Value Date     09/12/2017     09/10/2017     (L) 09/08/2017     Assessment/Plan:  Reviewed above labs. INR is 2.33.  INR is at goal. No changes in medication list. Concurrent medications include none. Pt did receive dose of warfarin last night.  Will continue current dose of  4 mg. Rx will continue to follow and adjust dose accordingly.  Today is day 6 of therapy.  .    Sagrario Nair [facility]  09/13/17 10:27 AM     "

## 2017-09-14 LAB
GLUCOSE BLDC GLUCOMTR-MCNC: 121 MG/DL (ref 70–130)
GLUCOSE BLDC GLUCOMTR-MCNC: 148 MG/DL (ref 70–130)
GLUCOSE BLDC GLUCOMTR-MCNC: 187 MG/DL (ref 70–130)
GLUCOSE BLDC GLUCOMTR-MCNC: 199 MG/DL (ref 70–130)
GLUCOSE BLDC GLUCOMTR-MCNC: 211 MG/DL (ref 70–130)
INR PPP: 2.45 (ref 0.8–1.2)
PROTHROMBIN TIME: 26.8 SECONDS (ref 11.1–15.3)

## 2017-09-14 PROCEDURE — 97110 THERAPEUTIC EXERCISES: CPT

## 2017-09-14 PROCEDURE — 97530 THERAPEUTIC ACTIVITIES: CPT

## 2017-09-14 PROCEDURE — 85610 PROTHROMBIN TIME: CPT | Performed by: FAMILY MEDICINE

## 2017-09-14 PROCEDURE — 97535 SELF CARE MNGMENT TRAINING: CPT

## 2017-09-14 PROCEDURE — 99232 SBSQ HOSP IP/OBS MODERATE 35: CPT | Performed by: FAMILY MEDICINE

## 2017-09-14 PROCEDURE — 82962 GLUCOSE BLOOD TEST: CPT

## 2017-09-14 RX ORDER — METFORMIN HYDROCHLORIDE 500 MG/1
500 TABLET, EXTENDED RELEASE ORAL
Status: DISCONTINUED | OUTPATIENT
Start: 2017-09-14 | End: 2017-09-18

## 2017-09-14 RX ORDER — WARFARIN SODIUM 3 MG/1
3 TABLET ORAL
Status: DISCONTINUED | OUTPATIENT
Start: 2017-09-14 | End: 2017-09-20 | Stop reason: DRUGHIGH

## 2017-09-14 RX ADMIN — HYDROCODONE BITARTRATE AND ACETAMINOPHEN 1 TABLET: 10; 325 TABLET ORAL at 12:23

## 2017-09-14 RX ADMIN — HYDROCODONE BITARTRATE AND ACETAMINOPHEN 1 TABLET: 10; 325 TABLET ORAL at 08:24

## 2017-09-14 RX ADMIN — POTASSIUM CITRATE 10 MEQ: 10 TABLET ORAL at 17:58

## 2017-09-14 RX ADMIN — MAGNESIUM HYDROXIDE 10 ML: 2400 SUSPENSION ORAL at 08:21

## 2017-09-14 RX ADMIN — Medication 1 TABLET: at 08:21

## 2017-09-14 RX ADMIN — POTASSIUM CITRATE 10 MEQ: 10 TABLET ORAL at 08:21

## 2017-09-14 RX ADMIN — HYDROCHLOROTHIAZIDE 25 MG: 25 TABLET ORAL at 08:21

## 2017-09-14 RX ADMIN — SENNOSIDES AND DOCUSATE SODIUM 1 TABLET: 8.6; 5 TABLET ORAL at 17:58

## 2017-09-14 RX ADMIN — HYDROCODONE BITARTRATE AND ACETAMINOPHEN 1 TABLET: 10; 325 TABLET ORAL at 17:58

## 2017-09-14 RX ADMIN — METFORMIN HYDROCHLORIDE 500 MG: 500 TABLET, EXTENDED RELEASE ORAL at 17:58

## 2017-09-14 RX ADMIN — ATORVASTATIN CALCIUM 10 MG: 10 TABLET, FILM COATED ORAL at 08:21

## 2017-09-14 RX ADMIN — FERROUS SULFATE TAB EC 324 MG (65 MG FE EQUIVALENT) 324 MG: 324 (65 FE) TABLET DELAYED RESPONSE at 08:21

## 2017-09-14 RX ADMIN — Medication 25 MG: at 20:16

## 2017-09-14 RX ADMIN — FAMOTIDINE 40 MG: 40 TABLET ORAL at 08:21

## 2017-09-14 RX ADMIN — FINASTERIDE 5 MG: 5 TABLET, FILM COATED ORAL at 20:16

## 2017-09-14 RX ADMIN — SENNOSIDES AND DOCUSATE SODIUM 1 TABLET: 8.6; 5 TABLET ORAL at 08:21

## 2017-09-14 RX ADMIN — WARFARIN SODIUM 3 MG: 3 TABLET ORAL at 17:58

## 2017-09-14 NOTE — PROGRESS NOTES
LOS: 3 days   Patient Care Team:  Renan Rodríguez MD as PCP - General (Family Medicine)    Chief Complaint:   Multiple trauma          Interval History:     SUBJECTIVE:  Feels good today working with therapy no nausea no vomiting  History taken from: patient chart RN Therapy staff    Objective     Vital Signs  Temp:  [96.3 °F (35.7 °C)-97.1 °F (36.2 °C)] 97.1 °F (36.2 °C)  Heart Rate:  [] 104  Resp:  [18] 18  BP: (136-142)/(70-85) 136/85  There were no vitals filed for this visit.      Physical Exam:     General Appearance:    Alert, cooperative, in no acute distress   Head:    Normocephalic, without obvious abnormality, atraumatic   Eyes:            Lids and lashes normal, conjunctivae and sclerae normal, no   icterus, no pallor, corneas clear, PERRLA   Throat:   No oral lesions, no thrush, oral mucosa moist   Neck:   No adenopathy, supple, trachea midline, no thyromegaly, no   carotid bruit, no JVD       Lungs:     Clear to auscultation,respirations regular, even and                  Unlabored     Heart:    Regular rhythm and normal rate, normal S1 and S2, no            murmur, no gallop, no rub, no click    Chest Wall:    No abnormalities observed   Abdomen:     Normal bowel sounds, no masses, no organomegaly, soft        non-tender, non-distended, no guarding, no rebound                Tenderness    Extremities:   Moves Extremities.  Immobilizer on the right knee        Skin:   No bleeding, bruising or rash   Lymph nodes:   No palpable adenopathy   Neurologic:   Cranial nerves 2 - 12 grossly intact, sensation intact, DTR       present and equal bilaterally          RESULTS REVIEW:     Lab Results (last 24 hours)     Procedure Component Value Units Date/Time    POC Glucose Fingerstick [[id number]]  (Abnormal) Collected:  09/13/17 1658    Specimen:  Blood Updated:  09/13/17 1715     Glucose 161 (H) mg/dL       Meter: [id number]: [id number] LAINE MILLER       POC Glucose Fingerstick [[id number]]   (Abnormal) Collected:  09/13/17 2125    Specimen:  Blood Updated:  09/14/17 0104     Glucose 211 (H) mg/dL       Meter: [id number]: [id number] TOY MURRIETA       POC Glucose Fingerstick [[id number]]  (Abnormal) Collected:  09/14/17 0524    Specimen:  Blood Updated:  09/14/17 0537     Glucose 187 (H) mg/dL       RN NotifiedMeter: [id number]: [id number] TOY MURRIETA       Protime-INR [[id number]]  (Abnormal) Collected:  09/14/17 0523    Specimen:  Blood Updated:  09/14/17 0650     Protime 26.8 (H) Seconds      INR 2.45 (H)    Narrative:       Therapeutic range for most indications is 2.0-3.0 INR,  or 2.5-3.5 for mechanical heart valves.    POC Glucose Fingerstick [[id number]]  (Normal) Collected:  09/14/17 1204    Specimen:  Blood Updated:  09/14/17 1242     Glucose 121 mg/dL       RN NotifiedMeter: [id number]: [id number] MICHELLE MEYER           Imaging Results (last 72 hours)     ** No results found for the last 72 hours. **          Assessment/Plan     Principal Problem:    Multiple trauma  Active Problems:    Closed displaced comminuted fracture of right patella    Closed displaced fracture of navicular bone of left foot    Osteoarthritis of hands, bilateral    BPH (benign prostatic hyperplasia)    Glaucoma    Encounter for rehabilitation    Essential hypertension    Mixed hyperlipidemia    History of kidney stones  Hyperglycemia      He is doing very well with therapy and progressing rapidly.  He will not be ambulatory at discharge but he is doing well.  I discussed his fingersticks with him his sugar was as high as 293 yesterday and I think he needs to be on metformin and discussed this with him.  He asked that I call his wife called but not received an answer and I will call again  The reason to be concerned about his sugar is that his incisions or may not heal well with his sugar as high as it has been this was explained to him as well    He is adequately anticoagulated  We'll continue  intensive therapy and will discuss with him and his wife about his sugar and keep a close monitor discussed diabetic diet with him and we'll try to get his sugar down        Alec Stephen MD  09/14/17  1:48 PM          I did discuss his diabetes with his wife and she is agreeable to him starting metformin and the patient is as well.  She did request a ramp and we will order that and see if [facility] will [facility] it as she requests

## 2017-09-14 NOTE — PLAN OF CARE
Problem: Patient Care Overview (Adult)  Goal: Plan of Care Review  Outcome: Ongoing (interventions implemented as appropriate)    09/14/17 0342   Coping/Psychosocial Response Interventions   Plan Of Care Reviewed With patient   Patient Care Overview   Progress no change   Outcome Evaluation   Outcome Summary/Follow up Plan pt resting well this shift         Problem: Orthopaedic Fracture (Adult)  Goal: Signs and Symptoms of Listed Potential Problems Will be Absent or Manageable (Orthopaedic Fracture)  Outcome: Ongoing (interventions implemented as appropriate)    09/14/17 0342   Orthopaedic Fracture   Problems Assessed (Orthopaedic Fracture) all   Problems Present (Orthopaedic Fracture) functional deficit/ self-care deficit;pain         Problem: Fall Risk (Adult)  Goal: Absence of Falls    09/14/17 0342   Fall Risk (Adult)   Absence of Falls achieves outcome

## 2017-09-14 NOTE — ADDENDUM NOTE
Addendum  created 09/14/17 1350 by Seth Sykes MD    Anesthesia Intra Blocks edited, Sign clinical note

## 2017-09-14 NOTE — PROGRESS NOTES
"Anticoagulation by Pharmacy - Warfarin day 7 of 28    Vangie Lopez is a 67 y.o.male  [Ht: 69\" (175.3 cm); Wt:  ] on Warfarin 4 mg PO  for indication of indication of VTE prophylaxis s/p ortho procedure.    Goal INR: 1.7-2.5  Today's INR:   Lab Results   Component Value Date    INR 2.45 (H) 09/14/2017         Lab Results   Component Value Date    INR 2.45 (H) 09/14/2017    INR 2.33 (H) 09/13/2017    INR 2.01 (H) 09/12/2017    PROTIME 26.8 (H) 09/14/2017    PROTIME 25.8 (H) 09/13/2017    PROTIME 22.9 (H) 09/12/2017     Lab Results   Component Value Date    HGB 12.0 (L) 09/12/2017    HGB 13.2 (L) 09/10/2017    HGB 13.2 (L) 09/10/2017     Lab Results   Component Value Date    HCT 33.4 (L) 09/12/2017    HCT 37.0 (L) 09/10/2017    HCT 37.0 (L) 09/10/2017     Lab Results   Component Value Date     09/12/2017     09/10/2017     (L) 09/08/2017     Assessment/Plan:  Reviewed above labs. INR is 2.45.  INR is at goal. No changes in medication list.  Pt did receive dose of warfarin last night.  Will decrease current dose to 3 mg to maintain goal. Rx will continue to follow and adjust dose accordingly.  Today is day 7 of therapy.      [facility]ario Nair [facility]  09/14/17 11:44 AM     "

## 2017-09-14 NOTE — DISCHARGE PLACEMENT REQUEST
"Lia Lopez (67 y.o. Male)     Date of Birth Social Security Number Address Home Phone MRN    1950  [address] [place] [address] [phone] [id number]    Restoration Marital Status          Voodoo        Admission Date Admission Type Admitting Provider Attending Provider Department, Room/Bed    9/11/17 Elective Alec Stephen MD Hargrove, Kenneth R, MD [facility] ACUTE REHAB, 534/1    Discharge Date Discharge Disposition Discharge Destination                      Attending Provider: Alec Stephen MD     Allergies:  No Known Allergies    Isolation:  None   Infection:  None   Code Status:  FULL    Ht:  69\" (175.3 cm)   Wt:  182 lb (82.6 kg)    Admission Cmt:  None   Principal Problem:  Multiple trauma [T07]                 Active Insurance as of 9/11/2017     Primary Coverage     Payor Plan Insurance Group Employer/Plan Group    MEDICARE MEDICARE A & B      Payor Plan Address Payor Plan Phone Number Effective From Effective To     [id number] [phone] 6/1/2016     [place] [address]       Subscriber Name Subscriber Birth Date Member ID       LIA LOPEZ 1950 [id number]           Secondary Coverage     Payor Plan Insurance Group Employer/Plan Group    [employer] [place] [id number]     Payor Plan Address Payor Plan Phone Number Effective From Effective To    [facility]  [phone] 9/6/2017     [facility] [place] [address]       Subscriber Name Subscriber Birth Date Member ID       LIA LOPEZ 1950 [id number]                 Emergency Contacts      (Rel.) Home Phone Work Phone Mobile Phone    Sun Lopez (Spouse) [phone] -- [phone]            Emergency Contact Information     Name Relation Home Work Mobile    Sun Lopez Spouse [phone]          Insurance Information                MEDICARE/MEDICARE A & B Phone: [phone]    " Subscriber: Vangie Lopez Subscriber#: [id number]    Group#:  Precert#:         [employer] [employer] Phone: [phone]    Subscriber: Vangie Lopez Subscriber#: [id number]    Group#: [id number] Precert#:              History & Physical      Alec Stephen MD at 9/11/2017  2:25 PM           [address] [address]  T - [phone]     H/P NOTE         SUBJECTIVE:   Patient Care Team:  Renan Rodríguez MD as PCP - General (Family Medicine)    Chief Complaint:    Multiple trauma of the bilateral lower extremities    Subjective     Patient is 67 y.o. male presents with trauma to both lower extremities.  He was working on his roof on September 6 as he was coming off of the roof he fell off of the latter to the ground.  He sustained an navicular fracture dislocation of the left foot and a comminuted fracture of the right patella.  He was evaluated in the emergency room and it was felt that his foot was at risk for transformation to an open fracture and he underwent closed reduction with pinning per Dr. Rivera of podiatry.  He was admitted to the hospital followed by Dr. Rivera.,  Dr. Rutherford orthopedics and the hospitalist service.  On 9/8/17 he underwent open reduction internal fixation of the patella by Dr. Rutherford with pinning and closure.  His workup otherwise was unremarkable and he's done fairly well.  We have been asked to admit him to the acute rehabilitation unit for intensive rehabilitation at this time he is nonweightbearing on both lower extremities but he does plan to return home and needs to be able to transfer and do activities of daily living.  He'll not be ambulatory until he is weightbearing. .      ROS/HISTORY/ CURRENT MEDICATIONS/OBJECTIVE/VS/PE:       History:     Past Medical History:   Diagnosis Date   • BPH (benign prostatic hyperplasia)    • Cancer     skin cancer   • Closed fracture of navicular bone with dislocation of  perilunate joint of left wrist 09/06/2017    closed reduction pinning  9/7/17   • Essential hypertension 9/8/2017   • Fracture of patella, right, closed 09/06/2017    orif 9/8/17   • Glaucoma     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   • Hyperlipidemia    • Multiple trauma 09/06/2017   • Osteoarthritis of hands, bilateral     retired due to arthritis,    He's had glaucoma and he is blind in his right eye he retired due to osteoarthritis in his hands.  He does have a history of kidney stones and been removed at times as well      Past Surgical History:   Procedure Laterality Date   • COLONOSCOPY  12/08/2006   • COLONOSCOPY N/A 4/3/2017    Procedure: COLONOSCOPY;  Surgeon: Michael Ang MD;  Location: [facility] ENDOSCOPY;  Service:    • CYSTOSCOPY     • EXTERNAL FIXATION ANKLE FRACTURE Left 9/6/2017    Procedure: ANKLE EXTERNAL FIXATOR APPLICATION;  Surgeon: Simone Rivera DPM;  Location: [facility] OR;  Service:    • KNEE SURGERY     • PATELLA OPEN REDUCTION INTERNAL FIXATION Right 9/8/2017    Procedure: OPEN REDUCTION INTERNAL FIXATION RIGHT PATELLA       (C-ARM#3);  Surgeon: Checo Rutherford MD;  Location: [facility] OR;  Service:    • SCAPHOID FRACTURE SURGERY Left 09/07/2017    fracture dislocation left foot, closed reduction with pinning   • SHOULDER SURGERY       Family History   Problem Relation Age of Onset   • Alzheimer's disease Mother    • Heart attack Father      Social History   Substance Use Topics   • Smoking status: Never Smoker   • Smokeless tobacco: None   • Alcohol use Yes      Comment: social drinker/rarely uses alcohol     He lives in a house with his wife.  There are 2 steps to enter the house he does not have to go up steps in the home.  Prior to this he was independent did not use any assistive device.    Prescriptions Prior to Admission   Medication Sig Dispense Refill Last Dose   • finasteride (PROSCAR) 5 MG tablet Take 5 mg by mouth Every Night.   9/11/2017 at 2100   • hydrochlorothiazide (HYDRODIURIL) 25 MG tablet  Take 12.5 mg by mouth Daily. Pt takes 1/2 (25 mg) tablet = 12.5 mg per dose.   9/11/2017 at 0700   • potassium citrate (UROCIT-K) 10 MEQ (1080 MG) CR tablet Take 10 mEq by mouth 2 (Two) Times a Day With Meals.   9/11/2017 at 0700   • pravastatin (PRAVACHOL) 40 MG tablet Take 20 mg by mouth Every Night. Pt takes 1/2 (40 mg) tablet = 20 mg per dose.   9/11/2017 at 2100   • sildenafil (VIAGRA) 100 MG tablet Take 100 mg by mouth Daily As Needed for erectile dysfunction.   9/11/2017 at Unknown time     Allergies:  Review of patient's allergies indicates no known allergies.    Current Medications:     Current Facility-Administered Medications:   •  acetaminophen (TYLENOL) tablet 650 mg, 650 mg, Oral, Q6H PRN, Alec Stephen MD  •  [START ON 9/12/2017] atorvastatin (LIPITOR) tablet 10 mg, 10 mg, Oral, Daily, Alec Stephen MD  •  bisacodyl (DULCOLAX) suppository 10 mg, 10 mg, Rectal, Daily PRN, Alec Stephen MD  •  calcium carbonate (TUMS) chewable tablet 500 mg (200 mg elemental), 2 tablet, Oral, BID PRN, Alec Stephen MD  •  [START ON 9/12/2017] enoxaparin (LOVENOX) syringe 40 mg, 40 mg, Subcutaneous, Q24H, Alec Stephen MD  •  [START ON 9/12/2017] famotidine (PEPCID) tablet 40 mg, 40 mg, Oral, Daily, Alec Stephen MD  •  finasteride (PROSCAR) tablet 5 mg, 5 mg, Oral, Daily, Alec Stephen MD  •  [START ON 9/12/2017] hydrochlorothiazide (HYDRODIURIL) tablet 25 mg, 25 mg, Oral, Daily, Alec Stephen MD  •  HYDROcodone-acetaminophen (NORCO)  MG per tablet 1 tablet, 1 tablet, Oral, Q4H PRN, Alec Stephen MD  •  magnesium hydroxide (MILK OF MAGNESIA) suspension 2400 mg/10mL 10 mL, 10 mL, Oral, BID PRN, Alec Stephen MD  •  ondansetron (ZOFRAN) tablet 4 mg, 4 mg, Oral, Q6H PRN, Alec Stephen MD  •  Pharmacy to dose warfarin, , Does not apply, Continuous PRN, Alec Stephen MD  •  polyethylene glycol (MIRALAX) powder 17 g, 17 g, Oral, Daily, Alec CASTILLO  MD Zafar  •  potassium citrate (UROCIT-K) CR tablet 10 mEq, 10 mEq, Oral, BID With Meals, Alec Stephen MD  •  sennosides-docusate sodium (SENOKOT-S) 8.6-50 MG tablet 1 tablet, 1 tablet, Oral, BID, Alec Stephen MD  •  sodium chloride 0.9 % flush 1-10 mL, 1-10 mL, Intravenous, PRN, Alec Stephen MD  •  warfarin (COUMADIN) tablet 4 mg, 4 mg, Oral, Daily, Alec Stephen MD      REVIEW OF SYSTEMS:  Review of Systems   Constitutional: Positive for activity change. Negative for chills, fatigue and fever.        Activity changed at the time of his accident because of inability to ambulate   HENT: Negative.    Eyes: Positive for visual disturbance.        He can see only in the right eye because of history of glaucoma and this has not changed recently   Respiratory: Negative.    Cardiovascular: Negative.    Gastrointestinal: Negative.    Endocrine: Negative.    Genitourinary: Positive for frequency. Negative for dysuria, enuresis and flank pain.        He does use Viagra for erectile dysfunction  He has had kidney stones removed in the past   Musculoskeletal: Positive for arthralgias and gait problem.        Prior to his injury he had complaints of pain in his hands from time to time his back  Since his injury he's had some pain in his wrist bilaterally in the fall   Skin: Negative.    Allergic/Immunologic: Negative.    Neurological: Negative for dizziness, seizures, syncope, facial asymmetry, speech difficulty, weakness, light-headedness, numbness and headaches.   Hematological: Negative.    Psychiatric/Behavioral: Negative.        Objective     Physical Exam:   Temp:  [97.8 °F (36.6 °C)-99.6 °F (37.6 °C)] 97.8 °F (36.6 °C)  Heart Rate:  [] 108  Resp:  [18] 18  BP: (117-142)/(56-76) 136/66    Physical Exam:    Physical Exam   Constitutional: He is oriented to person, place, and time. He appears well-developed and well-nourished. No distress.   HENT:   Head: Normocephalic.   Nose: Nose  normal.   Mouth/Throat: Oropharynx is clear and moist. No oropharyngeal exudate.   He does have abrasions in the anterior scalp eschar present   Eyes: Conjunctivae and EOM are normal. Pupils are equal, round, and reactive to light. Right eye exhibits no discharge. Left eye exhibits no discharge. No scleral icterus.   Neck: Normal range of motion. Neck supple. No thyromegaly present.   Cardiovascular: Normal rate and regular rhythm.  Exam reveals no gallop and no friction rub.    No murmur heard.  Pulmonary/Chest: Effort normal and breath sounds normal. No respiratory distress. He has no wheezes. He has no rales. He exhibits no tenderness.   Abdominal: Soft. Bowel sounds are normal. He exhibits no distension.   Musculoskeletal: He exhibits tenderness. He exhibits no edema.   He has a knee immobilizer and dressing on the right leg down to the foot  On the left leg he has thick dressing and posterior splint present above the knee to the toes  No swelling present at the toes   Neurological: He is alert and oriented to person, place, and time. He displays normal reflexes. No cranial nerve deficit. Coordination normal.   Skin: Skin is warm and dry. He is not diaphoretic.   Multiple abrasions noted at the wrist at the left elbow and the scalp anteriorly    Lower extremities are enlarged dressings and these were not removed they been changed today   Psychiatric: He has a normal mood and affect. His behavior is normal. Judgment and thought content normal.   Nursing note and vitals reviewed.           Results Review:      INR today 1.64  Sodium 136 potassium 3.7 BUN 15 creatinine 0.72  It is noted that his fingerstick was 159 this morning 139 yesterday  Hemoglobin 13.2 hematocrit 37.4                  I did review the x-ray films and fluoroscopy during surgery    I reviewed the patient's  clinical results.  I reviewed the patient's  imaging results and agree with the interpretation.   I reviewed the therapy notes    ASSESSMENT/PLAN:   Assessment/Plan   Principal Problem:    Multiple trauma  Active Problems:    Closed displaced comminuted fracture of right patella    Closed displaced fracture of navicular bone of left foot    Osteoarthritis of hands, bilateral    BPH (benign prostatic hyperplasia)    Glaucoma    Essential hypertension    Mixed hyperlipidemia    Encounter for rehabilitation    He does have hyperglycemia as well    He is to be admitted to the acute rehabilitation unit for intensive rehabilitation.  At this time he is not ambulatory until weightbearing restrictions are decreased.  During his stay we will follow his anticoagulation and also recheck sugar with a hemoglobin A1c tomorrow.  It is expected that he'll participate 3 hours a day make measurable improvement and be able to return home at discharge      I discussed the patients findings and my recommendations with patient.      Alec Stephen MD  09/11/17  2:26 PM        Electronically signed by Alec Stephen MD at 9/11/2017  2:48 PM           Physician Progress Notes (last 24 hours) (Notes from 9/13/2017  3:47 PM through 9/14/2017  3:47 PM)      Alec Stephen MD at 9/14/2017  1:48 PM  Version 2 of 2              LOS: 3 days   Patient Care Team:  Renan Rodríguez MD as PCP - General (Family Medicine)    Chief Complaint:   Multiple trauma          Interval History:     SUBJECTIVE:  Feels good today working with therapy no nausea no vomiting  History taken from: patient chart RN Therapy staff    Objective     Vital Signs  Temp:  [96.3 °F (35.7 °C)-97.1 °F (36.2 °C)] 97.1 °F (36.2 °C)  Heart Rate:  [] 104  Resp:  [18] 18  BP: (136-142)/(70-85) 136/85  There were no vitals filed for this visit.      Physical Exam:     General Appearance:    Alert, cooperative, in no acute distress   Head:    Normocephalic, without obvious abnormality, atraumatic   Eyes:            Lids and lashes normal, conjunctivae and sclerae normal, no   icterus, no  pallor, corneas clear, PERRLA   Throat:   No oral lesions, no thrush, oral mucosa moist   Neck:   No adenopathy, supple, trachea midline, no thyromegaly, no   carotid bruit, no JVD       Lungs:     Clear to auscultation,respirations regular, even and                  Unlabored     Heart:    Regular rhythm and normal rate, normal S1 and S2, no            murmur, no gallop, no rub, no click    Chest Wall:    No abnormalities observed   Abdomen:     Normal bowel sounds, no masses, no organomegaly, soft        non-tender, non-distended, no guarding, no rebound                Tenderness    Extremities:   Moves Extremities.  Immobilizer on the right knee        Skin:   No bleeding, bruising or rash   Lymph nodes:   No palpable adenopathy   Neurologic:   Cranial nerves 2 - 12 grossly intact, sensation intact, DTR       present and equal bilaterally          RESULTS REVIEW:     Lab Results (last 24 hours)     Procedure Component Value Units Date/Time    POC Glucose Fingerstick [[id number]]  (Abnormal) Collected:  09/13/17 1658    Specimen:  Blood Updated:  09/13/17 1715     Glucose 161 (H) mg/dL       Meter: [id number]: [id number] LAINE MILLER       POC Glucose Fingerstick [[id number]]  (Abnormal) Collected:  09/13/17 2125    Specimen:  Blood Updated:  09/14/17 0104     Glucose 211 (H) mg/dL       Meter: [id number]: [id number] TOY MURRIETA       POC Glucose Fingerstick [[id number]]  (Abnormal) Collected:  09/14/17 0524    Specimen:  Blood Updated:  09/14/17 0537     Glucose 187 (H) mg/dL       RN NotifiedMeter: [id number]: [id number] TOY MURRIETA       Protime-INR [[id number]]  (Abnormal) Collected:  09/14/17 0523    Specimen:  Blood Updated:  09/14/17 0650     Protime 26.8 (H) Seconds      INR 2.45 (H)    Narrative:       Therapeutic range for most indications is 2.0-3.0 INR,  or 2.5-3.5 for mechanical heart valves.    POC Glucose Fingerstick [[id number]]  (Normal) Collected:  09/14/17 1204    Specimen:   Blood Updated:  09/14/17 1242     Glucose 121 mg/dL       RN NotifiedMeter: [id number]: [id number] MICHELLE MEYER           Imaging Results (last 72 hours)     ** No results found for the last 72 hours. **          Assessment/Plan     Principal Problem:    Multiple trauma  Active Problems:    Closed displaced comminuted fracture of right patella    Closed displaced fracture of navicular bone of left foot    Osteoarthritis of hands, bilateral    BPH (benign prostatic hyperplasia)    Glaucoma    Encounter for rehabilitation    Essential hypertension    Mixed hyperlipidemia    History of kidney stones  Hyperglycemia      He is doing very well with therapy and progressing rapidly.  He will not be ambulatory at discharge but he is doing well.  I discussed his fingersticks with him his sugar was as high as 293 yesterday and I think he needs to be on metformin and discussed this with him.  He asked that I call his wife called but not received an answer and I will call again  The reason to be concerned about his sugar is that his incisions or may not heal well with his sugar as high as it has been this was explained to him as well    He is adequately anticoagulated  We'll continue intensive therapy and will discuss with him and his wife about his sugar and keep a close monitor discussed diabetic diet with him and we'll try to get his sugar down        Alec Stephen MD  09/14/17  1:48 PM          I did discuss his diabetes with his wife and she is agreeable to him starting metformin and the patient is as well.  She did request a ramp and we will order that and [facility] as she requests     Electronically signed by Alec Stephen MD at 9/14/2017  3:14 PM      Alec Stephen MD at 9/14/2017  1:48 PM  Version 1 of 2              LOS: 3 days   Patient Care Team:  Renan Rodríguez MD as PCP - General (Family Medicine)    Chief Complaint:   Multiple trauma          Interval History:      SUBJECTIVE:  Feels good today working with therapy no nausea no vomiting  History taken from: patient chart RN Therapy staff    Objective     Vital Signs  Temp:  [96.3 °F (35.7 °C)-97.1 °F (36.2 °C)] 97.1 °F (36.2 °C)  Heart Rate:  [] 104  Resp:  [18] 18  BP: (136-142)/(70-85) 136/85  There were no vitals filed for this visit.      Physical Exam:     General Appearance:    Alert, cooperative, in no acute distress   Head:    Normocephalic, without obvious abnormality, atraumatic   Eyes:            Lids and lashes normal, conjunctivae and sclerae normal, no   icterus, no pallor, corneas clear, PERRLA   Throat:   No oral lesions, no thrush, oral mucosa moist   Neck:   No adenopathy, supple, trachea midline, no thyromegaly, no   carotid bruit, no JVD       Lungs:     Clear to auscultation,respirations regular, even and                  Unlabored     Heart:    Regular rhythm and normal rate, normal S1 and S2, no            murmur, no gallop, no rub, no click    Chest Wall:    No abnormalities observed   Abdomen:     Normal bowel sounds, no masses, no organomegaly, soft        non-tender, non-distended, no guarding, no rebound                Tenderness    Extremities:   Moves Extremities.  Immobilizer on the right knee        Skin:   No bleeding, bruising or rash   Lymph nodes:   No palpable adenopathy   Neurologic:   Cranial nerves 2 - 12 grossly intact, sensation intact, DTR       present and equal bilaterally          RESULTS REVIEW:     Lab Results (last 24 hours)     Procedure Component Value Units Date/Time    POC Glucose Fingerstick [[id number]]  (Abnormal) Collected:  09/13/17 1658    Specimen:  Blood Updated:  09/13/17 1715     Glucose 161 (H) mg/dL       Meter: [id number]: [id number] JANG ANGELA       POC Glucose Fingerstick [[id number]]  (Abnormal) Collected:  09/13/17 2125    Specimen:  Blood Updated:  09/14/17 0104     Glucose 211 (H) mg/dL       Meter: [id number]: [id number] TOY  GLENNY       POC Glucose Fingerstick [[id number]]  (Abnormal) Collected:  09/14/17 0524    Specimen:  Blood Updated:  09/14/17 0537     Glucose 187 (H) mg/dL       RN NotifiedMeter: [id number]: [id number] TOY MURRIETA       Protime-INR [[id number]]  (Abnormal) Collected:  09/14/17 0523    Specimen:  Blood Updated:  09/14/17 0650     Protime 26.8 (H) Seconds      INR 2.45 (H)    Narrative:       Therapeutic range for most indications is 2.0-3.0 INR,  or 2.5-3.5 for mechanical heart valves.    POC Glucose Fingerstick [[id number]]  (Normal) Collected:  09/14/17 1204    Specimen:  Blood Updated:  09/14/17 1242     Glucose 121 mg/dL       RN NotifiedMeter: [id number]: [id number] MICHELLE MEYER           Imaging Results (last 72 hours)     ** No results found for the last 72 hours. **          Assessment/Plan     Principal Problem:    Multiple trauma  Active Problems:    Closed displaced comminuted fracture of right patella    Closed displaced fracture of navicular bone of left foot    Osteoarthritis of hands, bilateral    BPH (benign prostatic hyperplasia)    Glaucoma    Encounter for rehabilitation    Essential hypertension    Mixed hyperlipidemia    History of kidney stones  Hyperglycemia      He is doing very well with therapy and progressing rapidly.  He will not be ambulatory at discharge but he is doing well.  I discussed his fingersticks with him his sugar was as high as 293 yesterday and I think he needs to be on metformin and discussed this with him.  He asked that I call his wife called but not received an answer and I will call again  The reason to be concerned about his sugar is that his incisions or may not heal well with his sugar as high as it has been this was explained to him as well    He is adequately anticoagulated  We'll continue intensive therapy and will discuss with him and his wife about his sugar and keep a close monitor discussed diabetic diet with him and we'll try to get his sugar  "down        Alec Stephen MD  17  1:48 PM               Electronically signed by Alec Stephen MD at 2017  1:51 PM          [facility]  [address] [place] [address]  Phone:  [phone]  Fax:   Date Ordered: Sep 14, 2017         Patient:  Vangie Lopez MRN:  [id number]   [address]  [facility] [address] :  1950  SSN:    Phone: [phone] Sex:  M     Weight: 182 lb (82.6 kg)         Ht Readings from Last 1 Encounters:   17 69\" (175.3 cm)         Miscellaneous DME             (Order ID: [id number])    Diagnosis:  Closed displaced comminuted fracture of right patella with routine healing, subsequent encounter (S82.041D [ICD-10-CM] V54.16 [ICD-9-CM])  Closed displaced fracture of navicular bone of left foot with routine healing, subsequent encounter (S92.252D [ICD-10-CM] V54.19 [ICD-9-CM])  Multiple trauma (T07 [ICD-10-CM] 959.8 [ICD-9-CM])   Quantity:  1  Comments: Ramp for home use     Type of DME: ramp  Length of Need (99 Months = Lifetime): 99 Months = Lifetime            Authorizing Provider:Alec Stephen MD  Authorizing Provider's NPI: [id number]  Order Entered By: Alec Stephen MD 2017  3:13 PM     Electronically signed by: Alec Stephen MD 2017  3:13 PM           Consult Notes (all)     No notes of this type exist for this encounter.        "

## 2017-09-14 NOTE — THERAPY TREATMENT NOTE
Inpatient Rehabilitation - Physical Therapy Treatment Note  [facility]     Patient Name: Vangie Lopez  : 1950  MRN: [id number]  Today's Date: 2017  Onset of Illness/Injury or Date of Surgery Date: 17  Date of Referral to PT: 17  Referring Physician: Dr. Stephen    Admit Date: 2017    Visit Dx:    ICD-10-CM ICD-9-CM   1. Multiple trauma T07 959.8   2. Abnormality of gait and mobility R26.9 781.2   3. Muscle weakness (generalized) M62.81 728.87   4. Impaired mobility and ADLs Z74.09 799.89   5. Closed displaced comminuted fracture of right patella with routine healing, subsequent encounter S82.041D V54.16   6. Closed displaced fracture of navicular bone of left foot with routine healing, subsequent encounter S92.252D V54.19     Patient Active Problem List   Diagnosis   • Encounter for screening for malignant neoplasm of colon   • Closed displaced comminuted fracture of right patella   • Closed displaced fracture of navicular bone of left foot   • Closed dislocation of navicular bone of left foot   • Patella fracture   • Essential hypertension   • Mixed hyperlipidemia   • Osteoarthritis of hands, bilateral   • BPH (benign prostatic hyperplasia)   • Glaucoma   • Hyperlipidemia   • Closed fracture of navicular bone with dislocation of perilunate joint of left wrist   • Fracture of patella, right, closed   • Multiple trauma   • Encounter for rehabilitation   • History of kidney stones               Adult Rehabilitation Note       17 1440 17 1315 17 1115    Rehab Assessment/Intervention    Discipline physical therapy assistant  -RW occupational therapy assistant  -LM physical therapy assistant  -RW    Document Type therapy note (daily note)  -RW therapy note (daily note)  -LM therapy note (daily note)  -RW    Subjective Information agree to therapy  -RW agree to therapy  -LM agree to therapy  -RW    Patient Effort, Rehab Treatment good  -RW good  -LM good  -RW     Precautions/Limitations non-weight bearing status  -RW  non-weight bearing status  -RW    Recorded by [RW] Balaji Malhotra PTA [LM] Marce Carter VILLALPANDO/L [RW] Balaji Malhotra PTA    Pain Assessment    Pain Assessment No/denies pain  -RW No/denies pain  -LM --   c/o wrist pain left.  -RW    Pain Location   Wrist  -RW    Pain Orientation   Left  -RW    Recorded by [RW] Balaji Malhotra PTA [LM] Marce Carter, VILLALPANDO/L [RW] Balaji Malhotra PTA    Vision Assessment/Intervention    Visual Impairment  WNL  -LM     Recorded by  [LM] Marce Carter, VILLAL[place]O/L     Cognitive Assessment/Intervention    Current Cognitive/Communication Assessment functional  -RW functional  -LM functional  -RW    Orientation Status oriented x 4  -RW oriented x 4  -LM oriented x 4  -RW    Follows Commands/Answers Questions 100% of the time  -% of the time  -% of the time  -RW    Personal Safety  WNL/WFL  -LM     Personal Safety Interventions gait belt  -RW  gait belt  -RW    Recorded by [RW] Balaji Malhotra PTA [LM] Marce Carter, VILL[place]O/L [RW] Balaji Malhotra PTA    Mobility Assessment/Training    Left Lower Extremity Weight-Bearing non weight-bearing  -RW  non weight-bearing  -RW    Right Lower Extremity Weight-Bearing non weight-bearing  -RW  non weight-bearing  -RW    Recorded by [RW] Balaji Malhotra PTA  [RW] Balaji Malhotra PTA    Bed Mobility, Assessment/Treatment    Bed Mobility, Assistive Device   head of bed elevated  -RW    Bed Mob, Supine to Sit, Branch   conditional independence;independent  -RW    Bed Mob, Sit to Supine, Branch   independent;conditional independence  -RW    Recorded by   [RW] Balaji Malhotra PTA    Transfer Assessment/Treatment    Transfers, Bed-Chair Branch   set up required  -RW    Transfers, Chair-Bed Branch   set up required  -RW    Recorded by   [RW] Balaji Malhotra PTA    Gait Assessment/Treatment    Gait, [place] Level not appropriate to assess  -RW  not  appropriate to assess  -RW    Recorded by [RW] Balaji Malhotra PTA  [RW] Balaji Malhotra PTA    Stairs Assessment/Treatment    Stairs, [place] Level not appropriate to assess  -RW  not appropriate to assess  -RW    Recorded by [RW] Balaji Malhotra PTA  [RW] Balaji Malhotra PTA    Wheelchair Training/Management    Wheelchair Propulsion Training incline  -RW      Wheelchair, Distance Propelled 300*  -RW      Wheelchair Training Comment wc mob up and down ramp ind  -RW      Recorded by [RW] Balaji Malhotra PTA      Balance Skills Training    Sitting-Balance Activities Forward lean;Reaching for objects;Reaching across midline  -RW      Recorded by [RW] Balaji Malhotra PTA      Therapy Exercises    Right Lower Extremity   AROM:;ankle pumps/circles;20 reps;supine  -RW    Left Lower Extremity AROM:;20 reps;sitting;knee flexion;LAQ  -RW  AROM:;heel slides;supine;20 reps  -RW    LLE Resistance theraband  -RW      Bilateral Lower Extremities AROM:;20 reps;glut sets;quad sets;sitting  -RW  AROM:;20 reps;supine;glut sets;quad sets  -RW    Bilateral Upper Extremity AROM:;20 reps;sitting   t band rows with blue tb 2 sets  -RW AROM:;20 reps;elbow flexion/extension;shoulder abduction/adduction;shoulder extension/flexion;shoulder horizontal abd/add;shoulder protraction/retraction  -LM     Recorded by [RW] Balaji Malhotra PTA [LM] NAV SmithA/L [RW] Balaji Malhotra PTA    Positioning and Restraints    Pre-Treatment Position sitting in chair/recliner  -RW in bed  -LM sitting in chair/recliner  -RW    Post Treatment Position wheelchair  -RW bed  -LM wheelchair  -RW    In Wheelchair   call light within reach  -RW    Recorded by [RW] Balaji Malhotra PTA [LM] NAV SmithA/L [RW] Balaji Malhotra PTA      09/14/17 1005 09/13/17 1440 09/13/17 1305    Rehab Assessment/Intervention    Discipline occupational therapy assistant  -YOEL physical therapy assistant  -RW occupational therapy assistant  -YOEL    Document Type  therapy note (daily note)  -LM therapy note (daily note)  -RW therapy note (daily note)  -LM    Subjective Information agree to therapy  -LM agree to therapy  -RW agree to therapy  -LM    Patient Effort, Rehab Treatment good  -LM good  -RW good  -LM    Precautions/Limitations  non-weight bearing status;brace on when up  -RW     Recorded by [LM] NAV SmithA/L [RW] Balaji Malhotra PTA [LM] NAV SmithA/L    Pain Assessment    Pain Assessment No/denies pain  -LM No/denies pain  -RW No/denies pain  -LM    Recorded by [LM] Marce Carter VILLALPANDO/L [RW] Balaji Malhotra PTA [LM] NAV SmithA/L    Cognitive Assessment/Intervention    Current Cognitive/Communication Assessment functional  -LM functional  -RW functional  -LM    Orientation Status oriented x 4  -LM oriented x 4  -RW oriented x 4  -LM    Follows Commands/Answers Questions 100% of the time  -% of the time  -% of the time  -LM    Personal Safety   WNL/WFL  -LM    Personal Safety Interventions  gait belt  -RW     Recorded by [LM] NAV SmithA/L [RW] Balaji Malhotra PTA [LM] NAV SmithA/L    Mobility Assessment/Training    Left Lower Extremity Weight-Bearing  non weight-bearing  -RW     Right Lower Extremity Weight-Bearing  non weight-bearing  -RW     Recorded by  [RW] Balaji Malhotra PTA     Bed Mobility, Assessment/Treatment    Bed Mobility, Assistive Device  bed rails;head of bed elevated  -RW     Bed Mob, Supine to Sit, [place]  conditional independence  -RW conditional independence  -LM    Bed Mob, Sit to Supine, [place]   conditional independence   to long sitting with grab barin bed   -LM    Recorded by  [RW] Balaji Malhotra PTA [LM] NAV SmithA/L    Transfer Assessment/Treatment    Transfers, Bed-Chair [place] minimum assist (75% patient effort)  -LM contact guard assist  -RW     Transfers, Chair-Bed [place] minimum assist (75% patient effort)  -LM       Transfers, Bed-Chair-Bed, Assist Device  sliding board  -RW     Toilet Transfer, [place]  contact guard assist  -RW     Toilet Transfer, Assistive Device  bedside commode with drop arms   sliding bd  -RW     Recorded by [LM] IRASEMA Smith/L [RW] Balaji Malhotra PTA     Gait Assessment/Treatment    Gait, [place] Level  not appropriate to assess  -RW     Recorded by  [RW] Balaji Malhotra PTA     Stairs Assessment/Treatment    Stairs, [place] Level  not appropriate to assess  -RW     Recorded by  [RW] Balaji Malhotra PTA     Wheelchair Training/Management    Wheelchair, Distance Propelled 100  -LM      Recorded by [LM] Marce Carter VILLALPANDO/L      Upper Body Bathing Assessment/Training    UB Bathing Assess/Train, [place] Level   set up required  -LM    Recorded by   [LM] NAV SmithA/L    Lower Body Bathing Assessment/Training    LB Bathing Assess/Train, [place] Level   set up required  -LM    Recorded by   [LM] NAV SmithA/L    Upper Body Dressing Assessment/Training    UB Dressing Assess/Train, Clothing Type   doffing:;donning:  -LM    UB Dressing Assess/Train, [place] set up required  -LM  set up required  -LM    Recorded by [LM] NAV SmithA/L  [LM] NAV SmithA/L    Lower Body Dressing Assessment/Training    LB Dressing Assess/Train, [place] independent  -LM  minimum assist (75% patient effort);moderate assist (50% patient effort)   reacher dressing stick in supine long sitting  -LM    Recorded by [LM] NAV SmithA/L  [LM] NAV SmithA/L    Grooming Assessment/Training    Grooming Assess/Train, [place] Level   independent  -LM    Recorded by   [LM] Marce Carter VILLALPANDO/L    Balance Skills Training    Sitting-Balance Activities  Forward lean;Reaching for objects;Reaching across midline  -RW     Recorded by  [RW] Balaji Malhotra PTA     Therapy Exercises    Right Lower Extremity  AROM:;ankle  pumps/circles;20 reps;supine  -RW     Left Lower Extremity  AROM:;heel slides;supine;20 reps  -RW     Bilateral Lower Extremities  AROM:;20 reps;supine;glut sets;quad sets  -RW     Bilateral Upper Extremity AROM:;20 reps;elbow flexion/extension;shoulder rolls/shrugs;shoulder extension/flexion;shoulder protraction/retraction;shoulder abduction/adduction   cable column all planes B UE   -LM      Recorded by [LM] IRASEMA Smith/L [RW] Balaji Malhotra PTA     Positioning and Restraints    Pre-Treatment Position sitting in chair/recliner  -LM in bed  -RW in bed  -LM    Post Treatment Position wheelchair  -LM bsc  -RW bed  -LM    On BS commode  call light within reach;encouraged to call for assist  -RW     Recorded by [LM] IRASEMA Smith/L [RW] Balaji Malhotra PTA [LM] ELY Smith      09/13/17 1300 09/13/17 1012 09/12/17 1455    Rehab Assessment/Intervention    Discipline occupational therapy assistant  -LM physical therapy assistant  -RW occupational therapy assistant  -RC    Document Type therapy note (daily note)  -LM therapy note (daily note)  -RW therapy note (daily note)  -RC    Subjective Information agree to therapy  -LM agree to therapy  -RW agree to therapy  -RC    Patient Effort, Rehab Treatment good  -LM good  -RW good  -RC    Precautions/Limitations  non-weight bearing status   brace on at all times  -RW fall precautions;non-weight bearing status;brace on when up   brace on all time elevate r lle all time l le after up 3o mi  -RC    Specific Treatment Considerations   --   in w/c only 2 hr at a time, keep r knee straght  -RC    Recorded by [LM] IRASEMA Smith/L [RW] Balaji Malhotra PTA [RC] IRASEMA Herring/FREEDOM    Pain Assessment    Pain Assessment 0-10  -LM      Pain Score  --   c/o occ pain in left flank  -RW 0  -RC    Post Pain Score   0  -RC    Recorded by [LM] IRASEMA Smith/L [RW] Balaji Malhotra PTA [RC] IRASEMA Herring/FREEDOM    Cognitive  Assessment/Intervention    Current Cognitive/Communication Assessment functional  -LM functional  -RW     Orientation Status oriented x 4  -LM oriented x 4  -RW     Follows Commands/Answers Questions 100% of the time  -% of the time  -RW     Personal Safety Interventions  gait belt  -RW     Recorded by [LM] NAV SmithA/L [RW] Balaji Malhotra PTA     Mobility Assessment/Training    Left Lower Extremity Weight-Bearing  non weight-bearing  -RW     Right Lower Extremity Weight-Bearing  non weight-bearing  -RW     Recorded by  [RW] Balaji Malhotra PTA     Bed Mobility, Assessment/Treatment    Bed Mobility, Assistive Device  bed rails;head of bed elevated  -RW     Bed Mob, Supine to Sit, [place]  supervision required  -RW     Bed Mob, Sit to Supine, [place]  supervision required  -RW     Recorded by  [RW] Balaji Malhotra PTA     Transfer Assessment/Treatment    Transfers, Bed-Chair [place]  minimum assist (75% patient effort);contact guard assist  -RW     Transfers, Chair-Bed [place] minimum assist (75% patient effort)  -LM minimum assist (75% patient effort);contact guard assist  -RW minimum assist (75% patient effort)  -RC    Transfers, Bed-Chair-Bed, Assist Device  sliding board  -RW sliding board  -RC    Transfers, Sit-Stand [place] supervision required  -LM      Transfers, Stand-Sit [place] supervision required  -LM      Transfer, Comment  practiced 2-3 times using leg lift strap o right leg  -RW     Recorded by [LM] Marce Carter, VILLALPANDO/L [RW] Balaji Malhotra PTA [RC] Shruthi Jean-Baptiste, VILLALPANDO/L    Gait Assessment/Treatment    Gait, [place] Level  not appropriate to assess  -RW     Recorded by  [RW] Balaji Malhotra PTA     Stairs Assessment/Treatment    Stairs, [place] Level  not appropriate to assess  -RW     Recorded by  [RW] Balaji Malhotra PTA     Wheelchair Training/Management    Wheelchair Transfer Type  lateral transfer  -RW --   sliding board to l side   -RC    Wheelchair Task Training Comment   --   requires someont to hold r le and vc's  -RC    Wheelchair, Distance Propelled 100 ft   - ind  -  -RC    Wheelchair Safety Comment --   wc measured placed in fim book 18x16  -LM      Recorded by [LM] IRASEMA Smith/L [RW] Balaji Malhotra PTA [RC] NAV HerringA/L    Therapy Exercises    Right Lower Extremity  AROM:;15 reps;sitting;ankle pumps/circles;quad sets;glut sets  -RW     Left Lower Extremity  AROM:;15 reps;sitting;glut sets;quad sets  -RW     Bilateral Upper Extremity AROM:   UE bke x 15 min B UE only   -LM  AROM:;15 reps  -RC    BUE Resistance manual resistance- minimal   ther ex with 3 lb wt elbowf elx   -LM  theraband   rowing blue band 2 sets  -RC    Recorded by [LM] IRASEMA Smith/L [RW] Balaji Malhotra PTA [RC] NAV HerringA/L    Positioning and Restraints    Pre-Treatment Position sitting in chair/recliner  -LM in bed  -RW sitting in chair/recliner  -RC    Post Treatment Position bed  -LM wheelchair  -RW bed  -RC    In Bed   call light within reach;encouraged to call for assist;with nsg  -RC    Recorded by [LM] IRASEMA Smith/L [RW] Balaji Malhotra PTA [RC] NAV HerringA/FREEDOM      09/12/17 1028          Rehab Assessment/Intervention    Discipline physical therapy assistant  -TA      Document Type therapy note (daily note)  -TA      Subjective Information agree to therapy  -TA      Patient Effort, Rehab Treatment good  -TA      Symptoms Noted Comment BLE non weight bearing,knee immobilizer R LE  -TA      Precautions/Limitations fall precautions;non-weight bearing status   Non weight bearing B LE  -TA      Recorded by [TA] Geri Juan PTA      Vital Signs    Pre Systolic BP Rehab 146  -TA      Pre Treatment Diastolic BP 73  -TA      Post Systolic BP Rehab 147  -TA      Post Treatment Diastolic BP 70  -TA      Pretreatment Heart Rate (beats/min) 102  -TA      Intratreatment Heart Rate (beats/min)  96  -TA      Posttreatment Heart Rate (beats/min) 103  -TA      Pre SpO2 (%) 98  -TA      O2 Delivery Pre Treatment room air  -TA      Intra SpO2 (%) 98  -TA      Post SpO2 (%) 97  -TA      Pre Patient Position Supine  -TA      Post Patient Position Supine  -TA      Recorded by [TA] Geri Juan PTA      Pain Assessment    Pain Assessment 0-10  -TA      Pain Score 0  -TA      Post Pain Score 6  -TA      Pain Type Surgical pain  -TA      Pain Location Knee  -TA      Pain Orientation Right  -TA      Recorded by [TA] Geri Juan PTA      Cognitive Assessment/Intervention    Current Cognitive/Communication Assessment functional  -TA      Orientation Status oriented x 4  -TA      Follows Commands/Answers Questions 100% of the time  -TA      Personal Safety mild impairment  -TA      Personal Safety Interventions fall prevention program maintained  -TA      Recorded by [TA] Geri Juan PTA      Bed Mobility, Assessment/Treatment    Bed Mobility, Assistive Device bed rails;head of bed elevated;overhead trapeze  -TA      Bed Mobility, Roll Left, [place] minimum assist (75% patient effort);nonverbal cues required (demo/gesture)   x 3  -TA      Bed Mobility, Roll Right, [place] minimum assist (75% patient effort);verbal cues required   x 3  -TA      Bed Mobility, Scoot/Bridge, [place] contact guard assist  -TA      Bed Mob, Supine to Sit, [place] minimum assist (75% patient effort)   for R LE x 2  -TA      Bed Mob, Sit to Supine, [place] minimum assist (75% patient effort)    for  R LE x 2  -TA      Bed Mobility, Comment pt Assisted to EOB x 2, pt's R  LE supported by PTA  -TA      Recorded by [TA] Geri Juan PTA      Therapy Exercises    Right Lower Extremity AROM:;20 reps;ankle pumps/circles;supine   2 sets  -TA      Left Lower Extremity AROM:;20 reps;quad sets;supine   2 sets  -TA      Bilateral Lower Extremities AROM:;20 reps;supine;glut sets   2 sets  -TA      Trunk Exercises  supine;10 reps   pull-ups  with trapeze bar  -TA      Recorded by [TA] Geri Juan PTA      Positioning and Restraints    Pre-Treatment Position in bed  -TA      Post Treatment Position bed  -TA      In Bed supine;call light within reach;with family/caregiver  -TA      Recorded by [TA] Geri Juan PTA        User Key  (r) = Recorded By, (t) = Taken By, (c) = Cosigned By    Initials Name Effective Dates    TA Geri Juan, PTA 10/17/16 -     RW Balaji Malhotra, PTA 10/17/16 -     RC Shruthi Jean-Baptiste, VILLALPANDO/L 10/17/16 -     LM Marce Carter, VILLALPANDO/L 10/17/16 -                 IP PT Goals       09/14/17 1557 09/13/17 1533 09/12/17 1355    Bed Mobility PT LTG    Bed Mobility PT LTG, Date Goal Reviewed 09/14/17  -RW 09/13/17  -RW 09/12/17  -LM    Bed Mobility PT LTG, Outcome goal met  -RW goal ongoing  -RW goal ongoing  -LM    Transfer Training PT LTG    Transfer Training PT  LTG, Date Goal Reviewed 09/14/17  -RW 09/13/17  -RW 09/12/17  -LM    Transfer Training PT LTG, Outcome goal ongoing  -RW goal ongoing  -RW goal ongoing  -LM    Range of Motion PT LTG    Range of Motion Goal PT LTG, Date Goal Reviewed 09/14/17  -RW 09/13/17  -RW 09/12/17  -LM    Range of Motion Goal PT LTG, Outcome goal ongoing  -RW goal ongoing  -RW goal ongoing  -LM    Wheelchair Propulsion PT LTG    Wheelchair Propulsion Goal PT LTG, Date Established   09/12/17  -LM    Wheelchair Propulsion Goal PT LTG, Time to Achieve   by discharge  -LM    Wheelchair Propulsion Goal PT LTG, [place] Level   conditional independence  -LM    Wheelchair Propulsion Goal PT LTG, Distance to Achieve   300 feet  -LM    Wheelchair Propulsion Goal PT LTG, Date Goal Reviewed 09/14/17  -RW 09/13/17  -RW     Wheelchair Propulsion Goal PT LTG, Outcome goal met  -RW goal ongoing  -RW goal ongoing  -LM    Physical Therapy PT LTG    Physical Therapy PT LTG, Date Established   09/12/17  -LM    Physical Therapy PT LTG, Time to Achieve   by discharge  -LM    Physical  Therapy PT LTG, Activity Type   Pt will self propel w/c up/down ramp.  -LM    Physical Therapy PT LTG, [place] Level   conditional independence  -LM    Physical Therapy PT LTG, Date Goal Reviewed 09/14/17  -RW 09/13/17  -RW     Physical Therapy PT LTG, Outcome goal met  -RW goal ongoing  -RW goal ongoing  -LM      09/11/17 1500 09/11/17 1040 09/10/17 1300    Bed Mobility PT LTG    Bed Mobility PT LTG, Date Established 09/11/17  -LM      Bed Mobility PT LTG, Time to Achieve by discharge  -LM      Bed Mobility PT LTG, Activity Type supine to sit/sit to supine  -LM      Bed Mobility PT LTG, [place] Level conditional independence  -LM      Bed Mobility PT Goal  LTG, Assist Device overhead trapeze;bed rails  -LM      Bed Mobility PT LTG, Outcome goal ongoing  -LM      Transfer Training PT LTG    Transfer Training PT LTG, Date Established 09/11/17  -LM      Transfer Training PT LTG, Time to Achieve by discharge  -LM      Transfer Training PT LTG, Activity Type bed to chair /chair to bed  -LM      Transfer Training PT LTG, [place] Level conditional independence  -LM      Transfer Training PT LTG, Assist Device --   AAD  -LM      Transfer Training PT  LTG, Date Goal Reviewed  09/11/17  -AM 09/10/17  -MAGNO    Transfer Training PT LTG, Outcome goal ongoing  -LM goal not met  -AM goal ongoing  -MAGNO    Transfer Training PT LTG, Reason Goal Not Met  discharged from facility  -AM     Range of Motion PT LTG    Range of Motion Goal PT LTG, Date Established 09/11/17  -LM      Range of Motion Goal PT LTG, Time to Achieve by discharge  -LM      Range fo Motion Goal PT LTG, Joint L knee  -LM      Range of Motion Goal PT LTG, AROM Measure Knee flex - 110 degrees  -LM      Range of Motion Goal PT LTG, Outcome goal ongoing  -LM      Patient Education PT LTG    Patient Education PT LTG, Date Goal Reviewed  09/11/17  -AM 09/10/17  -MAGNO    Patient Education PT LTG Outcome  goal met  -AM goal ongoing  -MAGNO    Caregiver Training PT  LTG    Caregiver Training PT LTG, Date Goal Reviewed  09/11/17  -AM 09/10/17  -    Caregiver Training PT LTG, Outcome  goal not met  -AM goal ongoing  -    Caregiver Training PT LTG, Reason Goal Not Met  discharged from facility  -AM     Physical Therapy PT STG    Physical Therapy PT STG, Date Goal Reviewed   09/10/17  -    Physical Therapy PT STG, Outcome   goal met  -      09/09/17 1626          Transfer Training PT LTG    Transfer Training PT LTG, Date Established 09/09/17  -[facility]      Transfer Training PT LTG, Time to Achieve by discharge  -A      Transfer Training PT LTG, Activity Type bed to chair /chair to bed  -[facility]      Transfer Training PT LTG, Additional Goal Once MD allows, pt will perform lateral transfer with conditional independence  -[facility]      Transfer Training PT LTG, Outcome goal ongoing  -[facility]      Patient Education PT LTG    Patient Education PT LTG, Date Established 09/09/17  -[facility]      Patient Education PT LTG, Time to Achieve by discharge  -[facility]      Patient Education PT LTG, Education Type precaution per surgeon;transfers;bed mobility;pain management;home safety  -[facility]      Patient Education PT LTG Outcome goal ongoing  -[facility]      Caregiver Training PT LTG    Caregiver Training PT LTG, Date Established 09/09/17  -[facility]      Caregiver Training PT LTG, Time to Achieve by discharge  -[facility]      Caregiver Training PT LTG, Activity Type home caregiver will demonstrate understanding assisting pt as needed with transfers/mobility as well as verbalize understanding of home care instructions  -[facility]      Caregiver Training PT LTG, Outcome goal ongoing  -[facility]      Physical Therapy PT STG    Physical Therapy PT STG, Date Established 09/09/17  -[facility]      Physical Therapy PT STG, Time to Achieve by discharge  -[facility]      Physical Therapy PT STG, Activity Type Pt will tolerate OOB 3-4 hours per day  -[facility]      Physical Therapy PT STG, Outcome goal ongoing  -[facility]        User Key  (r) = Recorded By, (t) = Taken By, (c)  = Cosigned By    Initials Name Provider Type    LM Isela Chris, PT Physical Therapist    HELEN Abel, PT Physical Therapist    MAGNO Pendleton, PTA Physical Therapy Assistant    SUSIE Young, PTA Physical Therapy Assistant    RW Balaji Malhotra, PTA Physical Therapy Assistant          Physical Therapy Education     Title: PT OT SLP Therapies (Active)     Topic: Physical Therapy (Active)     Point: Mobility training (Done)    Learning Progress Summary    Learner Readiness Method Response Comment Documented by Status   Patient Acceptance E VU reviewed  non wt bearing and transfering to from [facility] 09/13/17 1140 Done    Acceptance E NR Reviewed transferring towards stronger side and maintaining NWB with activity.  09/12/17 1608 Active    Acceptance E NR  TA 09/12/17 1515 Active    Acceptance E NR Reviewed safety with transfers.  Explained that Dr. Rutherford only wants stretcher chair used at this time.  09/11/17 1636 Active               Point: Precautions (Done)    Learning Progress Summary    Learner Readiness Method Response Comment Documented by Status   Patient Acceptance E VU reviewed  non wt bearing and transfering to from [facility] 09/13/17 1140 Done    Acceptance E NR Reviewed transferring towards stronger side and maintaining NWB with activity.  09/12/17 1608 Active    Acceptance E NR  TA 09/12/17 1515 Active    Acceptance E NR Reviewed safety with transfers.  Explained that Dr. Rutherford only wants stretcher chair used at this time.  09/11/17 1636 Active                      User Key     Initials Effective Dates Name Provider Type Discipline     06/15/16 -  Isela Chris, PT Physical Therapist PT    TA 10/17/16 -  Geri Juan, DARIEN Physical Therapy Assistant PT    RW 10/17/16 -  Balaji Malhotra, DARIEN Physical Therapy Assistant PT                    PT Recommendation and Plan  Anticipated Discharge Disposition: home with home health  Planned Therapy Interventions: balance training, bed mobility  training, home exercise program, motor coordination training, neuromuscular re-education, patient/family education, postural re-education, ROM (Range of Motion), strengthening, stretching, transfer training  PT Frequency: other (see comments) (5-14 times/wk)  Plan of Care Review  Plan Of Care Reviewed With: patient  Outcome Summary/Follow up Plan: pt doing well overall and met wc goals this date.          Outcome Measures       09/13/17 1100 09/12/17 1500 09/12/17 1355    How much help from another person do you currently need...    Turning from your back to your side while in flat bed without using bedrails? 3  -RW 3  -TA 3  -LM    Moving from lying on back to sitting on the side of a flat bed without bedrails? 3  -RW 3  -TA 3  -LM    Moving to and from a bed to a chair (including a wheelchair)? 3  -RW 3  -TA 3  -LM    Standing up from a chair using your arms (e.g., wheelchair, bedside chair)? 1  -RW 1  -TA 1  -LM    Climbing 3-5 steps with a railing? 1  -RW 1  -TA 1  -LM    To walk in hospital room? 1  -RW 1  -TA 1  -LM    AM-PAC 6 Clicks Score 12  -RW 12  -TA 12  -LM    Functional Assessment    Outcome Measure Options  AM-PAC 6 Clicks Basic Mobility (PT)  -TA AM-PAC 6 Clicks Basic Mobility (PT)  -LM      09/12/17 0755          How much help from another is currently needed...    Putting on and taking off regular lower body clothing? 1  -BH (r) SP (t) BH (c)      Bathing (including washing, rinsing, and drying) 2  -BH (r) SP (t) BH (c)      Toileting (which includes using toilet bed pan or urinal) 2  -BH (r) SP (t) BH (c)      Putting on and taking off regular upper body clothing 3  -BH (r) SP (t) BH (c)      Taking care of personal grooming (such as brushing teeth) 3  -BH (r) SP (t) BH (c)      Eating meals 4  -BH (r) SP (t) BH (c)      Score 15  -BH (r) SP (t)      Functional Assessment    Outcome Measure Options AM-PAC 6 Clicks Daily Activity (OT)  -BH (r) SP (t) BH (c)        User Key  (r) = Recorded By, (t) =  Taken By, (c) = Cosigned By    Initials Name Provider Type    LM Isela Chris, PT Physical Therapist     Isabel Jones, OTR/L Occupational Therapist    HOLLIS Juan, PTA Physical Therapy Assistant    JOHN Malhotra, PTA Physical Therapy Assistant    DANY Juárez, OT Student OT Student           Time Calculation:         PT Charges       09/14/17 1605 09/14/17 1237       Time Calculation    Start Time 1440  -RW 1115  -RW     Stop Time 1525  -RW 1200  -RW     Time Calculation (min) 45 min  -RW 45 min  -RW     Time Calculation- PT    Total Timed Code Minutes- PT 45 minute(s)  -RW 45 minute(s)  -RW       User Key  (r) = Recorded By, (t) = Taken By, (c) = Cosigned By    Initials Name Provider Type    JOHN Malhotra PTA Physical Therapy Assistant          Therapy Charges for Today     Code Description Service Date Service Provider Modifiers Qty    15996784870 HC PT THER PROC EA 15 MIN 9/13/2017 Balaji Malhotra, PTA GP 1    03815589350 HC PT THERAPEUTIC ACT EA 15 MIN 9/13/2017 Balaji Malhotra, PTA GP 3    09726562507 HC PT THER PROC EA 15 MIN 9/13/2017 Balaji Malhotra, PTA GP 2    04294055502 HC PT THERAPEUTIC ACT EA 15 MIN 9/13/2017 Balaji Malhotra, PTA GP 1    92690141328 HC PT THER PROC EA 15 MIN 9/14/2017 Balaji Malhotra, PTA GP 2    43867079922 HC PT THERAPEUTIC ACT EA 15 MIN 9/14/2017 Balaji Malhotra, PTA GP 1    05769588277 HC PT THER PROC EA 15 MIN 9/14/2017 Balaji Malhotra, PTA GP 1    30078985572 HC PT THERAPEUTIC ACT EA 15 MIN 9/14/2017 Balaji Malhotra, PTA GP 2          PT G-Codes  PT Professional Judgement Used?: Yes  Outcome Measure Options: AM-PAC 6 Clicks Basic Mobility (PT)  Score: 12  Functional Limitation: Mobility: Walking and moving around  Mobility: Walking and Moving Around Current Status (): At least 60 percent but less than 80 percent impaired, limited or restricted  Mobility: Walking and Moving Around Goal Status (): At least 20 percent but less than 40 percent impaired,  limited or restricted    Balaji Malhotra, PTA  9/14/2017

## 2017-09-14 NOTE — PLAN OF CARE
Problem: Patient Care Overview (Adult)  Goal: Plan of Care Review  Outcome: Ongoing (interventions implemented as appropriate)    09/14/17 1557   Coping/Psychosocial Response Interventions   Plan Of Care Reviewed With patient   Outcome Evaluation   Outcome Summary/Follow up Plan pt doing well overall and met wc goals this date.         Problem: Inpatient Physical Therapy  Goal: Bed Mobility Goal LTG- PT  Outcome: Outcome(s) achieved Date Met:  09/14/17 09/11/17 1500 09/14/17 1557   Bed Mobility PT LTG   Bed Mobility PT LTG, Date Established 09/11/17 --    Bed Mobility PT LTG, Time to Achieve by discharge --    Bed Mobility PT LTG, Activity Type supine to sit/sit to supine --    Bed Mobility PT LTG, [place] Level conditional independence --    Bed Mobility PT Goal LTG, Assist Device overhead trapeze;bed rails --    Bed Mobility PT LTG, Date Goal Reviewed --  09/14/17   Bed Mobility PT LTG, Outcome --  goal met       Goal: Transfer Training Goal 1 LTG- PT  Outcome: Ongoing (interventions implemented as appropriate)    09/11/17 1500 09/14/17 1557   Transfer Training PT LTG   Transfer Training PT LTG, Date Established 09/11/17 --    Transfer Training PT LTG, Time to Achieve by discharge --    Transfer Training PT LTG, Activity Type bed to chair /chair to bed --    Transfer Training PT LTG, [place] Level conditional independence --    Transfer Training PT LTG, Assist Device (AAD) --    Transfer Training PT LTG, Date Goal Reviewed --  09/14/17   Transfer Training PT LTG, Outcome --  goal ongoing       Goal: Range of Motion Goal LTG- PT  Outcome: Ongoing (interventions implemented as appropriate)    09/11/17 1500 09/14/17 1557   Range of Motion PT LTG   Range of Motion Goal PT LTG, Date Established 09/11/17 --    Range of Motion Goal PT LTG, Time to Achieve by discharge --    Range fo Motion Goal PT LTG, Joint L knee --    Range of Motion Goal PT LTG, AROM Measure Knee flex - 110 degrees --    Range of Motion  Goal PT LTG, Date Goal Reviewed --  09/14/17   Range of Motion Goal PT LTG, Outcome --  goal ongoing       Goal: Wheelchair Propulsion Goal LTG- PT  Outcome: Outcome(s) achieved Date Met:  09/14/17 09/12/17 1355 09/14/17 1557   Wheelchair Propulsion PT LTG   Wheelchair Propulsion Goal PT LTG, Date Established 09/12/17 --    Wheelchair Propulsion Goal PT LTG, Time to Achieve by discharge --    Wheelchair Propulsion Goal PT LTG, [place] Level conditional independence --    Wheelchair Propulsion Goal PT LTG, Distance to Achieve 300 feet --    Wheelchair Propulsion Goal PT LTG, Date Goal Reviewed --  09/14/17   Wheelchair Propulsion Goal PT LTG, Outcome --  goal met       Goal: Physical Therapy Goal LTG- PT  Outcome: Outcome(s) achieved Date Met:  09/14/17 09/12/17 1355 09/14/17 1557   Physical Therapy PT LTG   Physical Therapy PT LTG, Date Established 09/12/17 --    Physical Therapy PT LTG, Time to Achieve by discharge --    Physical Therapy PT LTG, Activity Type Pt will self propel w/c up/down ramp. --    Physical Therapy PT LTG, [place] Level conditional independence --    Physical Therapy PT LTG, Date Goal Reviewed --  09/14/17   Physical Therapy PT LTG, Outcome --  goal met

## 2017-09-14 NOTE — THERAPY TREATMENT NOTE
Inpatient Rehabilitation - Occupational Therapy Treatment Note  [facility]     Patient Name: Vangie Lopez  : 1950  MRN: [id number]  Today's Date: 2017  Onset of Illness/Injury or Date of Surgery Date: 17  Date of Referral to OT: 17  Referring Physician: Dr. Stephen      Admit Date: 2017    Visit Dx:     ICD-10-CM ICD-9-CM   1. Abnormality of gait and mobility R26.9 781.2   2. Muscle weakness (generalized) M62.81 728.87   3. Impaired mobility and ADLs Z74.09 799.89     Patient Active Problem List   Diagnosis   • Encounter for screening for malignant neoplasm of colon   • Closed displaced comminuted fracture of right patella   • Closed displaced fracture of navicular bone of left foot   • Closed dislocation of navicular bone of left foot   • Patella fracture   • Essential hypertension   • Mixed hyperlipidemia   • Osteoarthritis of hands, bilateral   • BPH (benign prostatic hyperplasia)   • Glaucoma   • Hyperlipidemia   • Closed fracture of navicular bone with dislocation of perilunate joint of left wrist   • Fracture of patella, right, closed   • Multiple trauma   • Encounter for rehabilitation   • History of kidney stones             Adult Rehabilitation Note       17 1315 17 1115 17 1005    Rehab Assessment/Intervention    Discipline occupational therapy assistant  -LM physical therapy assistant  -RW occupational therapy assistant  -LM    Document Type therapy note (daily note)  -LM therapy note (daily note)  -RW therapy note (daily note)  -LM    Subjective Information agree to therapy  -LM agree to therapy  -RW agree to therapy  -LM    Patient Effort, Rehab Treatment good  -LM good  -RW good  -LM    Precautions/Limitations  non-weight bearing status  -RW     Recorded by [LM] IRASEMA Smith/L [RW] Balaji Malhotra PTA [LM] IRASEMA Smith/L    Pain Assessment    Pain Assessment No/denies pain  -LM --   c/o wrist pain left.  -RW No/denies  pain  -LM    Pain Location  Wrist  -RW     Pain Orientation  Left  -RW     Recorded by [LM] NAV SmithA/L [RW] Balaji Malhotra PTA [LM] ANV SmithA/L    Vision Assessment/Intervention    Visual Impairment WNL  -LM      Recorded by [LM] Marce Carter VILLALPANDO/L      Cognitive Assessment/Intervention    Current Cognitive/Communication Assessment functional  -LM functional  -RW functional  -LM    Orientation Status oriented x 4  -LM oriented x 4  -RW oriented x 4  -LM    Follows Commands/Answers Questions 100% of the time  -% of the time  -% of the time  -LM    Personal Safety WNL/WFL  -LM      Personal Safety Interventions  gait belt  -RW     Recorded by [LM] NAV SmithA/L [RW] Balaji Malhotra PTA [LM] Marce Carter VILLALPANDO/L    Mobility Assessment/Training    Left Lower Extremity Weight-Bearing  non weight-bearing  -RW     Right Lower Extremity Weight-Bearing  non weight-bearing  -RW     Recorded by  [RW] Balaji Malhotra PTA     Bed Mobility, Assessment/Treatment    Bed Mobility, Assistive Device  head of bed elevated  -RW     Bed Mob, Supine to Sit, [place]  conditional independence;independent  -RW     Bed Mob, Sit to Supine, [place]  independent;conditional independence  -RW     Recorded by  [RW] Balaji Malhotra PTA     Transfer Assessment/Treatment    Transfers, Bed-Chair [place]  set up required  -RW minimum assist (75% patient effort)  -LM    Transfers, Chair-Bed [place]  set up required  -RW minimum assist (75% patient effort)  -LM    Recorded by  [RW] Balaji Malhotra PTA [LM] NAV SmithA/L    Gait Assessment/Treatment    Gait, [place] Level  not appropriate to assess  -RW     Recorded by  [RW] Balaji Malhotra PTA     Stairs Assessment/Treatment    Stairs, [place] Level  not appropriate to assess  -RW     Recorded by  [RW] Balaji Malhotra PTA     Wheelchair Training/Management    Wheelchair, Distance Propelled   100   -LM    Recorded by   [LM] IRASEMA Smith/L    Upper Body Dressing Assessment/Training    UB Dressing Assess/Train, [place]   set up required  -LM    Recorded by   [LM] IRASEMA Smith/L    Lower Body Dressing Assessment/Training    LB Dressing Assess/Train, [place]   independent  -LM    Recorded by   [LM] NAV SmithA/L    Therapy Exercises    Right Lower Extremity  AROM:;ankle pumps/circles;20 reps;supine  -RW     Left Lower Extremity  AROM:;heel slides;supine;20 reps  -RW     Bilateral Lower Extremities  AROM:;20 reps;supine;glut sets;quad sets  -RW     Bilateral Upper Extremity AROM:;20 reps;elbow flexion/extension;shoulder abduction/adduction;shoulder extension/flexion;shoulder horizontal abd/add;shoulder protraction/retraction  -LM  AROM:;20 reps;elbow flexion/extension;shoulder rolls/shrugs;shoulder extension/flexion;shoulder protraction/retraction;shoulder abduction/adduction   cable column all planes B UE   -LM    Recorded by [LM] IRASEMA Smith/L [RW] Balaji Malhotra PTA [LM] IRASEMA Smith/L    Positioning and Restraints    Pre-Treatment Position in bed  -LM sitting in chair/recliner  -RW sitting in chair/recliner  -LM    Post Treatment Position bed  -LM wheelchair  -RW wheelchair  -LM    In Wheelchair  call light within reach  -RW     Recorded by [LM] IRASEMA Smith/L [RW] Balaji Malhotra, PTA [LM] IRASEMA Smith/FREEDOM      09/13/17 1440 09/13/17 1305 09/13/17 1300    Rehab Assessment/Intervention    Discipline physical therapy assistant  -RW occupational therapy assistant  -LM occupational therapy assistant  -LM    Document Type therapy note (daily note)  -RW therapy note (daily note)  -LM therapy note (daily note)  -LM    Subjective Information agree to therapy  -RW agree to therapy  -LM agree to therapy  -LM    Patient Effort, Rehab Treatment good  -RW good  -LM good  -LM    Precautions/Limitations non-weight bearing status;brace on  when up  -RW      Recorded by [RW] Balaji Malhotra PTA [LM] Marce Carter, VILLALPANDO/L [LM] Marce Carter, VILL[place]O/L    Pain Assessment    Pain Assessment No/denies pain  -RW No/denies pain  -LM 0-10  -LM    Recorded by [RW] Balaji Malhotra PTA [LM] Marce Carter, VILLALPANDO/L [LM] Marce Carter, VILL[place]O/L    Cognitive Assessment/Intervention    Current Cognitive/Communication Assessment functional  -RW functional  -LM functional  -LM    Orientation Status oriented x 4  -RW oriented x 4  -LM oriented x 4  -LM    Follows Commands/Answers Questions 100% of the time  -% of the time  -% of the time  -LM    Personal Safety  WNL/WFL  -LM     Personal Safety Interventions gait belt  -RW      Recorded by [RW] Balaji Malhotra PTA [LM] Marce Carter, VILLALPANDO/L [LM] Marce Carter, VILLALPANDO/L    Mobility Assessment/Training    Left Lower Extremity Weight-Bearing non weight-bearing  -RW      Right Lower Extremity Weight-Bearing non weight-bearing  -RW      Recorded by [RW] Balaji Malhotra PTA      Bed Mobility, Assessment/Treatment    Bed Mobility, Assistive Device bed rails;head of bed elevated  -RW      Bed Mob, Supine to Sit, [place] conditional independence  -RW conditional independence  -LM     Bed Mob, Sit to Supine, [place]  conditional independence   to long sitting with grab barin bed   -LM     Recorded by [RW] Balaji Malhotra PTA [LM] Marce Carter, VILLALPANDO/L     Transfer Assessment/Treatment    Transfers, Bed-Chair [place] contact guard assist  -RW      Transfers, Chair-Bed [place]   minimum assist (75% patient effort)  -LM    Transfers, Bed-Chair-Bed, Assist Device sliding board  -RW      Transfers, Sit-Stand [place]   supervision required  -LM    Transfers, Stand-Sit [place]   supervision required  -LM    Toilet Transfer, [place] contact guard assist  -RW      Toilet Transfer, Assistive Device bedside commode with drop arms   sliding bd  -RW      Recorded by [RW] Balaji  BERNABE Malhotra, PTA  [LM] NAV SmithA/L    Gait Assessment/Treatment    Gait, [place] Level not appropriate to assess  -RW      Recorded by [RW] Balaji Malhotra PTA      Stairs Assessment/Treatment    Stairs, [place] Level not appropriate to assess  -RW      Recorded by [RW] Balaji Malhotra PTA      Wheelchair Training/Management    Wheelchair, Distance Propelled   100 ft   -LM    Wheelchair Safety Comment   --   wc measured placed in fim book 18x16  -LM    Recorded by   [LM] NAV SmithA/L    Upper Body Bathing Assessment/Training    UB Bathing Assess/Train, [place] Level  set up required  -LM     Recorded by  [LM] NAV SmithA/L     Lower Body Bathing Assessment/Training    LB Bathing Assess/Train, [place] Level  set up required  -LM     Recorded by  [LM] NAV SmithA/L     Upper Body Dressing Assessment/Training    UB Dressing Assess/Train, Clothing Type  doffing:;donning:  -LM     UB Dressing Assess/Train, [place]  set up required  -LM     Recorded by  [LM] NAV SmithA/L     Lower Body Dressing Assessment/Training    LB Dressing Assess/Train, [place]  minimum assist (75% patient effort);moderate assist (50% patient effort)   reacher dressing stick in supine long sitting  -LM     Recorded by  [LM] NAV SmithA/L     Grooming Assessment/Training    Grooming Assess/Train, [place] Level  independent  -LM     Recorded by  [LM] NVA SmithA/L     Balance Skills Training    Sitting-Balance Activities Forward lean;Reaching for objects;Reaching across midline  -RW      Recorded by [RW] Balaji Malhotra PTA      Therapy Exercises    Right Lower Extremity AROM:;ankle pumps/circles;20 reps;supine  -RW      Left Lower Extremity AROM:;heel slides;supine;20 reps  -RW      Bilateral Lower Extremities AROM:;20 reps;supine;glut sets;quad sets  -RW      Bilateral Upper Extremity   AROM:   UE bke x 15 min B UE only   -LM    BUE  Resistance   manual resistance- minimal   ther ex with 3 lb wt elbowf elx   -LM    Recorded by [RW] Balaji Malhotra PTA  [LM] NAV SmithA/L    Positioning and Restraints    Pre-Treatment Position in bed  -RW in bed  -LM sitting in chair/recliner  -LM    Post Treatment Position bsc  -RW bed  -LM bed  -LM    On BS commode call light within reach;encouraged to call for assist  -RW      Recorded by [RW] Balaji Malhotra PTA [LM] IRASEMA Smith/L [LM] IRASEMA Smith/FREEDOM      09/13/17 1012 09/12/17 1455 09/12/17 1028    Rehab Assessment/Intervention    Discipline physical therapy assistant  -RW occupational therapy assistant  -RC physical therapy assistant  -TA    Document Type therapy note (daily note)  -RW therapy note (daily note)  -RC therapy note (daily note)  -TA    Subjective Information agree to therapy  -RW agree to therapy  -RC agree to therapy  -TA    Patient Effort, Rehab Treatment good  -RW good  -RC good  -TA    Symptoms Noted Comment   BLE non weight bearing,knee immobilizer R LE  -TA    Precautions/Limitations non-weight bearing status   brace on at all times  -RW fall precautions;non-weight bearing status;brace on when up   brace on all time elevate r lle all time l le after up 3o mi  -RC fall precautions;non-weight bearing status   Non weight bearing B LE  -TA    Specific Treatment Considerations  --   in w/c only 2 hr at a time, keep r knee straght  -RC     Recorded by [RW] Balaji Malhotra PTA [RC] IRASEMA Herring/FREEDOM [TA] Geri Juan PTA    Vital Signs    Pre Systolic BP Rehab   146  -TA    Pre Treatment Diastolic BP   73  -TA    Post Systolic BP Rehab   147  -TA    Post Treatment Diastolic BP   70  -TA    Pretreatment Heart Rate (beats/min)   102  -TA    Intratreatment Heart Rate (beats/min)   96  -TA    Posttreatment Heart Rate (beats/min)   103  -TA    Pre SpO2 (%)   98  -TA    O2 Delivery Pre Treatment   room air  -TA    Intra SpO2 (%)   98  -TA    Post SpO2 (%)    97  -TA    Pre Patient Position   Supine  -TA    Post Patient Position   Supine  -TA    Recorded by   [TA] Geri Juan PTA    Pain Assessment    Pain Assessment   0-10  -TA    Pain Score --   c/o occ pain in left flank  -RW 0  -RC 0  -TA    Post Pain Score  0  -RC 6  -TA    Pain Type   Surgical pain  -TA    Pain Location   Knee  -TA    Pain Orientation   Right  -TA    Recorded by [RW] Balaji Malhotra PTA [RC] Shruthi Jean-Baptiste, VILLALPANDO/L [TA] Geri Juan PTA    Cognitive Assessment/Intervention    Current Cognitive/Communication Assessment functional  -RW  functional  -TA    Orientation Status oriented x 4  -RW  oriented x 4  -TA    Follows Commands/Answers Questions 100% of the time  -RW  100% of the time  -TA    Personal Safety   mild impairment  -TA    Personal Safety Interventions gait belt  -RW  fall prevention program maintained  -TA    Recorded by [RW] Balaji Malhotra PTA  [TA] Geri Juan PTA    Mobility Assessment/Training    Left Lower Extremity Weight-Bearing non weight-bearing  -RW      Right Lower Extremity Weight-Bearing non weight-bearing  -RW      Recorded by [RW] Balaji Malhotra PTA      Bed Mobility, Assessment/Treatment    Bed Mobility, Assistive Device bed rails;head of bed elevated  -RW  bed rails;head of bed elevated;overhead trapeze  -TA    Bed Mobility, Roll Left, [place]   minimum assist (75% patient effort);nonverbal cues required (demo/gesture)   x 3  -TA    Bed Mobility, Roll Right, [place]   minimum assist (75% patient effort);verbal cues required   x 3  -TA    Bed Mobility, Scoot/Bridge, [place]   contact guard assist  -TA    Bed Mob, Supine to Sit, [place] supervision required  -RW  minimum assist (75% patient effort)   for R LE x 2  -TA    Bed Mob, Sit to Supine, [place] supervision required  -RW  minimum assist (75% patient effort)    for  R LE x 2  -TA    Bed Mobility, Comment   pt Assisted to EOB x 2, pt's R  LE supported by PTA  -TA    Recorded by  [RW] Balaji Malhotra PTA  [TA] Geri Juan PTA    Transfer Assessment/Treatment    Transfers, Bed-Chair [place] minimum assist (75% patient effort);contact guard assist  -RW      Transfers, Chair-Bed [place] minimum assist (75% patient effort);contact guard assist  -RW minimum assist (75% patient effort)  -RC     Transfers, Bed-Chair-Bed, Assist Device sliding board  -RW sliding board  -RC     Transfer, Comment practiced 2-3 times using leg lift strap o right leg  -RW      Recorded by [RW] Balaji Malhotra PTA [RC] NAV HerringA/L     Gait Assessment/Treatment    Gait, [place] Level not appropriate to assess  -RW      Recorded by [RW] Balaji Malhotra PTA      Stairs Assessment/Treatment    Stairs, [place] Level not appropriate to assess  -RW      Recorded by [RW] Balaji Malhotra PTA      Wheelchair Training/Management    Wheelchair Transfer Type lateral transfer  -RW --   sliding board to l side  -RC     Wheelchair Task Training Comment  --   requires someont to hold r le and vc's  -RC     Wheelchair, Distance Propelled 150 ind  -  -RC     Recorded by [RW] Balaji Malhotra PTA [RC] NAV HerringA/L     Therapy Exercises    Right Lower Extremity AROM:;15 reps;sitting;ankle pumps/circles;quad sets;glut sets  -RW  AROM:;20 reps;ankle pumps/circles;supine   2 sets  -TA    Left Lower Extremity AROM:;15 reps;sitting;glut sets;quad sets  -RW  AROM:;20 reps;quad sets;supine   2 sets  -TA    Bilateral Lower Extremities   AROM:;20 reps;supine;glut sets   2 sets  -TA    Bilateral Upper Extremity  AROM:;15 reps  -RC     BUE Resistance  theraband   rowing blue band 2 sets  -RC     Trunk Exercises   supine;10 reps   pull-ups  with trapeze bar  -TA    Recorded by [RW] Balaji Malhotra PTA [RC] IRASEMA Herring/L [TA] Geri Juan PTA    Positioning and Restraints    Pre-Treatment Position in bed  -RW sitting in chair/recliner  -RC in bed  -TA    Post Treatment Position wheelchair   -RW bed  -RC bed  -TA    In Bed  call light within reach;encouraged to call for assist;with nsg  -RC supine;call light within reach;with family/caregiver  -TA    Recorded by [RW] Balaji Malhotra, PTA [RC] Shruthi Jean-Baptiste, VILLALPANDO/L [TA] Geri Juan, DARIEN      User Key  (r) = Recorded By, (t) = Taken By, (c) = Cosigned By    Initials Name Effective Dates    TA Geri Juan, PTA 10/17/16 -     RW Balaji Malhotra, DARIEN 10/17/16 -     RC Shruthi Jean-Baptiste, VILLALPANDO/L 10/17/16 -     LM aMrce Carter, VILLALPANDO/L 10/17/16 -                 OT Goals       09/14/17 1413 09/13/17 1552 09/12/17 1455    Bed Mobility OT LTG    Bed Mobility OT LTG, Date Goal Reviewed 09/14/17  -LM 09/13/17  -LM 09/12/17  -RC    Bed Mobility OT LTG, Outcome goal met  -LM goal ongoing  -LM goal ongoing  -RC    Transfer Training OT LTG    Transfer Training OT LTG, Date Goal Reviewed 09/14/17  -LM 09/13/17  -LM 09/12/17  -    Transfer Training OT LTG, Outcome goal met  -LM goal ongoing  -LM goal ongoing  -RC    Patient Education OT LTG    Patient Education OT LTG, Date Goal Reviewed 09/14/17  -LM 09/13/17  -LM 09/12/17  -    Patient Education OT LTG Outcome goal ongoing  -LM goal ongoing  -LM goal ongoing  -RC    ADL OT LTG    ADL OT LTG, Date Goal Reviewed 09/14/17  -LM 09/13/17  -LM 09/12/17  -RC    ADL OT LTG, Outcome goal ongoing  -LM goal ongoing  -LM goal ongoing  -RC      09/12/17 0755 09/11/17 1609 09/11/17 1333    Bed Mobility OT LTG    Bed Mobility OT LTG, Date Established 09/12/17  -BH (r) SP (t) BH (c)      Bed Mobility OT LTG, Time to Achieve by discharge  -BH (r) SP (t) BH (c)      Bed Mobility OT LTG, Activity Type all bed mobility  -BH (r) SP (t) BH (c)      Bed Mobility OT LTG, [place] Level supervision required;minimum assist (75% patient effort)   AE/adaptive techniques- RLE in knee immobilizer and extended  -BH (r) SP (t) BH (c)      Transfer Training OT LTG    Transfer Training OT LTG, Date Established 09/12/17  -BH (r) SP (t)  BH (c)  09/11/17  -RB    Transfer Training OT LTG, Time to Achieve by discharge  -BH (r) SP (t) BH (c)  by discharge  -RB    Transfer Training OT LTG, Activity Type --   BSC, w/c  -BH (r) SP (t) BH (c)  all transfers  -RB    Transfer Training OT LTG, [place] Level minimum assist (75% patient effort);contact guard assist   RLE in knee immobilizer and extended at all times.  -BH (r) SP (t) BH (c)  supervision required;contact guard assist  -RB    Transfer Training OT LTG, Assist Device   other (see comments)   Sliding board if needed.  -RB    Transfer Training OT LTG, Date Goal Reviewed  09/04/17  -SG     Transfer Training OT LTG, Outcome  goal not met  -SG     Transfer Training OT LTG, Reason Goal Not Met  discharged from facility  -SG     Strength OT LTG    Strength Goal OT LTG, Date Established   09/11/17  -RB    Strength Goal OT LTG, Time to Achieve   by discharge  -RB    Strength Goal OT LTG, Measure to Achieve   2 sets of 10 reps all planes with 3-4 lb dumbells for B UE strength to increase independence with functional mobility/transfers.  -RB    Strength Goal OT LTG, Date Goal Reviewed  09/11/17  -SG     Strength Goal OT LTG, Outcome  goal not met  -SG     Strength Goal OT LTG, Reason Goal Not Met  discharged from facility  -     Dynamic Sitting Balance OT LTG    Dynamic Sitting Balance OT LTG, Date Established   09/11/17  -RB    Dynamic Sitting Balance OT LTG, Time to Achieve   by discharge  -RB    Dynamic Sitting Balance OT LTG, [place] Level   supervision required   10-15 minutes with functional activity.  -RB    Dynamic Sitting Balance OT LTG, Assist Device   bed rails  -RB    Dynamic Sitting Balance OT LTG, Date Goal Reviewed  09/11/17  -SG     Dynamic Sitting Balance OT LTG, Outcome  goal not met  -SG     Dynamic Sitting Balance OT LTG, Reason Goal Not Met  discharged from facility  -     Patient Education OT LTG    Patient Education OT LTG, Date Established 09/12/17  -BH (r) SP (t) BH (c)       Patient Education OT LTG, Time to Achieve by discharge  - (r) SP (t) BH (c)      Patient Education OT LTG, Education Type HEP;precautions per surgeon;positioning;posture/body mechanics;home safety;adaptive equipment mgmt  -BH (r) SP (t) BH (c)      Patient Education OT LTG, Education Understanding independent;demonstrates adequately;verbalizes understanding  -BH (r) SP (t) BH (c)      ADL OT LTG    ADL OT LTG, Date Established 09/12/17  -BH (r) SP (t) BH (c)  09/11/17  -RB    ADL OT LTG, Time to Achieve by discharge  - (r) SP (t) BH (c)  by discharge  -RB    ADL OT LTG, Activity Type ADL skills  - (r) SP (t)  (c)  ADL skills   Sponge bath and dress.  -RB    ADL OT LTG, [place] Level   contact guard;min assist  -RB    ADL OT LTG, Additional Goal set-up with self-feeding, grooming, UB dressing/bathing; min A with toileting; mod A with LB dressing/bathing  - (r) SP (t) BH (c)      ADL OT LTG, Date Goal Reviewed  09/11/17  -SG     ADL OT LTG, Outcome  goal not met  -SG     ADL OT LTG, Reason Goal Not Met  discharged from facility  -       User Key  (r) = Recorded By, (t) = Taken By, (c) = Cosigned By    Initials Name Provider Type    RB Axel Perez, OT Occupational Therapist     Isabel Jones, OTR/L Occupational Therapist    MARGE Jean-Baptiste, VILLALPANDO/L Occupational Therapy Assistant    YOEL Carter VILLALPANDO/L Occupational Therapy Assistant    DANY Juárez, OT Student OT Student    SAHARA Rai OT Occupational Therapist          Occupational Therapy Education     Title: PT OT SLP Therapies (Active)     Topic: Occupational Therapy (Active)     Point: ADL training (Done)    Description: Instruct learner(s) on proper safety adaptation and remediation techniques during self care or transfers.   Instruct in proper use of assistive devices.    Learning Progress Summary    Learner Readiness Method Response Comment Documented by Status   Patient Acceptance E VU Educated on OT and POC.  Educated about adhering to weight bearing precautions. Educated about safety with ADL and bed mobility. SP 09/12/17 1159 Done               Point: Precautions (Done)    Description: Instruct learner(s) on prescribed precautions during self-care and functional transfers.    Learning Progress Summary    Learner Readiness Method Response Comment Documented by Status   Patient Acceptance E VU Educated on OT and POC. Educated about adhering to weight bearing precautions. Educated about safety with ADL and bed mobility. SP 09/12/17 1159 Done               Point: Body mechanics (Done)    Description: Instruct learner(s) on proper positioning and spine alignment during self-care, functional mobility activities and/or exercises.    Learning Progress Summary    Learner Readiness Method Response Comment Documented by Status   Patient Acceptance E VU Educated on OT and POC. Educated about adhering to weight bearing precautions. Educated about safety with ADL and bed mobility. SP 09/12/17 1159 Done                      User Key     Initials Effective Dates Name Provider Type Discipline     01/31/17 -  Kenya Juárez OT Student OT Student OT                  OT Recommendation and Plan  Anticipated Equipment Needs At Discharge: hospital bed  Anticipated Discharge Disposition: home with /7 care, home with home health  Planned Therapy Interventions: activity intolerance, adaptive equipment training, ADL retraining, bed mobility training, balance training, energy conservation, fine motor coordination training, home exercise program, motor coordination training, strengthening, transfer training  Therapy Frequency: other (see comments) (3-14x/wk)  Plan of Care Review  Plan Of Care Reviewed With: patient  Progress: improving  Outcome Summary/Follow up Plan: improving toward all goals        Outcome Measures       09/13/17 1100 09/12/17 1500 09/12/17 1355    How much help from another person do you currently need...    Turning from  your back to your side while in flat bed without using bedrails? 3  -RW 3  -TA 3  -LM    Moving from lying on back to sitting on the side of a flat bed without bedrails? 3  -RW 3  -TA 3  -LM    Moving to and from a bed to a chair (including a wheelchair)? 3  -RW 3  -TA 3  -LM    Standing up from a chair using your arms (e.g., wheelchair, bedside chair)? 1  -RW 1  -TA 1  -LM    Climbing 3-5 steps with a railing? 1  -RW 1  -TA 1  -LM    To walk in hospital room? 1  -RW 1  -TA 1  -LM    AM-PAC 6 Clicks Score 12  -RW 12  -TA 12  -LM    Functional Assessment    Outcome Measure Options  AM-PAC 6 Clicks Basic Mobility (PT)  -TA AM-PAC 6 Clicks Basic Mobility (PT)  -LM      09/12/17 0755 09/11/17 1500       How much help from another person do you currently need...    Turning from your back to your side while in flat bed without using bedrails?  3  -LM     Moving from lying on back to sitting on the side of a flat bed without bedrails?  3  -LM     Moving to and from a bed to a chair (including a wheelchair)?  3  -LM     Standing up from a chair using your arms (e.g., wheelchair, bedside chair)?  1  -LM     Climbing 3-5 steps with a railing?  1  -LM     To walk in hospital room?  1  -LM     AM-PAC 6 Clicks Score  12  -LM     How much help from another is currently needed...    Putting on and taking off regular lower body clothing? 1  -BH (r) SP (t) BH (c)      Bathing (including washing, rinsing, and drying) 2  -BH (r) SP (t) BH (c)      Toileting (which includes using toilet bed pan or urinal) 2  -BH (r) SP (t) BH (c)      Putting on and taking off regular upper body clothing 3  -BH (r) SP (t) BH (c)      Taking care of personal grooming (such as brushing teeth) 3  -BH (r) SP (t) BH (c)      Eating meals 4  -BH (r) SP (t) BH (c)      Score 15  -BH (r) SP (t)      Functional Assessment    Outcome Measure Options AM-PAC 6 Clicks Daily Activity (OT)  -NATALIA (r) SP (t) NATALIA (c) AM-PAC 6 Clicks Basic Mobility (PT)  -LM       User Key   (r) = Recorded By, (t) = Taken By, (c) = Cosigned By    Initials Name Provider Type    LM Isela Chris, PT Physical Therapist    BH Isabel Jones, OTR/L Occupational Therapist    TA Geri Juan, PTA Physical Therapy Assistant    RW Balaji Malhotra, PTA Physical Therapy Assistant    SP Kenya Juárez, OT Student OT Student           Time Calculation:         Time Calculation- OT       09/14/17 1405 09/14/17 1319       Time Calculation- OT    OT Start Time 1315  -LM 1005  -LM     OT Stop Time 1400  -LM 1115  -LM     OT Time Calculation (min) 45 min  -LM 70 min  -LM     Total Timed Code Minutes- OT 45 minute(s)  -LM 70 minute(s)  -LM     OT Received On 09/14/17  -LM 09/14/17  -LM       User Key  (r) = Recorded By, (t) = Taken By, (c) = Cosigned By    Initials Name Provider Type    LM Marce Carter VILLALPANDO/L Occupational Therapy Assistant           Therapy Charges for Today     Code Description Service Date Service Provider Modifiers Qty    14788863155 HC OT THERAPEUTIC ACT EA 15 MIN 9/13/2017 Marce Carter VILLALPANDO/L GO 2    12241490214 HC OT THER PROC EA 15 MIN 9/13/2017 Marce Carter VILLALPANDO/L GO 2    51826608593 HC OT SELF CARE/MGMT/TRAIN EA 15 MIN 9/14/2017 Marce Carter VILLALPANDO/L GO 1    32171082123 HC OT THER PROC EA 15 MIN 9/14/2017 Marce Carter VILLALPANDO/L GO 3    44501458965 HC OT THERAPEUTIC ACT EA 15 MIN 9/14/2017 Marce Carter VILLALPANDO/L GO 1    29245382884 HC OT THER PROC EA 15 MIN 9/14/2017 Marce Carter VILLALPANDO/L GO 3          OT G-codes  OT Professional Judgement Used?: Yes  OT Functional Scales Options: AM-PAC 6 Clicks Daily Activity (OT)  Score: 15  Functional Limitation: Self care  Self Care Current Status (): At least 40 percent but less than 60 percent impaired, limited or restricted  Self Care Goal Status (): At least 20 percent but less than 40 percent impaired, limited or restricted    IRASEMA Smith/FREEDOM  9/14/2017

## 2017-09-14 NOTE — PLAN OF CARE
Problem: Patient Care Overview (Adult)  Goal: Plan of Care Review  Outcome: Ongoing (interventions implemented as appropriate)    09/14/17 1413   Coping/Psychosocial Response Interventions   Plan Of Care Reviewed With patient   Patient Care Overview   Progress improving   Outcome Evaluation   Outcome Summary/Follow up Plan improving toward all goals         Problem: Inpatient Occupational Therapy  Goal: Bed Mobility Goal LTG- OT  Outcome: Outcome(s) achieved Date Met:  09/14/17 09/14/17 1413   Bed Mobility OT LTG   Bed Mobility OT LTG, Date Goal Reviewed 09/14/17   Bed Mobility OT LTG, Outcome goal met         09/14/17 1413   Bed Mobility OT LTG   Bed Mobility OT LTG, Date Goal Reviewed 09/14/17   Bed Mobility OT LTG, Outcome goal met       Goal: Transfer Training Goal 1 LTG- OT  Outcome: Outcome(s) achieved Date Met:  09/14/17 09/14/17 1413   Transfer Training OT LTG   Transfer Training OT LTG, Date Goal Reviewed 09/14/17   Transfer Training OT LTG, Outcome goal met       Goal: Patient Education Goal LTG- OT  Outcome: Ongoing (interventions implemented as appropriate)    09/14/17 1413   Patient Education OT LTG   Patient Education OT LTG, Date Goal Reviewed 09/14/17   Patient Education OT LTG Outcome goal ongoing       Goal: ADL Goal LTG- OT  Outcome: Ongoing (interventions implemented as appropriate)    09/14/17 1413   ADL OT LTG   ADL OT LTG, Date Goal Reviewed 09/14/17   ADL OT LTG, Outcome goal ongoing

## 2017-09-15 LAB
GLUCOSE BLDC GLUCOMTR-MCNC: 144 MG/DL (ref 70–130)
GLUCOSE BLDC GLUCOMTR-MCNC: 178 MG/DL (ref 70–130)
GLUCOSE BLDC GLUCOMTR-MCNC: 216 MG/DL (ref 70–130)
INR PPP: 2.36 (ref 0.8–1.2)
PROTHROMBIN TIME: 26 SECONDS (ref 11.1–15.3)

## 2017-09-15 PROCEDURE — 85610 PROTHROMBIN TIME: CPT | Performed by: FAMILY MEDICINE

## 2017-09-15 PROCEDURE — 99024 POSTOP FOLLOW-UP VISIT: CPT | Performed by: PODIATRIST

## 2017-09-15 PROCEDURE — 97542 WHEELCHAIR MNGMENT TRAINING: CPT

## 2017-09-15 PROCEDURE — 99232 SBSQ HOSP IP/OBS MODERATE 35: CPT | Performed by: FAMILY MEDICINE

## 2017-09-15 PROCEDURE — 97530 THERAPEUTIC ACTIVITIES: CPT

## 2017-09-15 PROCEDURE — 82962 GLUCOSE BLOOD TEST: CPT

## 2017-09-15 PROCEDURE — 97110 THERAPEUTIC EXERCISES: CPT

## 2017-09-15 PROCEDURE — 97535 SELF CARE MNGMENT TRAINING: CPT

## 2017-09-15 RX ADMIN — FAMOTIDINE 40 MG: 40 TABLET ORAL at 08:22

## 2017-09-15 RX ADMIN — METFORMIN HYDROCHLORIDE 500 MG: 500 TABLET, EXTENDED RELEASE ORAL at 17:19

## 2017-09-15 RX ADMIN — POTASSIUM CITRATE 10 MEQ: 10 TABLET ORAL at 08:22

## 2017-09-15 RX ADMIN — WARFARIN SODIUM 3 MG: 3 TABLET ORAL at 17:20

## 2017-09-15 RX ADMIN — SENNOSIDES AND DOCUSATE SODIUM 1 TABLET: 8.6; 5 TABLET ORAL at 08:22

## 2017-09-15 RX ADMIN — ATORVASTATIN CALCIUM 10 MG: 10 TABLET, FILM COATED ORAL at 08:22

## 2017-09-15 RX ADMIN — Medication 25 MG: at 20:04

## 2017-09-15 RX ADMIN — HYDROCODONE BITARTRATE AND ACETAMINOPHEN 1 TABLET: 10; 325 TABLET ORAL at 08:22

## 2017-09-15 RX ADMIN — FERROUS SULFATE TAB EC 324 MG (65 MG FE EQUIVALENT) 324 MG: 324 (65 FE) TABLET DELAYED RESPONSE at 08:22

## 2017-09-15 RX ADMIN — HYDROCHLOROTHIAZIDE 25 MG: 25 TABLET ORAL at 08:22

## 2017-09-15 RX ADMIN — FINASTERIDE 5 MG: 5 TABLET, FILM COATED ORAL at 20:04

## 2017-09-15 RX ADMIN — Medication 1 TABLET: at 08:22

## 2017-09-15 RX ADMIN — POTASSIUM CITRATE 10 MEQ: 10 TABLET ORAL at 17:19

## 2017-09-15 NOTE — THERAPY TREATMENT NOTE
Acute Care - Occupational Therapy Treatment Note  [facility]     Patient Name: Vangie Lopez  : 1950  MRN: [id number]  Today's Date: 9/15/2017  Onset of Illness/Injury or Date of Surgery Date: 17  Date of Referral to OT: 17  Referring Physician: Dr. Stephen      Admit Date: 2017    Visit Dx:     ICD-10-CM ICD-9-CM   1. Multiple trauma T07 959.8   2. Abnormality of gait and mobility R26.9 781.2   3. Muscle weakness (generalized) M62.81 728.87   4. Impaired mobility and ADLs Z74.09 799.89   5. Closed displaced comminuted fracture of right patella with routine healing, subsequent encounter S82.041D V54.16   6. Closed displaced fracture of navicular bone of left foot with routine healing, subsequent encounter S92.252D V54.19     Patient Active Problem List   Diagnosis   • Encounter for screening for malignant neoplasm of colon   • Closed displaced comminuted fracture of right patella   • Closed displaced fracture of navicular bone of left foot   • Closed dislocation of navicular bone of left foot   • Patella fracture   • Essential hypertension   • Mixed hyperlipidemia   • Osteoarthritis of hands, bilateral   • BPH (benign prostatic hyperplasia)   • Glaucoma   • Hyperlipidemia   • Closed fracture of navicular bone with dislocation of perilunate joint of left wrist   • Fracture of patella, right, closed   • Multiple trauma   • Encounter for rehabilitation   • History of kidney stones             Adult Rehabilitation Note       09/15/17 0815 17 1440 17 1315    Rehab Assessment/Intervention    Discipline occupational therapy assistant  -KD physical therapy assistant  -RW occupational therapy assistant  -LM    Document Type therapy note (daily note)  -KD therapy note (daily note)  -RW therapy note (daily note)  -LM    Subjective Information agree to therapy  -KD agree to therapy  -RW agree to therapy  -LM    Patient Effort, Rehab Treatment  good  -RW good  -LM     Precautions/Limitations  non-weight bearing status  -RW     Recorded by [KD] NAV CamachoA/L [RW] Balaji Malhotra PTA [LM] NAV SmithA/L    Vital Signs    Pre SpO2 (%) 97  -KD      O2 Delivery Pre Treatment room air  -KD      Post SpO2 (%) 98  -KD      O2 Delivery Post Treatment room air  -KD      Pre Patient Position Supine  -KD      Intra Patient Position Sitting  -KD      Recorded by [KD] NAV CamachoA/L      Pain Assessment    Pain Assessment 0-10  -KD No/denies pain  -RW No/denies pain  -LM    Pain Score 6  -KD      Post Pain Score 8  -KD      Pain Type Surgical pain  -KD      Pain Location Knee   L foot  -KD      Pain Orientation Right  -KD      Pain Intervention(s) Medication (See MAR)  -KD      Recorded by [KD] NAV CamachoA/L [RW] Balaji Malhotra PTA [LM] NAV SmithA/L    Vision Assessment/Intervention    Visual Impairment   WNL  -LM    Recorded by   [LM] NAV SmithA/L    Cognitive Assessment/Intervention    Current Cognitive/Communication Assessment functional  -KD functional  -RW functional  -LM    Orientation Status oriented x 4  -KD oriented x 4  -RW oriented x 4  -LM    Follows Commands/Answers Questions 100% of the time  -% of the time  -% of the time  -LM    Personal Safety WNL/WFL  -KD  WNL/WFL  -LM    Personal Safety Interventions  gait belt  -RW     Recorded by [KD] NAV CamachoA/FREEDOM [RW] Balaji Malhotra PTA [LM] NAV SmithA/L    Mobility Assessment/Training    Left Lower Extremity Weight-Bearing  non weight-bearing  -RW     Right Lower Extremity Weight-Bearing  non weight-bearing  -RW     Recorded by  [RW] Balaji Malhotra PTA     Bed Mobility, Assessment/Treatment    Bed Mobility, Assistive Device head of bed elevated;bed rails  -KD      Bed Mob, Supine to Sit, [place] conditional independence  -KD      Bed Mob, Sidelying to Sit, [place] conditional independence  -KD      Recorded by [KD] Virgie GLEZ  IRASEMA Ray/L      Transfer Assessment/Treatment    Transfers, Bed-Chair [place] set up required  -KD      Transfers, Bed-Chair-Bed, Assist Device sliding board  -KD      Recorded by [KD] ELY Camacho      Gait Assessment/Treatment    Gait, [place] Level  not appropriate to assess  -RW     Recorded by  [RW] Balaji Malhotra, PTA     Stairs Assessment/Treatment    Stairs, [place] Level  not appropriate to assess  -RW     Recorded by  [RW] Balaji Malhotra, PTA     Wheelchair Training/Management    Wheelchair Propulsion Training  incline  -RW     Wheelchair Propulsion Training Comment --   self propelled  -KD      Wheelchair, Distance Propelled 275  -*  -RW     Wheelchair Training Comment  wc mob up and down ramp ind  -RW     Recorded by [KD] IRASEMA Camacho/FREEDOM [RW] Balaji Malhotra, PTA     Upper Body Bathing Assessment/Training    UB Bathing Assess/Train Assistive Device bath mitt  -KD      UB Bathing Assess/Train, Position sitting  -KD      UB Bathing Assess/Train, [place] Level set up required  -KD      Recorded by [KD] IRASEMA Camacho/L      Lower Body Bathing Assessment/Training    LB Bathing Assess/Train Assistive Device bath mitt  -KD      LB Bathing Assess/Train, Position sitting  -KD      LB Bathing Assess/Train, [place] Level set up required  -KD      Recorded by [KD] IRASEMA Camacho/L      Upper Body Dressing Assessment/Training    UB Dressing Assess/Train, Clothing Type donning:;pull over  -KD      UB Dressing Assess/Train, Position sitting  -KD      UB Dressing Assess/Train, [place] set up required  -KD      Recorded by [KD] IRASEMA Camacho/L      Lower Body Dressing Assessment/Training    LB Dressing Assess/Train, Clothing Type doffing:;donning:;pants;shorts;slipper socks  -KD      LB Dressing Assess/Train, Position supine;sitting  -KD      LB Dressing Assess/Train, [place] set up required  -KD      Recorded by [KD] IRASEMA Camacho/FREEDOM       Grooming Assessment/Training    Grooming Assess/Train, Assistive Device --   brush teeth/ shave  -KD      Grooming Assess/Train, Position long sitting  -KD      Grooming Assess/Train, [place] Level set up required  -KD      Recorded by [KD] IRASEMA Camacho/L      Balance Skills Training    Sitting-Balance Activities  Forward lean;Reaching for objects;Reaching across midline  -RW     Recorded by  [RW] Balaji Malhotra PTA     Therapy Exercises    Left Lower Extremity  AROM:;20 reps;sitting;knee flexion;LAQ  -RW     LLE Resistance  theraband  -RW     Bilateral Lower Extremities  AROM:;20 reps;glut sets;quad sets;sitting  -RW     Bilateral Upper Extremity AROM:;20 reps;sitting;elbow flexion/extension;hand pumps;pronation/supination;shoulder abduction/adduction;shoulder extension/flexion;shoulder ER/IR  -KD AROM:;20 reps;sitting   t band rows with blue tb 2 sets  -RW AROM:;20 reps;elbow flexion/extension;shoulder abduction/adduction;shoulder extension/flexion;shoulder horizontal abd/add;shoulder protraction/retraction  -LM    BUE Resistance manual resistance- minimal   5lb HW; 2 sets  -KD      Recorded by [KD] IRASEMA Camacho/FREEDOM [RW] Balaji Malhotra PTA [LM] IRASEMA Smith/L    Positioning and Restraints    Pre-Treatment Position in bed  -KD sitting in chair/recliner  -RW in bed  -LM    Post Treatment Position wheelchair  -KD wheelchair  -RW bed  -LM    In Wheelchair sitting;call light within reach;encouraged to call for assist;exit alarm on  -KD      Recorded by [KD] IRASEMA Camacho/FREEDOM [RW] Balaji Malhotra, PTA [LM] IRASEMA Smith/L      09/14/17 1115 09/14/17 1005 09/13/17 1440    Rehab Assessment/Intervention    Discipline physical therapy assistant  -RW occupational therapy assistant  -LM physical therapy assistant  -RW    Document Type therapy note (daily note)  -RW therapy note (daily note)  -LM therapy note (daily note)  -RW    Subjective Information agree to therapy  -RW agree to  therapy  -LM agree to therapy  -RW    Patient Effort, Rehab Treatment good  -RW good  -LM good  -RW    Precautions/Limitations non-weight bearing status  -RW  non-weight bearing status;brace on when up  -RW    Recorded by [RW] Balaji Malhotra PTA [LM] Marce Carter, V[place]/L [RW] Balaji Malhotra PTA    Pain Assessment    Pain Assessment --   c/o wrist pain left.  -RW No/denies pain  -LM No/denies pain  -RW    Pain Location Wrist  -RW      Pain Orientation Left  -RW      Recorded by [RW] Balaji Malhotra PTA [LM] Marce Carter, [place]/L [RW] Balaji Malhotra PTA    Cognitive Assessment/Intervention    Current Cognitive/Communication Assessment functional  -RW functional  -LM functional  -RW    Orientation Status oriented x 4  -RW oriented x 4  -LM oriented x 4  -RW    Follows Commands/Answers Questions 100% of the time  -% of the time  -% of the time  -RW    Personal Safety Interventions gait belt  -RW  gait belt  -RW    Recorded by [RW] Balaji Malhotra PTA [LM] Marce Carter, [place]/L [RW] Balaji Malhotra PTA    Mobility Assessment/Training    Left Lower Extremity Weight-Bearing non weight-bearing  -RW  non weight-bearing  -RW    Right Lower Extremity Weight-Bearing non weight-bearing  -RW  non weight-bearing  -RW    Recorded by [RW] Balaji Malhotra PTA  [RW] Balaji Malhotra PTA    Bed Mobility, Assessment/Treatment    Bed Mobility, Assistive Device head of bed elevated  -RW  bed rails;head of bed elevated  -RW    Bed Mob, Supine to Sit, [place] conditional independence;independent  -RW  conditional independence  -RW    Bed Mob, Sit to Supine, [place] independent;conditional independence  -RW      Recorded by [RW] Balaji Malhotra PTA  [RW] Balaji Malhotra PTA    Transfer Assessment/Treatment    Transfers, Bed-Chair [place] set up required  -RW minimum assist (75% patient effort)  -LM contact guard assist  -RW    Transfers, Chair-Bed [place] set up required  -RW minimum assist (75%  patient effort)  -LM     Transfers, Bed-Chair-Bed, Assist Device   sliding board  -RW    Toilet Transfer, [place]   contact guard assist  -RW    Toilet Transfer, Assistive Device   bedside commode with drop arms   sliding bd  -RW    Recorded by [RW] Balaji Malhotra PTA [LM] Marce Carter V[place]/L [RW] Balaji Malhotra PTA    Gait Assessment/Treatment    Gait, [place] Level not appropriate to assess  -RW  not appropriate to assess  -RW    Recorded by [RW] Balaji Malhotra PTA  [RW] Balaji Malhotra PTA    Stairs Assessment/Treatment    Stairs, [place] Level not appropriate to assess  -RW  not appropriate to assess  -RW    Recorded by [RW] Balaji Malhotra PTA  [RW] Balaji Malhotra PTA    Wheelchair Training/Management    Wheelchair, Distance Propelled  100  -LM     Recorded by  [LM] Marce Carter VILL[place]O/L     Upper Body Dressing Assessment/Training    UB Dressing Assess/Train, [place]  set up required  -LM     Recorded by  [LM] NAV SmithA/L     Lower Body Dressing Assessment/Training    LB Dressing Assess/Train, [place]  independent  -LM     Recorded by  [LM] Marce Carter VILL[place]O/L     Balance Skills Training    Sitting-Balance Activities   Forward lean;Reaching for objects;Reaching across midline  -RW    Recorded by   [RW] Balaji Malhotra PTA    Therapy Exercises    Right Lower Extremity AROM:;ankle pumps/circles;20 reps;supine  -RW  AROM:;ankle pumps/circles;20 reps;supine  -RW    Left Lower Extremity AROM:;heel slides;supine;20 reps  -RW  AROM:;heel slides;supine;20 reps  -RW    Bilateral Lower Extremities AROM:;20 reps;supine;glut sets;quad sets  -RW  AROM:;20 reps;supine;glut sets;quad sets  -RW    Bilateral Upper Extremity  AROM:;20 reps;elbow flexion/extension;shoulder rolls/shrugs;shoulder extension/flexion;shoulder protraction/retraction;shoulder abduction/adduction   cable column all planes B UE   -LM     Recorded by [RW] Balaji Malhotra PTA [LM] Marce Carter,  [place]/L [RW] Balaji Malhotra PTA    Positioning and Restraints    Pre-Treatment Position sitting in chair/recliner  -RW sitting in chair/recliner  -LM in bed  -RW    Post Treatment Position wheelchair  -RW wheelchair  -LM bsc  -RW    In Wheelchair call light within reach  -RW      On BS commode   call light within reach;encouraged to call for assist  -RW    Recorded by [RW] Balaji Malhotra PTA [LM] IRASEMA Smith/L [RW] Balaji Malhotra, DARIEN      09/13/17 1305 09/13/17 1300 09/13/17 1012    Rehab Assessment/Intervention    Discipline occupational therapy assistant  -YOEL occupational therapy assistant  -YOEL physical therapy assistant  -RW    Document Type therapy note (daily note)  -LM therapy note (daily note)  -LM therapy note (daily note)  -RW    Subjective Information agree to therapy  -LM agree to therapy  -LM agree to therapy  -RW    Patient Effort, Rehab Treatment good  -LM good  -LM good  -RW    Precautions/Limitations   non-weight bearing status   brace on at all times  -RW    Recorded by [LM] NAV SmithA/L [LM] NAV SmithA/L [RW] Balaji Malhotra PTA    Pain Assessment    Pain Assessment No/denies pain  -LM 0-10  -LM     Pain Score   --   c/o occ pain in left flank  -RW    Recorded by [LM] NAV SmithA/L [LM] NAV SmithA/L [RW] Balaji Malhotra PTA    Cognitive Assessment/Intervention    Current Cognitive/Communication Assessment functional  -LM functional  -LM functional  -RW    Orientation Status oriented x 4  -LM oriented x 4  -LM oriented x 4  -RW    Follows Commands/Answers Questions 100% of the time  -% of the time  -% of the time  -RW    Personal Safety WNL/WFL  -LM      Personal Safety Interventions   gait belt  -RW    Recorded by [LM] IRASEMA Smith/L [LM] IRASEMA Smith/L [RW] Balaji Malhotra PTA    Mobility Assessment/Training    Left Lower Extremity Weight-Bearing   non weight-bearing  -RW    Right Lower Extremity  Weight-Bearing   non weight-bearing  -RW    Recorded by   [RW] Balaji Malhotra PTA    Bed Mobility, Assessment/Treatment    Bed Mobility, Assistive Device   bed rails;head of bed elevated  -RW    Bed Mob, Supine to Sit, [place] conditional independence  -LM  supervision required  -RW    Bed Mob, Sit to Supine, [place] conditional independence   to long sitting with grab barin bed   -LM  supervision required  -RW    Recorded by [LM] IRASEMA Smith/L  [RW] Balaji Malhotra PTA    Transfer Assessment/Treatment    Transfers, Bed-Chair [place]   minimum assist (75% patient effort);contact guard assist  -RW    Transfers, Chair-Bed [place]  minimum assist (75% patient effort)  -LM minimum assist (75% patient effort);contact guard assist  -RW    Transfers, Bed-Chair-Bed, Assist Device   sliding board  -RW    Transfers, Sit-Stand [place]  supervision required  -LM     Transfers, Stand-Sit [place]  supervision required  -LM     Transfer, Comment   practiced 2-3 times using leg lift strap o right leg  -RW    Recorded by  [LM] IRASEMA Smith/L [RW] Balaji Malhotra PTA    Gait Assessment/Treatment    Gait, [place] Level   not appropriate to assess  -RW    Recorded by   [RW] Balaji Malhotra PTA    Stairs Assessment/Treatment    Stairs, [place] Level   not appropriate to assess  -RW    Recorded by   [RW] Balaji Malhotra PTA    Wheelchair Training/Management    Wheelchair Transfer Type   lateral transfer  -RW    Wheelchair, Distance Propelled  100 ft   - ind  -RW    Wheelchair Safety Comment  --   wc measured placed in fim book 18x16  -LM     Recorded by  [LM] IRASEMA Smith/L [RW] Balaji Malhotra PTA    Upper Body Bathing Assessment/Training    UB Bathing Assess/Train, [place] Level set up required  -LM      Recorded by [LM] NAV SmithA/L      Lower Body Bathing Assessment/Training    LB Bathing Assess/Train, [place] Level set up required   -LM      Recorded by [LM] IRASEMA Smith/L      Upper Body Dressing Assessment/Training    UB Dressing Assess/Train, Clothing Type doffing:;donning:  -LM      UB Dressing Assess/Train, [place] set up required  -LM      Recorded by [LM] IRASEMA Smith/L      Lower Body Dressing Assessment/Training    LB Dressing Assess/Train, [place] minimum assist (75% patient effort);moderate assist (50% patient effort)   reacher dressing stick in supine long sitting  -LM      Recorded by [LM] IRASEMA Smith/L      Grooming Assessment/Training    Grooming Assess/Train, [place] Level independent  -LM      Recorded by [LM] IRASEMA Smith/L      Therapy Exercises    Right Lower Extremity   AROM:;15 reps;sitting;ankle pumps/circles;quad sets;glut sets  -RW    Left Lower Extremity   AROM:;15 reps;sitting;glut sets;quad sets  -RW    Bilateral Upper Extremity  AROM:   UE bke x 15 min B UE only   -LM     BUE Resistance  manual resistance- minimal   ther ex with 3 lb wt elbowf elx   -LM     Recorded by  [LM] IRASEMA Smith/L [RW] Balaji Malhotra, PTA    Positioning and Restraints    Pre-Treatment Position in bed  -LM sitting in chair/recliner  -LM in bed  -RW    Post Treatment Position bed  -LM bed  -LM wheelchair  -RW    Recorded by [LM] ELY Smith [LM] ELY Smith [RW] Balaji Malhotra, PTA      09/12/17 8031          Rehab Assessment/Intervention    Discipline occupational therapy assistant  -      Document Type therapy note (daily note)  -      Subjective Information agree to therapy  -      Patient Effort, Rehab Treatment good  -RC      Precautions/Limitations fall precautions;non-weight bearing status;brace on when up   brace on all time elevate r lle all time l le after up 3o mi  -      Specific Treatment Considerations --   in w/c only 2 hr at a time, keep r knee straght  -RC      Recorded by [RC] IRASEMA Herring/FREEDOM      Pain Assessment     Pain Score 0  -RC      Post Pain Score 0  -RC      Recorded by [RC] NAV HerringA/L      Transfer Assessment/Treatment    Transfers, Chair-Bed [place] minimum assist (75% patient effort)  -RC      Transfers, Bed-Chair-Bed, Assist Device sliding board  -RC      Recorded by [RC] IRASEMA Herring/L      Wheelchair Training/Management    Wheelchair Transfer Type --   sliding board to l side  -RC      Wheelchair Task Training Comment --   requires someont to hold r le and vc's  -RC      Wheelchair, Distance Propelled 200  -RC      Recorded by [RC] IRASEMA Herring/L      Therapy Exercises    Bilateral Upper Extremity AROM:;15 reps  -RC      BUE Resistance theraband   rowing blue band 2 sets  -RC      Recorded by [RC] IRASEMA Herring/L      Positioning and Restraints    Pre-Treatment Position sitting in chair/recliner  -RC      Post Treatment Position bed  -RC      In Bed call light within reach;encouraged to call for assist;with nsg  -RC      Recorded by [RC] IRASEMA Herring/L        User Key  (r) = Recorded By, (t) = Taken By, (c) = Cosigned By    Initials Name Effective Dates    RW Balaji Malhotra, PTA 10/17/16 -     RC Shruthi Jean-Baptiste, VILLAL[place]ANDO/L 10/17/16 -     KD Virgie Ray, [place]/L 10/17/16 -     LM Marce Carter, [place]/L 10/17/16 -                 OT Goals       09/15/17 0810 09/14/17 1413 09/13/17 1552    Bed Mobility OT LTG    Bed Mobility OT LTG, Date Goal Reviewed  09/14/17  -LM 09/13/17  -LM    Bed Mobility OT LTG, Outcome  goal met  -LM goal ongoing  -LM    Transfer Training OT LTG    Transfer Training OT LTG, Date Goal Reviewed  09/14/17  -LM 09/13/17  -LM    Transfer Training OT LTG, Outcome  goal met  -LM goal ongoing  -LM    Patient Education OT LTG    Patient Education OT LTG, Date Goal Reviewed 09/15/17  -KD 09/14/17  -LM 09/13/17  -LM    Patient Education OT LTG Outcome goal not met  -KD goal ongoing  -LM goal ongoing  -LM    ADL OT LTG    ADL OT LTG, Date Goal  Reviewed 09/15/17  -KD 09/14/17  -LM 09/13/17  -LM    ADL OT LTG, Outcome goal not met  -KD goal ongoing  -LM goal ongoing  -LM      09/12/17 1455 09/12/17 0755 09/11/17 1609    Bed Mobility OT LTG    Bed Mobility OT LTG, Date Established  09/12/17  -BH (r) SP (t) BH (c)     Bed Mobility OT LTG, Time to Achieve  by discharge  -BH (r) SP (t) BH (c)     Bed Mobility OT LTG, Activity Type  all bed mobility  -BH (r) SP (t) BH (c)     Bed Mobility OT LTG, [place] Level  supervision required;minimum assist (75% patient effort)   AE/adaptive techniques- RLE in knee immobilizer and extended  -BH (r) SP (t) BH (c)     Bed Mobility OT LTG, Date Goal Reviewed 09/12/17  -RC      Bed Mobility OT LTG, Outcome goal ongoing  -RC      Transfer Training OT LTG    Transfer Training OT LTG, Date Established  09/12/17  -BH (r) SP (t) BH (c)     Transfer Training OT LTG, Time to Achieve  by discharge  -BH (r) SP (t) BH (c)     Transfer Training OT LTG, Activity Type  --   BSC, w/c  -BH (r) SP (t) BH (c)     Transfer Training OT LTG, [place] Level  minimum assist (75% patient effort);contact guard assist   RLE in knee immobilizer and extended at all times.  -BH (r) SP (t) BH (c)     Transfer Training OT LTG, Date Goal Reviewed 09/12/17  -RC  09/04/17  -SG    Transfer Training OT LTG, Outcome goal ongoing  -RC  goal not met  -SG    Transfer Training OT LTG, Reason Goal Not Met   discharged from facility  -SG    Strength OT LTG    Strength Goal OT LTG, Date Goal Reviewed   09/11/17  -SG    Strength Goal OT LTG, Outcome   goal not met  -SG    Strength Goal OT LTG, Reason Goal Not Met   discharged from facility  -SG    Dynamic Sitting Balance OT LTG    Dynamic Sitting Balance OT LTG, Date Goal Reviewed   09/11/17  -SG    Dynamic Sitting Balance OT LTG, Outcome   goal not met  -SG    Dynamic Sitting Balance OT LTG, Reason Goal Not Met   discharged from facility  -SG    Patient Education OT LTG    Patient Education OT LTG, Date  Established  09/12/17  -BH (r) SP (t) BH (c)     Patient Education OT LTG, Time to Achieve  by discharge  -BH (r) SP (t) BH (c)     Patient Education OT LTG, Education Type  HEP;precautions per surgeon;positioning;posture/body mechanics;home safety;adaptive equipment mgmt  -BH (r) SP (t) BH (c)     Patient Education OT LTG, Education Understanding  independent;demonstrates adequately;verbalizes understanding  -BH (r) SP (t) BH (c)     Patient Education OT LTG, Date Goal Reviewed 09/12/17  -      Patient Education OT LTG Outcome goal ongoing  -      ADL OT LTG    ADL OT LTG, Date Established  09/12/17  -BH (r) SP (t) BH (c)     ADL OT LTG, Time to Achieve  by discharge  -BH (r) SP (t) BH (c)     ADL OT LTG, Activity Type  ADL skills  -BH (r) SP (t) BH (c)     ADL OT LTG, Additional Goal  set-up with self-feeding, grooming, UB dressing/bathing; min A with toileting; mod A with LB dressing/bathing  -BH (r) SP (t) BH (c)     ADL OT LTG, Date Goal Reviewed 09/12/17  -  09/11/17  -    ADL OT LTG, Outcome goal ongoing  -  goal not met  -    ADL OT LTG, Reason Goal Not Met   discharged from facility  -      09/11/17 1333          Transfer Training OT LTG    Transfer Training OT LTG, Date Established 09/11/17  -RB      Transfer Training OT LTG, Time to Achieve by discharge  -RB      Transfer Training OT LTG, Activity Type all transfers  -RB      Transfer Training OT LTG, [place] Level supervision required;contact guard assist  -RB      Transfer Training OT LTG, Assist Device other (see comments)   Sliding board if needed.  -RB      Strength OT LTG    Strength Goal OT LTG, Date Established 09/11/17  -RB      Strength Goal OT LTG, Time to Achieve by discharge  -RB      Strength Goal OT LTG, Measure to Achieve 2 sets of 10 reps all planes with 3-4 lb dumbells for B UE strength to increase independence with functional mobility/transfers.  -RB      Dynamic Sitting Balance OT LTG    Dynamic Sitting Balance OT  LTG, Date Established 09/11/17  -RB      Dynamic Sitting Balance OT LTG, Time to Achieve by discharge  -RB      Dynamic Sitting Balance OT LTG, [place] Level supervision required   10-15 minutes with functional activity.  -RB      Dynamic Sitting Balance OT LTG, Assist Device bed rails  -RB      ADL OT LTG    ADL OT LTG, Date Established 09/11/17  -RB      ADL OT LTG, Time to Achieve by discharge  -RB      ADL OT LTG, Activity Type ADL skills   Sponge bath and dress.  -RB      ADL OT LTG, [place] Level contact guard;min assist  -RB        User Key  (r) = Recorded By, (t) = Taken By, (c) = Cosigned By    Initials Name Provider Type    RB Axel Perez, OT Occupational Therapist    BH Isabel Jones, OTR/L Occupational Therapist    RC Shruthi Jean-Baptiste, VILLALPANDO/L Occupational Therapy Assistant    VIPUL Ray, VILLAL[place]ANDO/L Occupational Therapy Assistant    YOEL Carter, V[place]/L Occupational Therapy Assistant    SP Kenya Juárez, OT Student OT Student    SAHARA Rai, OT Occupational Therapist          Occupational Therapy Education     Title: PT OT SLP Therapies (Active)     Topic: Occupational Therapy (Active)     Point: ADL training (Done)    Description: Instruct learner(s) on proper safety adaptation and remediation techniques during self care or transfers.   Instruct in proper use of assistive devices.    Learning Progress Summary    Learner Readiness Method Response Comment Documented by Status   Patient Acceptance TIMOTHY DAVILA 09/15/17 1036 Done    Acceptance E VU Educated on OT and POC. Educated about adhering to weight bearing precautions. Educated about safety with ADL and bed mobility. SP 09/12/17 1159 Done               Point: Precautions (Done)    Description: Instruct learner(s) on prescribed precautions during self-care and functional transfers.    Learning Progress Summary    Learner Readiness Method Response Comment Documented by Status   Patient Acceptance E VU Educated on OT and POC.  Educated about adhering to weight bearing precautions. Educated about safety with ADL and bed mobility. SP 09/12/17 1159 Done               Point: Body mechanics (Done)    Description: Instruct learner(s) on proper positioning and spine alignment during self-care, functional mobility activities and/or exercises.    Learning Progress Summary    Learner Readiness Method Response Comment Documented by Status   Patient Acceptance E VU Educated on OT and POC. Educated about adhering to weight bearing precautions. Educated about safety with ADL and bed mobility. SP 09/12/17 1159 Done                      User Key     Initials Effective Dates Name Provider Type Discipline    KD 10/17/16 -  ELY Camacho Occupational Therapy Assistant OT    SP 01/31/17 -  Kenya Juárez OT Student OT Student OT                  OT Recommendation and Plan  Anticipated Equipment Needs At Discharge: hospital bed  Anticipated Discharge Disposition: home with 24/7 care, home with home health  Planned Therapy Interventions: activity intolerance, adaptive equipment training, ADL retraining, bed mobility training, balance training, energy conservation, fine motor coordination training, home exercise program, motor coordination training, strengthening, transfer training  Therapy Frequency: other (see comments) (3-14x/wk)  Plan of Care Review  Outcome Summary/Follow up Plan: No goals met this date        Outcome Measures       09/15/17 0810 09/13/17 1100 09/12/17 1500    How much help from another person do you currently need...    Turning from your back to your side while in flat bed without using bedrails?  3  -RW 3  -TA    Moving from lying on back to sitting on the side of a flat bed without bedrails?  3  -RW 3  -TA    Moving to and from a bed to a chair (including a wheelchair)?  3  -RW 3  -TA    Standing up from a chair using your arms (e.g., wheelchair, bedside chair)?  1  -RW 1  -TA    Climbing 3-5 steps with a railing?  1  -RW 1   -TA    To walk in hospital room?  1  -RW 1  -TA    AM-PAC 6 Clicks Score  12  -RW 12  -TA    How much help from another is currently needed...    Putting on and taking off regular lower body clothing? 4  -KD      Bathing (including washing, rinsing, and drying) 3  -KD      Toileting (which includes using toilet bed pan or urinal) 3  -KD      Putting on and taking off regular upper body clothing 4  -KD      Taking care of personal grooming (such as brushing teeth) 4  -KD      Eating meals 4  -KD      Score 22  -KD      Functional Assessment    Outcome Measure Options   AM-PAC 6 Clicks Basic Mobility (PT)  -TA      09/12/17 1355          How much help from another person do you currently need...    Turning from your back to your side while in flat bed without using bedrails? 3  -LM      Moving from lying on back to sitting on the side of a flat bed without bedrails? 3  -LM      Moving to and from a bed to a chair (including a wheelchair)? 3  -LM      Standing up from a chair using your arms (e.g., wheelchair, bedside chair)? 1  -LM      Climbing 3-5 steps with a railing? 1  -LM      To walk in hospital room? 1  -LM      AM-PAC 6 Clicks Score 12  -LM      Functional Assessment    Outcome Measure Options AM-PAC 6 Clicks Basic Mobility (PT)  -LM        User Key  (r) = Recorded By, (t) = Taken By, (c) = Cosigned By    Initials Name Provider Type    LM Isela Chris, PT Physical Therapist    HOLLIS Juan, PTA Physical Therapy Assistant    RW Balaji Malhotra, PTA Physical Therapy Assistant    KD IRASEMA Camacho/L Occupational Therapy Assistant           Time Calculation:         Time Calculation- OT       09/15/17 1036          Time Calculation- OT    OT Start Time 0815  -KD      OT Stop Time 0945  -KD      OT Time Calculation (min) 90 min  -KD      Total Timed Code Minutes- OT 90 minute(s)  -KD      OT Received On 09/15/17  -KD        User Key  (r) = Recorded By, (t) = Taken By, (c) = Cosigned By    Initials Name  Provider Type     ELY Camacho Occupational Therapy Assistant           Therapy Charges for Today     Code Description Service Date Service Provider Modifiers Qty    93923022947 HC OT THER PROC EA 15 MIN 9/15/2017 ELY Camacho GO 2    15008673929 HC OT WHEELCHAIR MGMT EA 15 MIN 9/15/2017 ELY Camacho GO 1    21987110554 HC OT SELF CARE/MGMT/TRAIN EA 15 MIN 9/15/2017 IRASEMA Camacho/L GO 3          OT G-codes  OT Professional Judgement Used?: Yes  OT Functional Scales Options: AM-PAC 6 Clicks Daily Activity (OT)  Score: 15  Functional Limitation: Self care  Self Care Current Status (): At least 40 percent but less than 60 percent impaired, limited or restricted  Self Care Goal Status (): At least 20 percent but less than 40 percent impaired, limited or restricted    ELY Camacho  9/15/2017

## 2017-09-15 NOTE — PLAN OF CARE
Problem: Patient Care Overview (Adult)  Goal: Plan of Care Review  Outcome: Ongoing (interventions implemented as appropriate)    09/15/17 0202   Coping/Psychosocial Response Interventions   Plan Of Care Reviewed With patient   Patient Care Overview   Progress improving   Outcome Evaluation   Outcome Summary/Follow up Plan pt rested well         Problem: Orthopaedic Fracture (Adult)  Goal: Signs and Symptoms of Listed Potential Problems Will be Absent or Manageable (Orthopaedic Fracture)  Outcome: Ongoing (interventions implemented as appropriate)    Problem: Fall Risk (Adult)  Goal: Absence of Falls  Outcome: Ongoing (interventions implemented as appropriate)

## 2017-09-15 NOTE — CONSULTS
"Adult Nutrition  Assessment    Patient Name:  Vangie Lopez  YOB: 1950  MRN: [id number]  Admit Date:  9/11/2017    Assessment Date:  9/15/2017          Reason for Assessment       09/15/17 1655    Reason for Assessment    Reason For Assessment/Visit admission assessment;education    Identified At Risk By Screening Criteria need for education;large or nonhealing wound, burn or pressure ulcer                Nutrition/Diet History       09/15/17 1655    Nutrition/Diet History    Patient Reported Diet/Restrictions/Preferences carbohydrate counting    Typical Food/Fluid Intake [facility] and he wouldn't listen. now he needs to listen. his wife purchased some artifical sweetener.              Labs/Tests/Procedures/Meds       09/15/17 1656    Labs/Tests/Procedures/Meds    Labs/Tests Review Reviewed;Na+;Glucose;Hgb A1C    Medication Review Reviewed, pertinent    Significant Vitals reviewed              Estimated/Assessed Needs       09/15/17 1657    Calculation Measurements    Weight Used For Calculations 82.6 kg (182 lb)    Height Used for Calculations 1.753 m (5' 9\")    Estimated/Assessed Energy Needs    Energy Need Method [place]-St Jeor    Age 67    RMR ([place]-St[place] Jeor Equation) 1595.92    Total estimated needs ([place] St. Jeor) 2233    Estimated Kcal Range  9532-4316    Estimated/Assessed Protein Needs    Weight Used for Protein Calculation 82.6 kg (182 lb)    Protein (gm/kg) 1.2    1.2 Gm Protein (gm) 99.07    Estimated/Assessed Fluid Needs    Fluid Need Method RDA method    RDA Method (mL)  2200            Nutrition Prescription Ordered       09/15/17 1658    Nutrition Prescription PO    Current PO Diet Regular    Supplement Milk    Supplement Frequency 3 times a day    Common Modifiers Consistent Carbohydrate            Evaluation of Received Nutrient/Fluid Intake       09/15/17 1659    PO Evaluation    Number of Meals 4    % PO Intake " 94            Comments:  Pt said the meals are fantastic. He tries to drink milk with meals and he plans to follow the diet to control his blood sugar. Will add milk with meals. Pt is eating well all meals. Pt to ask for a hs snack of high protein quality to help promote healing after multiple trauma. Some weight loss prior to admit. He will have time to take care of himself since he is retired he said. RDN began education and will continue during stay. Continue to monitor        Electronically signed by:  Isela Benavides RD  09/15/17 4:59 PM

## 2017-09-15 NOTE — THERAPY TREATMENT NOTE
Inpatient Rehabilitation - Physical Therapy Treatment Note  [facility]     Patient Name: Vangie Lopez  : 1950  MRN: [id number]  Today's Date: 9/15/2017  Onset of Illness/Injury or Date of Surgery Date: 17  Date of Referral to PT: 17  Referring Physician: Dr. Stephen    Admit Date: 2017    Visit Dx:    ICD-10-CM ICD-9-CM   1. Multiple trauma T07 959.8   2. Abnormality of gait and mobility R26.9 781.2   3. Muscle weakness (generalized) M62.81 728.87   4. Impaired mobility and ADLs Z74.09 799.89   5. Closed displaced comminuted fracture of right patella with routine healing, subsequent encounter S82.041D V54.16   6. Closed displaced fracture of navicular bone of left foot with routine healing, subsequent encounter S92.252D V54.19     Patient Active Problem List   Diagnosis   • Encounter for screening for malignant neoplasm of colon   • Closed displaced comminuted fracture of right patella   • Closed displaced fracture of navicular bone of left foot   • Closed dislocation of navicular bone of left foot   • Patella fracture   • Essential hypertension   • Mixed hyperlipidemia   • Osteoarthritis of hands, bilateral   • BPH (benign prostatic hyperplasia)   • Glaucoma   • Hyperlipidemia   • Closed fracture of navicular bone with dislocation of perilunate joint of left wrist   • Fracture of patella, right, closed   • Multiple trauma   • Encounter for rehabilitation   • History of kidney stones               Adult Rehabilitation Note       09/15/17 1447 09/15/17 1005 09/15/17 0815    Rehab Assessment/Intervention    Discipline physical therapy assistant  -RW physical therapy assistant  -RW occupational therapy assistant  -KD    Document Type therapy note (daily note)  -RW therapy note (daily note)  -RW therapy note (daily note)  -KD    Subjective Information agree to therapy  -RW agree to therapy  -RW agree to therapy  -KD    Patient Effort, Rehab Treatment good  -RW good  -RW      Recorded by [RW] Balaji Malhotra PTA [RW] Balaji Malhotra PTA [KD] Virgie Ray, VILLALPANDO/L    Vital Signs    Pre SpO2 (%)   97  -KD    O2 Delivery Pre Treatment   room air  -KD    Post SpO2 (%)   98  -KD    O2 Delivery Post Treatment   room air  -KD    Pre Patient Position   Supine  -KD    Intra Patient Position   Sitting  -KD    Recorded by   [KD] Virgie Ray, VILL[place]/L    Pain Assessment    Pain Assessment No/denies pain  -RW --   none reported  -RW 0-10  -KD    Pain Score   6  -KD    Post Pain Score   8  -KD    Pain Type   Surgical pain  -KD    Pain Location   Knee   L foot  -KD    Pain Orientation   Right  -KD    Pain Intervention(s)   Medication (See MAR)  -KD    Recorded by [RW] Balaji Malhotra PTA [RW] Balaji Malhotra PTA [KD] Virgie Ray, [place]/L    Cognitive Assessment/Intervention    Current Cognitive/Communication Assessment functional  -RW functional  -RW functional  -KD    Orientation Status oriented x 4  -RW oriented x 4  -RW oriented x 4  -KD    Follows Commands/Answers Questions 100% of the time  -% of the time  -% of the time  -KD    Personal Safety   WNL/WFL  -KD    Personal Safety Interventions gait belt  -RW gait belt  -RW     Recorded by [RW] Balaji Malhotra PTA [RW] Balaji Malhotra PTA [KD] Virgie Ray, V[place]/L    General LE Assessment    ROM Detail arom 90 deg right knee  -RW      Recorded by [RW] Balaji Malhotra PTA      Mobility Assessment/Training    Left Lower Extremity Weight-Bearing non weight-bearing  -RW non weight-bearing  -RW     Right Lower Extremity Weight-Bearing non weight-bearing  -RW non weight-bearing  -RW     Recorded by [RW] Balaji Malhotra PTA [RW] Balaji Malhotra PTA     Bed Mobility, Assessment/Treatment    Bed Mobility, Assistive Device   head of bed elevated;bed rails  -KD    Bed Mob, Supine to Sit, [place] conditional independence  -RW conditional independence  -RW conditional independence  -KD    Bed Mob, Sit to Supine, [place] conditional  independence  -RW conditional independence  -RW     Bed Mob, Sidelying to Sit, [place]   conditional independence  -KD    Recorded by [RW] Balaji Malhotra, DARIEN [RW] Balaji Malhotra, DARIEN [KD] Virgie Ray, [place]/L    Transfer Assessment/Treatment    Transfers, Bed-Chair [place] conditional independence;set up required  -RW  set up required  -KD    Transfers, Chair-Bed [place]  conditional independence;set up required  -RW     Transfers, Bed-Chair-Bed, Assist Device   sliding board  -KD    Recorded by [RW] Balaji Malhotra PTA [RW] Balaji Malhotra, DARIEN [KD] Virgie Ray, [place]/L    Gait Assessment/Treatment    Gait, [place] Level not appropriate to assess  -RW not appropriate to assess  -RW     Recorded by [RW] Balaji Malhotra, DARIEN [RW] Balaji Malhotra PTA     Stairs Assessment/Treatment    Stairs, [place] Level not appropriate to assess  -RW not appropriate to assess  -RW     Recorded by [RW] Balaji Malhotra, DARIEN [RW] Balaji Malhotra PTA     Wheelchair Training/Management    Wheelchair Propulsion Training Comment   --   self propelled  -KD    Wheelchair, Distance Propelled 150  -RW  275  -KD    Recorded by [RW] Balaji Malhotra PTA  [KD] Virgie Ray, [place]/L    Upper Body Bathing Assessment/Training    UB Bathing Assess/Train Assistive Device   bath mitt  -KD    UB Bathing Assess/Train, Position   sitting  -KD    UB Bathing Assess/Train, [place] Level   set up required  -KD    Recorded by   [KD] Virgie Ray, V[place]/L    Lower Body Bathing Assessment/Training    LB Bathing Assess/Train Assistive Device   bath mitt  -KD    LB Bathing Assess/Train, Position   sitting  -KD    LB Bathing Assess/Train, [place] Level   set up required  -KD    Recorded by   [KD] Virgie Ray, V[place]/L    Upper Body Dressing Assessment/Training    UB Dressing Assess/Train, Clothing Type   donning:;pull over  -KD    UB Dressing Assess/Train, Position   sitting  -KD    UB Dressing Assess/Train, [place]    set up required  -KD    Recorded by   [KD] NAV CamachoA/L    Lower Body Dressing Assessment/Training    LB Dressing Assess/Train, Clothing Type   doffing:;donning:;pants;shorts;slipper socks  -KD    LB Dressing Assess/Train, Position   supine;sitting  -KD    LB Dressing Assess/Train, [place]   set up required  -KD    Recorded by   [KD] NAV CamachoA/L    Grooming Assessment/Training    Grooming Assess/Train, Assistive Device   --   brush teeth/ shave  -KD    Grooming Assess/Train, Position   long sitting  -KD    Grooming Assess/Train, [place] Level   set up required  -KD    Recorded by   [KD] NAV CamachoA/L    Balance Skills Training    Sitting-Balance Activities Forward lean;Reaching for objects;Reaching across midline  -RW Forward lean;Reaching for objects;Reaching across midline  -RW     Recorded by [RW] Balaji Malhotra PTA [RW] Balaji Malhotra PTA     Therapy Exercises    Right Lower Extremity  AROM:;20 reps;sitting;ankle pumps/circles   2 sets  -RW     Left Lower Extremity  AROM:;20 reps;sitting;hip abduction/adduction;heel slides   2 sets  -RW     Bilateral Lower Extremities AROM:;20 reps;sitting;quad sets;glut sets  -RW AROM:;20 reps;glut sets;quad sets;sitting   2 sets  -RW     Bilateral Upper Extremity AROM:;20 reps;sitting   cable column wide  rows 35#  -RW AROM:;20 reps;sitting   t band rows with blue tb 2 sets  -RW AROM:;20 reps;sitting;elbow flexion/extension;hand pumps;pronation/supination;shoulder abduction/adduction;shoulder extension/flexion;shoulder ER/IR  -KD    BUE Resistance   manual resistance- minimal   5lb HW; 2 sets  -KD    Recorded by [RW] Balaji Malhotra, PTA [RW] Balaji Malhotra, PTA [KD] NAV CamachoA/L    Positioning and Restraints    Pre-Treatment Position in bed  -RW sitting in chair/recliner  -RW in bed  -KD    Post Treatment Position wheelchair  -RW bed  -RW wheelchair  -KD    In Wheelchair call light within reach;encouraged to call for assist   -RW  sitting;call light within reach;encouraged to call for assist;exit alarm on  -KD    Recorded by [RW] Balaji Malhotra PTA [RW] Balaji Malhotra PTA [KD] NAV CamachoA/FREEDOM      09/14/17 1440 09/14/17 1315 09/14/17 1115    Rehab Assessment/Intervention    Discipline physical therapy assistant  -RW occupational therapy assistant  -LM physical therapy assistant  -RW    Document Type therapy note (daily note)  -RW therapy note (daily note)  -LM therapy note (daily note)  -RW    Subjective Information agree to therapy  -RW agree to therapy  -LM agree to therapy  -RW    Patient Effort, Rehab Treatment good  -RW good  -LM good  -RW    Precautions/Limitations non-weight bearing status  -RW  non-weight bearing status  -RW    Recorded by [RW] Balaji Malhotra PTA [LM] Marce Carter, VILLALPANDO/L [RW] Balaji Malhotra PTA    Pain Assessment    Pain Assessment No/denies pain  -RW No/denies pain  -LM --   c/o wrist pain left.  -RW    Pain Location   Wrist  -RW    Pain Orientation   Left  -RW    Recorded by [RW] Balaji Malhotra PTA [LM] Marce Carter VILLALPANDO/L [RW] Balaji Malhotra PTA    Vision Assessment/Intervention    Visual Impairment  WNL  -LM     Recorded by  [LM] Marce Carter, VILLALPANDO/L     Cognitive Assessment/Intervention    Current Cognitive/Communication Assessment functional  -RW functional  -LM functional  -RW    Orientation Status oriented x 4  -RW oriented x 4  -LM oriented x 4  -RW    Follows Commands/Answers Questions 100% of the time  -% of the time  -% of the time  -RW    Personal Safety  WNL/WFL  -LM     Personal Safety Interventions gait belt  -RW  gait belt  -RW    Recorded by [RW] Balaji Malhotra PTA [LM] Marce Carter, VILLALPANDO/L [RW] Balaji Malhotra PTA    Mobility Assessment/Training    Left Lower Extremity Weight-Bearing non weight-bearing  -RW  non weight-bearing  -RW    Right Lower Extremity Weight-Bearing non weight-bearing  -RW  non weight-bearing  -RW    Recorded by [RW] Balaji KIDD  [place], PTA  [RW] Balaji Malhotra PTA    Bed Mobility, Assessment/Treatment    Bed Mobility, Assistive Device   head of bed elevated  -RW    Bed Mob, Supine to Sit, [place]   conditional independence;independent  -RW    Bed Mob, Sit to Supine, [place]   independent;conditional independence  -RW    Recorded by   [RW] Balaji Malhotra PTA    Transfer Assessment/Treatment    Transfers, Bed-Chair [place]   set up required  -RW    Transfers, Chair-Bed [place]   set up required  -RW    Recorded by   [RW] Balaji Malhotra PTA    Gait Assessment/Treatment    Gait, [place] Level not appropriate to assess  -RW  not appropriate to assess  -RW    Recorded by [RW] Balaji Malhotra PTA  [RW] Balaji Malhotra PTA    Stairs Assessment/Treatment    Stairs, [place] Level not appropriate to assess  -RW  not appropriate to assess  -RW    Recorded by [RW] Balaji Malhotra PTA  [RW] Balaji Malhotra PTA    Wheelchair Training/Management    Wheelchair Propulsion Training incline  -RW      Wheelchair, Distance Propelled 300*  -RW      Wheelchair Training Comment wc mob up and down ramp ind  -RW      Recorded by [RW] Balaji Malhotra PTA      Balance Skills Training    Sitting-Balance Activities Forward lean;Reaching for objects;Reaching across midline  -RW      Recorded by [RW] Balaji Malhotra PTA      Therapy Exercises    Right Lower Extremity   AROM:;ankle pumps/circles;20 reps;supine  -RW    Left Lower Extremity AROM:;20 reps;sitting;knee flexion;LAQ  -RW  AROM:;heel slides;supine;20 reps  -RW    LLE Resistance theraband  -RW      Bilateral Lower Extremities AROM:;20 reps;glut sets;quad sets;sitting  -RW  AROM:;20 reps;supine;glut sets;quad sets  -RW    Bilateral Upper Extremity AROM:;20 reps;sitting   t band rows with blue tb 2 sets  -RW AROM:;20 reps;elbow flexion/extension;shoulder abduction/adduction;shoulder extension/flexion;shoulder horizontal abd/add;shoulder protraction/retraction  -LM     Recorded by  [RW] Balaji Malhotra PTA [LM] IRASEMA Smith/L [RW] Balaji Malhotra PTA    Positioning and Restraints    Pre-Treatment Position sitting in chair/recliner  -RW in bed  -LM sitting in chair/recliner  -RW    Post Treatment Position wheelchair  -RW bed  -LM wheelchair  -RW    In Wheelchair   call light within reach  -RW    Recorded by [RW] Balaji Malhotra PTA [LM] IRASEMA Smith/L [RW] Balaji Malhotra PTA      09/14/17 1005 09/13/17 1440 09/13/17 1305    Rehab Assessment/Intervention    Discipline occupational therapy assistant  -YOEL physical therapy assistant  -RW occupational therapy assistant  -LM    Document Type therapy note (daily note)  -LM therapy note (daily note)  -RW therapy note (daily note)  -LM    Subjective Information agree to therapy  -LM agree to therapy  -RW agree to therapy  -LM    Patient Effort, Rehab Treatment good  -LM good  -RW good  -LM    Precautions/Limitations  non-weight bearing status;brace on when up  -RW     Recorded by [LM] IRASEMA Smith/L [RW] Balaji Malhotra PTA [LM] NAV SmithA/L    Pain Assessment    Pain Assessment No/denies pain  -LM No/denies pain  -RW No/denies pain  -LM    Recorded by [LM] IRASEMA Smith/L [RW] Balaji Malhotra PTA [LM] NAV SmithA/L    Cognitive Assessment/Intervention    Current Cognitive/Communication Assessment functional  -LM functional  -RW functional  -LM    Orientation Status oriented x 4  -LM oriented x 4  -RW oriented x 4  -LM    Follows Commands/Answers Questions 100% of the time  -% of the time  -% of the time  -LM    Personal Safety   WNL/WFL  -LM    Personal Safety Interventions  gait belt  -RW     Recorded by [LM] IRASEMA Smith/L [RW] Balaji Malhotra PTA [LM] NAV SmithA/L    Mobility Assessment/Training    Left Lower Extremity Weight-Bearing  non weight-bearing  -RW     Right Lower Extremity Weight-Bearing  non weight-bearing  -RW     Recorded by  [RW]  Balaji Malhotra, PTA     Bed Mobility, Assessment/Treatment    Bed Mobility, Assistive Device  bed rails;head of bed elevated  -RW     Bed Mob, Supine to Sit, [place]  conditional independence  -RW conditional independence  -LM    Bed Mob, Sit to Supine, [place]   conditional independence   to long sitting with grab barin bed   -LM    Recorded by  [RW] Balaji Malhotra PTA [LM] Marce Carter VILLALPANDO/L    Transfer Assessment/Treatment    Transfers, Bed-Chair [place] minimum assist (75% patient effort)  -LM contact guard assist  -RW     Transfers, Chair-Bed [place] minimum assist (75% patient effort)  -LM      Transfers, Bed-Chair-Bed, Assist Device  sliding board  -RW     Toilet Transfer, [place]  contact guard assist  -RW     Toilet Transfer, Assistive Device  bedside commode with drop arms   sliding bd  -RW     Recorded by [LM] IRASEMA Smith/L [RW] Balaji Malhotra PTA     Gait Assessment/Treatment    Gait, [place] Level  not appropriate to assess  -RW     Recorded by  [RW] Balaji Malhotra PTA     Stairs Assessment/Treatment    Stairs, [place] Level  not appropriate to assess  -RW     Recorded by  [RW] Balaji Malhotra PTA     Wheelchair Training/Management    Wheelchair, Distance Propelled 100  -LM      Recorded by [LM] NAV SmithA/L      Upper Body Bathing Assessment/Training    UB Bathing Assess/Train, [place] Level   set up required  -LM    Recorded by   [LM] IRASEMA Smith/L    Lower Body Bathing Assessment/Training    LB Bathing Assess/Train, [place] Level   set up required  -LM    Recorded by   [LM] NAV SmithA/L    Upper Body Dressing Assessment/Training    UB Dressing Assess/Train, Clothing Type   doffing:;donning:  -LM    UB Dressing Assess/Train, [place] set up required  -LM  set up required  -LM    Recorded by [LM] NAV SmithA/L  [LM] NAV SmithA/L    Lower Body Dressing Assessment/Training     LB Dressing Assess/Train, [place] independent  -LM  minimum assist (75% patient effort);moderate assist (50% patient effort)   reacher dressing stick in supine long sitting  -LM    Recorded by [LM] ELY Smith  [LM] IRASEMA Smith/L    Grooming Assessment/Training    Grooming Assess/Train, [place] Level   independent  -LM    Recorded by   [LM] IRASEMA Smith/L    Balance Skills Training    Sitting-Balance Activities  Forward lean;Reaching for objects;Reaching across midline  -RW     Recorded by  [RW] Balaji Malhotra PTA     Therapy Exercises    Right Lower Extremity  AROM:;ankle pumps/circles;20 reps;supine  -RW     Left Lower Extremity  AROM:;heel slides;supine;20 reps  -RW     Bilateral Lower Extremities  AROM:;20 reps;supine;glut sets;quad sets  -RW     Bilateral Upper Extremity AROM:;20 reps;elbow flexion/extension;shoulder rolls/shrugs;shoulder extension/flexion;shoulder protraction/retraction;shoulder abduction/adduction   cable column all planes B UE   -LM      Recorded by [LM] IRASEMA Smith/L [RW] Balaji Malhotra PTA     Positioning and Restraints    Pre-Treatment Position sitting in chair/recliner  -LM in bed  -RW in bed  -LM    Post Treatment Position wheelchair  -LM bsc  -RW bed  -LM    On BS commode  call light within reach;encouraged to call for assist  -RW     Recorded by [LM] ELY Smith [RW] Balaji Malhotra PTA [LM] ELY Smith      09/13/17 1300 09/13/17 1012       Rehab Assessment/Intervention    Discipline occupational therapy assistant  -LM physical therapy assistant  -RW     Document Type therapy note (daily note)  -LM therapy note (daily note)  -RW     Subjective Information agree to therapy  -LM agree to therapy  -RW     Patient Effort, Rehab Treatment good  -LM good  -RW     Precautions/Limitations  non-weight bearing status   brace on at all times  -RW     Recorded by [LM] ELY Smith [RW] Balaji KIDD  DARIEN Malhotra     Pain Assessment    Pain Assessment 0-10  -LM      Pain Score  --   c/o occ pain in left flank  -RW     Recorded by [LM] IRASEMA Smith/L [RW] Balaji Malhotra PTA     Cognitive Assessment/Intervention    Current Cognitive/Communication Assessment functional  -LM functional  -RW     Orientation Status oriented x 4  -LM oriented x 4  -RW     Follows Commands/Answers Questions 100% of the time  -% of the time  -RW     Personal Safety Interventions  gait belt  -RW     Recorded by [LM] IRASEMA Smith/L [RW] Balaji Malhotra PTA     Mobility Assessment/Training    Left Lower Extremity Weight-Bearing  non weight-bearing  -RW     Right Lower Extremity Weight-Bearing  non weight-bearing  -RW     Recorded by  [RW] Balaji Malhotra PTA     Bed Mobility, Assessment/Treatment    Bed Mobility, Assistive Device  bed rails;head of bed elevated  -RW     Bed Mob, Supine to Sit, [place]  supervision required  -RW     Bed Mob, Sit to Supine, [place]  supervision required  -RW     Recorded by  [RW] Balaji Malhotra PTA     Transfer Assessment/Treatment    Transfers, Bed-Chair [place]  minimum assist (75% patient effort);contact guard assist  -RW     Transfers, Chair-Bed [place] minimum assist (75% patient effort)  -LM minimum assist (75% patient effort);contact guard assist  -RW     Transfers, Bed-Chair-Bed, Assist Device  sliding board  -RW     Transfers, Sit-Stand [place] supervision required  -LM      Transfers, Stand-Sit [place] supervision required  -LM      Transfer, Comment  practiced 2-3 times using leg lift strap o right leg  -RW     Recorded by [LM] IRASEMA Smith/L [RW] Balaji Malhotra PTA     Gait Assessment/Treatment    Gait, [place] Level  not appropriate to assess  -RW     Recorded by  [RW] Balaji Malhotra PTA     Stairs Assessment/Treatment    Stairs, [place] Level  not appropriate to assess  -RW     Recorded by  [RW] Balaji Malhotra PTA      Wheelchair Training/Management    Wheelchair Transfer Type  lateral transfer  -RW     Wheelchair, Distance Propelled 100 ft   - ind  -RW     Wheelchair Safety Comment --   wc measured placed in fim book 18x16  -LM      Recorded by [LM] IRASEMA Smith/L [RW] Balaji Malhotra PTA     Therapy Exercises    Right Lower Extremity  AROM:;15 reps;sitting;ankle pumps/circles;quad sets;glut sets  -RW     Left Lower Extremity  AROM:;15 reps;sitting;glut sets;quad sets  -RW     Bilateral Upper Extremity AROM:   UE bke x 15 min B UE only   -LM      BUE Resistance manual resistance- minimal   ther ex with 3 lb wt elbowf elx   -LM      Recorded by [LM] IRASEMA Smith/L [RW] Balaji Malhotra PTA     Positioning and Restraints    Pre-Treatment Position sitting in chair/recliner  -LM in bed  -RW     Post Treatment Position bed  -LM wheelchair  -RW     Recorded by [LM] IRASEMA Smith/L [RW] Balaji Malhotra PTA       User Key  (r) = Recorded By, (t) = Taken By, (c) = Cosigned By    Initials Name Effective Dates    RW Balaji Malhotra PTA 10/17/16 -     KD NAV CamachoA/FREEDOM 10/17/16 -     LM NAV SmithA/FREEDOM 10/17/16 -                 IP PT Goals       09/15/17 1556 09/14/17 1557 09/13/17 1533    Bed Mobility PT LTG    Bed Mobility PT LTG, Date Goal Reviewed  09/14/17  -RW 09/13/17  -RW    Bed Mobility PT LTG, Outcome  goal met  -RW goal ongoing  -RW    Transfer Training PT LTG    Transfer Training PT  LTG, Date Goal Reviewed 09/15/17  -RW 09/14/17  -RW 09/13/17  -RW    Transfer Training PT LTG, Outcome goal ongoing  -RW goal ongoing  -RW goal ongoing  -RW    Range of Motion PT LTG    Range of Motion Goal PT LTG, Date Goal Reviewed 09/15/17  -RW 09/14/17  -RW 09/13/17  -RW    Range of Motion Goal PT LTG, Outcome goal ongoing  -RW goal ongoing  -RW goal ongoing  -RW    Wheelchair Propulsion PT LTG    Wheelchair Propulsion Goal PT LTG, Date Goal Reviewed  09/14/17  -RW 09/13/17  -RW     Wheelchair Propulsion Goal PT LTG, Outcome  goal met  -RW goal ongoing  -RW    Physical Therapy PT LTG    Physical Therapy PT LTG, Date Goal Reviewed  09/14/17  -RW 09/13/17  -RW    Physical Therapy PT LTG, Outcome  goal met  -RW goal ongoing  -RW      09/12/17 1355 09/11/17 1500 09/11/17 1040    Bed Mobility PT LTG    Bed Mobility PT LTG, Date Established  09/11/17  -LM     Bed Mobility PT LTG, Time to Achieve  by discharge  -LM     Bed Mobility PT LTG, Activity Type  supine to sit/sit to supine  -LM     Bed Mobility PT LTG, [place] Level  conditional independence  -LM     Bed Mobility PT Goal  LTG, Assist Device  overhead trapeze;bed rails  -LM     Bed Mobility PT LTG, Date Goal Reviewed 09/12/17  -LM      Bed Mobility PT LTG, Outcome goal ongoing  -LM goal ongoing  -LM     Transfer Training PT LTG    Transfer Training PT LTG, Date Established  09/11/17  -LM     Transfer Training PT LTG, Time to Achieve  by discharge  -LM     Transfer Training PT LTG, Activity Type  bed to chair /chair to bed  -LM     Transfer Training PT LTG, [place] Level  conditional independence  -LM     Transfer Training PT LTG, Assist Device  --   AAD  -LM     Transfer Training PT  LTG, Date Goal Reviewed 09/12/17  -LM  09/11/17  -AM    Transfer Training PT LTG, Outcome goal ongoing  -LM goal ongoing  -LM goal not met  -AM    Transfer Training PT LTG, Reason Goal Not Met   discharged from facility  -AM    Range of Motion PT LTG    Range of Motion Goal PT LTG, Date Established  09/11/17  -LM     Range of Motion Goal PT LTG, Time to Achieve  by discharge  -LM     Range fo Motion Goal PT LTG, Joint  L knee  -LM     Range of Motion Goal PT LTG, AROM Measure  Knee flex - 110 degrees  -LM     Range of Motion Goal PT LTG, Date Goal Reviewed 09/12/17  -LM      Range of Motion Goal PT LTG, Outcome goal ongoing  -LM goal ongoing  -LM     Patient Education PT LTG    Patient Education PT LTG, Date Goal Reviewed   09/11/17  -AM    Patient  Education PT LTG Outcome   goal met  -AM    Wheelchair Propulsion PT LTG    Wheelchair Propulsion Goal PT LTG, Date Established 09/12/17  -      Wheelchair Propulsion Goal PT LTG, Time to Achieve by discharge  -      Wheelchair Propulsion Goal PT LTG, [place] Level conditional independence  -      Wheelchair Propulsion Goal PT LTG, Distance to Achieve 300 feet  -      Wheelchair Propulsion Goal PT LTG, Outcome goal ongoing  -      Caregiver Training PT LTG    Caregiver Training PT LTG, Date Goal Reviewed   09/11/17  -AM    Caregiver Training PT LTG, Outcome   goal not met  -AM    Caregiver Training PT LTG, Reason Goal Not Met   discharged from facility  -AM    Physical Therapy PT LTG    Physical Therapy PT LTG, Date Established 09/12/17  -      Physical Therapy PT LTG, Time to Achieve by discharge  -      Physical Therapy PT LTG, Activity Type Pt will self propel w/c up/down ramp.  -      Physical Therapy PT LTG, [place] Level conditional independence  -      Physical Therapy PT LTG, Outcome goal ongoing  -LM        09/10/17 1300 09/09/17 1626       Transfer Training PT LTG    Transfer Training PT LTG, Date Established  09/09/17  -     Transfer Training PT LTG, Time to Achieve  by discharge  -MAGNO     Transfer Training PT LTG, Activity Type  bed to chair /chair to bed  -MAGNO     Transfer Training PT LTG, Additional Goal  Once MD allows, pt will perform lateral transfer with conditional independence  -MAGNO     Transfer Training PT  LTG, Date Goal Reviewed 09/10/17  -A      Transfer Training PT LTG, Outcome goal ongoing  -[facility] goal ongoing  -MAGNO     Patient Education PT LTG    Patient Education PT LTG, Date Established  09/09/17  -MAGNO     Patient Education PT LTG, Time to Achieve  by discharge  -MAGNO     Patient Education PT LTG, Education Type  precaution per surgeon;transfers;bed mobility;pain management;home safety  -     Patient Education PT LTG, Date Goal Reviewed 09/10/17  -A       Patient Education PT LTG Outcome goal ongoing  -[facility] goal ongoing  -     Caregiver Training PT LTG    Caregiver Training PT LTG, Date Established  09/09/17  -     Caregiver Training PT LTG, Time to Achieve  by discharge  -     Caregiver Training PT LTG, Activity Type  home caregiver will demonstrate understanding assisting pt as needed with transfers/mobility as well as verbalize understanding of home care instructions  -     Caregiver Training PT LTG, Date Goal Reviewed 09/10/17  -[facility]      Caregiver Training PT LTG, Outcome goal ongoing  -A goal ongoing  -     Physical Therapy PT STG    Physical Therapy PT STG, Date Established  09/09/17  -     Physical Therapy PT STG, Time to Achieve  by discharge  -     Physical Therapy PT STG, Activity Type  Pt will tolerate OOB 3-4 hours per day  -     Physical Therapy PT STG, Date Goal Reviewed 09/10/17  -[facility]      Physical Therapy PT STG, Outcome goal met  -[facility] goal ongoing  [facility]       User Key  (r) = Recorded By, (t) = Taken By, (c) = Cosigned By    Initials Name Provider Type     Isela Chris, PT Physical Therapist    MAGNO Abel, PT Physical Therapist    HELEN Pendleton, PTA Physical Therapy Assistant    AM Jim Young, PTA Physical Therapy Assistant    RW Balaji Malhotra, PTA Physical Therapy Assistant          Physical Therapy Education     Title: PT OT SLP Therapies (Active)     Topic: Physical Therapy (Active)     Point: Mobility training (Done)    Learning Progress Summary    Learner Readiness Method Response Comment Documented by Status   Patient Acceptance E VU reviewed  non wt bearing and transfering to from wc RW 09/13/17 1140 Done    Acceptance E NR Reviewed transferring towards stronger side and maintaining NWB with activity. LM 09/12/17 1608 Active    Acceptance E NR  TA 09/12/17 1515 Active    Acceptance E NR Reviewed safety with transfers.  Explained that Dr. Rutherford only wants stretcher chair used at this time.  09/11/17 1632  Active               Point: Precautions (Done)    Learning Progress Summary    Learner Readiness Method Response Comment Documented by Status   Patient Acceptance E VU reviewed  non wt bearing and transfering to from wc RW 09/13/17 1140 Done    Acceptance E NR Reviewed transferring towards stronger side and maintaining NWB with activity.  09/12/17 1608 Active    Acceptance E NR  TA 09/12/17 1515 Active    Acceptance E NR Reviewed safety with transfers.  Explained that Dr. Rutherford only wants stretcher chair used at this time.  09/11/17 1636 Active                      User Key     Initials Effective Dates Name Provider Type Discipline     06/15/16 -  Isela Chris, PT Physical Therapist PT    TA 10/17/16 -  Geri Juan, PTA Physical Therapy Assistant PT    RW 10/17/16 -  Balaji Malhotra, PTA Physical Therapy Assistant PT                    PT Recommendation and Plan  Anticipated Discharge Disposition: home with home health  Planned Therapy Interventions: balance training, bed mobility training, home exercise program, motor coordination training, neuromuscular re-education, patient/family education, postural re-education, ROM (Range of Motion), strengthening, stretching, transfer training  PT Frequency: other (see comments) (5-14 times/wk)  Plan of Care Review  Plan Of Care Reviewed With: patient  Outcome Summary/Follow up Plan: overall pt soing well and needs setup assist for transfers. cont with mob and strengthening.          Outcome Measures       09/15/17 1000 09/15/17 0810 09/13/17 1100    How much help from another person do you currently need...    Turning from your back to your side while in flat bed without using bedrails? 4  -RW  3  -RW    Moving from lying on back to sitting on the side of a flat bed without bedrails? 4  -RW  3  -RW    Moving to and from a bed to a chair (including a wheelchair)? 4  -RW  3  -RW    Standing up from a chair using your arms (e.g., wheelchair, bedside chair)? 1  -RW  1   -RW    Climbing 3-5 steps with a railing? 1  -RW  1  -RW    To walk in hospital room? 1  -RW  1  -RW    AM-PAC 6 Clicks Score 15  -RW  12  -RW    How much help from another is currently needed...    Putting on and taking off regular lower body clothing?  4  -KD     Bathing (including washing, rinsing, and drying)  3  -KD     Toileting (which includes using toilet bed pan or urinal)  3  -KD     Putting on and taking off regular upper body clothing  4  -KD     Taking care of personal grooming (such as brushing teeth)  4  -KD     Eating meals  4  -KD     Score  22  -KD       User Key  (r) = Recorded By, (t) = Taken By, (c) = Cosigned By    Initials Name Provider Type    JOHN Malhotra PTA Physical Therapy Assistant    IRASEMA Shelby/L Occupational Therapy Assistant           Time Calculation:         PT Charges       09/15/17 1558 09/15/17 1100       Time Calculation    Start Time 1447  -RW 1005  -RW     Stop Time 1530  -RW 1054  -RW     Time Calculation (min) 43 min  -RW 49 min  -RW     Time Calculation- PT    Total Timed Code Minutes- PT 43 minute(s)  -RW 49 minute(s)  -RW       User Key  (r) = Recorded By, (t) = Taken By, (c) = Cosigned By    Initials Name Provider Type    JOHN Malhotra PTA Physical Therapy Assistant          Therapy Charges for Today     Code Description Service Date Service Provider Modifiers Qty    22020483857 HC PT THER PROC EA 15 MIN 9/14/2017 Balaji Malhotra PTA GP 2    11859314417 HC PT THERAPEUTIC ACT EA 15 MIN 9/14/2017 Balaji Malhotra PTA GP 1    94167244249 HC PT THER PROC EA 15 MIN 9/14/2017 Balaji Malhotra PTA GP 1    13873299195 HC PT THERAPEUTIC ACT EA 15 MIN 9/14/2017 Balaji Malhotra PTA GP 2    07290473392 HC PT THER PROC EA 15 MIN 9/15/2017 Balaji Malhotra PTA GP 2    81735103576 HC PT THERAPEUTIC ACT EA 15 MIN 9/15/2017 Balaji Malhotra PTA GP 1    02380845674 HC PT THER PROC EA 15 MIN 9/15/2017 Balaji Malhotra PTA GP 2    15701590426 HC PT THERAPEUTIC ACT EA 15  MIN 9/15/2017 Balaji Malhotra PTA GP 1          PT G-Codes  PT Professional Judgement Used?: Yes  Outcome Measure Options: AM-PAC 6 Clicks Basic Mobility (PT)  Score: 12  Functional Limitation: Mobility: Walking and moving around  Mobility: Walking and Moving Around Current Status (): At least 60 percent but less than 80 percent impaired, limited or restricted  Mobility: Walking and Moving Around Goal Status (): At least 20 percent but less than 40 percent impaired, limited or restricted    Balaji Malhotra PTA  9/15/2017

## 2017-09-15 NOTE — PROGRESS NOTES
LOS: 4 days   Patient Care Team:  Renan Rodríguez MD as PCP - General (Family Medicine)    Chief Complaint:   Multiple trauma   Postoperative day 9 with a navicular fracture of the left ankle external fixator application by Dr. Rivera           Postoperative day 8 for right  patellar fracture repair by Dr. Rutherford          Interval History:     SUBJECTIVE:  He says he slept well last night his pain much better participating with therapy feels good in general  Glucose is improving and discussed diabetic diet with him again and the importance of controlling his sugar at this time until the incisions healed well    New diagnosis of diabetes he's not convinced yet that he has diabetes  History taken from: patient chart    Objective     Vital Signs  Temp:  [97.1 °F (36.2 °C)-98.9 °F (37.2 °C)] 97.1 °F (36.2 °C)  Heart Rate:  [85-88] 85  Resp:  [18-20] 20  BP: (129-132)/(74-76) 129/76  Last 3 weights    09/14/17  1500   Weight: 182 lb (82.6 kg)         Physical Exam:     General Appearance:    Alert, cooperative, in no acute distress   Head:    Normocephalic, without obvious abnormality, atraumatic   Eyes:            Lids and lashes normal, conjunctivae and sclerae normal, no   icterus, no pallor, corneas clear, PERRLA   Throat:   No oral lesions, no thrush, oral mucosa moist   Neck:   No adenopathy, supple, trachea midline, no thyromegaly, no   carotid bruit, no JVD       Lungs:     Clear to auscultation,respirations regular, even and                  Unlabored     Heart:    Regular rhythm and normal rate, normal S1 and S2, no            murmur, no gallop, no rub, no click    Chest Wall:    No abnormalities observed   Abdomen:     Normal bowel sounds, no masses, no organomegaly, soft        non-tender, non-distended, no guarding, no rebound                Tenderness    Extremities:   Moves all extremities well, no edema, no cyanosis, no             Redness        Skin:   No bleeding, bruising or rash   Lymph nodes:   No  palpable adenopathy   Neurologic:   Cranial nerves 2 - 12 grossly intact, sensation intact, DTR       present and equal bilaterally        RESULTS REVIEW:     Lab Results (last 24 hours)     Procedure Component Value Units Date/Time    POC Glucose Fingerstick [[id number]]  (Normal) Collected:  09/14/17 1204    Specimen:  Blood Updated:  09/14/17 1242     Glucose 121 mg/dL       RN NotifiedMeter: [id number]: [id number] MICHELLE MEYER       POC Glucose Fingerstick [[id number]]  (Abnormal) Collected:  09/14/17 1654    Specimen:  Blood Updated:  09/14/17 1708     Glucose 148 (H) mg/dL       RN NotifiedMeter: [id number]: [id number] KAE VALLECILLO       POC Glucose Fingerstick [[id number]]  (Abnormal) Collected:  09/14/17 2015    Specimen:  Blood Updated:  09/14/17 2344     Glucose 199 (H) mg/dL       RN NotifiedMeter: [id number]: [id number] CADEN SAENZ       POC Glucose Fingerstick [[id number]]  (Abnormal) Collected:  09/15/17 0517    Specimen:  Blood Updated:  09/15/17 0532     Glucose 178 (H) mg/dL       RN NotifiedMeter: [id number]: [id number] CADEN SAENZ       Protime-INR [[id number]]  (Abnormal) Collected:  09/15/17 0516    Specimen:  Blood Updated:  09/15/17 0619     Protime 26.0 (H) Seconds      INR 2.36 (H)    Narrative:       Therapeutic range for most indications is 2.0-3.0 INR,  or 2.5-3.5 for mechanical heart valves.        Imaging Results (last 72 hours)     ** No results found for the last 72 hours. **          Assessment/Plan     Principal Problem:    Multiple trauma  Active Problems:    Closed displaced comminuted fracture of right patella    Closed displaced fracture of navicular bone of left foot    Osteoarthritis of hands, bilateral    BPH (benign prostatic hyperplasia)    Glaucoma    Encounter for rehabilitation    Essential hypertension    Mixed hyperlipidemia    History of kidney stones        He is participating with therapy and he is making progress.  He'll  be nonweightbearing for some time.  I did discuss dressing changes to the right leg with Dr. Rutherford .    reviewing his fingersticks they are improved  but need to be better still and we may need to adjust metformin to control his sugar better.  We will ask dietary to see him for a diabetic diet education  Adequately anticoagulated        Alec Stephen MD  09/15/17  10:36 AM

## 2017-09-15 NOTE — PROGRESS NOTES
LOS: 4 days   Patient Care Team:  Renan Rodríguez MD as PCP - General (Family Medicine)    Chief Complaint: Multiple lower extremity trauma    Subjective   Patient seen at bedside resting comfortably.  No acute distress.  Splint to left lower extremity is clean, dry and intact.    History taken from: patient    Objective     Vital Signs  Temp:  [97.1 °F (36.2 °C)-98.9 °F (37.2 °C)] 97.1 °F (36.2 °C)  Heart Rate:  [85-88] 85  Resp:  [18-20] 20  BP: (129-132)/(74-76) 129/76    Objective    Left lower extremity exam: Splint is clean, dry and intact.  Capillary fill time is immediate to the distal toes.  Sensation is intact to the distal digits. Negative Homans    Results Review:     I reviewed the patient's new clinical results.      Assessment/Plan     Principal Problem:    Multiple trauma  Active Problems:    Closed displaced comminuted fracture of right patella    Closed displaced fracture of navicular bone of left foot    Essential hypertension    Mixed hyperlipidemia    Osteoarthritis of hands, bilateral    BPH (benign prostatic hyperplasia)    Glaucoma    Encounter for rehabilitation    History of kidney stones      POD# 8 Closed reduction and percutaneous fixation of left navicular fracture dislocation  Nonweightbearing to left lower extremity   Ice behind knee and elevate  Pain control   DVT prophylaxis  Will change splint prior to discharge  Please do not hesitate to call with questions.          This document has been electronically signed by Simone Rivera DPM on September 15, 2017 7:49 AM

## 2017-09-15 NOTE — PLAN OF CARE
Problem: Patient Care Overview (Adult)  Goal: Plan of Care Review    09/15/17 6554   Coping/Psychosocial Response Interventions   Plan Of Care Reviewed With patient   Patient Care Overview   Progress improving   Outcome Evaluation   Outcome Summary/Follow up Plan began education for new dx of diabetes

## 2017-09-15 NOTE — PLAN OF CARE
Problem: Patient Care Overview (Adult)  Goal: Plan of Care Review  Outcome: Ongoing (interventions implemented as appropriate)    09/15/17 0202 09/15/17 0810   Coping/Psychosocial Response Interventions   Plan Of Care Reviewed With patient --    Patient Care Overview   Progress improving --    Outcome Evaluation   Outcome Summary/Follow up Plan --  No goals met this date       Goal: Discharge Needs Assessment  Outcome: Ongoing (interventions implemented as appropriate)    09/12/17 0415 09/12/17 0755   Discharge Needs Assessment   Concerns To Be Addressed no discharge needs identified --    Living Environment   Transportation Available --  car;family or friend will provide   Self-Care   Equipment Currently Used at Home --  (BSC, bath bench, shower chair, w/c, tripod cane; not used)         Problem: Inpatient Occupational Therapy  Goal: Patient Education Goal LTG- OT  Outcome: Ongoing (interventions implemented as appropriate)    09/12/17 0755 09/15/17 0810   Patient Education OT LTG   Patient Education OT LTG, Date Established 09/12/17 --    Patient Education OT LTG, Time to Achieve by discharge --    Patient Education OT LTG, Education Type HEP;precautions per surgeon;positioning;posture/body mechanics;home safety;adaptive equipment mgmt --    Patient Education OT LTG, Education Understanding independent;demonstrates adequately;verbalizes understanding --    Patient Education OT LTG, Date Goal Reviewed --  09/15/17   Patient Education OT LTG Outcome --  goal not met       Goal: ADL Goal LTG- OT  Outcome: Ongoing (interventions implemented as appropriate)    09/12/17 0755 09/15/17 0810   ADL OT LTG   ADL OT LTG, Date Established 09/12/17 --    ADL OT LTG, Time to Achieve by discharge --    ADL OT LTG, Activity Type ADL skills --    ADL OT LTG, Additional Goal set-up with self-feeding, grooming, UB dressing/bathing; min A with toileting; mod A with LB dressing/bathing --    ADL OT LTG, Date Goal Reviewed --  09/15/17    ADL OT LTG, Outcome --  goal not met

## 2017-09-15 NOTE — PROGRESS NOTES
"Anticoagulation by Pharmacy - Warfarin Day 8 of 28    Vangie Lopez is a 67 y.o.male  [Ht: 69\" (175.3 cm); Wt: 182 lb (82.6 kg)] on Warfarin 3 mg PO  for indication of VTE prophylaxis s/p  ORIF right patella.    Goal INR: 1.7-2.5  Today's INR:   Lab Results   Component Value Date    INR 2.36 (H) 09/15/2017         Lab Results   Component Value Date    INR 2.36 (H) 09/15/2017    INR 2.45 (H) 09/14/2017    INR 2.33 (H) 09/13/2017    PROTIME 26.0 (H) 09/15/2017    PROTIME 26.8 (H) 09/14/2017    PROTIME 25.8 (H) 09/13/2017     Lab Results   Component Value Date    HGB 12.0 (L) 09/12/2017    HGB 13.2 (L) 09/10/2017    HGB 13.2 (L) 09/10/2017     Lab Results   Component Value Date    HCT 33.4 (L) 09/12/2017    HCT 37.0 (L) 09/10/2017    HCT 37.0 (L) 09/10/2017     Assessment/Plan:  Chart reviewed. INR down following dose reduction yesterday. Will continue with warfarin 3 mg po nightly. Will continue to follow and adjust as needed.    Bertrand Iniguez, [facility]  09/15/17 11:03 AM       "

## 2017-09-15 NOTE — PLAN OF CARE
Problem: Patient Care Overview (Adult)  Goal: Plan of Care Review  Outcome: Ongoing (interventions implemented as appropriate)    09/15/17 1556   Coping/Psychosocial Response Interventions   Plan Of Care Reviewed With patient   Outcome Evaluation   Outcome Summary/Follow up Plan overall pt soing well and needs setup assist for transfers. cont with mob and strengthening.         Problem: Inpatient Physical Therapy  Goal: Transfer Training Goal 1 LTG- PT  Outcome: Ongoing (interventions implemented as appropriate)    09/11/17 1500 09/15/17 1556   Transfer Training PT LTG   Transfer Training PT LTG, Date Established 09/11/17 --    Transfer Training PT LTG, Time to Achieve by discharge --    Transfer Training PT LTG, Activity Type bed to chair /chair to bed --    Transfer Training PT LTG, Roby Level conditional independence --    Transfer Training PT LTG, Assist Device (AAD) --    Transfer Training PT LTG, Date Goal Reviewed --  09/15/17   Transfer Training PT LTG, Outcome --  goal ongoing       Goal: Range of Motion Goal LTG- PT  Outcome: Ongoing (interventions implemented as appropriate)    09/11/17 1500 09/15/17 1556   Range of Motion PT LTG   Range of Motion Goal PT LTG, Date Established 09/11/17 --    Range of Motion Goal PT LTG, Time to Achieve by discharge --    Range fo Motion Goal PT LTG, Joint L knee --    Range of Motion Goal PT LTG, AROM Measure Knee flex - 110 degrees --    Range of Motion Goal PT LTG, Date Goal Reviewed --  09/15/17   Range of Motion Goal PT LTG, Outcome --  goal ongoing

## 2017-09-16 LAB
ALBUMIN SERPL-MCNC: 3.9 G/DL (ref 3.4–4.8)
ALBUMIN/GLOB SERPL: 1.3 G/DL (ref 1.1–1.8)
ALP SERPL-CCNC: 134 U/L (ref 38–126)
ALT SERPL W P-5'-P-CCNC: 204 U/L (ref 21–72)
ANION GAP SERPL CALCULATED.3IONS-SCNC: 11 MMOL/L (ref 5–15)
AST SERPL-CCNC: 66 U/L (ref 17–59)
BASOPHILS # BLD AUTO: 0.02 10*3/MM3 (ref 0–0.2)
BASOPHILS NFR BLD AUTO: 0.2 % (ref 0–2)
BILIRUB SERPL-MCNC: 1.1 MG/DL (ref 0.2–1.3)
BUN BLD-MCNC: 22 MG/DL (ref 7–21)
BUN/CREAT SERPL: 26.5 (ref 7–25)
CALCIUM SPEC-SCNC: 9.2 MG/DL (ref 8.4–10.2)
CHLORIDE SERPL-SCNC: 96 MMOL/L (ref 95–110)
CO2 SERPL-SCNC: 27 MMOL/L (ref 22–31)
CREAT BLD-MCNC: 0.83 MG/DL (ref 0.7–1.3)
DEPRECATED RDW RBC AUTO: 39.4 FL (ref 35.1–43.9)
EOSINOPHIL # BLD AUTO: 0.46 10*3/MM3 (ref 0–0.7)
EOSINOPHIL NFR BLD AUTO: 4.8 % (ref 0–7)
ERYTHROCYTE [DISTWIDTH] IN BLOOD BY AUTOMATED COUNT: 12.3 % (ref 11.5–14.5)
GFR SERPL CREATININE-BSD FRML MDRD: 93 ML/MIN/1.73 (ref 49–113)
GLOBULIN UR ELPH-MCNC: 2.9 GM/DL (ref 2.3–3.5)
GLUCOSE BLD-MCNC: 164 MG/DL (ref 60–100)
GLUCOSE BLDC GLUCOMTR-MCNC: 162 MG/DL (ref 70–130)
GLUCOSE BLDC GLUCOMTR-MCNC: 164 MG/DL (ref 70–130)
GLUCOSE BLDC GLUCOMTR-MCNC: 187 MG/DL (ref 70–130)
GLUCOSE BLDC GLUCOMTR-MCNC: 218 MG/DL (ref 70–130)
GLUCOSE BLDC GLUCOMTR-MCNC: 230 MG/DL (ref 70–130)
HCT VFR BLD AUTO: 36.1 % (ref 39–49)
HGB BLD-MCNC: 12.7 G/DL (ref 13.7–17.3)
IMM GRANULOCYTES # BLD: 0.04 10*3/MM3 (ref 0–0.02)
IMM GRANULOCYTES NFR BLD: 0.4 % (ref 0–0.5)
INR PPP: 2.18 (ref 0.8–1.2)
LYMPHOCYTES # BLD AUTO: 1.82 10*3/MM3 (ref 0.6–4.2)
LYMPHOCYTES NFR BLD AUTO: 19.1 % (ref 10–50)
MCH RBC QN AUTO: 30.8 PG (ref 26.5–34)
MCHC RBC AUTO-ENTMCNC: 35.2 G/DL (ref 31.5–36.3)
MCV RBC AUTO: 87.4 FL (ref 80–98)
MONOCYTES # BLD AUTO: 0.84 10*3/MM3 (ref 0–0.9)
MONOCYTES NFR BLD AUTO: 8.8 % (ref 0–12)
NEUTROPHILS # BLD AUTO: 6.33 10*3/MM3 (ref 2–8.6)
NEUTROPHILS NFR BLD AUTO: 66.7 % (ref 37–80)
PLATELET # BLD AUTO: 338 10*3/MM3 (ref 150–450)
PMV BLD AUTO: 8.6 FL (ref 8–12)
POTASSIUM BLD-SCNC: 3.8 MMOL/L (ref 3.5–5.1)
PROT SERPL-MCNC: 6.8 G/DL (ref 6.3–8.6)
PROTHROMBIN TIME: 24.4 SECONDS (ref 11.1–15.3)
RBC # BLD AUTO: 4.13 10*6/MM3 (ref 4.37–5.74)
SODIUM BLD-SCNC: 134 MMOL/L (ref 137–145)
WBC NRBC COR # BLD: 9.51 10*3/MM3 (ref 3.2–9.8)

## 2017-09-16 PROCEDURE — 82962 GLUCOSE BLOOD TEST: CPT

## 2017-09-16 PROCEDURE — 85025 COMPLETE CBC W/AUTO DIFF WBC: CPT | Performed by: FAMILY MEDICINE

## 2017-09-16 PROCEDURE — 85610 PROTHROMBIN TIME: CPT | Performed by: FAMILY MEDICINE

## 2017-09-16 PROCEDURE — 80053 COMPREHEN METABOLIC PANEL: CPT | Performed by: FAMILY MEDICINE

## 2017-09-16 PROCEDURE — 97535 SELF CARE MNGMENT TRAINING: CPT

## 2017-09-16 PROCEDURE — 97110 THERAPEUTIC EXERCISES: CPT

## 2017-09-16 PROCEDURE — 97530 THERAPEUTIC ACTIVITIES: CPT

## 2017-09-16 RX ADMIN — Medication 1 TABLET: at 08:10

## 2017-09-16 RX ADMIN — HYDROCODONE BITARTRATE AND ACETAMINOPHEN 1 TABLET: 10; 325 TABLET ORAL at 12:18

## 2017-09-16 RX ADMIN — METFORMIN HYDROCHLORIDE 500 MG: 500 TABLET, EXTENDED RELEASE ORAL at 17:04

## 2017-09-16 RX ADMIN — ATORVASTATIN CALCIUM 10 MG: 10 TABLET, FILM COATED ORAL at 08:10

## 2017-09-16 RX ADMIN — POTASSIUM CITRATE 10 MEQ: 10 TABLET ORAL at 17:05

## 2017-09-16 RX ADMIN — Medication 25 MG: at 20:11

## 2017-09-16 RX ADMIN — SENNOSIDES AND DOCUSATE SODIUM 1 TABLET: 8.6; 5 TABLET ORAL at 17:04

## 2017-09-16 RX ADMIN — HYDROCODONE BITARTRATE AND ACETAMINOPHEN 1 TABLET: 10; 325 TABLET ORAL at 17:04

## 2017-09-16 RX ADMIN — SENNOSIDES AND DOCUSATE SODIUM 1 TABLET: 8.6; 5 TABLET ORAL at 08:10

## 2017-09-16 RX ADMIN — FERROUS SULFATE TAB EC 324 MG (65 MG FE EQUIVALENT) 324 MG: 324 (65 FE) TABLET DELAYED RESPONSE at 08:10

## 2017-09-16 RX ADMIN — POLYETHYLENE GLYCOL 3350 17 G: 17 POWDER, FOR SOLUTION ORAL at 08:10

## 2017-09-16 RX ADMIN — HYDROCHLOROTHIAZIDE 25 MG: 25 TABLET ORAL at 08:10

## 2017-09-16 RX ADMIN — HYDROCODONE BITARTRATE AND ACETAMINOPHEN 1 TABLET: 10; 325 TABLET ORAL at 08:10

## 2017-09-16 RX ADMIN — POTASSIUM CITRATE 10 MEQ: 10 TABLET ORAL at 08:11

## 2017-09-16 RX ADMIN — FINASTERIDE 5 MG: 5 TABLET, FILM COATED ORAL at 20:11

## 2017-09-16 RX ADMIN — FAMOTIDINE 40 MG: 40 TABLET ORAL at 08:10

## 2017-09-16 RX ADMIN — WARFARIN SODIUM 3 MG: 3 TABLET ORAL at 17:04

## 2017-09-16 NOTE — THERAPY TREATMENT NOTE
Inpatient Rehabilitation - Physical Therapy Treatment Note  [facility]     Patient Name: Vangie Lopez  : 1950  MRN: [id number]  Today's Date: 2017  Onset of Illness/Injury or Date of Surgery Date: 17  Date of Referral to PT: 17  Referring Physician: Dr. Stephen    Admit Date: 2017    Visit Dx:    ICD-10-CM ICD-9-CM   1. Multiple trauma T07 959.8   2. Abnormality of gait and mobility R26.9 781.2   3. Muscle weakness (generalized) M62.81 728.87   4. Impaired mobility and ADLs Z74.09 799.89   5. Closed displaced comminuted fracture of right patella with routine healing, subsequent encounter S82.041D V54.16   6. Closed displaced fracture of navicular bone of left foot with routine healing, subsequent encounter S92.252D V54.19     Patient Active Problem List   Diagnosis   • Encounter for screening for malignant neoplasm of colon   • Closed displaced comminuted fracture of right patella   • Closed displaced fracture of navicular bone of left foot   • Closed dislocation of navicular bone of left foot   • Patella fracture   • Essential hypertension   • Mixed hyperlipidemia   • Osteoarthritis of hands, bilateral   • BPH (benign prostatic hyperplasia)   • Glaucoma   • Hyperlipidemia   • Closed fracture of navicular bone with dislocation of perilunate joint of left wrist   • Fracture of patella, right, closed   • Multiple trauma   • Encounter for rehabilitation   • History of kidney stones               Adult Rehabilitation Note       17 1255 17 0910 17 0810    Rehab Assessment/Intervention    Discipline physical therapy assistant  -JA occupational therapy assistant  -BL physical therapy assistant  -JA    Document Type therapy note (daily note)  -JA therapy note (daily note)  -BL therapy note (daily note)  -JA    Subjective Information agree to therapy  -JA agree to therapy  -BL agree to therapy  -JA    Patient Effort, Rehab Treatment good  -JA good  -BL good  -JA     Precautions/Limitations non-weight bearing status  -JA non-weight bearing status  -BL non-weight bearing status  -JA    Specific Treatment Considerations  Both legs in extension for entire treatment and pt returned to supine after 2 hours in w/c this date.  -BL Pt. reports having muscle spasm last night at L LE and reports increase L knee pain at medial side this a.m. nsg. made aware   -JA    Recorded by [JA] Dwayne Felton PTA [BL] IRASEMA Vasquez/FREEDOM [JA] Dwayne Felton PTA    Vital Signs    Post Systolic BP Rehab  145  -BL     Post Treatment Diastolic BP  75  -BL     Pretreatment Heart Rate (beats/min)  97  -BL     Posttreatment Heart Rate (beats/min)  99  -BL     Pre SpO2 (%)  98  -BL     O2 Delivery Pre Treatment  room air  -BL     Post SpO2 (%)  99  -BL     O2 Delivery Post Treatment  room air  -BL     Pre Patient Position Supine  -JA Sitting  -BL Supine  -JA    Intra Patient Position  Sitting  -BL Sitting  -JA    Post Patient Position Supine  -JA Supine  -BL Sitting  -JA    Recorded by [JA] Dwayne Felton PTA [BL] NAV VasquezA/FREEDOM [JA] Dwayne Felton PTA    Pain Assessment    Pain Assessment 0-10  -JA 0-10  -BL 0-10  -JA    Pain Score 4  -JA 6  -BL 8  -JA    Post Pain Score 4  -JA 8  -BL 6  -JA    Pain Type Acute pain;Surgical pain  -JA Acute pain  -BL Acute pain  -JA    Pain Location Knee  -JA Knee  -BL Knee  -JA    Pain Orientation Right;Left  -JA Right  -BL Left  -JA    Pain Intervention(s) Medication (See MAR)  -JA Medication (See MAR);Repositioned   returned to bed in supine position  -BL Medication (See MAR)  -JA    Recorded by [JA] Dwayne Felton PTA [BL] NAV VasquezA/FREEDOM [JA] Dwayne Felton PTA    Cognitive Assessment/Intervention    Current Cognitive/Communication Assessment  functional  -BL     Orientation Status  oriented x 4  -BL     Follows Commands/Answers Questions  100% of the time  -BL     Personal Safety  WNL/WFL  -BL     Personal Safety  Interventions  gait belt;muscle strengthening facilitated  -BL     Recorded by  [BL] IRASEMA Vasquez/L     General LE Assessment    ROM knee, left: LE ROM deficit  -      ROM Detail 110 degrees AROM   -JA      Recorded by [JA] Dwayne Felton PTA      Mobility Assessment/Training    Extremity Weight-Bearing Status left lower extremity  -JA  left lower extremity  -JA    Left Lower Extremity Weight-Bearing non weight-bearing  -JA  non weight-bearing  -JA    Right Lower Extremity Weight-Bearing non weight-bearing  -JA  non weight-bearing  -JA    Recorded by [JA] Dwayne Felton PTA  [JA] Dwayne Felton PTA    Bed Mobility, Assessment/Treatment    Bed Mobility, Assistive Device  head of bed elevated;overhead trapeze;leg   - head of bed elevated;overhead trapeze  -    Bed Mobility, Roll Left, [place]  conditional independence  -BL     Bed Mobility, Scoot/Bridge, [place]  conditional independence  -BL conditional independence  -JA    Bed Mob, Supine to Sit, [place]  conditional independence  -BL conditional independence  -    Bed Mobility, Safety Issues  decreased use of legs for bridging/pushing  -     Bed Mobility, Impairments  ROM decreased  -BL     Recorded by  [BL] IRASEMA Vasquez/FREEDOM [JA] Dwayne Felton PTA    Transfer Assessment/Treatment    Transfers, Bed-Chair [place]   conditional independence;set up required  -    Transfers, Chair-Bed [place]  conditional independence;set up required;verbal cues required  -     Transfers, Bed-Chair-Bed, Assist Device  sliding board  - sliding board  -    Toilet Transfer, [place]  contact guard assist;supervision required;set up required;verbal cues required  -     Toilet Transfer, Assistive Device  bedside commode with drop arms   sliding board  -     Walk-In Shower Transfer, [place]  not tested  -BL     Bathtub Transfer, [place]  not tested  -BL     Transfer, Maintain Weight Bearing  Status  able to maintain weight bearing status  -BL     Transfer, Impairments  ROM decreased;strength decreased  -BL     Recorded by  [BL] IRASEMA Vasquez/FREEDOM [JA] Dwayne Felton PTA    Wheelchair Training/Management    Wheelchair Transfer Type  lateral transfer  -BL lateral transfer  -JA    Wheelchair Transfer Type Comment  Pt demo'd safe transfer from w/c to bedside commode along with from bedside commode back to w/c and w/c to bed with sliding board use. Pt required vc's and set up for sliding board this date however was able to complete transfer with conditional independence however CGA required from w/c to toilet and back to w/c.   -BL     Wheelchair Task Training  safety considerations  -BL     Wheelchair Task Training Comment  Pt was educated on safely guiding w/c throughout the hallway this date and to assess spacial concerns once returning home to avoid contact of his feet with objects. Pt was educated on safety with remembering to always lock w/c before transfers and to always place armrest back into correct position once returned to w/c after transfer is complete. Required vc's to lock w/c once and to return armrest to resting position this date after transferring back from toilet to w/c.  -BL     Wheelchair Propulsion Training  carpet;turning wheelchair;steering wheelchair;moving through doorways;backward propulsion;forward propulsion  -BL     Wheelchair, Distance Propelled  75  -  -JA    Wheelchair Training Comment  conditional independence however vc's required for turning w/c  -BL conditional independent   -JA    Recorded by  [BL] IRASEMA Vasquez/FREEDOM [JA] Dwayne Felton PTA    Upper Body Bathing Assessment/Training    UB Bathing Assess/Train Assistive Device  bath mitt  -BL     UB Bathing Assess/Train, Position  sitting   in w/c at sink  -BL     UB Bathing Assess/Train, [place] Level  set up required;conditional independence  -BL     UB Bathing Assess/Train, Comment  Max  assist for cleansing his back this date due to IRASEMA/L not having LH sponge to train patient with.  -BL     Recorded by  [BL] IRASEMA Vasquez/L     Lower Body Bathing Assessment/Training    LB Bathing Assess/Train Assistive Device  bath mitt  -BL     LB Bathing Assess/Train, Position  sitting;supported sitting  -BL     LB Bathing Assess/Train, [place] Level  set up required;supervision required;dependent (less than 25% patient effort)  -BL     LB Bathing Assess/Train, Impairments  decreased flexibility;ROM decreased;strength decreased;pain  -BL     LB Bathing Assess/Train, Comment  Dependent for washing R foot only this date. Pt was able to complete pericare in sitting with supervision and set up only.  -BL     Recorded by  [BL] IRASEMA Vasquez/L     Upper Body Dressing Assessment/Training    UB Dressing Assess/Train, Clothing Type  doffing:;donning:;pull over;t-shirt  -BL     UB Dressing Assess/Train, Position  sitting  -BL     UB Dressing Assess/Train, [place]  independent  -BL     UB Dressing Assess/Train, Impairments  ROM decreased  -BL     Recorded by  [BL] IRASEMA Vasquez/L     Lower Body Dressing Assessment/Training    LB Dressing Assess/Train, Clothing Type  donning:;doffing:;shorts;slipper socks  -BL     LB Dressing Assess/Train, Position  sitting  -BL     LB Dressing Assess/Train, [place]  set up required;moderate assist (50% patient effort);dependent (less than 25% patient effort)  -BL     LB Dressing Assess/Train, Comment  moderate assist for donning shorts and dependent for sock on R foot  -BL     Recorded by  [BL] ELY Vasquez     Toileting Assessment/Training    Toileting Assess/Train, Assistive Device  bedside commode  -BL     Toileting Assess/Train, Position  sitting  -BL     Toileting Assess/Train, [place] Level  set up required;moderate assist (50% patient effort)  -BL     Toileting Assess/Train, Impairments  ROM decreased;decreased flexibility;strength decreased   -BL     Recorded by  [BL] IRASEMA Vasquez/L     Grooming Assessment/Training    Grooming Assess/Train, Assistive Device  electric toothbrush  -BL     Grooming Assess/Train, Position  sitting  -BL     Grooming Assess/Train, [place] Level  set up required;conditional independence  -BL     Recorded by  [BL] IRASEMA aVsquez/L     Therapy Exercises    Right Lower Extremity AROM:;20 reps;supine;ankle pumps/circles;quad sets   2x20  -JA  AROM:;20 reps;supine;ankle pumps/circles   2x20  -JA    Left Lower Extremity AROM:;20 reps;supine;hip abduction/adduction;SLR;heel slides   2x20  -JA  AROM:;20 reps;supine;heel slides;hip abduction/adduction;SLR   2x20  -JA    Bilateral Lower Extremities AROM:;20 reps;glut sets;quad sets   2x20  -JA  AROM:;20 reps;supine;glut sets;quad sets   2x20  -JA    Bilateral Upper Extremity   AROM:;20 reps;sitting   CC mid/high rows 35# 2x20  -JA    Recorded by [JA] Dwayne Felton PTA  [JA] Dwayne Felton PTA    Positioning and Restraints    Pre-Treatment Position in bed  -JA other (comment)   w/c  -BL in bed  -JA    Post Treatment Position bed  -JA bed  -BL wheelchair  -JA    In Bed  supine;call light within reach;encouraged to call for assist  -BL     In Wheelchair   with OT  -JA    Recorded by [JA] Dwayne Felton PTA [BL] IRASEMA Vasquez/FREEDOM [JA] Dwayne Felton PTA      09/15/17 1447 09/15/17 1005 09/15/17 0815    Rehab Assessment/Intervention    Discipline physical therapy assistant  -RW physical therapy assistant  -RW occupational therapy assistant  -KD    Document Type therapy note (daily note)  -RW therapy note (daily note)  -RW therapy note (daily note)  -KD    Subjective Information agree to therapy  -RW agree to therapy  -RW agree to therapy  -KD    Patient Effort, Rehab Treatment good  -RW good  -RW     Recorded by [RW] Balaji Malhotra PTA [RW] Balaji Malhotra PTA [KD] Virgie Ray VILLALPANDO/L    Vital Signs    Pre SpO2 (%)   97  -KD    O2 Delivery Pre  Treatment   room air  -KD    Post SpO2 (%)   98  -KD    O2 Delivery Post Treatment   room air  -KD    Pre Patient Position   Supine  -KD    Intra Patient Position   Sitting  -KD    Recorded by   [KD] Virgie Ray, VILLALPANDO/L    Pain Assessment    Pain Assessment No/denies pain  -RW --   none reported  -RW 0-10  -KD    Pain Score   6  -KD    Post Pain Score   8  -KD    Pain Type   Surgical pain  -KD    Pain Location   Knee   L foot  -KD    Pain Orientation   Right  -KD    Pain Intervention(s)   Medication (See MAR)  -KD    Recorded by [RW] Balaji Malhotra PTA [RW] Balaji Malhotra PTA [KD] NAV CamachoA/L    Cognitive Assessment/Intervention    Current Cognitive/Communication Assessment functional  -RW functional  -RW functional  -KD    Orientation Status oriented x 4  -RW oriented x 4  -RW oriented x 4  -KD    Follows Commands/Answers Questions 100% of the time  -% of the time  -% of the time  -KD    Personal Safety   WNL/WFL  -KD    Personal Safety Interventions gait belt  -RW gait belt  -RW     Recorded by [RW] Balaji Malhotra PTA [RW] Balaji Malhotra PTA [KD] Virgie Ray, VILLALPANDO/L    General LE Assessment    ROM Detail arom 90 deg right knee  -RW      Recorded by [RW] Balaji Malhotra PTA      Mobility Assessment/Training    Left Lower Extremity Weight-Bearing non weight-bearing  -RW non weight-bearing  -RW     Right Lower Extremity Weight-Bearing non weight-bearing  -RW non weight-bearing  -RW     Recorded by [RW] Balaji Malhotra PTA [RW] Balaji Malhotra PTA     Bed Mobility, Assessment/Treatment    Bed Mobility, Assistive Device   head of bed elevated;bed rails  -KD    Bed Mob, Supine to Sit, [place] conditional independence  -RW conditional independence  -RW conditional independence  -KD    Bed Mob, Sit to Supine, [place] conditional independence  -RW conditional independence  -RW     Bed Mob, Sidelying to Sit, [place]   conditional independence  -KD    Recorded by [JOHN] Balaji KIDD  Roscoe, PTA [RW] Balaji Malhotra, PTA [KD] NAV CmaachoA/L    Transfer Assessment/Treatment    Transfers, Bed-Chair [place] conditional independence;set up required  -RW  set up required  -KD    Transfers, Chair-Bed [place]  conditional independence;set up required  -RW     Transfers, Bed-Chair-Bed, Assist Device   sliding board  -KD    Recorded by [RW] Balaji Malhotra, PTA [RW] Balaji Malhotra, PTA [KD] NAV CamachoA/L    Gait Assessment/Treatment    Gait, [place] Level not appropriate to assess  -RW not appropriate to assess  -RW     Recorded by [RW] Balaji Malhotra, DARIEN [RW] Balaji Malhotra PTA     Stairs Assessment/Treatment    Stairs, [place] Level not appropriate to assess  -RW not appropriate to assess  -RW     Recorded by [RW] Balaji Malhotra, DARIEN [RW] Balaji Malhotra PTA     Wheelchair Training/Management    Wheelchair Propulsion Training Comment   --   self propelled  -KD    Wheelchair, Distance Propelled 150  -RW  275  -KD    Recorded by [RW] Balaji Malhotra, PTA  [KD] NAV CamachoA/L    Upper Body Bathing Assessment/Training    UB Bathing Assess/Train Assistive Device   bath mitt  -KD    UB Bathing Assess/Train, Position   sitting  -KD    UB Bathing Assess/Train, [place] Level   set up required  -KD    Recorded by   [KD] Virgie Ray, VILLALPANDO/L    Lower Body Bathing Assessment/Training    LB Bathing Assess/Train Assistive Device   bath mitt  -KD    LB Bathing Assess/Train, Position   sitting  -KD    LB Bathing Assess/Train, [place] Level   set up required  -KD    Recorded by   [KD] NAV CamachoA/L    Upper Body Dressing Assessment/Training    UB Dressing Assess/Train, Clothing Type   donning:;pull over  -KD    UB Dressing Assess/Train, Position   sitting  -KD    UB Dressing Assess/Train, [place]   set up required  -KD    Recorded by   [KD] NAV CamachoA/L    Lower Body Dressing Assessment/Training    LB Dressing Assess/Train, Clothing Type    doffing:;donning:;pants;shorts;slipper socks  -KD    LB Dressing Assess/Train, Position   supine;sitting  -KD    LB Dressing Assess/Train, [place]   set up required  -KD    Recorded by   [KD] NAV CamachoA/L    Grooming Assessment/Training    Grooming Assess/Train, Assistive Device   --   brush teeth/ shave  -KD    Grooming Assess/Train, Position   long sitting  -KD    Grooming Assess/Train, [place] Level   set up required  -KD    Recorded by   [KD] NAV CamachoA/L    Balance Skills Training    Sitting-Balance Activities Forward lean;Reaching for objects;Reaching across midline  -RW Forward lean;Reaching for objects;Reaching across midline  -RW     Recorded by [RW] Balaji Malhotra PTA [RW] Balaji Malhotra PTA     Therapy Exercises    Right Lower Extremity  AROM:;20 reps;sitting;ankle pumps/circles   2 sets  -RW     Left Lower Extremity  AROM:;20 reps;sitting;hip abduction/adduction;heel slides   2 sets  -RW     Bilateral Lower Extremities AROM:;20 reps;sitting;quad sets;glut sets  -RW AROM:;20 reps;glut sets;quad sets;sitting   2 sets  -RW     Bilateral Upper Extremity AROM:;20 reps;sitting   cable column wide  rows 35#  -RW AROM:;20 reps;sitting   t band rows with blue tb 2 sets  -RW AROM:;20 reps;sitting;elbow flexion/extension;hand pumps;pronation/supination;shoulder abduction/adduction;shoulder extension/flexion;shoulder ER/IR  -KD    BUE Resistance   manual resistance- minimal   5lb HW; 2 sets  -KD    Recorded by [RW] Balaji Malhotra PTA [RW] Balaji Malhotra PTA [KD] NAV CamachoA/L    Positioning and Restraints    Pre-Treatment Position in bed  -RW sitting in chair/recliner  -RW in bed  -KD    Post Treatment Position wheelchair  -RW bed  -RW wheelchair  -KD    In Wheelchair call light within reach;encouraged to call for assist  -RW  sitting;call light within reach;encouraged to call for assist;exit alarm on  -KD    Recorded by [RW] Balaji Malhotra PTA [RW] Balaji Malhotra PTA [KD]  Virgie Ray, VILLALPANDO/L      09/14/17 1440 09/14/17 1315 09/14/17 1115    Rehab Assessment/Intervention    Discipline physical therapy assistant  -RW occupational therapy assistant  -LM physical therapy assistant  -JOHN    Document Type therapy note (daily note)  -RW therapy note (daily note)  -LM therapy note (daily note)  -RW    Subjective Information agree to therapy  -RW agree to therapy  -LM agree to therapy  -RW    Patient Effort, Rehab Treatment good  -RW good  -LM good  -RW    Precautions/Limitations non-weight bearing status  -RW  non-weight bearing status  -RW    Recorded by [RW] Balaji Malhotra PTA [LM] NAV SmithA/L [RW] Balaji Malhotra PTA    Pain Assessment    Pain Assessment No/denies pain  -RW No/denies pain  -LM --   c/o wrist pain left.  -RW    Pain Location   Wrist  -RW    Pain Orientation   Left  -RW    Recorded by [RW] Balaji Malhotra PTA [LM] NAV SmithA/L [RW] Balaji Malhotra PTA    Vision Assessment/Intervention    Visual Impairment  WNL  -LM     Recorded by  [LM] Marce Carter, VILLALPANDO/L     Cognitive Assessment/Intervention    Current Cognitive/Communication Assessment functional  -RW functional  -LM functional  -RW    Orientation Status oriented x 4  -RW oriented x 4  -LM oriented x 4  -RW    Follows Commands/Answers Questions 100% of the time  -% of the time  -% of the time  -RW    Personal Safety  WNL/WFL  -LM     Personal Safety Interventions gait belt  -RW  gait belt  -RW    Recorded by [RW] Balaji Malhotra PTA [LM] NAV SmithA/L [RW] Balaji Malhotra PTA    Mobility Assessment/Training    Left Lower Extremity Weight-Bearing non weight-bearing  -RW  non weight-bearing  -RW    Right Lower Extremity Weight-Bearing non weight-bearing  -RW  non weight-bearing  -RW    Recorded by [RW] Balaji Malhotra PTA  [RW] Balaji Malhotra PTA    Bed Mobility, Assessment/Treatment    Bed Mobility, Assistive Device   head of bed elevated  -RW    Bed Mob, Supine  to Sit, [place]   conditional independence;independent  -RW    Bed Mob, Sit to Supine, [place]   independent;conditional independence  -RW    Recorded by   [RW] Balaji Malhotra PTA    Transfer Assessment/Treatment    Transfers, Bed-Chair [place]   set up required  -RW    Transfers, Chair-Bed [place]   set up required  -RW    Recorded by   [RW] Balaji Malhotra PTA    Gait Assessment/Treatment    Gait, [place] Level not appropriate to assess  -RW  not appropriate to assess  -RW    Recorded by [RW] Balaji Malhotra PTA  [RW] Balaji Malhotra PTA    Stairs Assessment/Treatment    Stairs, [place] Level not appropriate to assess  -RW  not appropriate to assess  -RW    Recorded by [RW] Balaji Malhotra PTA  [RW] Balaji Malhotra PTA    Wheelchair Training/Management    Wheelchair Propulsion Training incline  -RW      Wheelchair, Distance Propelled 300*  -RW      Wheelchair Training Comment wc mob up and down ramp ind  -RW      Recorded by [RW] Balaji Malhotra PTA      Balance Skills Training    Sitting-Balance Activities Forward lean;Reaching for objects;Reaching across midline  -RW      Recorded by [RW] Balaji Malhotra PTA      Therapy Exercises    Right Lower Extremity   AROM:;ankle pumps/circles;20 reps;supine  -RW    Left Lower Extremity AROM:;20 reps;sitting;knee flexion;LAQ  -RW  AROM:;heel slides;supine;20 reps  -RW    LLE Resistance theraband  -RW      Bilateral Lower Extremities AROM:;20 reps;glut sets;quad sets;sitting  -RW  AROM:;20 reps;supine;glut sets;quad sets  -RW    Bilateral Upper Extremity AROM:;20 reps;sitting   t band rows with blue tb 2 sets  -RW AROM:;20 reps;elbow flexion/extension;shoulder abduction/adduction;shoulder extension/flexion;shoulder horizontal abd/add;shoulder protraction/retraction  -LM     Recorded by [RW] Balaji Malhotra PTA [LM] NAV SmithA/L [RW] Balaji Malhotra PTA    Positioning and Restraints    Pre-Treatment Position sitting in  chair/recliner  -RW in bed  -LM sitting in chair/recliner  -RW    Post Treatment Position wheelchair  -RW bed  -LM wheelchair  -RW    In Wheelchair   call light within reach  -RW    Recorded by [RW] Balaji Malhotra PTA [LM] IRASEMA Smith/L [RW] Balaji Malhotra PTA      09/14/17 1005 09/13/17 1440       Rehab Assessment/Intervention    Discipline occupational therapy assistant  -LM physical therapy assistant  -RW     Document Type therapy note (daily note)  -LM therapy note (daily note)  -RW     Subjective Information agree to therapy  -LM agree to therapy  -RW     Patient Effort, Rehab Treatment good  -LM good  -RW     Precautions/Limitations  non-weight bearing status;brace on when up  -RW     Recorded by [LM] IRASEMA Smith/L [RW] Balaji Malhotra PTA     Pain Assessment    Pain Assessment No/denies pain  -LM No/denies pain  -RW     Recorded by [LM] IRASEMA Smith/L [RW] Balaji Malhotra PTA     Cognitive Assessment/Intervention    Current Cognitive/Communication Assessment functional  -LM functional  -RW     Orientation Status oriented x 4  -LM oriented x 4  -RW     Follows Commands/Answers Questions 100% of the time  -% of the time  -RW     Personal Safety Interventions  gait belt  -RW     Recorded by [LM] IRASEMA Smith/L [RW] Balaji Malhotra PTA     Mobility Assessment/Training    Left Lower Extremity Weight-Bearing  non weight-bearing  -RW     Right Lower Extremity Weight-Bearing  non weight-bearing  -RW     Recorded by  [RW] Balaji Malhotra PTA     Bed Mobility, Assessment/Treatment    Bed Mobility, Assistive Device  bed rails;head of bed elevated  -RW     Bed Mob, Supine to Sit, [place]  conditional independence  -RW     Recorded by  [RW] Balaji Malhotra PTA     Transfer Assessment/Treatment    Transfers, Bed-Chair [place] minimum assist (75% patient effort)  -LM contact guard assist  -RW     Transfers, Chair-Bed [place] minimum assist (75% patient  effort)  -LM      Transfers, Bed-Chair-Bed, Assist Device  sliding board  -RW     Toilet Transfer, [place]  contact guard assist  -RW     Toilet Transfer, Assistive Device  bedside commode with drop arms   sliding bd  -RW     Recorded by [LM] IRASEMA Smith/L [RW] Balaji Malhotra PTA     Gait Assessment/Treatment    Gait, [place] Level  not appropriate to assess  -RW     Recorded by  [RW] Balaji Malhotra PTA     Stairs Assessment/Treatment    Stairs, [place] Level  not appropriate to assess  -RW     Recorded by  [RW] Balaji Malhotra PTA     Wheelchair Training/Management    Wheelchair, Distance Propelled 100  -LM      Recorded by [LM] IRASEMA Smith/L      Upper Body Dressing Assessment/Training    UB Dressing Assess/Train, [place] set up required  -LM      Recorded by [LM] IRASEMA Smith/L      Lower Body Dressing Assessment/Training    LB Dressing Assess/Train, [place] independent  -LM      Recorded by [LM] IRASEMA Smith/L      Balance Skills Training    Sitting-Balance Activities  Forward lean;Reaching for objects;Reaching across midline  -RW     Recorded by  [RW] Balaji Malhotra PTA     Therapy Exercises    Right Lower Extremity  AROM:;ankle pumps/circles;20 reps;supine  -RW     Left Lower Extremity  AROM:;heel slides;supine;20 reps  -RW     Bilateral Lower Extremities  AROM:;20 reps;supine;glut sets;quad sets  -RW     Bilateral Upper Extremity AROM:;20 reps;elbow flexion/extension;shoulder rolls/shrugs;shoulder extension/flexion;shoulder protraction/retraction;shoulder abduction/adduction   cable column all planes B UE   -LM      Recorded by [LM] IRASEMA Smith/L [RW] Balaji Malhotra PTA     Positioning and Restraints    Pre-Treatment Position sitting in chair/recliner  -LM in bed  -RW     Post Treatment Position wheelchair  -LM bsc  -RW     On BS commode  call light within reach;encouraged to call for assist  -RW     Recorded by [LM]  Marce Carter, VILLALPANDO/L [RW] Balaji Malhotra, PTA       User Key  (r) = Recorded By, (t) = Taken By, (c) = Cosigned By    Initials Name Effective Dates     Dwayne Felton, PTA 10/17/16 -     RW Balaji Malhotra, PTA 10/17/16 -     KD Virgie NEWTON Ray, VILLALPANDO/L 10/17/16 -     BL Krissy Hubbard, VILLALPANDO/L 10/17/16 -     LM Marce Carter, VILLALPANDO/L 10/17/16 -                 IP PT Goals       09/16/17 1255 09/15/17 1556 09/14/17 1557    Bed Mobility PT LTG    Bed Mobility PT LTG, Date Goal Reviewed   09/14/17  -RW    Bed Mobility PT LTG, Outcome   goal met  -RW    Transfer Training PT LTG    Transfer Training PT  LTG, Date Goal Reviewed 09/16/17  -JA 09/15/17  -RW 09/14/17  -RW    Transfer Training PT LTG, Outcome goal ongoing  -JA goal ongoing  -RW goal ongoing  -RW    Range of Motion PT LTG    Range of Motion Goal PT LTG, Date Goal Reviewed 09/16/17  -JA 09/15/17  -RW 09/14/17  -RW    Range of Motion Goal PT LTG, Outcome goal ongoing  -JA goal ongoing  -RW goal ongoing  -RW    Wheelchair Propulsion PT LTG    Wheelchair Propulsion Goal PT LTG, Date Goal Reviewed   09/14/17  -RW    Wheelchair Propulsion Goal PT LTG, Outcome   goal met  -RW    Physical Therapy PT LTG    Physical Therapy PT LTG, Date Goal Reviewed   09/14/17  -RW    Physical Therapy PT LTG, Outcome   goal met  -RW      09/13/17 1533 09/12/17 1355 09/11/17 1500    Bed Mobility PT LTG    Bed Mobility PT LTG, Date Established   09/11/17  -LM    Bed Mobility PT LTG, Time to Achieve   by discharge  -LM    Bed Mobility PT LTG, Activity Type   supine to sit/sit to supine  -LM    Bed Mobility PT LTG, [place] Level   conditional independence  -LM    Bed Mobility PT Goal  LTG, Assist Device   overhead trapeze;bed rails  -LM    Bed Mobility PT LTG, Date Goal Reviewed 09/13/17  -RW 09/12/17  -LM     Bed Mobility PT LTG, Outcome goal ongoing  -RW goal ongoing  -LM goal ongoing  -LM    Transfer Training PT LTG    Transfer Training PT LTG, Date Established    09/11/17  -LM    Transfer Training PT LTG, Time to Achieve   by discharge  -LM    Transfer Training PT LTG, Activity Type   bed to chair /chair to bed  -LM    Transfer Training PT LTG, [place] Level   conditional independence  -LM    Transfer Training PT LTG, Assist Device   --   AAD  -LM    Transfer Training PT  LTG, Date Goal Reviewed 09/13/17  -RW 09/12/17  -LM     Transfer Training PT LTG, Outcome goal ongoing  -RW goal ongoing  -LM goal ongoing  -LM    Range of Motion PT LTG    Range of Motion Goal PT LTG, Date Established   09/11/17  -LM    Range of Motion Goal PT LTG, Time to Achieve   by discharge  -LM    Range fo Motion Goal PT LTG, Joint   L knee  -LM    Range of Motion Goal PT LTG, AROM Measure   Knee flex - 110 degrees  -LM    Range of Motion Goal PT LTG, Date Goal Reviewed 09/13/17  -RW 09/12/17  -LM     Range of Motion Goal PT LTG, Outcome goal ongoing  -RW goal ongoing  -LM goal ongoing  -LM    Wheelchair Propulsion PT LTG    Wheelchair Propulsion Goal PT LTG, Date Established  09/12/17  -LM     Wheelchair Propulsion Goal PT LTG, Time to Achieve  by discharge  -LM     Wheelchair Propulsion Goal PT LTG, [place] Level  conditional independence  -LM     Wheelchair Propulsion Goal PT LTG, Distance to Achieve  300 feet  -LM     Wheelchair Propulsion Goal PT LTG, Date Goal Reviewed 09/13/17  -RW      Wheelchair Propulsion Goal PT LTG, Outcome goal ongoing  -RW goal ongoing  -LM     Physical Therapy PT LTG    Physical Therapy PT LTG, Date Established  09/12/17  -LM     Physical Therapy PT LTG, Time to Achieve  by discharge  -LM     Physical Therapy PT LTG, Activity Type  Pt will self propel w/c up/down ramp.  -LM     Physical Therapy PT LTG, [place] Level  conditional independence  -LM     Physical Therapy PT LTG, Date Goal Reviewed 09/13/17  -RW      Physical Therapy PT LTG, Outcome goal ongoing  -RW goal ongoing  -LM       09/11/17 1040 09/10/17 1300 09/09/17 1626    Transfer Training PT LTG     Transfer Training PT LTG, Date Established   09/09/17  -    Transfer Training PT LTG, Time to Achieve   by discharge  -    Transfer Training PT LTG, Activity Type   bed to chair /chair to bed  -    Transfer Training PT LTG, Additional Goal   Once MD allows, pt will perform lateral transfer with conditional independence  -    Transfer Training PT  LTG, Date Goal Reviewed 09/11/17  -AM 09/10/17  -A     Transfer Training PT LTG, Outcome goal not met  -AM goal ongoing  -A goal ongoing  -    Transfer Training PT LTG, Reason Goal Not Met discharged from facility  -AM      Patient Education PT LTG    Patient Education PT LTG, Date Established   09/09/17  -    Patient Education PT LTG, Time to Achieve   by discharge  -    Patient Education PT LTG, Education Type   precaution per surgeon;transfers;bed mobility;pain management;home safety  -    Patient Education PT LTG, Date Goal Reviewed 09/11/17  -AM 09/10/17  -A     Patient Education PT LTG Outcome goal met  -AM goal ongoing  -A goal ongoing  -    Caregiver Training PT LTG    Caregiver Training PT LTG, Date Established   09/09/17  -    Caregiver Training PT LTG, Time to Achieve   by discharge  -    Caregiver Training PT LTG, Activity Type   home caregiver will demonstrate understanding assisting pt as needed with transfers/mobility as well as verbalize understanding of home care instructions  -    Caregiver Training PT LTG, Date Goal Reviewed 09/11/17  -AM 09/10/17  -A     Caregiver Training PT LTG, Outcome goal not met  -AM goal ongoing  -A goal ongoing  -    Caregiver Training PT LTG, Reason Goal Not Met discharged from facility  -AM      Physical Therapy PT STG    Physical Therapy PT STG, Date Established   09/09/17  -    Physical Therapy PT STG, Time to Achieve   by discharge  -    Physical Therapy PT STG, Activity Type   Pt will tolerate OOB 3-4 hours per day  -    Physical Therapy PT STG, Date Goal Reviewed  09/10/17   -JCA     Physical Therapy PT STG, Outcome  goal met  -JCA goal ongoing  -MAGNO      User Key  (r) = Recorded By, (t) = Taken By, (c) = Cosigned By    Initials Name Provider Type    LM Isela Chris, PT Physical Therapist    MAGNO Abel, PT Physical Therapist    ERICH Felton, PTA Physical Therapy Assistant    HELEN Pendleton, PTA Physical Therapy Assistant    SUSIE Young, PTA Physical Therapy Assistant    RW Balaji Malhotra, PTA Physical Therapy Assistant          Physical Therapy Education     Title: PT OT SLP Therapies (Active)     Topic: Physical Therapy (Active)     Point: Mobility training (Done)    Learning Progress Summary    Learner Readiness Method Response Comment Documented by Status   Patient Acceptance E VU reviewed  non wt bearing and transfering to from [facility] 09/13/17 1140 Done    Acceptance E NR Reviewed transferring towards stronger side and maintaining NWB with activity.  09/12/17 1608 Active    Acceptance E NR  TA 09/12/17 1515 Active    Acceptance E NR Reviewed safety with transfers.  Explained that Dr. Rutherford only wants stretcher chair used at this time.  09/11/17 1636 Active               Point: Precautions (Done)    Learning Progress Summary    Learner Readiness Method Response Comment Documented by Status   Patient Acceptance E VU reviewed  non wt bearing and transfering to from [facility] 09/13/17 1140 Done    Acceptance E NR Reviewed transferring towards stronger side and maintaining NWB with activity.  09/12/17 1608 Active    Acceptance E NR  TA 09/12/17 1515 Active    Acceptance E NR Reviewed safety with transfers.  Explained that Dr. Rutherford only wants stretcher chair used at this time.  09/11/17 1636 Active                      User Key     Initials Effective Dates Name Provider Type Discipline     06/15/16 -  Isela Chris, PT Physical Therapist PT    TA 10/17/16 -  Geri Juan, PTA Physical Therapy Assistant PT    RW 10/17/16 -  Balaji Malhotra, PTA Physical  Therapy Assistant PT                    PT Recommendation and Plan  Anticipated Discharge Disposition: home with home health  Planned Therapy Interventions: balance training, bed mobility training, home exercise program, motor coordination training, neuromuscular re-education, patient/family education, postural re-education, ROM (Range of Motion), strengthening, stretching, transfer training  PT Frequency: other (see comments) (5-14 times/wk)  Plan of Care Review  Plan Of Care Reviewed With: patient  Progress: progress toward functional goals as expected  Outcome Summary/Follow up Plan: Pt. cont.'s to progress with ROM, strength, and transfer ability. Assess pt.'s L knee ROM for consistency to acheive ROM goal          Outcome Measures       09/16/17 0910 09/16/17 0810 09/15/17 1000    How much help from another person do you currently need...    Turning from your back to your side while in flat bed without using bedrails?  4  -JA 4  -RW    Moving from lying on back to sitting on the side of a flat bed without bedrails?  4  -JA 4  -RW    Moving to and from a bed to a chair (including a wheelchair)?  4  -JA 4  -RW    Standing up from a chair using your arms (e.g., wheelchair, bedside chair)?  1  -JA 1  -RW    Climbing 3-5 steps with a railing?  1  -JA 1  -RW    To walk in hospital room?  1  -JA 1  -RW    AM-PAC 6 Clicks Score  15  -JA 15  -RW    How much help from another is currently needed...    Putting on and taking off regular lower body clothing? 2  -BL      Bathing (including washing, rinsing, and drying) 3  -BL      Toileting (which includes using toilet bed pan or urinal) 2  -BL      Putting on and taking off regular upper body clothing 4  -BL      Taking care of personal grooming (such as brushing teeth) 4  -BL      Eating meals 4  -BL      Score 19  -BL      Functional Assessment    Outcome Measure Options AM-PAC 6 Clicks Daily Activity (OT)  -BL AM-PAC 6 Clicks Basic Mobility (PT)  -JA       09/15/17 0810           How much help from another is currently needed...    Putting on and taking off regular lower body clothing? 4  -KD      Bathing (including washing, rinsing, and drying) 3  -KD      Toileting (which includes using toilet bed pan or urinal) 3  -KD      Putting on and taking off regular upper body clothing 4  -KD      Taking care of personal grooming (such as brushing teeth) 4  -KD      Eating meals 4  -KD      Score 22  -KD        User Key  (r) = Recorded By, (t) = Taken By, (c) = Cosigned By    Initials Name Provider Type    ERICH Felton PTA Physical Therapy Assistant    JOHN Malhotra PTA Physical Therapy Assistant    VIPUL Ray VILLALPANDO/L Occupational Therapy Assistant    SPARKLE Hubbard VILLALPANDO/L Occupational Therapy Assistant           Time Calculation:         PT Charges       09/16/17 1330 09/16/17 0916       Time Calculation    Start Time 1255  -JA 0810  -JA     Stop Time 1325  -JA 0910  -JA     Time Calculation (min) 30 min  -JA 60 min  -JA     Time Calculation- PT    Total Timed Code Minutes- PT 30 minute(s)  -JA 60 minute(s)  -JA       User Key  (r) = Recorded By, (t) = Taken By, (c) = Cosigned By    Initials Name Provider Type    ERICH Felton PTA Physical Therapy Assistant          Therapy Charges for Today     Code Description Service Date Service Provider Modifiers Qty    44675842552 HC PT THERAPEUTIC ACT EA 15 MIN 9/16/2017 Dwayne Felton PTA GP 1    18596981006 HC PT THER PROC EA 15 MIN 9/16/2017 Dwayne Felton PTA GP 3    54151732865 HC PT THER PROC EA 15 MIN 9/16/2017 Dwayne Felton PTA GP 2          PT G-Codes  PT Professional Judgement Used?: Yes  Outcome Measure Options: AM-PAC 6 Clicks Daily Activity (OT)  Score: 12  Functional Limitation: Mobility: Walking and moving around  Mobility: Walking and Moving Around Current Status (): At least 60 percent but less than 80 percent impaired, limited or restricted  Mobility: Walking and Moving Around  Goal Status (): At least 20 percent but less than 40 percent impaired, limited or restricted    Dwayne Felton, PTA  9/16/2017

## 2017-09-16 NOTE — PLAN OF CARE
Problem: Patient Care Overview (Adult)  Goal: Plan of Care Review  Outcome: Ongoing (interventions implemented as appropriate)    09/16/17 1400   Coping/Psychosocial Response Interventions   Plan Of Care Reviewed With patient   Patient Care Overview   Progress progress toward functional goals as expected   Outcome Evaluation   Outcome Summary/Follow up Plan Pt participated very well this date requiring vc's for safety during transfers and during w/c propelling task. Pt demo's consistant progress with ADL independence.

## 2017-09-16 NOTE — THERAPY TREATMENT NOTE
Inpatient Rehabilitation - Occupational Therapy Treatment Note  [facility]     Patient Name: Vangie Lopez  : 1950  MRN: [id number]  Today's Date: 2017  Onset of Illness/Injury or Date of Surgery Date: 17  Date of Referral to OT: 17  Referring Physician: Dr. Stephen      Admit Date: 2017    Visit Dx:     ICD-10-CM ICD-9-CM   1. Multiple trauma T07 959.8   2. Abnormality of gait and mobility R26.9 781.2   3. Muscle weakness (generalized) M62.81 728.87   4. Impaired mobility and ADLs Z74.09 799.89   5. Closed displaced comminuted fracture of right patella with routine healing, subsequent encounter S82.041D V54.16   6. Closed displaced fracture of navicular bone of left foot with routine healing, subsequent encounter S92.252D V54.19     Patient Active Problem List   Diagnosis   • Encounter for screening for malignant neoplasm of colon   • Closed displaced comminuted fracture of right patella   • Closed displaced fracture of navicular bone of left foot   • Closed dislocation of navicular bone of left foot   • Patella fracture   • Essential hypertension   • Mixed hyperlipidemia   • Osteoarthritis of hands, bilateral   • BPH (benign prostatic hyperplasia)   • Glaucoma   • Hyperlipidemia   • Closed fracture of navicular bone with dislocation of perilunate joint of left wrist   • Fracture of patella, right, closed   • Multiple trauma   • Encounter for rehabilitation   • History of kidney stones             Adult Rehabilitation Note       17 1255 17 0910 17 0810    Rehab Assessment/Intervention    Discipline physical therapy assistant  -JA occupational therapy assistant  -BL physical therapy assistant  -JA    Document Type therapy note (daily note)  -JA therapy note (daily note)  -BL therapy note (daily note)  -JA    Subjective Information agree to therapy  -JA agree to therapy  -BL agree to therapy  -JA    Patient Effort, Rehab Treatment good  -JA good  -BL good   -JA    Precautions/Limitations non-weight bearing status  -JA non-weight bearing status  -BL non-weight bearing status  -JA    Specific Treatment Considerations  Both legs in extension for entire treatment and pt returned to supine after 2 hours in w/c this date.  -BL Pt. reports having muscle spasm last night at L LE and reports increase L knee pain at medial side this a.m. nsg. made aware   -JA    Recorded by [JA] Dwayne Felton PTA [BL] IRASEMA Vasquez/FREEDOM [JA] Dwayne Felton PTA    Vital Signs    Post Systolic BP Rehab  145  -BL     Post Treatment Diastolic BP  75  -BL     Pretreatment Heart Rate (beats/min)  97  -BL     Posttreatment Heart Rate (beats/min)  99  -BL     Pre SpO2 (%)  98  -BL     O2 Delivery Pre Treatment  room air  -BL     Post SpO2 (%)  99  -BL     O2 Delivery Post Treatment  room air  -BL     Pre Patient Position Supine  -JA Sitting  -BL Supine  -JA    Intra Patient Position  Sitting  -BL Sitting  -JA    Post Patient Position Supine  -JA Supine  -BL Sitting  -JA    Recorded by [JA] Dwayne Felton PTA [BL] NAV VasquezA/FREEDOM [JA] Dwayne Felton PTA    Pain Assessment    Pain Assessment 0-10  -JA 0-10  -BL 0-10  -JA    Pain Score 4  -JA 6  -BL 8  -JA    Post Pain Score 4  -JA 8  -BL 6  -JA    Pain Type Acute pain;Surgical pain  -JA Acute pain  -BL Acute pain  -JA    Pain Location Knee  -JA Knee  -BL Knee  -JA    Pain Orientation Right;Left  -JA Right  -BL Left  -JA    Pain Intervention(s) Medication (See MAR)  -JA Medication (See MAR);Repositioned   returned to bed in supine position  -BL Medication (See MAR)  -JA    Recorded by [JA] Dwayne Felton PTA [BL] IRASEMA Vasquez/FREEDOM [JA] Dwayne Felton PTA    Cognitive Assessment/Intervention    Current Cognitive/Communication Assessment  functional  -BL     Orientation Status  oriented x 4  -BL     Follows Commands/Answers Questions  100% of the time  -BL     Personal Safety  WNL/WFL  -BL     Personal Safety  Interventions  gait belt;muscle strengthening facilitated  -BL     Recorded by  [BL] IRASEMA Vasquez/L     General LE Assessment    ROM knee, left: LE ROM deficit  -      ROM Detail 110 degrees AROM   -JA      Recorded by [JA] Dwayne Felton PTA      Mobility Assessment/Training    Extremity Weight-Bearing Status left lower extremity  -JA  left lower extremity  -JA    Left Lower Extremity Weight-Bearing non weight-bearing  -JA  non weight-bearing  -JA    Right Lower Extremity Weight-Bearing non weight-bearing  -JA  non weight-bearing  -JA    Recorded by [JA] Dwayne Felton PTA  [JA] Dwayne Felton PTA    Bed Mobility, Assessment/Treatment    Bed Mobility, Assistive Device  head of bed elevated;overhead trapeze;leg   - head of bed elevated;overhead trapeze  -    Bed Mobility, Roll Left, [place]  conditional independence  -BL     Bed Mobility, Scoot/Bridge, [place]  conditional independence  -BL conditional independence  -JA    Bed Mob, Supine to Sit, [place]  conditional independence  -BL conditional independence  -    Bed Mobility, Safety Issues  decreased use of legs for bridging/pushing  -     Bed Mobility, Impairments  ROM decreased  -BL     Recorded by  [BL] IRASEMA Vasquez/FREEDOM [JA] Dwayne Felton PTA    Transfer Assessment/Treatment    Transfers, Bed-Chair [place]   conditional independence;set up required  -    Transfers, Chair-Bed [place]  conditional independence;set up required;verbal cues required  -     Transfers, Bed-Chair-Bed, Assist Device  sliding board  - sliding board  -    Toilet Transfer, [place]  contact guard assist;supervision required;set up required;verbal cues required  -     Toilet Transfer, Assistive Device  bedside commode with drop arms   sliding board  -     Walk-In Shower Transfer, [place]  not tested  -BL     Bathtub Transfer, [place]  not tested  -BL     Transfer, Maintain Weight Bearing  Status  able to maintain weight bearing status  -BL     Transfer, Impairments  ROM decreased;strength decreased  -BL     Recorded by  [BL] IRASEMA Vasquez/FREEDOM [JA] Dwayne Felton PTA    Wheelchair Training/Management    Wheelchair Transfer Type  lateral transfer  -BL lateral transfer  -JA    Wheelchair Transfer Type Comment  Pt demo'd safe transfer from w/c to bedside commode along with from bedside commode back to w/c and w/c to bed with sliding board use. Pt required vc's and set up for sliding board this date however was able to complete transfer with conditional independence however CGA required from w/c to toilet and back to w/c.   -BL     Wheelchair Task Training  safety considerations  -BL     Wheelchair Task Training Comment  Pt was educated on safely guiding w/c throughout the hallway this date and to assess spacial concerns once returning home to avoid contact of his feet with objects. Pt was educated on safety with remembering to always lock w/c before transfers and to always place armrest back into correct position once returned to w/c after transfer is complete. Required vc's to lock w/c once and to return armrest to resting position this date after transferring back from toilet to w/c.  -BL     Wheelchair Propulsion Training  carpet;turning wheelchair;steering wheelchair;moving through doorways;backward propulsion;forward propulsion  -BL     Wheelchair, Distance Propelled  75  -  -JA    Wheelchair Training Comment  conditional independence however vc's required for turning w/c  -BL conditional independent   -JA    Recorded by  [BL] IRASEMA Vasquez/FREEDOM [JA] Dwayne Felton PTA    Upper Body Bathing Assessment/Training    UB Bathing Assess/Train Assistive Device  bath mitt  -BL     UB Bathing Assess/Train, Position  sitting   in w/c at sink  -BL     UB Bathing Assess/Train, [place] Level  set up required;conditional independence  -BL     UB Bathing Assess/Train, Comment  Max  assist for cleansing his back this date due to IRASEMA/L not having LH sponge to train patient with.  -BL     Recorded by  [BL] IRASEMA Vasquez/L     Lower Body Bathing Assessment/Training    LB Bathing Assess/Train Assistive Device  bath mitt  -BL     LB Bathing Assess/Train, Position  sitting;supported sitting  -BL     LB Bathing Assess/Train, [place] Level  set up required;supervision required;dependent (less than 25% patient effort)  -BL     LB Bathing Assess/Train, Impairments  decreased flexibility;ROM decreased;strength decreased;pain  -BL     LB Bathing Assess/Train, Comment  Dependent for washing R foot only this date. Pt was able to complete pericare in sitting with supervision and set up only.  -BL     Recorded by  [BL] IRASEMA Vasquez/L     Upper Body Dressing Assessment/Training    UB Dressing Assess/Train, Clothing Type  doffing:;donning:;pull over;t-shirt  -BL     UB Dressing Assess/Train, Position  sitting  -BL     UB Dressing Assess/Train, [place]  independent  -BL     UB Dressing Assess/Train, Impairments  ROM decreased  -BL     Recorded by  [BL] IRASEMA Vasquez/L     Lower Body Dressing Assessment/Training    LB Dressing Assess/Train, Clothing Type  donning:;doffing:;shorts;slipper socks  -BL     LB Dressing Assess/Train, Position  sitting  -BL     LB Dressing Assess/Train, [place]  set up required;moderate assist (50% patient effort);dependent (less than 25% patient effort)  -BL     LB Dressing Assess/Train, Comment  moderate assist for donning shorts and dependent for sock on R foot  -BL     Recorded by  [BL] ELY Vasquez     Toileting Assessment/Training    Toileting Assess/Train, Assistive Device  bedside commode  -BL     Toileting Assess/Train, Position  sitting  -BL     Toileting Assess/Train, [place] Level  set up required;moderate assist (50% patient effort)  -BL     Toileting Assess/Train, Impairments  ROM decreased;decreased flexibility;strength decreased   -BL     Recorded by  [BL] IRASEMA Vasquez/L     Grooming Assessment/Training    Grooming Assess/Train, Assistive Device  electric toothbrush  -BL     Grooming Assess/Train, Position  sitting  -BL     Grooming Assess/Train, [place] Level  set up required;conditional independence  -BL     Recorded by  [BL] IRASEMA Vasquez/L     Therapy Exercises    Right Lower Extremity AROM:;20 reps;supine;ankle pumps/circles;quad sets   2x20  -JA  AROM:;20 reps;supine;ankle pumps/circles   2x20  -JA    Left Lower Extremity AROM:;20 reps;supine;hip abduction/adduction;SLR;heel slides   2x20  -JA  AROM:;20 reps;supine;heel slides;hip abduction/adduction;SLR   2x20  -JA    Bilateral Lower Extremities AROM:;20 reps;glut sets;quad sets   2x20  -JA  AROM:;20 reps;supine;glut sets;quad sets   2x20  -JA    Bilateral Upper Extremity   AROM:;20 reps;sitting   CC mid/high rows 35# 2x20  -JA    Recorded by [JA] Dwayne Felton PTA  [JA] Dwayne Felton PTA    Positioning and Restraints    Pre-Treatment Position in bed  -JA other (comment)   w/c  -BL in bed  -JA    Post Treatment Position bed  -JA bed  -BL wheelchair  -JA    In Bed  supine;call light within reach;encouraged to call for assist  -BL     In Wheelchair   with OT  -JA    Recorded by [JA] Dwayne Felton PTA [BL] IRASEMA Vasquez/FREEDOM [JA] Dwayne Felton PTA      09/15/17 1447 09/15/17 1005 09/15/17 0815    Rehab Assessment/Intervention    Discipline physical therapy assistant  -RW physical therapy assistant  -RW occupational therapy assistant  -KD    Document Type therapy note (daily note)  -RW therapy note (daily note)  -RW therapy note (daily note)  -KD    Subjective Information agree to therapy  -RW agree to therapy  -RW agree to therapy  -KD    Patient Effort, Rehab Treatment good  -RW good  -RW     Recorded by [RW] Balaji Malhotra PTA [RW] Balaji Malhotra PTA [KD] Virgie Ray VILLALPANDO/L    Vital Signs    Pre SpO2 (%)   97  -KD    O2 Delivery Pre  Treatment   room air  -KD    Post SpO2 (%)   98  -KD    O2 Delivery Post Treatment   room air  -KD    Pre Patient Position   Supine  -KD    Intra Patient Position   Sitting  -KD    Recorded by   [KD] Virgie Ray, VILLALPANDO/L    Pain Assessment    Pain Assessment No/denies pain  -RW --   none reported  -RW 0-10  -KD    Pain Score   6  -KD    Post Pain Score   8  -KD    Pain Type   Surgical pain  -KD    Pain Location   Knee   L foot  -KD    Pain Orientation   Right  -KD    Pain Intervention(s)   Medication (See MAR)  -KD    Recorded by [RW] Balaji Malhotra PTA [RW] Balaji Malhotra PTA [KD] NAV CamachoA/L    Cognitive Assessment/Intervention    Current Cognitive/Communication Assessment functional  -RW functional  -RW functional  -KD    Orientation Status oriented x 4  -RW oriented x 4  -RW oriented x 4  -KD    Follows Commands/Answers Questions 100% of the time  -% of the time  -% of the time  -KD    Personal Safety   WNL/WFL  -KD    Personal Safety Interventions gait belt  -RW gait belt  -RW     Recorded by [RW] Balaji Malhotra PTA [RW] Balaji Malhotra PTA [KD] Virgie Ray, VILLALPANDO/L    General LE Assessment    ROM Detail arom 90 deg right knee  -RW      Recorded by [RW] Balaji Malhotra PTA      Mobility Assessment/Training    Left Lower Extremity Weight-Bearing non weight-bearing  -RW non weight-bearing  -RW     Right Lower Extremity Weight-Bearing non weight-bearing  -RW non weight-bearing  -RW     Recorded by [RW] Balaji Malhotra PTA [RW] Balaji Malhotra PTA     Bed Mobility, Assessment/Treatment    Bed Mobility, Assistive Device   head of bed elevated;bed rails  -KD    Bed Mob, Supine to Sit, [place] conditional independence  -RW conditional independence  -RW conditional independence  -KD    Bed Mob, Sit to Supine, [place] conditional independence  -RW conditional independence  -RW     Bed Mob, Sidelying to Sit, [place]   conditional independence  -KD    Recorded by [JOHN] Balaji KIDD  [place], PTA [RW] Balaji Malhotra, PTA [KD] NAV CamachoA/L    Transfer Assessment/Treatment    Transfers, Bed-Chair [place] conditional independence;set up required  -RW  set up required  -KD    Transfers, Chair-Bed [place]  conditional independence;set up required  -RW     Transfers, Bed-Chair-Bed, Assist Device   sliding board  -KD    Recorded by [RW] Balaji Malhotra, PTA [RW] Balaji Malhotra, PTA [KD] NAV CamachoA/L    Gait Assessment/Treatment    Gait, [place] Level not appropriate to assess  -RW not appropriate to assess  -RW     Recorded by [RW] Balaji Malhotra, DARIEN [RW] Balaji Malhotra PTA     Stairs Assessment/Treatment    Stairs, [place] Level not appropriate to assess  -RW not appropriate to assess  -RW     Recorded by [RW] Balaji Malhotra, DARIEN [RW] Balaji Malhotra PTA     Wheelchair Training/Management    Wheelchair Propulsion Training Comment   --   self propelled  -KD    Wheelchair, Distance Propelled 150  -RW  275  -KD    Recorded by [RW] Balaji Malhotra, PTA  [KD] NAV CamachoA/L    Upper Body Bathing Assessment/Training    UB Bathing Assess/Train Assistive Device   bath mitt  -KD    UB Bathing Assess/Train, Position   sitting  -KD    UB Bathing Assess/Train, [place] Level   set up required  -KD    Recorded by   [KD] Virgie Ray, VILLAL[place]ANDO/L    Lower Body Bathing Assessment/Training    LB Bathing Assess/Train Assistive Device   bath mitt  -KD    LB Bathing Assess/Train, Position   sitting  -KD    LB Bathing Assess/Train, [place] Level   set up required  -KD    Recorded by   [KD] NAV CamachoA/L    Upper Body Dressing Assessment/Training    UB Dressing Assess/Train, Clothing Type   donning:;pull over  -KD    UB Dressing Assess/Train, Position   sitting  -KD    UB Dressing Assess/Train, [place]   set up required  -KD    Recorded by   [KD] NAV CamachoA/L    Lower Body Dressing Assessment/Training    LB Dressing Assess/Train, Clothing Type    doffing:;donning:;pants;shorts;slipper socks  -KD    LB Dressing Assess/Train, Position   supine;sitting  -KD    LB Dressing Assess/Train, [place]   set up required  -KD    Recorded by   [KD] NAV CamachoA/L    Grooming Assessment/Training    Grooming Assess/Train, Assistive Device   --   brush teeth/ shave  -KD    Grooming Assess/Train, Position   long sitting  -KD    Grooming Assess/Train, [place] Level   set up required  -KD    Recorded by   [KD] NAV CamachoA/L    Balance Skills Training    Sitting-Balance Activities Forward lean;Reaching for objects;Reaching across midline  -RW Forward lean;Reaching for objects;Reaching across midline  -RW     Recorded by [RW] Balaji Malhotar PTA [RW] Balaji Malhotra PTA     Therapy Exercises    Right Lower Extremity  AROM:;20 reps;sitting;ankle pumps/circles   2 sets  -RW     Left Lower Extremity  AROM:;20 reps;sitting;hip abduction/adduction;heel slides   2 sets  -RW     Bilateral Lower Extremities AROM:;20 reps;sitting;quad sets;glut sets  -RW AROM:;20 reps;glut sets;quad sets;sitting   2 sets  -RW     Bilateral Upper Extremity AROM:;20 reps;sitting   cable column wide  rows 35#  -RW AROM:;20 reps;sitting   t band rows with blue tb 2 sets  -RW AROM:;20 reps;sitting;elbow flexion/extension;hand pumps;pronation/supination;shoulder abduction/adduction;shoulder extension/flexion;shoulder ER/IR  -KD    BUE Resistance   manual resistance- minimal   5lb HW; 2 sets  -KD    Recorded by [RW] Balaji Malhotra PTA [RW] Balaji Malhotra PTA [KD] NAV CamachoA/L    Positioning and Restraints    Pre-Treatment Position in bed  -RW sitting in chair/recliner  -RW in bed  -KD    Post Treatment Position wheelchair  -RW bed  -RW wheelchair  -KD    In Wheelchair call light within reach;encouraged to call for assist  -RW  sitting;call light within reach;encouraged to call for assist;exit alarm on  -KD    Recorded by [RW] Balaji Malhotra PTA [RW] Balaji Malhotra PTA [KD]  Virgie Ray, VILLALPANDO/L      09/14/17 1440 09/14/17 1315 09/14/17 1115    Rehab Assessment/Intervention    Discipline physical therapy assistant  -RW occupational therapy assistant  -LM physical therapy assistant  -JOHN    Document Type therapy note (daily note)  -RW therapy note (daily note)  -LM therapy note (daily note)  -RW    Subjective Information agree to therapy  -RW agree to therapy  -LM agree to therapy  -RW    Patient Effort, Rehab Treatment good  -RW good  -LM good  -RW    Precautions/Limitations non-weight bearing status  -RW  non-weight bearing status  -RW    Recorded by [RW] Balaji Malhotra PTA [LM] NAV SmithA/L [RW] Balaji Malhotra PTA    Pain Assessment    Pain Assessment No/denies pain  -RW No/denies pain  -LM --   c/o wrist pain left.  -RW    Pain Location   Wrist  -RW    Pain Orientation   Left  -RW    Recorded by [RW] Balaji Malhotra PTA [LM] NAV SmithA/L [RW] Balaji Malhotra PTA    Vision Assessment/Intervention    Visual Impairment  WNL  -LM     Recorded by  [LM] Marce Carter, VILLALPANDO/L     Cognitive Assessment/Intervention    Current Cognitive/Communication Assessment functional  -RW functional  -LM functional  -RW    Orientation Status oriented x 4  -RW oriented x 4  -LM oriented x 4  -RW    Follows Commands/Answers Questions 100% of the time  -% of the time  -% of the time  -RW    Personal Safety  WNL/WFL  -LM     Personal Safety Interventions gait belt  -RW  gait belt  -RW    Recorded by [RW] Balaji Malhotra PTA [LM] NAV SmithA/L [RW] Balaji Malhotra PTA    Mobility Assessment/Training    Left Lower Extremity Weight-Bearing non weight-bearing  -RW  non weight-bearing  -RW    Right Lower Extremity Weight-Bearing non weight-bearing  -RW  non weight-bearing  -RW    Recorded by [RW] Balaji Malhotra PTA  [RW] Balaji Malhotra PTA    Bed Mobility, Assessment/Treatment    Bed Mobility, Assistive Device   head of bed elevated  -RW    Bed Mob, Supine  to Sit, [place]   conditional independence;independent  -RW    Bed Mob, Sit to Supine, [place]   independent;conditional independence  -RW    Recorded by   [RW] Balaji Malhotra PTA    Transfer Assessment/Treatment    Transfers, Bed-Chair [place]   set up required  -RW    Transfers, Chair-Bed [place]   set up required  -RW    Recorded by   [RW] Balaji Malhotra PTA    Gait Assessment/Treatment    Gait, [place] Level not appropriate to assess  -RW  not appropriate to assess  -RW    Recorded by [RW] Balaji Malhotra PTA  [RW] Balaji Malhotra PTA    Stairs Assessment/Treatment    Stairs, [place] Level not appropriate to assess  -RW  not appropriate to assess  -RW    Recorded by [RW] Balaji Malhotra PTA  [RW] Balaji Malhotra PTA    Wheelchair Training/Management    Wheelchair Propulsion Training incline  -RW      Wheelchair, Distance Propelled 300*  -RW      Wheelchair Training Comment wc mob up and down ramp ind  -RW      Recorded by [RW] Balaji Malhotra PTA      Balance Skills Training    Sitting-Balance Activities Forward lean;Reaching for objects;Reaching across midline  -RW      Recorded by [RW] Balaji Malhotra PTA      Therapy Exercises    Right Lower Extremity   AROM:;ankle pumps/circles;20 reps;supine  -RW    Left Lower Extremity AROM:;20 reps;sitting;knee flexion;LAQ  -RW  AROM:;heel slides;supine;20 reps  -RW    LLE Resistance theraband  -RW      Bilateral Lower Extremities AROM:;20 reps;glut sets;quad sets;sitting  -RW  AROM:;20 reps;supine;glut sets;quad sets  -RW    Bilateral Upper Extremity AROM:;20 reps;sitting   t band rows with blue tb 2 sets  -RW AROM:;20 reps;elbow flexion/extension;shoulder abduction/adduction;shoulder extension/flexion;shoulder horizontal abd/add;shoulder protraction/retraction  -LM     Recorded by [RW] Balaji Malhotra PTA [LM] NAV SmithA/L [RW] Balaji Malhotra PTA    Positioning and Restraints    Pre-Treatment Position sitting in  chair/recliner  -RW in bed  -LM sitting in chair/recliner  -RW    Post Treatment Position wheelchair  -RW bed  -LM wheelchair  -RW    In Wheelchair   call light within reach  -RW    Recorded by [RW] Balaji Malhotra PTA [LM] IRASEMA Smith/L [RW] Balaji Malhotra PTA      09/14/17 1005 09/13/17 1440       Rehab Assessment/Intervention    Discipline occupational therapy assistant  -LM physical therapy assistant  -RW     Document Type therapy note (daily note)  -LM therapy note (daily note)  -RW     Subjective Information agree to therapy  -LM agree to therapy  -RW     Patient Effort, Rehab Treatment good  -LM good  -RW     Precautions/Limitations  non-weight bearing status;brace on when up  -RW     Recorded by [LM] IRASEMA Smith/L [RW] Balaji Malhotra PTA     Pain Assessment    Pain Assessment No/denies pain  -LM No/denies pain  -RW     Recorded by [LM] IRASEMA Smith/L [RW] Balaji Malhotra PTA     Cognitive Assessment/Intervention    Current Cognitive/Communication Assessment functional  -LM functional  -RW     Orientation Status oriented x 4  -LM oriented x 4  -RW     Follows Commands/Answers Questions 100% of the time  -% of the time  -RW     Personal Safety Interventions  gait belt  -RW     Recorded by [LM] IRASEMA Smith/L [RW] Balaji Malhotra PTA     Mobility Assessment/Training    Left Lower Extremity Weight-Bearing  non weight-bearing  -RW     Right Lower Extremity Weight-Bearing  non weight-bearing  -RW     Recorded by  [RW] Balaji Malohtra PTA     Bed Mobility, Assessment/Treatment    Bed Mobility, Assistive Device  bed rails;head of bed elevated  -RW     Bed Mob, Supine to Sit, [place]  conditional independence  -RW     Recorded by  [RW] Balaji Malhotra PTA     Transfer Assessment/Treatment    Transfers, Bed-Chair [place] minimum assist (75% patient effort)  -LM contact guard assist  -RW     Transfers, Chair-Bed [place] minimum assist (75% patient  effort)  -LM      Transfers, Bed-Chair-Bed, Assist Device  sliding board  -RW     Toilet Transfer, [place]  contact guard assist  -RW     Toilet Transfer, Assistive Device  bedside commode with drop arms   sliding bd  -RW     Recorded by [LM] IRASEMA Smith/L [RW] Balaji Malhotra PTA     Gait Assessment/Treatment    Gait, [place] Level  not appropriate to assess  -RW     Recorded by  [RW] Balaji Malhotra PTA     Stairs Assessment/Treatment    Stairs, [place] Level  not appropriate to assess  -RW     Recorded by  [RW] Balaji Malhotra PTA     Wheelchair Training/Management    Wheelchair, Distance Propelled 100  -LM      Recorded by [LM] IRASEMA Smith/L      Upper Body Dressing Assessment/Training    UB Dressing Assess/Train, [place] set up required  -LM      Recorded by [LM] IRASEMA Smith/L      Lower Body Dressing Assessment/Training    LB Dressing Assess/Train, [place] independent  -LM      Recorded by [LM] IRASEMA Smith/L      Balance Skills Training    Sitting-Balance Activities  Forward lean;Reaching for objects;Reaching across midline  -RW     Recorded by  [RW] Balaji Malhotra PTA     Therapy Exercises    Right Lower Extremity  AROM:;ankle pumps/circles;20 reps;supine  -RW     Left Lower Extremity  AROM:;heel slides;supine;20 reps  -RW     Bilateral Lower Extremities  AROM:;20 reps;supine;glut sets;quad sets  -RW     Bilateral Upper Extremity AROM:;20 reps;elbow flexion/extension;shoulder rolls/shrugs;shoulder extension/flexion;shoulder protraction/retraction;shoulder abduction/adduction   cable column all planes B UE   -LM      Recorded by [LM] IRASEMA Smith/L [RW] Balaji Malhotra PTA     Positioning and Restraints    Pre-Treatment Position sitting in chair/recliner  -LM in bed  -RW     Post Treatment Position wheelchair  -LM bsc  -RW     On BS commode  call light within reach;encouraged to call for assist  -RW     Recorded by [LM]  Marce Carter, VILLALPANDO/L [RW] Balaji Malhotra, PTA       User Key  (r) = Recorded By, (t) = Taken By, (c) = Cosigned By    Initials Name Effective Dates    JA Dwayne MEIR Felton, PTA 10/17/16 -     RW Balaji Malhotra, PTA 10/17/16 -     KD Virgie GLEZ Cory, VILLALPANDO/L 10/17/16 -     BL Krissy Hubbard, VILLALPANDO/L 10/17/16 -     LM Marce Carter, VILLALPANDO/L 10/17/16 -                 OT Goals       09/15/17 0810 09/14/17 1413 09/13/17 1552    Bed Mobility OT LTG    Bed Mobility OT LTG, Date Goal Reviewed  09/14/17  -LM 09/13/17  -LM    Bed Mobility OT LTG, Outcome  goal met  -LM goal ongoing  -LM    Transfer Training OT LTG    Transfer Training OT LTG, Date Goal Reviewed  09/14/17  -LM 09/13/17  -LM    Transfer Training OT LTG, Outcome  goal met  -LM goal ongoing  -LM    Patient Education OT LTG    Patient Education OT LTG, Date Goal Reviewed 09/15/17  -KD 09/14/17  -LM 09/13/17  -LM    Patient Education OT LTG Outcome goal not met  -KD goal ongoing  -LM goal ongoing  -LM    ADL OT LTG    ADL OT LTG, Date Goal Reviewed 09/15/17  -KD 09/14/17  -LM 09/13/17  -LM    ADL OT LTG, Outcome goal not met  -KD goal ongoing  -LM goal ongoing  -LM      09/12/17 1455 09/12/17 0755 09/11/17 1609    Bed Mobility OT LTG    Bed Mobility OT LTG, Date Established  09/12/17  -BH (r) SP (t) BH (c)     Bed Mobility OT LTG, Time to Achieve  by discharge  -BH (r) SP (t) BH (c)     Bed Mobility OT LTG, Activity Type  all bed mobility  -BH (r) SP (t) BH (c)     Bed Mobility OT LTG, [place] Level  supervision required;minimum assist (75% patient effort)   AE/adaptive techniques- RLE in knee immobilizer and extended  -BH (r) SP (t) BH (c)     Bed Mobility OT LTG, Date Goal Reviewed 09/12/17  -RC      Bed Mobility OT LTG, Outcome goal ongoing  -      Transfer Training OT LTG    Transfer Training OT LTG, Date Established  09/12/17  -BH (r) SP (t) BH (c)     Transfer Training OT LTG, Time to Achieve  by discharge  -BH (r) SP (t) BH (c)     Transfer  Training OT LTG, Activity Type  --   BSC, w/c  -BH (r) SP (t) BH (c)     Transfer Training OT LTG, [place] Level  minimum assist (75% patient effort);contact guard assist   RLE in knee immobilizer and extended at all times.  -BH (r) SP (t) BH (c)     Transfer Training OT LTG, Date Goal Reviewed 09/12/17  -  09/04/17  -    Transfer Training OT LTG, Outcome goal ongoing  -  goal not met  -    Transfer Training OT LTG, Reason Goal Not Met   discharged from facility  -    Strength OT LTG    Strength Goal OT LTG, Date Goal Reviewed   09/11/17  -    Strength Goal OT LTG, Outcome   goal not met  -SG    Strength Goal OT LTG, Reason Goal Not Met   discharged from facility  -    Dynamic Sitting Balance OT LTG    Dynamic Sitting Balance OT LTG, Date Goal Reviewed   09/11/17  -    Dynamic Sitting Balance OT LTG, Outcome   goal not met  -    Dynamic Sitting Balance OT LTG, Reason Goal Not Met   discharged from facility  -    Patient Education OT LTG    Patient Education OT LTG, Date Established  09/12/17  -BH (r) SP (t) BH (c)     Patient Education OT LTG, Time to Achieve  by discharge  -BH (r) SP (t) BH (c)     Patient Education OT LTG, Education Type  HEP;precautions per surgeon;positioning;posture/body mechanics;home safety;adaptive equipment mgmt  -BH (r) SP (t) BH (c)     Patient Education OT LTG, Education Understanding  independent;demonstrates adequately;verbalizes understanding  -BH (r) SP (t) BH (c)     Patient Education OT LTG, Date Goal Reviewed 09/12/17  -      Patient Education OT LTG Outcome goal ongoing  -      ADL OT LTG    ADL OT LTG, Date Established  09/12/17  -BH (r) SP (t) BH (c)     ADL OT LTG, Time to Achieve  by discharge  -BH (r) SP (t) BH (c)     ADL OT LTG, Activity Type  ADL skills  -BH (r) SP (t) BH (c)     ADL OT LTG, Additional Goal  set-up with self-feeding, grooming, UB dressing/bathing; min A with toileting; mod A with LB dressing/bathing  -BH (r) SP (t) BH (c)      ADL OT LTG, Date Goal Reviewed 09/12/17  -RC  09/11/17  -SG    ADL OT LTG, Outcome goal ongoing  -  goal not met  -SG    ADL OT LTG, Reason Goal Not Met   discharged from facility  -SG      09/11/17 1333          Transfer Training OT LTG    Transfer Training OT LTG, Date Established 09/11/17  -RB      Transfer Training OT LTG, Time to Achieve by discharge  -RB      Transfer Training OT LTG, Activity Type all transfers  -RB      Transfer Training OT LTG, [place] Level supervision required;contact guard assist  -RB      Transfer Training OT LTG, Assist Device other (see comments)   Sliding board if needed.  -RB      Strength OT LTG    Strength Goal OT LTG, Date Established 09/11/17  -RB      Strength Goal OT LTG, Time to Achieve by discharge  -RB      Strength Goal OT LTG, Measure to Achieve 2 sets of 10 reps all planes with 3-4 lb dumbells for B UE strength to increase independence with functional mobility/transfers.  -RB      Dynamic Sitting Balance OT LTG    Dynamic Sitting Balance OT LTG, Date Established 09/11/17  -RB      Dynamic Sitting Balance OT LTG, Time to Achieve by discharge  -RB      Dynamic Sitting Balance OT LTG, [place] Level supervision required   10-15 minutes with functional activity.  -RB      Dynamic Sitting Balance OT LTG, Assist Device bed rails  -RB      ADL OT LTG    ADL OT LTG, Date Established 09/11/17  -RB      ADL OT LTG, Time to Achieve by discharge  -RB      ADL OT LTG, Activity Type ADL skills   Sponge bath and dress.  -RB      ADL OT LTG, [place] Level contact guard;min assist  -RB        User Key  (r) = Recorded By, (t) = Taken By, (c) = Cosigned By    Initials Name Provider Type    CLAIR Perez, OT Occupational Therapist     Isabel Jones, OTR/L Occupational Therapist    MARGE Jean-Baptiste, VILLALPANDO/L Occupational Therapy Assistant    VIPUL Ray, VILLALPANDO/L Occupational Therapy Assistant    YOEL Carter, VILLALPANDO/L Occupational Therapy Assistant    SP  Kenya Juárez, OT Student OT Student    SAHARA Rai OT Occupational Therapist          Occupational Therapy Education     Title: PT OT SLP Therapies (Active)     Topic: Occupational Therapy (Active)     Point: ADL training (Done)    Description: Instruct learner(s) on proper safety adaptation and remediation techniques during self care or transfers.   Instruct in proper use of assistive devices.    Learning Progress Summary    Learner Readiness Method Response Comment Documented by Status   Patient Acceptance D STEPHEN  KD 09/15/17 1036 Done    Acceptance E VU Educated on OT and POC. Educated about adhering to weight bearing precautions. Educated about safety with ADL and bed mobility. SP 09/12/17 1159 Done               Point: Precautions (Done)    Description: Instruct learner(s) on prescribed precautions during self-care and functional transfers.    Learning Progress Summary    Learner Readiness Method Response Comment Documented by Status   Patient Acceptance E VU Educated on OT and POC. Educated about adhering to weight bearing precautions. Educated about safety with ADL and bed mobility. SP 09/12/17 1159 Done               Point: Body mechanics (Done)    Description: Instruct learner(s) on proper positioning and spine alignment during self-care, functional mobility activities and/or exercises.    Learning Progress Summary    Learner Readiness Method Response Comment Documented by Status   Patient Acceptance E VU Educated on OT and POC. Educated about adhering to weight bearing precautions. Educated about safety with ADL and bed mobility. SP 09/12/17 1159 Done                      User Key     Initials Effective Dates Name Provider Type Discipline    KD 10/17/16 -  IRASEMA Camacho/L Occupational Therapy Assistant OT    SP 01/31/17 -  Kenya Juárez OT Student OT Student OT                  OT Recommendation and Plan  Anticipated Equipment Needs At Discharge: hospital bed  Anticipated Discharge  Disposition: home with 24/7 care, home with home health  Planned Therapy Interventions: activity intolerance, adaptive equipment training, ADL retraining, bed mobility training, balance training, energy conservation, fine motor coordination training, home exercise program, motor coordination training, strengthening, transfer training  Therapy Frequency: other (see comments) (3-14x/wk)  Plan of Care Review  Plan Of Care Reviewed With: patient  Progress: progress toward functional goals as expected  Outcome Summary/Follow up Plan: Pt participated very well this date requiring vc's for safety during transfers and during w/c propelling task. Pt demo's consistant progress with ADL independence.         Outcome Measures       09/16/17 0910 09/16/17 0810 09/15/17 1000    How much help from another person do you currently need...    Turning from your back to your side while in flat bed without using bedrails?  4  -JA 4  -RW    Moving from lying on back to sitting on the side of a flat bed without bedrails?  4  -JA 4  -RW    Moving to and from a bed to a chair (including a wheelchair)?  4  -JA 4  -RW    Standing up from a chair using your arms (e.g., wheelchair, bedside chair)?  1  -JA 1  -RW    Climbing 3-5 steps with a railing?  1  -JA 1  -RW    To walk in hospital room?  1  -JA 1  -RW    AM-PAC 6 Clicks Score  15  -JA 15  -RW    How much help from another is currently needed...    Putting on and taking off regular lower body clothing? 2  -BL      Bathing (including washing, rinsing, and drying) 3  -BL      Toileting (which includes using toilet bed pan or urinal) 2  -BL      Putting on and taking off regular upper body clothing 4  -BL      Taking care of personal grooming (such as brushing teeth) 4  -BL      Eating meals 4  -BL      Score 19  -BL      Functional Assessment    Outcome Measure Options AM-PAC 6 Clicks Daily Activity (OT)  -BL AM-PAC 6 Clicks Basic Mobility (PT)  -JA       09/15/17 0810          How much help  from another is currently needed...    Putting on and taking off regular lower body clothing? 4  -KD      Bathing (including washing, rinsing, and drying) 3  -KD      Toileting (which includes using toilet bed pan or urinal) 3  -KD      Putting on and taking off regular upper body clothing 4  -KD      Taking care of personal grooming (such as brushing teeth) 4  -KD      Eating meals 4  -KD      Score 22  -KD        User Key  (r) = Recorded By, (t) = Taken By, (c) = Cosigned By    Initials Name Provider Type    ERICH Felton, [facility] Physical Therapy Assistant    RW Balaji Malhotra, PTA Physical Therapy Assistant    KD Virgie Ray VILLALPANDO/L Occupational Therapy Assistant    IRASEMA Holloway/FREEDOM Occupational Therapy Assistant           Time Calculation:         Time Calculation- OT       09/16/17 1404          Time Calculation- OT    OT Start Time 0910  -BL      OT Stop Time 1040  -BL      OT Time Calculation (min) 90 min  -BL      Total Timed Code Minutes- OT 90 minute(s)  -BL      OT Received On 09/16/17  -        User Key  (r) = Recorded By, (t) = Taken By, (c) = Cosigned By    Initials Name Provider Type     Krissy Hubbard VILLALPANDO/FREEDOM Occupational Therapy Assistant           Therapy Charges for Today     Code Description Service Date Service Provider Modifiers Qty    13837579267 HC OT SELF CARE/MGMT/TRAIN EA 15 MIN 9/16/2017 Krissy Hubbard VILLALPANDO/L GO 4    33730284117 HC OT THER PROC EA 15 MIN 9/16/2017 Krissy Hubbard VILLALPANDO/L GO 2          OT G-codes  OT Professional Judgement Used?: Yes  OT Functional Scales Options: AM-PAC 6 Clicks Daily Activity (OT)  Score: 15  Functional Limitation: Self care  Self Care Current Status (): At least 40 percent but less than 60 percent impaired, limited or restricted  Self Care Goal Status (): At least 20 percent but less than 40 percent impaired, limited or restricted    IRASEMA Vasquez/FREEDOM  9/16/2017

## 2017-09-16 NOTE — PLAN OF CARE
Problem: Patient Care Overview (Adult)  Goal: Plan of Care Review  Outcome: Ongoing (interventions implemented as appropriate)    09/16/17 1255   Coping/Psychosocial Response Interventions   Plan Of Care Reviewed With patient   Patient Care Overview   Progress progress toward functional goals as expected   Outcome Evaluation   Outcome Summary/Follow up Plan Pt. cont.'s to progress with ROM, strength, and transfer ability. Assess pt.'s L knee ROM for consistency to acheive ROM goal       Goal: Discharge Needs Assessment  Outcome: Ongoing (interventions implemented as appropriate)    09/12/17 0415 09/12/17 0755   Discharge Needs Assessment   Concerns To Be Addressed no discharge needs identified --    Living Environment   Transportation Available --  car;family or friend will provide   Self-Care   Equipment Currently Used at Home --  (BSC, bath bench, shower chair, w/c, tripod cane; not used)         Problem: Inpatient Physical Therapy  Goal: Transfer Training Goal 1 LTG- PT  Outcome: Ongoing (interventions implemented as appropriate)    09/11/17 1500 09/16/17 1255   Transfer Training PT LTG   Transfer Training PT LTG, Date Established 09/11/17 --    Transfer Training PT LTG, Time to Achieve by discharge --    Transfer Training PT LTG, Activity Type bed to chair /chair to bed --    Transfer Training PT LTG, [place] Level conditional independence --    Transfer Training PT LTG, Assist Device (AAD) --    Transfer Training PT LTG, Date Goal Reviewed --  09/16/17   Transfer Training PT LTG, Outcome --  goal ongoing       Goal: Range of Motion Goal LTG- PT  Outcome: Ongoing (interventions implemented as appropriate)    09/11/17 1500 09/16/17 1255   Range of Motion PT LTG   Range of Motion Goal PT LTG, Date Established 09/11/17 --    Range of Motion Goal PT LTG, Time to Achieve by discharge --    Range fo Motion Goal PT LTG, Joint L knee --    Range of Motion Goal PT LTG, AROM Measure Knee flex - 110 degrees --     Range of Motion Goal PT LTG, Date Goal Reviewed --  09/16/17   Range of Motion Goal PT LTG, Outcome --  goal ongoing

## 2017-09-16 NOTE — PROGRESS NOTES
[facility] Medicine Services  INPATIENT PROGRESS NOTE    Length of Stay: 5  Date of Admission: 9/11/2017  Primary Care Physician: Renan Rodríguez MD    Subjective   Chief Complaint:  No complaints    HPI:      9/16/2017:  The patient is doing well today.  He states he had a spasm in his left leg during the night, but otherwise his pain has been controlled.      H&P by Dr. Stephen 9/11/2017:  Patient is 67 y.o. male presents with trauma to both lower extremities.  He was working on his roof on September 6 as he was coming off of the roof he fell off of the latter to the ground.  He sustained an navicular fracture dislocation of the left foot and a comminuted fracture of the right patella.  He was evaluated in the emergency room and it was felt that his foot was at risk for transformation to an open fracture and he underwent closed reduction with pinning per Dr. Rivera of podiatry.  He was admitted to the hospital followed by Dr. Rivera.,  Dr. Rutherford orthopedics and the hospitalist service.  On 9/8/17 he underwent open reduction internal fixation of the patella by Dr. Rutherford with pinning and closure.  His workup otherwise was unremarkable and he's done fairly well.  We have been asked to admit him to the acute rehabilitation unit for intensive rehabilitation at this time he is nonweightbearing on both lower extremities but he does plan to return home and needs to be able to transfer and do activities of daily living.  He'll not be ambulatory until he is weightbearing. .     Review of Systems   Constitutional: Positive for activity change.   Musculoskeletal: Positive for gait problem.   All other systems reviewed and are negative.     All pertinent negatives and positives are as above. All other systems have been reviewed and are negative unless otherwise stated.     Objective    Temp:  [98.6 °F (37 °C)-98.8 °F (37.1 °C)] 98.8 °F (37.1 °C)  Heart Rate:  [91-95] 95  Resp:  [18-19]  19  BP: (123-133)/(75-76) 133/75    Physical Exam   Constitutional: He is oriented to person, place, and time. He appears well-developed and well-nourished.   HENT:   Head: Normocephalic and atraumatic.   Eyes: EOM are normal. Pupils are equal, round, and reactive to light.   Neck: Normal range of motion. Neck supple.   Cardiovascular: Normal rate and regular rhythm.    Pulmonary/Chest: Effort normal.   Abdominal: Soft. Bowel sounds are normal.   Musculoskeletal: Normal range of motion.   Neurological: He is alert and oriented to person, place, and time.   Skin: Skin is warm and dry.     Results Review:  I have reviewed the labs, radiology results, and diagnostic studies.    Laboratory Data:     Results from last 7 days  Lab Units 09/16/17  0545 09/12/17  0521 09/10/17  0628   SODIUM mmol/L 134* 135* 136*   POTASSIUM mmol/L 3.8 3.7 3.7   CHLORIDE mmol/L 96 95 98   CO2 mmol/L 27.0 30.0 30.0   BUN mg/dL 22* 18 15   CREATININE mg/dL 0.83 0.75 0.72   GLUCOSE mg/dL 164* 171* 159*   CALCIUM mg/dL 9.2 9.2 9.1   BILIRUBIN mg/dL 1.1 1.1 1.1   ALK PHOS U/L 134* 97 94   ALT (SGPT) U/L 204* 92* 37   AST (SGOT) U/L 66* 54 50   ANION GAP mmol/L 11.0 10.0 8.0     Estimated Creatinine Clearance: 102.3 mL/min (by C-G formula based on Cr of 0.83).    Results from last 7 days  Lab Units 09/12/17  0521   MAGNESIUM mg/dL 2.0           Results from last 7 days  Lab Units 09/16/17  0545 09/12/17  0521 09/10/17  0628   WBC 10*3/mm3 9.51 9.70 10.18*   HEMOGLOBIN g/dL 12.7* 12.0* 13.2*  13.2*   HEMATOCRIT % 36.1* 33.4* 37.0*  37.0*   PLATELETS 10*3/mm3 338 312 206       Results from last 7 days  Lab Units 09/16/17  0545 09/15/17  0516 09/14/17  0523 09/13/17  0526 09/12/17  0521   INR  2.18* 2.36* 2.45* 2.33* 2.01*       Culture Data:   No results found for: BLOODCX  No results found for: URINECX  No results found for: RESPCX  No results found for: WOUNDCX  No results found for: STOOLCX  No components found for: BODYFLD    Radiology Data:    Imaging Results (last 24 hours)     ** No results found for the last 24 hours. **          I have reviewed the patient current medications.     Assessment/Plan     Hospital Problem List     * (Principal)Multiple trauma    Closed displaced comminuted fracture of right patella    Overview Addendum 9/10/2017 11:03 AM by Migue Martin MD     09/10/17.  POD #2.  Management per ortho.    09/09/17.  POD #1.  Management per ortho.    09/08/17.  OR today per Dr. Rutherford.  Management per ortho.    09/07/17.  Plan for OR tomorrow per ortho.         Closed displaced fracture of navicular bone of left foot    Overview Addendum 9/10/2017 11:03 AM by Migue Martin MD     09/10/17.  Management per podiatry.  POD #3.    09/09/17.  Management per podiatry.  POD #3.    09/08/17.  Management per podiatry.  POD #2.    09/07/17.  Management per podiatry.  POD #1.         Essential hypertension (Chronic)    Overview Addendum 9/9/2017  1:27 PM by Migue Martin MD     Hydrochlorothiazide.  Will add Coreg as needed.  Hydralazine PRN.         Mixed hyperlipidemia (Chronic)    Overview Signed 9/8/2017  2:06 PM by Migue Martin MD     Atorvastatin.         Osteoarthritis of hands, bilateral    Overview Signed 9/11/2017  1:17 PM by Alec Stephen MD     retired due to arthritis,          BPH (benign prostatic hyperplasia)    Glaucoma    Overview Addendum 9/11/2017  2:25 PM by Alec Stephen MD     blind in right eye         Encounter for rehabilitation    History of kidney stones    Overview Signed 9/13/2017 10:39 AM by Alec Stephen MD     On potassium citrate to prevent recurrence of kidney stones               Plan:   Continue with current medical treatment regimen.                This document has been electronically signed by LEIGHTON Dior on September 16, 2017 12:22 PM

## 2017-09-16 NOTE — PROGRESS NOTES
"Anticoagulation by Pharmacy - Warfarin    Vangie Lopez is a 67 y.o.male  [Ht: 69\" (175.3 cm); Wt: 182 lb (82.6 kg)] on Warfarin 3 mg PO  for indication of VTE prophylaxis s/p ortho procedure.    Goal INR: 1.7-2.5  Today's INR:   Lab Results   Component Value Date    INR 2.18 (H) 09/16/2017         Lab Results   Component Value Date    INR 2.18 (H) 09/16/2017    INR 2.36 (H) 09/15/2017    INR 2.45 (H) 09/14/2017    PROTIME 24.4 (H) 09/16/2017    PROTIME 26.0 (H) 09/15/2017    PROTIME 26.8 (H) 09/14/2017     Lab Results   Component Value Date    HGB 12.7 (L) 09/16/2017    HGB 12.0 (L) 09/12/2017    HGB 13.2 (L) 09/10/2017    HGB 13.2 (L) 09/10/2017     Lab Results   Component Value Date    HCT 36.1 (L) 09/16/2017    HCT 33.4 (L) 09/12/2017    HCT 37.0 (L) 09/10/2017    HCT 37.0 (L) 09/10/2017     Assessment/Plan:  INR 2.18 and therapeutic. Trending down after dose reduction. Will continue warfarin 3mg and adjust accordingly.    Maximus Espinoza, [facility]  09/16/17 3:31 PM     "

## 2017-09-17 LAB
GLUCOSE BLDC GLUCOMTR-MCNC: 150 MG/DL (ref 70–130)
GLUCOSE BLDC GLUCOMTR-MCNC: 175 MG/DL (ref 70–130)
GLUCOSE BLDC GLUCOMTR-MCNC: 181 MG/DL (ref 70–130)
HOLD SPECIMEN: NORMAL
INR PPP: 2.19 (ref 0.8–1.2)
PROTHROMBIN TIME: 24.5 SECONDS (ref 11.1–15.3)
WHOLE BLOOD HOLD SPECIMEN: NORMAL

## 2017-09-17 PROCEDURE — 82962 GLUCOSE BLOOD TEST: CPT

## 2017-09-17 PROCEDURE — 85610 PROTHROMBIN TIME: CPT | Performed by: FAMILY MEDICINE

## 2017-09-17 RX ADMIN — FERROUS SULFATE TAB EC 324 MG (65 MG FE EQUIVALENT) 324 MG: 324 (65 FE) TABLET DELAYED RESPONSE at 08:03

## 2017-09-17 RX ADMIN — POTASSIUM CITRATE 10 MEQ: 10 TABLET ORAL at 08:04

## 2017-09-17 RX ADMIN — HYDROCODONE BITARTRATE AND ACETAMINOPHEN 1 TABLET: 10; 325 TABLET ORAL at 08:03

## 2017-09-17 RX ADMIN — POLYETHYLENE GLYCOL 3350 17 G: 17 POWDER, FOR SOLUTION ORAL at 08:03

## 2017-09-17 RX ADMIN — SENNOSIDES AND DOCUSATE SODIUM 1 TABLET: 8.6; 5 TABLET ORAL at 17:07

## 2017-09-17 RX ADMIN — Medication 25 MG: at 20:55

## 2017-09-17 RX ADMIN — FAMOTIDINE 40 MG: 40 TABLET ORAL at 08:03

## 2017-09-17 RX ADMIN — POTASSIUM CITRATE 10 MEQ: 10 TABLET ORAL at 17:08

## 2017-09-17 RX ADMIN — FINASTERIDE 5 MG: 5 TABLET, FILM COATED ORAL at 20:55

## 2017-09-17 RX ADMIN — HYDROCODONE BITARTRATE AND ACETAMINOPHEN 1 TABLET: 10; 325 TABLET ORAL at 20:55

## 2017-09-17 RX ADMIN — HYDROCHLOROTHIAZIDE 25 MG: 25 TABLET ORAL at 08:03

## 2017-09-17 RX ADMIN — ATORVASTATIN CALCIUM 10 MG: 10 TABLET, FILM COATED ORAL at 08:03

## 2017-09-17 RX ADMIN — Medication 1 TABLET: at 08:03

## 2017-09-17 RX ADMIN — WARFARIN SODIUM 3 MG: 3 TABLET ORAL at 17:07

## 2017-09-17 RX ADMIN — METFORMIN HYDROCHLORIDE 500 MG: 500 TABLET, EXTENDED RELEASE ORAL at 17:07

## 2017-09-17 RX ADMIN — SENNOSIDES AND DOCUSATE SODIUM 1 TABLET: 8.6; 5 TABLET ORAL at 08:03

## 2017-09-17 NOTE — PROGRESS NOTES
[facility] Medicine Services  INPATIENT PROGRESS NOTE    Length of Stay: 6  Date of Admission: 9/11/2017  Primary Care Physician: Renan Rodríguez MD    Subjective   Chief Complaint:  No complaints    HPI:      9/17/2017:  The patient states he is doing well today.  No concerns or complaints.      9/16/2017:  The patient is doing well today.  He states he had a spasm in his left leg during the night, but otherwise his pain has been controlled.      H&P by Dr. Stephen 9/11/2017:  Patient is 67 y.o. male presents with trauma to both lower extremities.  He was working on his roof on September 6 as he was coming off of the roof he fell off of the latter to the ground.  He sustained an navicular fracture dislocation of the left foot and a comminuted fracture of the right patella.  He was evaluated in the emergency room and it was felt that his foot was at risk for transformation to an open fracture and he underwent closed reduction with pinning per Dr. Rivera of podiatry.  He was admitted to the hospital followed by Dr. Rivera.,  Dr. Rutherford orthopedics and the hospitalist service.  On 9/8/17 he underwent open reduction internal fixation of the patella by Dr. Rutherford with pinning and closure.  His workup otherwise was unremarkable and he's done fairly well.  We have been asked to admit him to the acute rehabilitation unit for intensive rehabilitation at this time he is nonweightbearing on both lower extremities but he does plan to return home and needs to be able to transfer and do activities of daily living.  He'll not be ambulatory until he is weightbearing. .     Review of Systems   Constitutional: Positive for activity change.   Musculoskeletal: Positive for gait problem.   All other systems reviewed and are negative.     All pertinent negatives and positives are as above. All other systems have been reviewed and are negative unless otherwise stated.     Objective    Temp:  [99.1 °F  (37.3 °C)] 99.1 °F (37.3 °C)  Heart Rate:  [83-96] 96  Resp:  [18] 18  BP: (124-128)/(69-70) 128/69    Physical Exam   Constitutional: He is oriented to person, place, and time. He appears well-developed and well-nourished.   HENT:   Head: Normocephalic and atraumatic.   Eyes: EOM are normal. Pupils are equal, round, and reactive to light.   Neck: Normal range of motion. Neck supple.   Cardiovascular: Normal rate and regular rhythm.    Pulmonary/Chest: Effort normal.   Abdominal: Soft. Bowel sounds are normal.   Musculoskeletal: Normal range of motion.   Neurological: He is alert and oriented to person, place, and time.   Skin: Skin is warm and dry.     Results Review:  I have reviewed the labs, radiology results, and diagnostic studies.    Laboratory Data:     Results from last 7 days  Lab Units 09/16/17  0545 09/12/17  0521   SODIUM mmol/L 134* 135*   POTASSIUM mmol/L 3.8 3.7   CHLORIDE mmol/L 96 95   CO2 mmol/L 27.0 30.0   BUN mg/dL 22* 18   CREATININE mg/dL 0.83 0.75   GLUCOSE mg/dL 164* 171*   CALCIUM mg/dL 9.2 9.2   BILIRUBIN mg/dL 1.1 1.1   ALK PHOS U/L 134* 97   ALT (SGPT) U/L 204* 92*   AST (SGOT) U/L 66* 54   ANION GAP mmol/L 11.0 10.0     Estimated Creatinine Clearance: 99.4 mL/min (by C-G formula based on Cr of 0.83).    Results from last 7 days  Lab Units 09/12/17  0521   MAGNESIUM mg/dL 2.0           Results from last 7 days  Lab Units 09/16/17  0545 09/12/17  0521   WBC 10*3/mm3 9.51 9.70   HEMOGLOBIN g/dL 12.7* 12.0*   HEMATOCRIT % 36.1* 33.4*   PLATELETS 10*3/mm3 338 312       Results from last 7 days  Lab Units 09/17/17  0528 09/16/17  0545 09/15/17  0516 09/14/17  0523 09/13/17  0526   INR  2.19* 2.18* 2.36* 2.45* 2.33*       Culture Data:   No results found for: BLOODCX  No results found for: URINECX  No results found for: RESPCX  No results found for: WOUNDCX  No results found for: STOOLCX  No components found for: BODYFLD    Radiology Data:   Imaging Results (last 24 hours)     ** No results  found for the last 24 hours. **          I have reviewed the patient current medications.     Assessment/Plan     Hospital Problem List     * (Principal)Multiple trauma    Closed displaced comminuted fracture of right patella    Overview Addendum 9/10/2017 11:03 AM by Migue Martin MD     09/10/17.  POD #2.  Management per ortho.    09/09/17.  POD #1.  Management per ortho.    09/08/17.  OR today per Dr. Rutherford.  Management per ortho.    09/07/17.  Plan for OR tomorrow per ortho.         Closed displaced fracture of navicular bone of left foot    Overview Addendum 9/10/2017 11:03 AM by Migue Martin MD     09/10/17.  Management per podiatry.  POD #3.    09/09/17.  Management per podiatry.  POD #3.    09/08/17.  Management per podiatry.  POD #2.    09/07/17.  Management per podiatry.  POD #1.         Essential hypertension (Chronic)    Overview Addendum 9/9/2017  1:27 PM by Migue Martin MD     Hydrochlorothiazide.  Will add Coreg as needed.  Hydralazine PRN.         Mixed hyperlipidemia (Chronic)    Overview Signed 9/8/2017  2:06 PM by Migue Martin MD     Atorvastatin.         Osteoarthritis of hands, bilateral    Overview Signed 9/11/2017  1:17 PM by Alec Stephen MD     retired due to arthritis,          BPH (benign prostatic hyperplasia)    Glaucoma    Overview Addendum 9/11/2017  2:25 PM by Alec Stephen MD     blind in right eye         Encounter for rehabilitation    History of kidney stones    Overview Signed 9/13/2017 10:39 AM by Alec Stephen MD     On potassium citrate to prevent recurrence of kidney stones               Plan:   Continue with current medical treatment regimen.                This document has been electronically signed by LEIGHTON Dior on September 17, 2017 12:39 PM

## 2017-09-17 NOTE — PROGRESS NOTES
"Anticoagulation by Pharmacy - Warfarin Day 10 of 28    Vangie Lopez is a 67 y.o.male  [Ht: 69\" (175.3 cm); Wt: 177 lb (80.3 kg)] on Warfarin 3 mg PO  for indication of VTE prophylaxis s/p ortho procedure.    Goal INR: 1.7-2.5  Today's INR:   Lab Results   Component Value Date    INR 2.19 (H) 09/17/2017         Lab Results   Component Value Date    INR 2.19 (H) 09/17/2017    INR 2.18 (H) 09/16/2017    INR 2.36 (H) 09/15/2017    PROTIME 24.5 (H) 09/17/2017    PROTIME 24.4 (H) 09/16/2017    PROTIME 26.0 (H) 09/15/2017     Lab Results   Component Value Date    HGB 12.7 (L) 09/16/2017    HGB 12.0 (L) 09/12/2017    HGB 13.2 (L) 09/10/2017    HGB 13.2 (L) 09/10/2017     Lab Results   Component Value Date    HCT 36.1 (L) 09/16/2017    HCT 33.4 (L) 09/12/2017    HCT 37.0 (L) 09/10/2017    HCT 37.0 (L) 09/10/2017     Assessment/Plan:  INR 2.19, inr therapeutic and stable. No change.    Maximus Espinoza, [facility]  09/17/17 12:44 PM     "

## 2017-09-17 NOTE — PLAN OF CARE
Problem: Patient Care Overview (Adult)  Goal: Plan of Care Review  Outcome: Ongoing (interventions implemented as appropriate)  Goal: Adult Individualization and Mutuality  Outcome: Ongoing (interventions implemented as appropriate)  Goal: Discharge Needs Assessment  Outcome: Ongoing (interventions implemented as appropriate)    Problem: Orthopaedic Fracture (Adult)  Goal: Signs and Symptoms of Listed Potential Problems Will be Absent or Manageable (Orthopaedic Fracture)  Outcome: Ongoing (interventions implemented as appropriate)    Problem: Fall Risk (Adult)  Goal: Absence of Falls  Outcome: Ongoing (interventions implemented as appropriate)    09/17/17 0340   Fall Risk (Adult)   Absence of Falls achieves outcome

## 2017-09-18 PROBLEM — E11.9 RECENT ONSET OF DIABETES MELLITUS (HCC): Status: ACTIVE | Noted: 2017-09-18

## 2017-09-18 LAB
GLUCOSE BLDC GLUCOMTR-MCNC: 138 MG/DL (ref 70–130)
GLUCOSE BLDC GLUCOMTR-MCNC: 150 MG/DL (ref 70–130)
GLUCOSE BLDC GLUCOMTR-MCNC: 157 MG/DL (ref 70–130)
GLUCOSE BLDC GLUCOMTR-MCNC: 204 MG/DL (ref 70–130)
INR PPP: 2 (ref 0.8–1.2)
PROTHROMBIN TIME: 22.8 SECONDS (ref 11.1–15.3)

## 2017-09-18 PROCEDURE — 82962 GLUCOSE BLOOD TEST: CPT

## 2017-09-18 PROCEDURE — 99232 SBSQ HOSP IP/OBS MODERATE 35: CPT | Performed by: FAMILY MEDICINE

## 2017-09-18 PROCEDURE — 97530 THERAPEUTIC ACTIVITIES: CPT

## 2017-09-18 PROCEDURE — 97110 THERAPEUTIC EXERCISES: CPT

## 2017-09-18 PROCEDURE — 85610 PROTHROMBIN TIME: CPT | Performed by: FAMILY MEDICINE

## 2017-09-18 RX ORDER — METFORMIN HYDROCHLORIDE 500 MG/1
500 TABLET, EXTENDED RELEASE ORAL 2 TIMES DAILY WITH MEALS
Status: DISCONTINUED | OUTPATIENT
Start: 2017-09-18 | End: 2017-09-22 | Stop reason: HOSPADM

## 2017-09-18 RX ADMIN — HYDROCODONE BITARTRATE AND ACETAMINOPHEN 1 TABLET: 10; 325 TABLET ORAL at 12:29

## 2017-09-18 RX ADMIN — HYDROCODONE BITARTRATE AND ACETAMINOPHEN 1 TABLET: 10; 325 TABLET ORAL at 08:42

## 2017-09-18 RX ADMIN — Medication 25 MG: at 20:38

## 2017-09-18 RX ADMIN — WARFARIN SODIUM 3 MG: 3 TABLET ORAL at 17:31

## 2017-09-18 RX ADMIN — HYDROCODONE BITARTRATE AND ACETAMINOPHEN 1 TABLET: 10; 325 TABLET ORAL at 18:26

## 2017-09-18 RX ADMIN — FERROUS SULFATE TAB EC 324 MG (65 MG FE EQUIVALENT) 324 MG: 324 (65 FE) TABLET DELAYED RESPONSE at 08:42

## 2017-09-18 RX ADMIN — FAMOTIDINE 40 MG: 40 TABLET ORAL at 08:42

## 2017-09-18 RX ADMIN — ACETAMINOPHEN 650 MG: 325 TABLET ORAL at 20:45

## 2017-09-18 RX ADMIN — HYDROCHLOROTHIAZIDE 25 MG: 25 TABLET ORAL at 08:42

## 2017-09-18 RX ADMIN — Medication 1 TABLET: at 08:42

## 2017-09-18 RX ADMIN — FINASTERIDE 5 MG: 5 TABLET, FILM COATED ORAL at 20:38

## 2017-09-18 RX ADMIN — ATORVASTATIN CALCIUM 10 MG: 10 TABLET, FILM COATED ORAL at 08:42

## 2017-09-18 RX ADMIN — POTASSIUM CITRATE 10 MEQ: 10 TABLET ORAL at 08:42

## 2017-09-18 RX ADMIN — POTASSIUM CITRATE 10 MEQ: 10 TABLET ORAL at 17:31

## 2017-09-18 RX ADMIN — METFORMIN HYDROCHLORIDE 500 MG: 500 TABLET, EXTENDED RELEASE ORAL at 17:31

## 2017-09-18 NOTE — THERAPY TREATMENT NOTE
Inpatient Rehabilitation - Physical Therapy Treatment Note  [facility]     Patient Name: Vangie Lopez  : 1950  MRN: [id number]  Today's Date: 2017  Onset of Illness/Injury or Date of Surgery Date: 17  Date of Referral to PT: 17  Referring Physician: Dr. Stephen    Admit Date: 2017    Visit Dx:    ICD-10-CM ICD-9-CM   1. Multiple trauma T07 959.8   2. Abnormality of gait and mobility R26.9 781.2   3. Muscle weakness (generalized) M62.81 728.87   4. Impaired mobility and ADLs Z74.09 799.89   5. Closed displaced comminuted fracture of right patella with routine healing, subsequent encounter S82.041D V54.16   6. Closed displaced fracture of navicular bone of left foot with routine healing, subsequent encounter S92.252D V54.19     Patient Active Problem List   Diagnosis   • Encounter for screening for malignant neoplasm of colon   • Closed displaced comminuted fracture of right patella   • Closed displaced fracture of navicular bone of left foot   • Closed dislocation of navicular bone of left foot   • Patella fracture   • Essential hypertension   • Mixed hyperlipidemia   • Osteoarthritis of hands, bilateral   • BPH (benign prostatic hyperplasia)   • Glaucoma   • Hyperlipidemia   • Closed fracture of navicular bone with dislocation of perilunate joint of left wrist   • Fracture of patella, right, closed   • Multiple trauma   • Encounter for rehabilitation   • History of kidney stones   • Recent onset of diabetes mellitus               Adult Rehabilitation Note       17 1400 17 [id number] [id number]    Rehab Assessment/Intervention    Discipline physical therapy assistant  -RW physical therapy assistant  -RW physical therapy assistant  -ERICH    Document Type therapy note (daily note)  -RW therapy note (daily note)  -RW therapy note (daily note)  -JA    Subjective Information agree to therapy  -RW agree to therapy  -RW agree to therapy  -JA    Patient Effort, Rehab  Treatment good  -RW good  -RW good  -JA    Precautions/Limitations   non-weight bearing status  -JA    Recorded by [RW] Balaji Malhotra PTA [RW] Balaji Malhotra PTA [JA] Dwayne Felton PTA    Vital Signs    Pre Patient Position   Supine  -JA    Post Patient Position   Supine  -JA    Recorded by   [JA] Dwayne Felton PTA    Pain Assessment    Pain Assessment --   left foot hurting but gives no rating  -RW  0-10  -JA    Pain Score  5  -RW 4  -JA    Post Pain Score  3  -RW 4  -JA    Pain Type   Acute pain;Surgical pain  -JA    Pain Location  Ankle  -RW Knee  -JA    Pain Orientation  Left  -RW Right;Left  -JA    Pain Intervention(s)  Medication (See MAR)  -RW Medication (See MAR)  -JA    Recorded by [RW] Balaji Malhotra PTA [RW] Balaji Malhotra PTA [JA] Dwayne Felton, DARIEN    Cognitive Assessment/Intervention    Current Cognitive/Communication Assessment functional  -RW functional  -RW     Orientation Status oriented x 4  -RW oriented x 4  -RW     Follows Commands/Answers Questions 100% of the time  -% of the time  -RW     Personal Safety Interventions  gait belt  -RW     Recorded by [RW] Balaji Malhotra PTA [RW] Balaji Malhotra PTA     General LE Assessment    ROM   knee, left: LE ROM deficit  -JA    ROM Detail   110 degrees AROM   -JA    Recorded by   [JA] Dwayne Felton PTA    Mobility Assessment/Training    Extremity Weight-Bearing Status   left lower extremity  -JA    Left Lower Extremity Weight-Bearing non weight-bearing  -RW non weight-bearing  -RW non weight-bearing  -JA    Right Lower Extremity Weight-Bearing non weight-bearing  -RW non weight-bearing  -RW non weight-bearing  -JA    Recorded by [RW] Balaji Malhotra PTA [RW] Balaji Malhotra, PTA [JA] Dwayne Felton, DARIEN    Bed Mobility, Assessment/Treatment    Bed Mobility, Roll Right, [place] conditional independence  -RW conditional independence  -RW     Bed Mob, Supine to Sit, [place] independent  -RW independent  -RW      Bed Mob, Sit to Supine, [place] independent  -RW independent  -RW     Recorded by [RW] Balaji Malhotra PTA [RW] Balaji Malhotra PTA     Transfer Assessment/Treatment    Transfers, Bed-Chair [place]  conditional independence;set up required  -RW     Transfers, Sit-Stand [place] not appropriate to assess  -RW not appropriate to assess  -RW     Recorded by [RW] Balaji Malhotra PTA [RW] Balaji Malhotra PTA     Gait Assessment/Treatment    Gait, [place] Level not appropriate to assess  -RW not appropriate to assess  -RW     Recorded by [RW] Balaji Malhotra PTA [RW] Balaji Malhotra PTA     Wheelchair Training/Management    Wheelchair Transfer Type  lateral transfer  -RW     Wheelchair, Distance Propelled  130  -RW     Wheelchair Training Comment  mod ind  -RW     Recorded by  [RW] Balaji Malhotra PTA     Therapy Exercises    Right Lower Extremity AROM:;20 reps;supine;ankle pumps/circles;quad sets  -RW AROM:;20 reps;supine;ankle pumps/circles;quad sets   2x20  -RW AROM:;20 reps;supine;ankle pumps/circles;quad sets   2x20  -JA    Left Lower Extremity AROM:;20 reps;supine;hip abduction/adduction;SLR;heel slides  -RW AROM:;20 reps;supine;hip abduction/adduction;SLR;heel slides   2x20  -RW AROM:;20 reps;supine;hip abduction/adduction;SLR;heel slides   2x20  -JA    Bilateral Lower Extremities AROM:;20 reps;glut sets;quad sets  -RW AROM:;20 reps;glut sets;quad sets   2x20  -RW AROM:;20 reps;glut sets;quad sets   2x20  -JA    Bilateral Upper Extremity  AROM:;20 reps;sitting   tband rows 2/20 with btb amd pro2 x 10 min l2  -RW     Trunk Exercises 10 reps   abdominal crunchs 2 sets  -RW      Recorded by [RW] Balaji Malhotra PTA [RW] Balaji Malhotra PTA [JA] Dwayne Felton PTA    Positioning and Restraints    Pre-Treatment Position in bed  -RW in bed  -RW in bed  -JA    Post Treatment Position bed  -RW wheelchair  -RW bed  -JA    In Bed call light within reach  -RW      Recorded by [RW] Balaji Malhotra PTA  [RW] Balaji Malhotra PTA [JA] Dwayne Felton PTA      09/16/17 0910 09/16/17 0810       Rehab Assessment/Intervention    Discipline occupational therapy assistant  -BL physical therapy assistant  -ERICH     Document Type therapy note (daily note)  -BL therapy note (daily note)  -JA     Subjective Information agree to therapy  -BL agree to therapy  -JA     Patient Effort, Rehab Treatment good  -BL good  -JA     Precautions/Limitations non-weight bearing status  -BL non-weight bearing status  -JA     Specific Treatment Considerations Both legs in extension for entire treatment and pt returned to supine after 2 hours in w/c this date.  -BL Pt. reports having muscle spasm last night at L LE and reports increase L knee pain at medial side this a.m. nsg. made aware   -JA     Recorded by [BL] IRASEMA Vasquez/FREEDOM [JA] Dwayne Felton PTA     Vital Signs    Post Systolic BP Rehab 145  -BL      Post Treatment Diastolic BP 75  -BL      Pretreatment Heart Rate (beats/min) 97  -BL      Posttreatment Heart Rate (beats/min) 99  -BL      Pre SpO2 (%) 98  -BL      O2 Delivery Pre Treatment room air  -BL      Post SpO2 (%) 99  -BL      O2 Delivery Post Treatment room air  -BL      Pre Patient Position Sitting  -BL Supine  -JA     Intra Patient Position Sitting  -BL Sitting  -JA     Post Patient Position Supine  -BL Sitting  -JA     Recorded by [BL] IRASEMA Vasquez/FREEDOM [JA] Dwayne Felton PTA     Pain Assessment    Pain Assessment 0-10  -BL 0-10  -JA     Pain Score 6  -BL 8  -JA     Post Pain Score 8  -BL 6  -JA     Pain Type Acute pain  -BL Acute pain  -JA     Pain Location Knee  -BL Knee  -JA     Pain Orientation Right  -BL Left  -JA     Pain Intervention(s) Medication (See MAR);Repositioned   returned to bed in supine position  -BL Medication (See MAR)  -JA     Recorded by [BL] IRASEMA Vasquez/FREEDOM [JA] Dwayne Felton PTA     Cognitive Assessment/Intervention    Current Cognitive/Communication Assessment  functional  -BL      Orientation Status oriented x 4  -BL      Follows Commands/Answers Questions 100% of the time  -BL      Personal Safety WNL/WFL  -BL      Personal Safety Interventions gait belt;muscle strengthening facilitated  -BL      Recorded by [BL] IRASEMA Vasquez/L      Mobility Assessment/Training    Extremity Weight-Bearing Status  left lower extremity  -     Left Lower Extremity Weight-Bearing  non weight-bearing  -     Right Lower Extremity Weight-Bearing  non weight-bearing  -JA     Recorded by  [JA] Dwayne Felton PTA     Bed Mobility, Assessment/Treatment    Bed Mobility, Assistive Device head of bed elevated;overhead trapeze;leg   - head of bed elevated;overhead trapeze  -     Bed Mobility, Roll Left, [place] conditional independence  -BL      Bed Mobility, Scoot/Bridge, [place] conditional independence  -BL conditional independence  -     Bed Mob, Supine to Sit, [place] conditional independence  -BL conditional independence  -     Bed Mobility, Safety Issues decreased use of legs for bridging/pushing  -      Bed Mobility, Impairments ROM decreased  -BL      Recorded by [BL] ELY Vasquez [JA] Dwayne Felton PTA     Transfer Assessment/Treatment    Transfers, Bed-Chair [place]  conditional independence;set up required  -     Transfers, Chair-Bed [place] conditional independence;set up required;verbal cues required  -      Transfers, Bed-Chair-Bed, Assist Device sliding board  - sliding board  -     Toilet Transfer, [place] contact guard assist;supervision required;set up required;verbal cues required  -      Toilet Transfer, Assistive Device bedside commode with drop arms   sliding board  -      Walk-In Shower Transfer, [place] not tested  -      Bathtub Transfer, [place] not tested  -      Transfer, Maintain Weight Bearing Status able to maintain weight bearing status  -BL      Transfer, Impairments  ROM decreased;strength decreased  -BL      Recorded by [BL] NAV VasquezA/L [JA] Dwayne Felton PTA     Wheelchair Training/Management    Wheelchair Transfer Type lateral transfer  -BL lateral transfer  -JA     Wheelchair Transfer Type Comment Pt demo'd safe transfer from w/c to bedside commode along with from bedside commode back to w/c and w/c to bed with sliding board use. Pt required vc's and set up for sliding board this date however was able to complete transfer with conditional independence however CGA required from w/c to toilet and back to w/c.   -BL      Wheelchair Task Training safety considerations  -BL      Wheelchair Task Training Comment Pt was educated on safely guiding w/c throughout the hallway this date and to assess spacial concerns once returning home to avoid contact of his feet with objects. Pt was educated on safety with remembering to always lock w/c before transfers and to always place armrest back into correct position once returned to w/c after transfer is complete. Required vc's to lock w/c once and to return armrest to resting position this date after transferring back from toilet to w/c.  -BL      Wheelchair Propulsion Training carpet;turning wheelchair;steering wheelchair;moving through doorways;backward propulsion;forward propulsion  -BL      Wheelchair, Distance Propelled 75  -  -JA     Wheelchair Training Comment conditional independence however vc's required for turning w/c  -BL conditional independent   -JA     Recorded by [BL] NAV VasquezA/L [JA] Dwayne Felton PTA     Upper Body Bathing Assessment/Training    UB Bathing Assess/Train Assistive Device bath mitt  -BL      UB Bathing Assess/Train, Position sitting   in w/c at sink  -BL      UB Bathing Assess/Train, [place] Level set up required;conditional independence  -BL      UB Bathing Assess/Train, Comment Max assist for cleansing his back this date due to [place]/L not having LH sponge to train  patient with.  -BL      Recorded by [BL] ELY Vasquez      Lower Body Bathing Assessment/Training    LB Bathing Assess/Train Assistive Device bath mitt  -BL      LB Bathing Assess/Train, Position sitting;supported sitting  -BL      LB Bathing Assess/Train, [place] Level set up required;supervision required;dependent (less than 25% patient effort)  -BL      LB Bathing Assess/Train, Impairments decreased flexibility;ROM decreased;strength decreased;pain  -BL      LB Bathing Assess/Train, Comment Dependent for washing R foot only this date. Pt was able to complete pericare in sitting with supervision and set up only.  -BL      Recorded by [BL] ELY Vasquez      Upper Body Dressing Assessment/Training    UB Dressing Assess/Train, Clothing Type doffing:;donning:;pull over;t-shirt  -BL      UB Dressing Assess/Train, Position sitting  -BL      UB Dressing Assess/Train, [place] independent  -BL      UB Dressing Assess/Train, Impairments ROM decreased  -BL      Recorded by [BL] IRASEMA Vasquez/L      Lower Body Dressing Assessment/Training    LB Dressing Assess/Train, Clothing Type donning:;doffing:;shorts;slipper socks  -BL      LB Dressing Assess/Train, Position sitting  -BL      LB Dressing Assess/Train, [place] set up required;moderate assist (50% patient effort);dependent (less than 25% patient effort)  -BL      LB Dressing Assess/Train, Comment moderate assist for donning shorts and dependent for sock on R foot  -BL      Recorded by [BL] ELY Vasquez      Toileting Assessment/Training    Toileting Assess/Train, Assistive Device bedside commode  -BL      Toileting Assess/Train, Position sitting  -BL      Toileting Assess/Train, [place] Level set up required;moderate assist (50% patient effort)  -BL      Toileting Assess/Train, Impairments ROM decreased;decreased flexibility;strength decreased  -BL      Recorded by [BL] IRASEMA Vasquez/L      Grooming Assessment/Training     Grooming Assess/Train, Assistive Device electric toothbrush  -BL      Grooming Assess/Train, Position sitting  -BL      Grooming Assess/Train, [place]ep[place] Level set up required;conditional independence  -BL      Recorded by [BL] ELY Vasquez      Therapy Exercises    Right Lower Extremity  AROM:;20 reps;supine;ankle pumps/circles   2x20  -JA     Left Lower Extremity  AROM:;20 reps;supine;heel slides;hip abduction/adduction;SLR   2x20  -JA     Bilateral Lower Extremities  AROM:;20 reps;supine;glut sets;quad sets   2x20  -JA     Bilateral Upper Extremity  AROM:;20 reps;sitting   CC mid/high rows 35# 2x20  -JA     Recorded by  [JA] Dwayne Felton PTA     Positioning and Restraints    Pre-Treatment Position other (comment)   w/c  -BL in bed  -JA     Post Treatment Position bed  -BL wheelchair  -JA     In Bed supine;call light within reach;encouraged to call for assist  -BL      In Wheelchair  with OT  -JA     Recorded by [BL] ELY Vasquez [JA] Dwayne Felton PTA       User Key  (r) = Recorded By, (t) = Taken By, (c) = Cosigned By    Initials Name Effective Dates    JA Dwayne Felton, PTA 10/17/16 -     RW Balaji Malhotra, PTA 10/17/16 -     BL ELY Vasquez 10/17/16 -                 IP PT Goals       09/18/17 1445 09/18/17 1444 09/16/17 1255    Transfer Training PT LTG    Transfer Training PT  LTG, Date Goal Reviewed 09/18/17  -RW  09/16/17  -JA    Transfer Training PT LTG, Outcome goal met  -RW  goal ongoing  -JA    Range of Motion PT LTG    Range of Motion Goal PT LTG, Date Goal Reviewed  09/18/17  -RW 09/16/17  -JA    Range of Motion Goal PT LTG, Outcome  goal ongoing  -RW goal ongoing  -JA      09/15/17 1556 09/14/17 1557 09/13/17 1533    Bed Mobility PT LTG    Bed Mobility PT LTG, Date Goal Reviewed  09/14/17  -RW 09/13/17  -RW    Bed Mobility PT LTG, Outcome  goal met  -RW goal ongoing  -RW    Transfer Training PT LTG    Transfer Training PT  LTG, Date Goal Reviewed 09/15/17   -RW 09/14/17  -RW 09/13/17  -RW    Transfer Training PT LTG, Outcome goal ongoing  -RW goal ongoing  -RW goal ongoing  -RW    Range of Motion PT LTG    Range of Motion Goal PT LTG, Date Goal Reviewed 09/15/17  -RW 09/14/17  -RW 09/13/17  -RW    Range of Motion Goal PT LTG, Outcome goal ongoing  -RW goal ongoing  -RW goal ongoing  -RW    Wheelchair Propulsion PT LTG    Wheelchair Propulsion Goal PT LTG, Date Goal Reviewed  09/14/17  -RW 09/13/17  -RW    Wheelchair Propulsion Goal PT LTG, Outcome  goal met  -RW goal ongoing  -RW    Physical Therapy PT LTG    Physical Therapy PT LTG, Date Goal Reviewed  09/14/17  -RW 09/13/17  -RW    Physical Therapy PT LTG, Outcome  goal met  -RW goal ongoing  -RW      09/12/17 1355 09/11/17 1500 09/11/17 1040    Bed Mobility PT LTG    Bed Mobility PT LTG, Date Established  09/11/17  -LM     Bed Mobility PT LTG, Time to Achieve  by discharge  -LM     Bed Mobility PT LTG, Activity Type  supine to sit/sit to supine  -LM     Bed Mobility PT LTG, [place] Level  conditional independence  -LM     Bed Mobility PT Goal  LTG, Assist Device  overhead trapeze;bed rails  -LM     Bed Mobility PT LTG, Date Goal Reviewed 09/12/17  -LM      Bed Mobility PT LTG, Outcome goal ongoing  -LM goal ongoing  -LM     Transfer Training PT LTG    Transfer Training PT LTG, Date Established  09/11/17  -LM     Transfer Training PT LTG, Time to Achieve  by discharge  -LM     Transfer Training PT LTG, Activity Type  bed to chair /chair to bed  -LM     Transfer Training PT LTG, [place] Level  conditional independence  -LM     Transfer Training PT LTG, Assist Device  --   AAD  -LM     Transfer Training PT  LTG, Date Goal Reviewed 09/12/17  -LM  09/11/17  -AM    Transfer Training PT LTG, Outcome goal ongoing  -LM goal ongoing  -LM goal not met  -AM    Transfer Training PT LTG, Reason Goal Not Met   discharged from facility  -AM    Range of Motion PT LTG    Range of Motion Goal PT LTG, Date Established   09/11/17  -LM     Range of Motion Goal PT LTG, Time to Achieve  by discharge  -LM     Range fo Motion Goal PT LTG, Joint  L knee  -LM     Range of Motion Goal PT LTG, AROM Measure  Knee flex - 110 degrees  -LM     Range of Motion Goal PT LTG, Date Goal Reviewed 09/12/17  -LM      Range of Motion Goal PT LTG, Outcome goal ongoing  -LM goal ongoing  -LM     Patient Education PT LTG    Patient Education PT LTG, Date Goal Reviewed   09/11/17  -AM    Patient Education PT LTG Outcome   goal met  -AM    Wheelchair Propulsion PT LTG    Wheelchair Propulsion Goal PT LTG, Date Established 09/12/17  -LM      Wheelchair Propulsion Goal PT LTG, Time to Achieve by discharge  -LM      Wheelchair Propulsion Goal PT LTG, [place] Level conditional independence  -      Wheelchair Propulsion Goal PT LTG, Distance to Achieve 300 feet  -LM      Wheelchair Propulsion Goal PT LTG, Outcome goal ongoing  -LM      Caregiver Training PT LTG    Caregiver Training PT LTG, Date Goal Reviewed   09/11/17  -AM    Caregiver Training PT LTG, Outcome   goal not met  -AM    Caregiver Training PT LTG, Reason Goal Not Met   discharged from facility  -AM    Physical Therapy PT LTG    Physical Therapy PT LTG, Date Established 09/12/17  -      Physical Therapy PT LTG, Time to Achieve by discharge  -      Physical Therapy PT LTG, Activity Type Pt will self propel w/c up/down ramp.  -      Physical Therapy PT LTG, [place] Level conditional independence  -      Physical Therapy PT LTG, Outcome goal ongoing  -LM        09/10/17 1300 09/09/17 1626       Transfer Training PT LTG    Transfer Training PT LTG, Date Established  09/09/17  -MAGNO     Transfer Training PT LTG, Time to Achieve  by discharge  -MAGNO     Transfer Training PT LTG, Activity Type  bed to chair /chair to bed  -MAGNO     Transfer Training PT LTG, Additional Goal  Once MD allows, pt will perform lateral transfer with conditional independence  -MAGNO     Transfer Training PT  LTG, Date  Goal Reviewed 09/10/17  -[facility]      Transfer Training PT LTG, Outcome goal ongoing  -[facility] goal ongoing  -     Patient Education PT LTG    Patient Education PT LTG, Date Established  09/09/17  -     Patient Education PT LTG, Time to Achieve  by discharge  -     Patient Education PT LTG, Education Type  precaution per surgeon;transfers;bed mobility;pain management;home safety  -     Patient Education PT LTG, Date Goal Reviewed 09/10/17  -[facility]      Patient Education PT LTG Outcome goal ongoing  -[facility] goal ongoing  -     Caregiver Training PT LTG    Caregiver Training PT LTG, Date Established  09/09/17  -     Caregiver Training PT LTG, Time to Achieve  by discharge  -     Caregiver Training PT LTG, Activity Type  home caregiver will demonstrate understanding assisting pt as needed with transfers/mobility as well as verbalize understanding of home care instructions  -     Caregiver Training PT LTG, Date Goal Reviewed 09/10/17  -[facility]      Caregiver Training PT LTG, Outcome goal ongoing  -[facility] goal ongoing  -     Physical Therapy PT STG    Physical Therapy PT STG, Date Established  09/09/17  -     Physical Therapy PT STG, Time to Achieve  by discharge  -     Physical Therapy PT STG, Activity Type  Pt will tolerate OOB 3-4 hours per day  -     Physical Therapy PT STG, Date Goal Reviewed 09/10/17  -[facility]      Physical Therapy PT STG, Outcome goal met  -[facility] goal [facility]       User Key  (r) = Recorded By, (t) = Taken By, (c) = Cosigned By    Initials Name Provider Type    YOEL Chris, PT Physical Therapist    MAGNO Lupe Abel, PT Physical Therapist    ERICH Felton, PTA Physical Therapy Assistant    HELEN Pendleton, PTA Physical Therapy Assistant    SUSIE Young, PTA Physical Therapy Assistant    JOHN Malhotra, PTA Physical Therapy Assistant          Physical Therapy Education     Title: PT OT SLP Therapies (Active)     Topic: Physical Therapy (Active)     Point: Mobility training  (Done)    Learning Progress Summary    Learner Readiness Method Response Comment Documented by Status   Patient Acceptance E VU reviewed  non wt bearing and transfering to from [facility] 09/13/17 1140 Done    Acceptance E NR Reviewed transferring towards stronger side and maintaining NWB with activity.  09/12/17 1608 Active    Acceptance E NR  TA 09/12/17 1515 Active    Acceptance E NR Reviewed safety with transfers.  Explained that Dr. Rutherford only wants stretcher chair used at this time.  09/11/17 1636 Active               Point: Precautions (Done)    Learning Progress Summary    Learner Readiness Method Response Comment Documented by Status   Patient Acceptance E VU reviewed  non wt bearing and transfering to from [facility] 09/13/17 1140 Done    Acceptance E NR Reviewed transferring towards stronger side and maintaining NWB with activity.  09/12/17 1608 Active    Acceptance E NR  TA 09/12/17 1515 Active    Acceptance E NR Reviewed safety with transfers.  Explained that Dr. Rutherford only wants stretcher chair used at this time.  09/11/17 1636 Active                      User Key     Initials Effective Dates Name Provider Type Discipline     06/15/16 -  Isela Chris, PT Physical Therapist PT     10/17/16 -  Geri Juan, PTA Physical Therapy Assistant PT     10/17/16 -  Balaji Malhotra, DARIEN Physical Therapy Assistant PT                    PT Recommendation and Plan  Anticipated Discharge Disposition: home with home health  Planned Therapy Interventions: balance training, bed mobility training, home exercise program, motor coordination training, neuromuscular re-education, patient/family education, postural re-education, ROM (Range of Motion), strengthening, stretching, transfer training  PT Frequency: other (see comments) (5-14 times/wk)  Plan of Care Review  Plan Of Care Reviewed With: patient  Outcome Summary/Follow up Plan: overall doing well but foot hurting on left so stayed in bed this pm. work on rom for  left knee.          Outcome Measures       09/18/17 0900 09/16/17 0910 09/16/17 0810    How much help from another person do you currently need...    Turning from your back to your side while in flat bed without using bedrails? 4  -RW  4  -JA    Moving from lying on back to sitting on the side of a flat bed without bedrails? 4  -RW  4  -JA    Moving to and from a bed to a chair (including a wheelchair)? 4  -RW  4  -JA    Standing up from a chair using your arms (e.g., wheelchair, bedside chair)? 1  -RW  1  -JA    Climbing 3-5 steps with a railing? 1  -RW  1  -JA    To walk in hospital room? 1  -RW  1  -JA    AM-PAC 6 Clicks Score 15  -RW  15  -JA    How much help from another is currently needed...    Putting on and taking off regular lower body clothing?  2  -BL     Bathing (including washing, rinsing, and drying)  3  -BL     Toileting (which includes using toilet bed pan or urinal)  2  -BL     Putting on and taking off regular upper body clothing  4  -BL     Taking care of personal grooming (such as brushing teeth)  4  -BL     Eating meals  4  -BL     Score  19  -BL     Functional Assessment    Outcome Measure Options  AM-PAC 6 Clicks Daily Activity (OT)  -BL AM-PAC 6 Clicks Basic Mobility (PT)  -JA      User Key  (r) = Recorded By, (t) = Taken By, (c) = Cosigned By    Initials Name Provider Type    ERICH Felton PTA Physical Therapy Assistant    JOHN Malhotra PTA Physical Therapy Assistant    IRASEMA Holloway/FREEDOM Occupational Therapy Assistant           Time Calculation:         PT Charges       09/18/17 1446 09/18/17 0913       Time Calculation    Start Time 1400  -RW 0755  -RW     Stop Time 1439  -RW 0903  -RW     Time Calculation (min) 39 min  -RW 68 min  -RW     Time Calculation- PT    Total Timed Code Minutes- PT 39 minute(s)  -RW 68 minute(s)  -RW       User Key  (r) = Recorded By, (t) = Taken By, (c) = Cosigned By    Initials Name Provider Type    JOHN Malhotra PTA Physical Therapy  Assistant          Therapy Charges for Today     Code Description Service Date Service Provider Modifiers Qty    57708529883 HC PT THER PROC EA 15 MIN 9/18/2017 Balaji Malhotra PTA GP 3    89263611096 HC PT THERAPEUTIC ACT EA 15 MIN 9/18/2017 Balaji Malhotra PTA GP 2    97541069008 HC PT THER PROC EA 15 MIN 9/18/2017 Balaji Malhotra PTA GP 3          PT G-Codes  PT Professional Judgement Used?: Yes  Outcome Measure Options: AM-PAC 6 Clicks Daily Activity (OT)  Score: 12  Functional Limitation: Mobility: Walking and moving around  Mobility: Walking and Moving Around Current Status (): At least 60 percent but less than 80 percent impaired, limited or restricted  Mobility: Walking and Moving Around Goal Status (): At least 20 percent but less than 40 percent impaired, limited or restricted    Balaji Malhotra PTA  9/18/2017

## 2017-09-18 NOTE — PLAN OF CARE
Problem: Patient Care Overview (Adult)  Goal: Plan of Care Review  Outcome: Ongoing (interventions implemented as appropriate)  Goal: Adult Individualization and Mutuality  Outcome: Ongoing (interventions implemented as appropriate)  Goal: Discharge Needs Assessment  Outcome: Ongoing (interventions implemented as appropriate)    Problem: Orthopaedic Fracture (Adult)  Goal: Signs and Symptoms of Listed Potential Problems Will be Absent or Manageable (Orthopaedic Fracture)  Outcome: Ongoing (interventions implemented as appropriate)    Problem: Fall Risk (Adult)  Goal: Absence of Falls  Outcome: Ongoing (interventions implemented as appropriate)    09/18/17 0541   Fall Risk (Adult)   Absence of Falls achieves outcome

## 2017-09-18 NOTE — PLAN OF CARE
Problem: Inpatient Physical Therapy  Goal: Transfer Training Goal 1 LTG- PT  Outcome: Outcome(s) achieved Date Met:  09/18/17 09/11/17 1500 09/18/17 1445   Transfer Training PT LTG   Transfer Training PT LTG, Date Established 09/11/17 --    Transfer Training PT LTG, Time to Achieve by discharge --    Transfer Training PT LTG, Activity Type bed to chair /chair to bed --    Transfer Training PT LTG, [place] Level conditional independence --    Transfer Training PT LTG, Assist Device (AAD) --    Transfer Training PT LTG, Date Goal Reviewed --  09/18/17   Transfer Training PT LTG, Outcome --  goal met

## 2017-09-18 NOTE — PLAN OF CARE
Problem: Patient Care Overview (Adult)  Goal: Plan of Care Review  Outcome: Ongoing (interventions implemented as appropriate)    09/18/17 4421   Coping/Psychosocial Response Interventions   Plan Of Care Reviewed With patient   Patient Care Overview   Progress improving   Outcome Evaluation   Outcome Summary/Follow up Plan patient's right leg dressing changed - looks good, worked well with therapy       Goal: Adult Individualization and Mutuality  Outcome: Ongoing (interventions implemented as appropriate)  Goal: Discharge Needs Assessment  Outcome: Ongoing (interventions implemented as appropriate)    Problem: Orthopaedic Fracture (Adult)  Goal: Signs and Symptoms of Listed Potential Problems Will be Absent or Manageable (Orthopaedic Fracture)  Outcome: Ongoing (interventions implemented as appropriate)    Problem: Fall Risk (Adult)  Goal: Absence of Falls  Outcome: Ongoing (interventions implemented as appropriate)

## 2017-09-18 NOTE — PLAN OF CARE
Problem: Patient Care Overview (Adult)  Goal: Plan of Care Review  Outcome: Ongoing (interventions implemented as appropriate)    09/18/17 1518   Coping/Psychosocial Response Interventions   Plan Of Care Reviewed With patient   Patient Care Overview   Progress improving   Outcome Evaluation   Outcome Summary/Follow up Plan pt perf well with OT. increased strength and tf          Problem: Inpatient Occupational Therapy  Goal: Patient Education Goal LTG- OT  Outcome: Ongoing (interventions implemented as appropriate)    09/12/17 0755 09/18/17 1518   Patient Education OT LTG   Patient Education OT LTG, Date Established 09/12/17 --    Patient Education OT LTG, Time to Achieve by discharge --    Patient Education OT LTG, Education Type HEP;precautions per surgeon;positioning;posture/body mechanics;home safety;adaptive equipment mgmt --    Patient Education OT LTG, Education Understanding independent;demonstrates adequately;verbalizes understanding --    Patient Education OT LTG, Date Goal Reviewed --  09/18/17   Patient Education OT LTG Outcome --  goal ongoing       Goal: ADL Goal LTG- OT  Outcome: Ongoing (interventions implemented as appropriate)    09/12/17 0755 09/18/17 1518   ADL OT LTG   ADL OT LTG, Date Established 09/12/17 --    ADL OT LTG, Time to Achieve by discharge --    ADL OT LTG, Activity Type ADL skills --    ADL OT LTG, Additional Goal set-up with self-feeding, grooming, UB dressing/bathing; min A with toileting; mod A with LB dressing/bathing --    ADL OT LTG, Date Goal Reviewed --  09/18/17   ADL OT LTG, Outcome --  goal ongoing

## 2017-09-18 NOTE — THERAPY TREATMENT NOTE
Inpatient Rehabilitation - Occupational Therapy Treatment Note  [facility]     Patient Name: Vangie Lopez  : 1950  MRN: [id number]  Today's Date: 2017  Onset of Illness/Injury or Date of Surgery Date: 17  Date of Referral to OT: 17  Referring Physician: Dr. Stephen      Admit Date: 2017    Visit Dx:     ICD-10-CM ICD-9-CM   1. Multiple trauma T07 959.8   2. Abnormality of gait and mobility R26.9 781.2   3. Muscle weakness (generalized) M62.81 728.87   4. Impaired mobility and ADLs Z74.09 799.89   5. Closed displaced comminuted fracture of right patella with routine healing, subsequent encounter S82.041D V54.16   6. Closed displaced fracture of navicular bone of left foot with routine healing, subsequent encounter S92.252D V54.19     Patient Active Problem List   Diagnosis   • Encounter for screening for malignant neoplasm of colon   • Closed displaced comminuted fracture of right patella   • Closed displaced fracture of navicular bone of left foot   • Closed dislocation of navicular bone of left foot   • Patella fracture   • Essential hypertension   • Mixed hyperlipidemia   • Osteoarthritis of hands, bilateral   • BPH (benign prostatic hyperplasia)   • Glaucoma   • Hyperlipidemia   • Closed fracture of navicular bone with dislocation of perilunate joint of left wrist   • Fracture of patella, right, closed   • Multiple trauma   • Encounter for rehabilitation   • History of kidney stones   • Recent onset of diabetes mellitus             Adult Rehabilitation Note       17 1400 17 1030 17 0755    Rehab Assessment/Intervention    Discipline physical therapy assistant  -RW occupational therapy assistant  -LM physical therapy assistant  -RW    Document Type therapy note (daily note)  -RW therapy note (daily note)  -LM therapy note (daily note)  -RW    Subjective Information agree to therapy  -RW agree to therapy  -LM agree to therapy  -RW    Patient Effort,  Rehab Treatment good  -RW good  -LM good  -RW    Recorded by [RW] Balaji Malhotra PTA [LM] Marce Carter, VILLALPANDO/L [RW] Balaji Malhotra PTA    Pain Assessment    Pain Assessment --   left foot hurting but gives no rating  -RW      Pain Score  5  -LM 5  -RW    Post Pain Score  4  -LM 3  -RW    Pain Location  Ankle  -LM Ankle  -RW    Pain Orientation  Left  -LM Left  -RW    Pain Intervention(s)  Medication (See MAR)  -LM Medication (See MAR)  -RW    Recorded by [RW] Balaji Malhotra PTA [LM] Marce Carter, VILLALPANDO/L [RW] Balaji Malhotra PTA    Cognitive Assessment/Intervention    Current Cognitive/Communication Assessment functional  -RW functional  -LM functional  -RW    Orientation Status oriented x 4  -RW oriented x 4  -LM oriented x 4  -RW    Follows Commands/Answers Questions 100% of the time  -% of the time  -% of the time  -RW    Personal Safety Interventions  gait belt  -LM gait belt  -RW    Recorded by [RW] Balaji Malhotra PTA [LM] Marce Carter, VILLALPANDO/L [RW] Balaji Malhotra PTA    Mobility Assessment/Training    Left Lower Extremity Weight-Bearing non weight-bearing  -RW  non weight-bearing  -RW    Right Lower Extremity Weight-Bearing non weight-bearing  -RW  non weight-bearing  -RW    Recorded by [RW] Balaji Malhotra PTA  [RW] Balaji Malhotra PTA    Bed Mobility, Assessment/Treatment    Bed Mobility, Roll Right, [place] conditional independence  -RW conditional independence  -LM conditional independence  -RW    Bed Mob, Supine to Sit, [place] independent  -RW independent  -LM independent  -RW    Bed Mob, Sit to Supine, [place] independent  -RW independent  -LM independent  -RW    Recorded by [RW] Balaji Malhotra PTA [LM] Marce Carter, VILLAL[place]ANDO/L [RW] Balaji Malhotra PTA    Transfer Assessment/Treatment    Transfers, Bed-Chair [place]  conditional independence  -LM conditional independence;set up required  -RW    Transfers, Chair-Bed [place]  conditional  independence  -LM     Transfers, Sit-Stand [place] not appropriate to assess  -RW not appropriate to assess  -LM not appropriate to assess  -RW    Recorded by [RW] Balaji Malhotra PTA [LM] IRASEMA Smith/L [RW] Balaji Malhotra PTA    Gait Assessment/Treatment    Gait, [place] Level not appropriate to assess  -RW not appropriate to assess  -LM not appropriate to assess  -RW    Recorded by [RW] Balaji Malhotra PTA [LM] IRASEMA Smith/L [RW] Balaji Malhotra PTA    Wheelchair Training/Management    Wheelchair Transfer Type   lateral transfer  -RW    Wheelchair, Distance Propelled  130  -  -RW    Wheelchair Training Comment  --   mod I  -LM mod ind  -RW    Recorded by  [LM] IRASEMA Smith/L [RW] Balaji Malhotra PTA    Therapy Exercises    Right Lower Extremity AROM:;20 reps;supine;ankle pumps/circles;quad sets  -RW  AROM:;20 reps;supine;ankle pumps/circles;quad sets   2x20  -RW    Left Lower Extremity AROM:;20 reps;supine;hip abduction/adduction;SLR;heel slides  -RW  AROM:;20 reps;supine;hip abduction/adduction;SLR;heel slides   2x20  -RW    Bilateral Lower Extremities AROM:;20 reps;glut sets;quad sets  -RW  AROM:;20 reps;glut sets;quad sets   2x20  -RW    Bilateral Upper Extremity  AROM:;elbow flexion/extension;shoulder abduction/adduction;shoulder extension/flexion;shoulder horizontal abd/add;shoulder protraction/retraction   level 3 tband all planes and rickshaw 5x 20 reps  -LM AROM:;20 reps;sitting   tband rows 2/20 with btb amd pro2 x 10 min l2  -RW    BUE Resistance  manual resistance- minimal   UEE bike x 20 min   -LM     Trunk Exercises 10 reps   abdominal crunchs 2 sets  -RW      Recorded by [RW] Balaji Malhotra PTA [LM] IRASEMA Smith/L [RW] Balaji Malhotra PTA    Positioning and Restraints    Pre-Treatment Position in bed  -RW sitting in chair/recliner  -LM in bed  -RW    Post Treatment Position bed  -RW bed  -LM wheelchair  -RW    In Bed call light within  reach  -RW      Recorded by [RW] Balaji Malhotra PTA [LM] IRASEMA Smith/L [RW] Balaji Malhotra PTA      09/16/17 1255 09/16/17 0910 09/16/17 0810    Rehab Assessment/Intervention    Discipline physical therapy assistant  -JA occupational therapy assistant  -BL physical therapy assistant  -ERICH    Document Type therapy note (daily note)  -JA therapy note (daily note)  -BL therapy note (daily note)  -JA    Subjective Information agree to therapy  -JA agree to therapy  -BL agree to therapy  -JA    Patient Effort, Rehab Treatment good  -JA good  -BL good  -JA    Precautions/Limitations non-weight bearing status  -JA non-weight bearing status  -BL non-weight bearing status  -JA    Specific Treatment Considerations  Both legs in extension for entire treatment and pt returned to supine after 2 hours in w/c this date.  -BL Pt. reports having muscle spasm last night at L LE and reports increase L knee pain at medial side this a.m. nsg. made aware   -JA    Recorded by [JA] Dwayne Felton PTA [BL] IRASEMA Vasquez/FREEDOM [JA] Dwayne Felton PTA    Vital Signs    Post Systolic BP Rehab  145  -BL     Post Treatment Diastolic BP  75  -BL     Pretreatment Heart Rate (beats/min)  97  -BL     Posttreatment Heart Rate (beats/min)  99  -BL     Pre SpO2 (%)  98  -BL     O2 Delivery Pre Treatment  room air  -BL     Post SpO2 (%)  99  -BL     O2 Delivery Post Treatment  room air  -BL     Pre Patient Position Supine  -JA Sitting  -BL Supine  -JA    Intra Patient Position  Sitting  -BL Sitting  -JA    Post Patient Position Supine  -JA Supine  -BL Sitting  -JA    Recorded by [JA] Dwayne Felton PTA [BL] IRASEMA Vasquez/FREEDOM [JA] Dwayne Felton PTA    Pain Assessment    Pain Assessment 0-10  -JA 0-10  -BL 0-10  -JA    Pain Score 4  -JA 6  -BL 8  -JA    Post Pain Score 4  -JA 8  -BL 6  -JA    Pain Type Acute pain;Surgical pain  -JA Acute pain  -BL Acute pain  -JA    Pain Location Knee  -JA Knee  -BL Knee  -JA     Pain Orientation Right;Left  -JA Right  -BL Left  -JA    Pain Intervention(s) Medication (See MAR)  -JA Medication (See MAR);Repositioned   returned to bed in supine position  -BL Medication (See MAR)  -JA    Recorded by [JA] Dwayne Felton PTA [BL] IRASEMA Vasquez/FREEDOM [JA] Dwayne Felton PTA    Cognitive Assessment/Intervention    Current Cognitive/Communication Assessment  functional  -BL     Orientation Status  oriented x 4  -BL     Follows Commands/Answers Questions  100% of the time  -BL     Personal Safety  WNL/WFL  -BL     Personal Safety Interventions  gait belt;muscle strengthening facilitated  -BL     Recorded by  [BL] NAV VasquezA/L     General LE Assessment    ROM knee, left: LE ROM deficit  -JA      ROM Detail 110 degrees AROM   -JA      Recorded by [JA] Dwayne Felton PTA      Mobility Assessment/Training    Extremity Weight-Bearing Status left lower extremity  -JA  left lower extremity  -JA    Left Lower Extremity Weight-Bearing non weight-bearing  -JA  non weight-bearing  -JA    Right Lower Extremity Weight-Bearing non weight-bearing  -JA  non weight-bearing  -JA    Recorded by [JA] Dwayne Felton PTA  [JA] Dwayne Felton PTA    Bed Mobility, Assessment/Treatment    Bed Mobility, Assistive Device  head of bed elevated;overhead trapeze;leg   - head of bed elevated;overhead trapeze  -JA    Bed Mobility, Roll Left, [place]  conditional independence  -BL     Bed Mobility, Scoot/Bridge, [place]  conditional independence  -BL conditional independence  -JA    Bed Mob, Supine to Sit, [place]  conditional independence  -BL conditional independence  -JA    Bed Mobility, Safety Issues  decreased use of legs for bridging/pushing  -BL     Bed Mobility, Impairments  ROM decreased  -BL     Recorded by  [BL] IRASEMA Vasquez/FREEDOM [JA] Dwayne Felton PTA    Transfer Assessment/Treatment    Transfers, Bed-Chair [place]   conditional independence;set up  required  -    Transfers, Chair-Bed [place]  conditional independence;set up required;verbal cues required  -     Transfers, Bed-Chair-Bed, Assist Device  sliding board  - sliding board  -    Toilet Transfer, [place]  contact guard assist;supervision required;set up required;verbal cues required  -     Toilet Transfer, Assistive Device  bedside commode with drop arms   sliding board  -     Walk-In Shower Transfer, [place]  not tested  -     Bathtub Transfer, [place]  not tested  -     Transfer, Maintain Weight Bearing Status  able to maintain weight bearing status  -     Transfer, Impairments  ROM decreased;strength decreased  -BL     Recorded by  [BL] ELY Vasquez [JA] Dwayne Felton PTA    Wheelchair Training/Management    Wheelchair Transfer Type  lateral transfer  - lateral transfer  -    Wheelchair Transfer Type Comment  Pt demo'd safe transfer from w/c to bedside commode along with from bedside commode back to w/c and w/c to bed with sliding board use. Pt required vc's and set up for sliding board this date however was able to complete transfer with conditional independence however CGA required from w/c to toilet and back to w/c.   -     Wheelchair Task Training  safety considerations  -     Wheelchair Task Training Comment  Pt was educated on safely guiding w/c throughout the hallway this date and to assess spacial concerns once returning home to avoid contact of his feet with objects. Pt was educated on safety with remembering to always lock w/c before transfers and to always place armrest back into correct position once returned to w/c after transfer is complete. Required vc's to lock w/c once and to return armrest to resting position this date after transferring back from toilet to w/c.  -     Wheelchair Propulsion Training  carpet;turning wheelchair;steering wheelchair;moving through doorways;backward propulsion;forward propulsion  -      Wheelchair, Distance Propelled  75  -  -JA    Wheelchair Training Comment  conditional independence however vc's required for turning w/c  -BL conditional independent   -JA    Recorded by  [BL] ELY Vasquez [JA] Dwayne Felton, PTA    Upper Body Bathing Assessment/Training    UB Bathing Assess/Train Assistive Device  bath mitt  -BL     UB Bathing Assess/Train, Position  sitting   in w/c at sink  -BL     UB Bathing Assess/Train, [place] Level  set up required;conditional independence  -BL     UB Bathing Assess/Train, Comment  Max assist for cleansing his back this date due to IRASEMA/L not having LH sponge to train patient with.  -BL     Recorded by  [BL] ELY Vasquez     Lower Body Bathing Assessment/Training    LB Bathing Assess/Train Assistive Device  bath mitt  -BL     LB Bathing Assess/Train, Position  sitting;supported sitting  -BL     LB Bathing Assess/Train, [place] Level  set up required;supervision required;dependent (less than 25% patient effort)  -BL     LB Bathing Assess/Train, Impairments  decreased flexibility;ROM decreased;strength decreased;pain  -BL     LB Bathing Assess/Train, Comment  Dependent for washing R foot only this date. Pt was able to complete pericare in sitting with supervision and set up only.  -BL     Recorded by  [BL] ELY Vasquez     Upper Body Dressing Assessment/Training    UB Dressing Assess/Train, Clothing Type  doffing:;donning:;pull over;t-shirt  -BL     UB Dressing Assess/Train, Position  sitting  -BL     UB Dressing Assess/Train, [place]  independent  -BL     UB Dressing Assess/Train, Impairments  ROM decreased  -BL     Recorded by  [BL] ELY Vasquez     Lower Body Dressing Assessment/Training    LB Dressing Assess/Train, Clothing Type  donning:;doffing:;shorts;slipper socks  -BL     LB Dressing Assess/Train, Position  sitting  -BL     LB Dressing Assess/Train, [place]  set up required;moderate assist (50%  patient effort);dependent (less than 25% patient effort)  -BL     LB Dressing Assess/Train, Comment  moderate assist for donning shorts and dependent for sock on R foot  -BL     Recorded by  [BL] IRASEMA Vasquez/L     Toileting Assessment/Training    Toileting Assess/Train, Assistive Device  bedside commode  -BL     Toileting Assess/Train, Position  sitting  -BL     Toileting Assess/Train, [place] Level  set up required;moderate assist (50% patient effort)  -BL     Toileting Assess/Train, Impairments  ROM decreased;decreased flexibility;strength decreased  -BL     Recorded by  [BL] IRASEMA Vasquez/L     Grooming Assessment/Training    Grooming Assess/Train, Assistive Device  electric toothbrush  -BL     Grooming Assess/Train, Position  sitting  -BL     Grooming Assess/Train, [place] Level  set up required;conditional independence  -BL     Recorded by  [BL] IRASEMA Vasquez/L     Therapy Exercises    Right Lower Extremity AROM:;20 reps;supine;ankle pumps/circles;quad sets   2x20  -JA  AROM:;20 reps;supine;ankle pumps/circles   2x20  -JA    Left Lower Extremity AROM:;20 reps;supine;hip abduction/adduction;SLR;heel slides   2x20  -JA  AROM:;20 reps;supine;heel slides;hip abduction/adduction;SLR   2x20  -JA    Bilateral Lower Extremities AROM:;20 reps;glut sets;quad sets   2x20  -JA  AROM:;20 reps;supine;glut sets;quad sets   2x20  -JA    Bilateral Upper Extremity   AROM:;20 reps;sitting   CC mid/high rows 35# 2x20  -JA    Recorded by [JA] Dwayne Felton PTA  [JA] Dwayne Felton PTA    Positioning and Restraints    Pre-Treatment Position in bed  -JA other (comment)   w/c  -BL in bed  -JA    Post Treatment Position bed  -JA bed  -BL wheelchair  -JA    In Bed  supine;call light within reach;encouraged to call for assist  -BL     In Wheelchair   with OT  -JA    Recorded by [JA] Dwayne Felton PTA [BL] IRASEMA Vasquez/FREEDOM [JA] Dwayne Felton PTA      User Key  (r) = Recorded By, (t) = Taken  By, (c) = Cosigned By    Initials Name Effective Dates    JA Dwayne MEIR Felton, PTA 10/17/16 -     RW Balaji Malhotra, PTA 10/17/16 -     BL Krissy Hubbard, VILLALPANDO/L 10/17/16 -     LM Marce Carter, VILLALPANDO/L 10/17/16 -                 OT Goals       09/18/17 1518 09/15/17 0810 09/14/17 1413    Bed Mobility OT LTG    Bed Mobility OT LTG, Date Goal Reviewed   09/14/17  -LM    Bed Mobility OT LTG, Outcome   goal met  -LM    Transfer Training OT LTG    Transfer Training OT LTG, Date Goal Reviewed   09/14/17  -LM    Transfer Training OT LTG, Outcome   goal met  -LM    Patient Education OT LTG    Patient Education OT LTG, Date Goal Reviewed 09/18/17  -LM 09/15/17  -KD 09/14/17  -LM    Patient Education OT LTG Outcome goal ongoing  -LM goal not met  -KD goal ongoing  -LM    ADL OT LTG    ADL OT LTG, Date Goal Reviewed 09/18/17  -LM 09/15/17  -KD 09/14/17  -LM    ADL OT LTG, Outcome goal ongoing  -LM goal not met  -KD goal ongoing  -LM      09/13/17 1552 09/12/17 1455 09/12/17 0755    Bed Mobility OT LTG    Bed Mobility OT LTG, Date Established   09/12/17  -BH (r) SP (t) BH (c)    Bed Mobility OT LTG, Time to Achieve   by discharge  -BH (r) SP (t) BH (c)    Bed Mobility OT LTG, Activity Type   all bed mobility  -BH (r) SP (t) BH (c)    Bed Mobility OT LTG, [place] Level   supervision required;minimum assist (75% patient effort)   AE/adaptive techniques- RLE in knee immobilizer and extended  -BH (r) SP (t) BH (c)    Bed Mobility OT LTG, Date Goal Reviewed 09/13/17  -LM 09/12/17  -RC     Bed Mobility OT LTG, Outcome goal ongoing  -LM goal ongoing  -RC     Transfer Training OT LTG    Transfer Training OT LTG, Date Established   09/12/17  -BH (r) SP (t) BH (c)    Transfer Training OT LTG, Time to Achieve   by discharge  -BH (r) SP (t) BH (c)    Transfer Training OT LTG, Activity Type   --   BSC, w/c  -BH (r) SP (t) BH (c)    Transfer Training OT LTG, [place] Level   minimum assist (75% patient effort);contact guard  assist   RLE in knee immobilizer and extended at all times.  -BH (r) SP (t) BH (c)    Transfer Training OT LTG, Date Goal Reviewed 09/13/17  -LM 09/12/17  -     Transfer Training OT LTG, Outcome goal ongoing  - goal ongoing  -     Patient Education OT LTG    Patient Education OT LTG, Date Established   09/12/17  -BH (r) SP (t) BH (c)    Patient Education OT LTG, Time to Achieve   by discharge  -BH (r) SP (t) BH (c)    Patient Education OT LTG, Education Type   HEP;precautions per surgeon;positioning;posture/body mechanics;home safety;adaptive equipment mgmt  -BH (r) SP (t) BH (c)    Patient Education OT LTG, Education Understanding   independent;demonstrates adequately;verbalizes understanding  -BH (r) SP (t) BH (c)    Patient Education OT LTG, Date Goal Reviewed 09/13/17  -LM 09/12/17  -     Patient Education OT LTG Outcome goal ongoing  - goal ongoing  -     ADL OT LTG    ADL OT LTG, Date Established   09/12/17  -BH (r) SP (t) BH (c)    ADL OT LTG, Time to Achieve   by discharge  -BH (r) SP (t) BH (c)    ADL OT LTG, Activity Type   ADL skills  -BH (r) SP (t) BH (c)    ADL OT LTG, Additional Goal   set-up with self-feeding, grooming, UB dressing/bathing; min A with toileting; mod A with LB dressing/bathing  -BH (r) SP (t) BH (c)    ADL OT LTG, Date Goal Reviewed 09/13/17  -LM 09/12/17  -     ADL OT LTG, Outcome goal ongoing  - goal ongoing  -       09/11/17 1609 09/11/17 1333       Transfer Training OT LTG    Transfer Training OT LTG, Date Established  09/11/17  -RB     Transfer Training OT LTG, Time to Achieve  by discharge  -RB     Transfer Training OT LTG, Activity Type  all transfers  -RB     Transfer Training OT LTG, [place] Level  supervision required;contact guard assist  -RB     Transfer Training OT LTG, Assist Device  other (see comments)   Sliding board if needed.  -RB     Transfer Training OT LTG, Date Goal Reviewed 09/04/17  -SG      Transfer Training OT LTG, Outcome goal not met   -SG      Transfer Training OT LTG, Reason Goal Not Met discharged from facility  -SG      Strength OT LTG    Strength Goal OT LTG, Date Established  09/11/17  -RB     Strength Goal OT LTG, Time to Achieve  by discharge  -RB     Strength Goal OT LTG, Measure to Achieve  2 sets of 10 reps all planes with 3-4 lb dumbells for B UE strength to increase independence with functional mobility/transfers.  -RB     Strength Goal OT LTG, Date Goal Reviewed 09/11/17  -      Strength Goal OT LTG, Outcome goal not met  -SG      Strength Goal OT LTG, Reason Goal Not Met discharged from facility  -      Dynamic Sitting Balance OT LTG    Dynamic Sitting Balance OT LTG, Date Established  09/11/17  -RB     Dynamic Sitting Balance OT LTG, Time to Achieve  by discharge  -RB     Dynamic Sitting Balance OT LTG, [place] Level  supervision required   10-15 minutes with functional activity.  -RB     Dynamic Sitting Balance OT LTG, Assist Device  bed rails  -RB     Dynamic Sitting Balance OT LTG, Date Goal Reviewed 09/11/17  -      Dynamic Sitting Balance OT LTG, Outcome goal not met  -      Dynamic Sitting Balance OT LTG, Reason Goal Not Met discharged from facility  -SG      ADL OT LTG    ADL OT LTG, Date Established  09/11/17  -RB     ADL OT LTG, Time to Achieve  by discharge  -RB     ADL OT LTG, Activity Type  ADL skills   Sponge bath and dress.  -RB     ADL OT LTG, [place] Level  contact guard;min assist  -RB     ADL OT LTG, Date Goal Reviewed 09/11/17  -      ADL OT LTG, Outcome goal not met  -      ADL OT LTG, Reason Goal Not Met discharged from facility  -        User Key  (r) = Recorded By, (t) = Taken By, (c) = Cosigned By    Initials Name Provider Type    CLAIR Perez, OT Occupational Therapist    NATALIA Jones, OTR/L Occupational Therapist    MARGE Jean-Baptiste, VILLALPANDO/L Occupational Therapy Assistant    VIPUL Ray, VILLALPANDO/L Occupational Therapy Assistant    YOEL Carter, VILLALPANDO/L  Occupational Therapy Assistant    DANY Juárez, OT Student OT Student    SAHARA Rai OT Occupational Therapist          Occupational Therapy Education     Title: PT OT SLP Therapies (Active)     Topic: Occupational Therapy (Active)     Point: ADL training (Done)    Description: Instruct learner(s) on proper safety adaptation and remediation techniques during self care or transfers.   Instruct in proper use of assistive devices.    Learning Progress Summary    Learner Readiness Method Response Comment Documented by Status   Patient Acceptance D DU   09/15/17 1036 Done    Acceptance E VU Educated on OT and POC. Educated about adhering to weight bearing precautions. Educated about safety with ADL and bed mobility. SP 09/12/17 1159 Done               Point: Precautions (Done)    Description: Instruct learner(s) on prescribed precautions during self-care and functional transfers.    Learning Progress Summary    Learner Readiness Method Response Comment Documented by Status   Patient Acceptance E VU Educated on OT and POC. Educated about adhering to weight bearing precautions. Educated about safety with ADL and bed mobility.  09/12/17 1159 Done               Point: Body mechanics (Done)    Description: Instruct learner(s) on proper positioning and spine alignment during self-care, functional mobility activities and/or exercises.    Learning Progress Summary    Learner Readiness Method Response Comment Documented by Status   Patient Acceptance E VU Educated on OT and POC. Educated about adhering to weight bearing precautions. Educated about safety with ADL and bed mobility.  09/12/17 1159 Done                      User Key     Initials Effective Dates Name Provider Type Discipline    KD 10/17/16 -  ELY Camacho Occupational Therapy Assistant OT    SP 01/31/17 -  Kenya Juárez, OT Student OT Student OT                  OT Recommendation and Plan  Anticipated Equipment Needs At Discharge: hospital  bed  Anticipated Discharge Disposition: home with 24/7 care, home with home health  Planned Therapy Interventions: activity intolerance, adaptive equipment training, ADL retraining, bed mobility training, balance training, energy conservation, fine motor coordination training, home exercise program, motor coordination training, strengthening, transfer training  Therapy Frequency: other (see comments) (3-14x/wk)  Plan of Care Review  Plan Of Care Reviewed With: patient  Progress: improving  Outcome Summary/Follow up Plan: pt perf well with OT.  increased strength and tf         Outcome Measures       09/18/17 0900 09/16/17 0910 09/16/17 0810    How much help from another person do you currently need...    Turning from your back to your side while in flat bed without using bedrails? 4  -RW  4  -JA    Moving from lying on back to sitting on the side of a flat bed without bedrails? 4  -RW  4  -JA    Moving to and from a bed to a chair (including a wheelchair)? 4  -RW  4  -JA    Standing up from a chair using your arms (e.g., wheelchair, bedside chair)? 1  -RW  1  -JA    Climbing 3-5 steps with a railing? 1  -RW  1  -JA    To walk in hospital room? 1  -RW  1  -JA    AM-PAC 6 Clicks Score 15  -RW  15  -JA    How much help from another is currently needed...    Putting on and taking off regular lower body clothing?  2  -BL     Bathing (including washing, rinsing, and drying)  3  -BL     Toileting (which includes using toilet bed pan or urinal)  2  -BL     Putting on and taking off regular upper body clothing  4  -BL     Taking care of personal grooming (such as brushing teeth)  4  -BL     Eating meals  4  -BL     Score  19  -BL     Functional Assessment    Outcome Measure Options  AM-PAC 6 Clicks Daily Activity (OT)  -BL AM-PAC 6 Clicks Basic Mobility (PT)  -JA      User Key  (r) = Recorded By, (t) = Taken By, (c) = Cosigned By    Initials Name Provider Type    ERICH Felton PTA Physical Therapy Assistant    RW  Balaji Malhotra, PTA Physical Therapy Assistant    SPARKLE Hubbard, VILLALPANDO/L Occupational Therapy Assistant           Time Calculation:         Time Calculation- OT       09/18/17 1441          Time Calculation- OT    OT Start Time 1030  -LM      OT Stop Time 1155  -LM      OT Time Calculation (min) 85 min  -LM      Total Timed Code Minutes- OT 85 minute(s)  -LM      OT Received On 09/18/17  -LM        User Key  (r) = Recorded By, (t) = Taken By, (c) = Cosigned By    Initials Name Provider Type     ELY Smith Occupational Therapy Assistant           Therapy Charges for Today     Code Description Service Date Service Provider Modifiers Qty    15665245515 HC OT THER PROC EA 15 MIN 9/18/2017 ELY Smith GO 5    81867010264 HC OT THERAPEUTIC ACT EA 15 MIN 9/18/2017 ELY Smith GO 1          OT G-codes  OT Professional Judgement Used?: Yes  OT Functional Scales Options: AM-PAC 6 Clicks Daily Activity (OT)  Score: 15  Functional Limitation: Self care  Self Care Current Status (): At least 40 percent but less than 60 percent impaired, limited or restricted  Self Care Goal Status (): At least 20 percent but less than 40 percent impaired, limited or restricted    ELY Smith  9/18/2017

## 2017-09-18 NOTE — PROGRESS NOTES
LOS: 7 days   Patient Care Team:  Renan Rodríguez MD as PCP - General (Family Medicine)    Chief Complaint:   Multiple trauma          Interval History:     SUBJECTIVE:  No complaints today he feels good he is in good spirits slept well eating well.  Pain is well-controlled.  No nausea no vomiting    History taken from: patient chart RN    Objective     Vital Signs  Temp:  [98.4 °F (36.9 °C)-98.7 °F (37.1 °C)] 98.7 °F (37.1 °C)  Heart Rate:  [84-87] 87  Resp:  [18] 18  BP: (122-123)/(69) 123/69  Last 3 weights    09/14/17  1500 09/17/17  0500   Weight: 182 lb (82.6 kg) 177 lb (80.3 kg)         Physical Exam:     General Appearance:    Alert, cooperative, in no acute distress   Head:    Normocephalic, without obvious abnormality, atraumatic   Eyes:            Lids and lashes normal, conjunctivae and sclerae normal, no   icterus, no pallor, corneas clear, PERRLA   Throat:   No oral lesions, no thrush, oral mucosa moist   Neck:   No adenopathy, supple, trachea midline, no thyromegaly, no   carotid bruit, no JVD       Lungs:     Clear to auscultation,respirations regular, even and                  Unlabored     Heart:    Regular rhythm and normal rate, normal S1 and S2, no            murmur, no gallop, no rub, no click    Chest Wall:    No abnormalities observed   Abdomen:     Normal bowel sounds, no masses, no organomegaly, soft        non-tender, non-distended, no guarding, no rebound                Tenderness    Extremities:   Knee immobilizer on the right leg splint cast on the left        Skin:   No bleeding or bruising .He does have a rash on his back probably secondary to contact irritants    Lymph nodes:   No palpable adenopathy   Neurologic:   Cranial nerves 2 - 12 grossly intact, sensation intact       RESULTS REVIEW:     Lab Results (last 24 hours)     Procedure Component Value Units Date/Time    POC Glucose Fingerstick [[id number]]  (Abnormal) Collected:  09/17/17 1639    Specimen:  Blood Updated:   09/17/17 1653     Glucose 181 (H) mg/dL       RN NotifiedMeter: [id number]: [id number] JAZZMINE MARLOW       POC Glucose Fingerstick [[id number]]  (Abnormal) Collected:  09/17/17 2059    Specimen:  Blood Updated:  09/18/17 0244     Glucose 204 (H) mg/dL       Meter: [id number]: [id number] PRATIMA LOPEZ       Protime-INR [[id number]]  (Abnormal) Collected:  09/18/17 0502    Specimen:  Blood Updated:  09/18/17 0643     Protime 22.8 (H) Seconds      INR 2.00 (H)    Narrative:       Therapeutic range for most indications is 2.0-3.0 INR,  or 2.5-3.5 for mechanical heart valves.    POC Glucose Fingerstick [[id number]]  (Abnormal) Collected:  09/18/17 0504    Specimen:  Blood Updated:  09/18/17 0701     Glucose 157 (H) mg/dL       Meter: [id number]: [id number] PRATIMA LOPEZ       POC Glucose Fingerstick [[id number]]  (Abnormal) Collected:  09/18/17 1015    Specimen:  Blood Updated:  09/18/17 1043     Glucose 138 (H) mg/dL       RN NotifiedMeter: [id number]: [id number] KAE RUTH ANN           Imaging Results (last 72 hours)     ** No results found for the last 72 hours. **          Assessment/Plan     Principal Problem:    Multiple trauma  Active Problems:    Closed displaced comminuted fracture of right patella    Closed displaced fracture of navicular bone of left foot    Osteoarthritis of hands, bilateral    BPH (benign prostatic hyperplasia)    Glaucoma    Encounter for rehabilitation    Recent onset of diabetes mellitus    Essential hypertension    Mixed hyperlipidemia    History of kidney stones      He is participating well with therapies making good progress  He is appropriately anticoagulated.  He does have a ramp at home and medical equipment has been ordered including a glucose meter,  lancets and strips  Hospital bed with trapeze bar, wheelchair, drop arm bedside commode, had been ordered as well      Alec Stephen MD  09/18/17  12:30 PM

## 2017-09-18 NOTE — DISCHARGE PLACEMENT REQUEST
"Lia Lopez (67 y.o. Male)     Date of Birth Social Security Number Address Home Phone MRN    1950  [address] [place] [id number] [phone] [id number]    Caodaism Marital Status          Spiritism        Admission Date Admission Type Admitting Provider Attending Provider Department, Room/Bed    9/11/17 Elective Alec Stephen MD Hargrove, Kenneth R, MD [facility] ACUTE REHAB, 534/1    Discharge Date Discharge Disposition Discharge Destination                      Attending Provider: Alec Stephen MD     Allergies:  No Known Allergies    Isolation:  None   Infection:  None   Code Status:  FULL    Ht:  69\" (175.3 cm)   Wt:  177 lb (80.3 kg)    Admission Cmt:  None   Principal Problem:  Multiple trauma [T07]                 Active Insurance as of 9/11/2017     Primary Coverage     Payor Plan Insurance Group Employer/Plan Group    MEDICARE MEDICARE A & B      Payor Plan Address Payor Plan Phone Number Effective From Effective To    [facility] [id number] [phone] 6/1/2016     [place] [address]       Subscriber Name Subscriber Birth Date Member ID       LIA LOPEZ 1950 [id number]           Secondary Coverage     Payor Plan Insurance Group Employer/Plan Group    [employer] [place] [id number]     Payor Plan Address Payor Plan Phone Number Effective From Effective To    [facility] [employer]  [phone] 9/6/2017     [facility] [place] [address]       Subscriber Name Subscriber Birth Date Member ID       LIA LOPEZ 1950 [id number]                 Emergency Contacts      (Rel.) Home Phone Work Phone Mobile Phone    Sun Lopez (Spouse) [phone] -- [phone]        Blood Glucose Monitoring Suppl device [[id number]] (Order [id number])    Date: 9/18/2017   Department: [facility] ACUTE REHAB   Ordering/Authorizing: Alec Stephen MD         Medication    Blood Glucose " Monitoring Suppl device [[id number]]         Blood Glucose Monitoring Suppl device [[id number]]  Order Details     Dose, Route, Frequency: As Directed Note to Pharmacy:   Glucose monitor to check sugar   Two  time daily ICD10  E11.9 diabetes mellitus recent diagnosis      Dispense Quantity:  1 each Refills:  0 Fills Remaining:  --           Sig: Use as directed to monitor sugar          Written Date:  17 Expiration Date:  18     Start Date:  17 End Date:  --            Ordering Provider:  Alec Stephen MD Phone:  [phone] Fax:  [phone]    Address:  [address] [place] [address] NPI:  [id number]            Authorizing Provider:  Alec Stephen MD Phone:  [phone] Fax:  [phone]    Address:  [address] [place] [address] NPI:  [id number]            Ordering User:  Alec Stephen MD                 Pharmacy:  [address] [facility] [place] [id number]  [phone]  - [phone] Phone:  [phone] Fax:  [phone]    Address:  [address] [place] [address] [place] [address]      Pharmacy Comments:  --          Quantity Remaining:  -- Quantity Filled:  --            Orders with any of the following pharmaceutical classes: Medical Devices      Name Dose Frequency Start Date End Date Medication Warnings Interventions? Order Mode        Blood Glucose Monitoring Suppl device   17    Outpatient        Warnings History      No Interaction Warnings Shown        Order Audit [place]      Number of times this order has been changed since signin     Order Audit [place]       Order Reconciliation Actions           Order Reconciliation Actions       E-Prescribing Status      Medication                    Blood Glucose Monitoring Suppl device          Sig: Use as directed to monitor sugar          Class: Print          Notes to Pharmacy: Glucose monitor to check sugar   Two  time daily ICD10  E11.9 diabetes mellitus  recent diagnosis          Date/Time Signed: 9/18/2017 10:20 AM           Event History           Event History       Order Transmittal Tracking      Blood Glucose Monitoring Suppl device (Order #[id number]) on 9/18/17             Emergency Contact Information     Name Relation Home Work Mobile    Sun Lopez Spouse [phone]          Insurance Information                MEDICARE/MEDICARE A & B Phone: [phone]    Subscriber: Vangie Lopez Subscriber#: [id number]    Group#:  Precert#:         [employer] Phone: [phone]    Subscriber: Vangie Lopez Subscriber#: [id number]    Group#: [id number] Precert#:         glucose blood test strip [[id number]] (Order [id number])    Date: 9/18/2017   Department: [facility] ACUTE REHAB   Ordering/Authorizing: Alec Stephen MD         Medication    glucose blood test strip [[id number]]         glucose blood test strip [[id number]]  Order Details     Dose, Route, Frequency: As Directed    Dispense Quantity:  100 each Refills:  5 Fills Remaining:  --           Sig: Check sugar twice a day          Written Date:  09/18/17 Expiration Date:  09/18/18     Start Date:  09/18/17 End Date:  --            Ordering Provider:  Alec Stephen MD Phone:  [phone] Fax:  [phone]    Address:  [address] [place] [address] NPI:  [id number]            Authorizing Provider:  Alec Stephen MD Phone:  [phone] Fax:  [phone]    Address:  [address] [place] [address] NPI:  [id number]            Ordering User:  Alec Stephen MD                 Pharmacy:  [facility] [address] [place] [address] [facility] [address] - [phone]  - [phone] Phone:  [phone] Fax:  [phone]    Address:  [address] [place] [address] [place] [address]      Pharmacy Comments:  --          Quantity Remaining:  -- Quantity Filled:  --            Warnings History      No  Interaction Warnings Shown        Order Audit [place]      Number of times this order has been changed since signin     Order Audit [place]       Order Reconciliation Actions           Order Reconciliation Actions       E-Prescribing Status      Medication                    glucose blood test strip          Sig: Check sugar twice a day          Class: Print          Date/Time Signed: 2017 10:20 AM           Event History           Event History       Order Transmittal Tracking      glucose blood test strip (Order #[id number]) on 17         Blood Glucose Monitoring Suppl device [[id number]] (Order [id number])    Date: 2017   Department: [facility] ACUTE REHAB   Ordering/Authorizing: Alec Stephen MD         Medication    Blood Glucose Monitoring Suppl device [[id number]]         Blood Glucose Monitoring Suppl device [[id number]]  Order Details     Dose, Route, Frequency: As Directed    Dispense Quantity:  100 each Refills:  0 Fills Remaining:  --           Sig: One lancet twice daily to check  Fingerstick  Sugar   E11.9          Written Date:  17 Expiration Date:  18     Start Date:  17 End Date:  --            Ordering Provider:  Alec Stephen MD Phone:  [phone] Fax:  [phone]    Address:  [address] [place] [address] NPI:  [id number]            Authorizing Provider:  Alce Stephen MD Phone:  [phone] Fax:  [phone]    Address:  [address] [place] [address] NPI:  [id number]            Ordering User:  Alec Stephen MD                 Pharmacy:  [address] [facility] [address] - [phone]  - [phone] Phone:  [phone] Fax:  [phone]    Address:  [address] [place] [address] [place] [address]      Pharmacy Comments:  --          Quantity Remaining:  -- Quantity Filled:  --            Orders with any of the following pharmaceutical classes: Medical  "Devices      Name Dose Frequency Start Date End Date Medication Warnings Interventions? Order Mode        Blood Glucose Monitoring Suppl device   17    Outpatient        Warnings History      No Interaction Warnings Shown        Order Audit [place]      Number of times this order has been changed since signin     Order Audit [place]       Order Reconciliation Actions           Order Reconciliation Actions       E-Prescribing Status      Medication                    Blood Glucose Monitoring Suppl device          Sig: One lancet twice daily to check  Fingerstick  Sugar   E11.9          Class: Print          Date/Time Signed: 2017 10:20 AM           Event History           Event History       Order Transmittal Tracking      Blood Glucose Monitoring Suppl device (Order #[id number]) on 17           [facility]  [address] [place] [address]  Phone:  [phone]  Fax:   Date Ordered: Sep 18, 2017         Patient:  Vangie Lopez MRN:  [id number]   [address] [facility] [address] :  1950  SSN:    Phone: [phone] Sex:  M     Weight: 177 lb (80.3 kg)         Ht Readings from Last 1 Encounters:   17 69\" (175.3 cm)         Hospital Bed            (Order ID: [id number])    Diagnosis:  Closed displaced comminuted fracture of right patella with routine healing, subsequent encounter (S82.041D [ICD-10-CM] V54.16 [ICD-9-CM])  Closed displaced fracture of navicular bone of left foot with routine healing, subsequent encounter (S92.252D [ICD-10-CM] V54.19 [ICD-9-CM])  Multiple trauma (T07 [ICD-10-CM] 959.8 [ICD-9-CM])   Quantity:  1  Comments: Split rails please     Equipment:  Hospital Bed, Semi-Electirc w/ Mattress & w/ Rails  Accessories:  Trapeze Bar, Attached to Bed, w/ Grab Bar  The face to face evaluation was performed on: 2017  Length of Need (99 Months = Lifetime): 15 Months            Authorizing " "Provider:Alec Stephen MD  Authorizing Provider's NPI: [id number]  Order Entered By: Alec Stephen MD 2017 10:12 AM     Electronically signed by: Alec Stephen MD 2017 10:12 AM           [facility]  [address] [place] [address]  Phone:  [phone]  Fax:   Date Ordered: Sep 18, 2017         Patient:  Vangie Lopez MRN:  [id number]   [address]  [facility] [address] :  1950  SSN:    Phone: [phone] Sex:  M     Weight: 177 lb (80.3 kg)         Ht Readings from Last 1 Encounters:   17 69\" (175.3 cm)         Standard Wheelchair                        (Order ID: [id number])    Diagnosis:  Closed displaced comminuted fracture of right patella with routine healing, subsequent encounter (S82.041D [ICD-10-CM] V54.16 [ICD-9-CM])  Closed displaced fracture of navicular bone of left foot with routine healing, subsequent encounter (S92.252D [ICD-10-CM] V54.19 [ICD-9-CM])  Multiple trauma (T07 [ICD-10-CM] 959.8 [ICD-9-CM])   Quantity:  1     Equipment:  Standard Wheelchair  Wheelchair accessories:  Manual W/C Seat Widths 20-23 inches  Wheelchair accessories:  Elevating Leg Rest (pair)  Wheelchair accessories:  Anti-Tipping Device (x2)  Wheelchair accessories:  Gen Use W/C Cushion <22\" Gel or Foam  Wheelchair accessories:  Adjustable Height Detachable Arms (x2)  Wheelchair accessories:  Wheel Lock Break Extensions - Handles (x2)  The face to face evaluation was performed on: 2017  Length of Need (99 Months = Lifetime): 15 Months            Authorizing Provider:Alec Stephen MD  Authorizing Provider's NPI: [id number]  Order Entered By: Alec Stephen MD 2017 10:12 AM     Electronically signed by: Alec Stephen MD 2017 10:12 AM           [facility]  [address] [place] [address]  Phone:  " "[phone]  Fax:   Date Ordered: Sep 18, 2017         Patient:  Vangie Lopez MRN:  [id number]   [address] [id number] :  1950  SSN:    Phone: [phone] Sex:  M     Weight: 177 lb (80.3 kg)         Ht Readings from Last 1 Encounters:   17 69\" (175.3 cm)         Miscellaneous DME             (Order ID: [id number])    Diagnosis:  Closed displaced comminuted fracture of right patella with routine healing, subsequent encounter (S82.041D [ICD-10-CM] V54.16 [ICD-9-CM])  Closed displaced fracture of navicular bone of left foot with routine healing, subsequent encounter (S92.252D [ICD-10-CM] V54.19 [ICD-9-CM])  Multiple trauma (T07 [ICD-10-CM] 959.8 [ICD-9-CM])   Quantity:  1     Type of DME: velcro cast handle  Length of Need (99 Months = Lifetime): 15 Months            Authorizing Provider:Alec Stephen MD  Authorizing Provider's NPI: [id number]  Order Entered By: Alec Stephen MD 2017 10:12 AM     Electronically signed by: Alec Stephen MD 2017 10:12 AM           [address] [address]  Phone:  [phone]  Fax:   Date Ordered: Sep 18, 2017         Patient:  Vangie Lopez MRN:  [id number]   [address] [place] [address] :  1950  SSN:    Phone: [phone] Sex:  M     Weight: 177 lb (80.3 kg)         Ht Readings from Last 1 Encounters:   17 69\" (175.3 cm)         Miscellaneous DME             (Order ID: [id number])    Diagnosis:  Closed displaced comminuted fracture of right patella with routine healing, subsequent encounter (S82.041D [ICD-10-CM] V54.16 [ICD-9-CM])  Closed displaced fracture of navicular bone of left foot with routine healing, subsequent encounter (S92.252D [ICD-10-CM] V54.19 [ICD-9-CM])  Multiple trauma (T07 [ICD-10-CM] 959.8 [ICD-9-CM])   Quantity:  1     Type of DME: 3 in 1 drop arm bedside " commode  Length of Need (99 Months = Lifetime): 12 Months            Authorizing Provider:Aelc Stephen MD  Authorizing Provider's NPI: [id number]  Order Entered By: Alec Stephen MD 2017 10:12 AM     Electronically signed by: Alec Stephen MD 2017 10:12 AM           [facility]  [address] [place] [address]  Phone:  [phone]  Fax:   Date: Sep 18, 2017      Ambulatory Referral to Home Health     Patient:  Vangie Lopez MRN:  [id number]   [address]  [facility] [address] :  1950  SSN:    Phone: [phone] Sex:  M      INSURANCE PAYOR PLAN GROUP # SUBSCRIBER ID   Primary:  Secondary: MED[employer]ARE [employer] [id number]  [id number]    [id number]  [id number]      Referring Provider Information:  ALEC STPEHEN Phone: [phone] Fax:       Referral Information:   # Visits:  1 Referral Type: Home Health [42]   Urgency:  Routine Referral Reason: Specialty Services Required   Start Date: Sep 18, 2017 End Date:  To be determined by Insurer   Diagnosis: Closed displaced comminuted fracture of right patella with routine healing, subsequent encounter (S82.041D [ICD-10-CM] V54.16 [ICD-9-CM])  Closed displaced fracture of navicular bone of left foot with routine healing, subsequent encounter (S92.252D [ICD-10-CM] V54.19 [ICD-9-CM])  Multiple trauma (T07 [ICD-10-CM] 959.8 [ICD-9-CM])      Refer to Dept:   Refer to Provider:   Refer to Facility:       Face to Face Visit Date: 2017  Follow-up Provider for Plan of Care? I treated the patient in an acute care facility and will not continue treatment after discharge.  Follow-up Provider: SARAH CORRAL [[id number]]  Reason/Clinical Findings: bilateral lower extremity fractures  Describe mobility limitations that make leaving home difficult: non weight bearing bilaterally  Nursing/Therapeutic Services Requested: Skilled Nursing  Nursing/Therapeutic  Services Requested: Physical Therapy  Nursing/Therapeutic Services Requested: Occupational Therapy  Skilled nursing orders: Medication education (new diagnosis of diabetes, diabetic teaching,inr twice weekly on monday and thursday followed by [facility] for 4 weeks)  PT orders: Therapeutic exercise (3 times weekly, non weight bearing bilateral lower exrtremities)  PT orders: Transfer training  PT orders: Strengthening  Occupational orders: Activities of daily living (3 times a week. non wt bearing bilateral lower extremities)  Occupational orders: Strengthening  Occupational orders: Home safety assessment  Frequency: 1 Week 1     This document serves as a request of services and does not constitute Insurance authorization or approval of services.  To determine eligibility, please contact the members Insurance carrier to verify and review coverage.     If you have medical questions regarding this request for services. Please contact [facility] ACUTE REHAB at [phone] between the hours of 8:00am - 5:00pm (Mon-Fri).             Authorizing Provider:Alec Stephen MD  Authorizing Provider's NPI: [id number]  Order Entered By: Alec Stephen MD 9/18/2017 10:12 AM     Electronically signed by: Alec Stephen MD 9/18/2017 10:12 AM

## 2017-09-18 NOTE — PROGRESS NOTES
"Anticoagulation by Pharmacy - Warfarin Day 11 of 28    Vangie Lopez is a 67 y.o.male  [Ht: 69\" (175.3 cm); Wt: 177 lb (80.3 kg)] on Warfarin 3 mg PO  for indication of VTE prophylaxis s/p ortho procedure.    Goal INR: 1.7-2.5  Today's INR:   Lab Results   Component Value Date    INR 2.00 (H) 09/18/2017         Lab Results   Component Value Date    INR 2.00 (H) 09/18/2017    INR 2.19 (H) 09/17/2017    INR 2.18 (H) 09/16/2017    PROTIME 22.8 (H) 09/18/2017    PROTIME 24.5 (H) 09/17/2017    PROTIME 24.4 (H) 09/16/2017     Lab Results   Component Value Date    HGB 12.7 (L) 09/16/2017    HGB 12.0 (L) 09/12/2017    HGB 13.2 (L) 09/10/2017    HGB 13.2 (L) 09/10/2017     Lab Results   Component Value Date    HCT 36.1 (L) 09/16/2017    HCT 33.4 (L) 09/12/2017    HCT 37.0 (L) 09/10/2017    HCT 37.0 (L) 09/10/2017     Assessment/Plan:  INR at goal.  No change.    Alec Cervantes III, [facility]  09/18/17 9:35 AM       "

## 2017-09-18 NOTE — PLAN OF CARE
Problem: Patient Care Overview (Adult)  Goal: Plan of Care Review  Outcome: Ongoing (interventions implemented as appropriate)    09/18/17 1444   Coping/Psychosocial Response Interventions   Plan Of Care Reviewed With patient   Outcome Evaluation   Outcome Summary/Follow up Plan overall doing well but foot hurting on left so stayed in bed this pm. work on rom for left knee.         Problem: Inpatient Physical Therapy  Goal: Range of Motion Goal LTG- PT  Outcome: Ongoing (interventions implemented as appropriate)    09/11/17 1500 09/18/17 1444   Range of Motion PT LTG   Range of Motion Goal PT LTG, Date Established 09/11/17 --    Range of Motion Goal PT LTG, Time to Achieve by discharge --    Range fo Motion Goal PT LTG, Joint L knee --    Range of Motion Goal PT LTG, AROM Measure Knee flex - 110 degrees --    Range of Motion Goal PT LTG, Date Goal Reviewed --  09/18/17   Range of Motion Goal PT LTG, Outcome --  goal ongoing

## 2017-09-19 LAB
GLUCOSE BLDC GLUCOMTR-MCNC: 118 MG/DL (ref 70–130)
GLUCOSE BLDC GLUCOMTR-MCNC: 126 MG/DL (ref 70–130)
GLUCOSE BLDC GLUCOMTR-MCNC: 171 MG/DL (ref 70–130)
GLUCOSE BLDC GLUCOMTR-MCNC: 179 MG/DL (ref 70–130)
GLUCOSE BLDC GLUCOMTR-MCNC: 190 MG/DL (ref 70–130)
INR PPP: 2.07 (ref 0.8–1.2)
PROTHROMBIN TIME: 23.4 SECONDS (ref 11.1–15.3)

## 2017-09-19 PROCEDURE — 82962 GLUCOSE BLOOD TEST: CPT

## 2017-09-19 PROCEDURE — 97530 THERAPEUTIC ACTIVITIES: CPT

## 2017-09-19 PROCEDURE — 97110 THERAPEUTIC EXERCISES: CPT

## 2017-09-19 PROCEDURE — 99232 SBSQ HOSP IP/OBS MODERATE 35: CPT | Performed by: FAMILY MEDICINE

## 2017-09-19 PROCEDURE — 85610 PROTHROMBIN TIME: CPT | Performed by: FAMILY MEDICINE

## 2017-09-19 RX ADMIN — HYDROCODONE BITARTRATE AND ACETAMINOPHEN 1 TABLET: 10; 325 TABLET ORAL at 19:27

## 2017-09-19 RX ADMIN — HYDROCHLOROTHIAZIDE 25 MG: 25 TABLET ORAL at 08:00

## 2017-09-19 RX ADMIN — FINASTERIDE 5 MG: 5 TABLET, FILM COATED ORAL at 20:25

## 2017-09-19 RX ADMIN — FAMOTIDINE 40 MG: 40 TABLET ORAL at 08:00

## 2017-09-19 RX ADMIN — Medication 1 TABLET: at 08:00

## 2017-09-19 RX ADMIN — WARFARIN SODIUM 3 MG: 3 TABLET ORAL at 17:12

## 2017-09-19 RX ADMIN — POTASSIUM CITRATE 10 MEQ: 10 TABLET ORAL at 07:58

## 2017-09-19 RX ADMIN — ATORVASTATIN CALCIUM 10 MG: 10 TABLET, FILM COATED ORAL at 08:00

## 2017-09-19 RX ADMIN — Medication 25 MG: at 20:25

## 2017-09-19 RX ADMIN — SENNOSIDES AND DOCUSATE SODIUM 1 TABLET: 8.6; 5 TABLET ORAL at 08:00

## 2017-09-19 RX ADMIN — POLYETHYLENE GLYCOL 3350 17 G: 17 POWDER, FOR SOLUTION ORAL at 08:11

## 2017-09-19 RX ADMIN — HYDROCODONE BITARTRATE AND ACETAMINOPHEN 1 TABLET: 10; 325 TABLET ORAL at 13:02

## 2017-09-19 RX ADMIN — SENNOSIDES AND DOCUSATE SODIUM 1 TABLET: 8.6; 5 TABLET ORAL at 17:12

## 2017-09-19 RX ADMIN — POTASSIUM CITRATE 10 MEQ: 10 TABLET ORAL at 17:13

## 2017-09-19 RX ADMIN — FERROUS SULFATE TAB EC 324 MG (65 MG FE EQUIVALENT) 324 MG: 324 (65 FE) TABLET DELAYED RESPONSE at 07:59

## 2017-09-19 RX ADMIN — METFORMIN HYDROCHLORIDE 500 MG: 500 TABLET, EXTENDED RELEASE ORAL at 07:59

## 2017-09-19 RX ADMIN — HYDROCODONE BITARTRATE AND ACETAMINOPHEN 1 TABLET: 10; 325 TABLET ORAL at 07:58

## 2017-09-19 RX ADMIN — METFORMIN HYDROCHLORIDE 500 MG: 500 TABLET, EXTENDED RELEASE ORAL at 17:12

## 2017-09-19 NOTE — THERAPY TREATMENT NOTE
Inpatient Rehabilitation - Physical Therapy Treatment Note  [facility]     Patient Name: Vangie Lopez  : 1950  MRN: [id number]  Today's Date: 2017  Onset of Illness/Injury or Date of Surgery Date: 17  Date of Referral to PT: 17  Referring Physician: Dr. Stephen    Admit Date: 2017    Visit Dx:    ICD-10-CM ICD-9-CM   1. Multiple trauma T07 959.8   2. Abnormality of gait and mobility R26.9 781.2   3. Muscle weakness (generalized) M62.81 728.87   4. Impaired mobility and ADLs Z74.09 799.89   5. Closed displaced comminuted fracture of right patella with routine healing, subsequent encounter S82.041D V54.16   6. Closed displaced fracture of navicular bone of left foot with routine healing, subsequent encounter S92.252D V54.19     Patient Active Problem List   Diagnosis   • Encounter for screening for malignant neoplasm of colon   • Closed displaced comminuted fracture of right patella   • Closed displaced fracture of navicular bone of left foot   • Closed dislocation of navicular bone of left foot   • Patella fracture   • Essential hypertension   • Mixed hyperlipidemia   • Osteoarthritis of hands, bilateral   • BPH (benign prostatic hyperplasia)   • Glaucoma   • Hyperlipidemia   • Closed fracture of navicular bone with dislocation of perilunate joint of left wrist   • Fracture of patella, right, closed   • Multiple trauma   • Encounter for rehabilitation   • History of kidney stones   • Recent onset of diabetes mellitus               Adult Rehabilitation Note       17 1350 17 1035 17 0805    Rehab Assessment/Intervention    Discipline physical therapy assistant  -RW occupational therapy assistant  -LM physical therapy assistant  -RW    Document Type therapy note (daily note)  -RW therapy note (daily note)  -LM therapy note (daily note)  -RW    Subjective Information agree to therapy  -RW agree to therapy  -LM agree to therapy  -RW    Patient Effort, Rehab  Treatment good  -RW good  -LM good  -RW    Recorded by [RW] Balaji Malhotra PTA [LM] Marce Carter, VILLALPANDO/L [RW] Balaji Malhotra PTA    Pain Assessment    Pain Assessment No/denies pain  -RW No/denies pain  -LM No/denies pain  -RW    Recorded by [RW] Balaji Malhotra PTA [LM] Marce Carter, VILLAL[place]O/L [RW] Balaji Malhotra PTA    Cognitive Assessment/Intervention    Current Cognitive/Communication Assessment functional  -RW functional  -LM functional  -RW    Orientation Status oriented x 4  -RW oriented x 4  -LM oriented x 4  -RW    Follows Commands/Answers Questions 100% of the time  -% of the time  -% of the time  -RW    Personal Safety Interventions   gait belt  -RW    Recorded by [RW] Balaji Malhotra PTA [LM] Marce Carter, VILLALPANDO/L [RW] Balaji Malhotra PTA    General LE Assessment    ROM Detail   95 arom  -RW    Recorded by   [RW] Balaji Malhotra PTA    Mobility Assessment/Training    Left Lower Extremity Weight-Bearing non weight-bearing  -RW  non weight-bearing  -RW    Right Lower Extremity Weight-Bearing non weight-bearing  -RW  non weight-bearing  -RW    Recorded by [RW] Balaji Malhotra PTA  [RW] Balaji Malhotra PTA    Bed Mobility, Assessment/Treatment    Bed Mobility, Roll Right, [place]  conditional independence  -LM conditional independence  -RW    Bed Mob, Supine to Sit, [place] independent  -RW independent  -LM independent  -RW    Bed Mob, Sit to Supine, [place] independent  -RW independent  -LM independent  -RW    Recorded by [RW] Balaij Malhotra PTA [LM] Marce Carter, VILLAL[place]ANDO/L [RW] Balaji Malhotra PTA    Transfer Assessment/Treatment    Transfers, Bed-Chair [place] conditional independence  -RW conditional independence  -LM conditional independence  -RW    Transfers, Chair-Bed [place]  conditional independence  -LM conditional independence  -RW    Transfers, Sit-Stand [place] not appropriate to assess  -RW not appropriate to assess  -LM not appropriate  to assess  -RW    Recorded by [RW] Balaji Malhotra PTA [LM] IRASEMA Smith/L [RW] Balaji Malhotra PTA    Gait Assessment/Treatment    Gait, [place] Level not appropriate to assess  -RW  not appropriate to assess  -RW    Recorded by [RW] Balaji Malhotra PTA  [RW] Balaji Malhotra PTA    Wheelchair Training/Management    Wheelchair, Distance Propelled 150 mod ind  -RW --   150  - mod ind  -RW    Recorded by [RW] Balaji Malhotra PTA [LM] IRASEMA Smith/L [RW] Balaji Malhotra PTA    Balance Skills Training    Sitting-Balance Activities Reaching for weighted objects;Reaching for objects;Reaching across midline  -RW  Reaching for weighted objects;Reaching for objects;Reaching across midline  -RW    Recorded by [RW] Balaji Malhotra PTA  [RW] Balaji Malhotra PTA    Therapy Exercises    Right Lower Extremity AROM:;20 reps;quad sets;sitting   2/20  -RW  AROM:;20 reps;supine;ankle pumps/circles;quad sets  -RW    Left Lower Extremity AROM:;20 reps;SLR;sitting   2/20  -RW  AROM:;20 reps;supine;hip abduction/adduction;SLR;heel slides  -RW    Bilateral Lower Extremities   AROM:;20 reps;glut sets;quad sets   2 sets  -RW    Bilateral Upper Extremity  AROM:   all tband ex 20 reps 3 sets rickshaw 3 set 20 and UEE 21 min  -LM 20 reps;AROM:;sitting   tband rows 2 sets  -RW    Trunk Exercises   10 reps   abdominal crunchs 2 sets  -RW    Recorded by [RW] Balaji Malhotra PTA [LM] IRASEMA Smith/L [RW] Balaji Malhotra PTA    Positioning and Restraints    Pre-Treatment Position  sitting in chair/recliner  -LM sitting in chair/recliner  -RW    Post Treatment Position  bed  -LM wheelchair  -RW    Recorded by  [LM] IRASEMA Smiht/L [RW] Balaji Malhotra PTA      09/18/17 1400 09/18/17 1030 09/18/17 0755    Rehab Assessment/Intervention    Discipline physical therapy assistant  -JOHN occupational therapy assistant  -LM physical therapy assistant  -JOHN    Document Type therapy note (daily note)  -JOHN  therapy note (daily note)  -LM therapy note (daily note)  -RW    Subjective Information agree to therapy  -RW agree to therapy  -LM agree to therapy  -RW    Patient Effort, Rehab Treatment good  -RW good  -LM good  -RW    Recorded by [RW] Balaji Malhotra PTA [LM] NAV SmithA/L [RW] Balaji Malhotra PTA    Pain Assessment    Pain Assessment --   left foot hurting but gives no rating  -RW      Pain Score  5  -LM 5  -RW    Post Pain Score  4  -LM 3  -RW    Pain Location  Ankle  -LM Ankle  -RW    Pain Orientation  Left  -LM Left  -RW    Pain Intervention(s)  Medication (See MAR)  -LM Medication (See MAR)  -RW    Recorded by [RW] Balaji Malhotra PTA [LM] NAV SmithA/L [RW] Balaji Malhotra PTA    Cognitive Assessment/Intervention    Current Cognitive/Communication Assessment functional  -RW functional  -LM functional  -RW    Orientation Status oriented x 4  -RW oriented x 4  -LM oriented x 4  -RW    Follows Commands/Answers Questions 100% of the time  -% of the time  -% of the time  -RW    Personal Safety Interventions  gait belt  -LM gait belt  -RW    Recorded by [RW] Balaji Malhotra PTA [LM] NAV SmithA/L [RW] Balaji Malhotra PTA    Mobility Assessment/Training    Left Lower Extremity Weight-Bearing non weight-bearing  -RW  non weight-bearing  -RW    Right Lower Extremity Weight-Bearing non weight-bearing  -RW  non weight-bearing  -RW    Recorded by [RW] Balaji Malhotra PTA  [RW] Balaji Malhotra PTA    Bed Mobility, Assessment/Treatment    Bed Mobility, Roll Right, [place] conditional independence  -RW conditional independence  -LM conditional independence  -RW    Bed Mob, Supine to Sit, [place] independent  -RW independent  -LM independent  -RW    Bed Mob, Sit to Supine, [place] independent  -RW independent  -LM independent  -RW    Recorded by [RW] Balaji Malhotra PTA [LM] Marce Carter, VILLAL[place]ANDO/L [RW] Balaji Malhotra PTA    Transfer Assessment/Treatment     Transfers, Bed-Chair [place]  conditional independence  -LM conditional independence;set up required  -RW    Transfers, Chair-Bed [place]  conditional independence  -LM     Transfers, Sit-Stand [place] not appropriate to assess  -RW not appropriate to assess  -LM not appropriate to assess  -RW    Recorded by [RW] Balaji Malhotra PTA [LM] NAV SmithA/L [RW] Balaji Malhotra PTA    Gait Assessment/Treatment    Gait, [place] Level not appropriate to assess  -RW not appropriate to assess  -LM not appropriate to assess  -RW    Recorded by [RW] Balaji Malhotra PTA [LM] NAV SmithA/L [RW] Balaji Malhotra PTA    Wheelchair Training/Management    Wheelchair Transfer Type   lateral transfer  -RW    Wheelchair, Distance Propelled  130  -  -RW    Wheelchair Training Comment  --   mod I  -LM mod ind  -RW    Recorded by  [LM] NAV SmithA/L [RW] Balaji Malhotra PTA    Therapy Exercises    Right Lower Extremity AROM:;20 reps;supine;ankle pumps/circles;quad sets  -RW  AROM:;20 reps;supine;ankle pumps/circles;quad sets   2x20  -RW    Left Lower Extremity AROM:;20 reps;supine;hip abduction/adduction;SLR;heel slides  -RW  AROM:;20 reps;supine;hip abduction/adduction;SLR;heel slides   2x20  -RW    Bilateral Lower Extremities AROM:;20 reps;glut sets;quad sets  -RW  AROM:;20 reps;glut sets;quad sets   2x20  -RW    Bilateral Upper Extremity  AROM:;elbow flexion/extension;shoulder abduction/adduction;shoulder extension/flexion;shoulder horizontal abd/add;shoulder protraction/retraction   level 3 tband all planes and rickshaw 5x 20 reps  -LM AROM:;20 reps;sitting   tband rows 2/20 with btb amd pro2 x 10 min l2  -RW    BUE Resistance  manual resistance- minimal   UEE bike x 20 min   -LM     Trunk Exercises 10 reps   abdominal crunchs 2 sets  -RW      Recorded by [RW] Balaji Malhotra PTA [LM] NAV SmithA/L [RW] Balaji Malhotra PTA    Positioning and Restraints     Pre-Treatment Position in bed  -RW sitting in chair/recliner  -LM in bed  -RW    Post Treatment Position bed  -RW bed  -LM wheelchair  -RW    In Bed call light within reach  -RW      Recorded by [RW] Balaji Malhotra PTA [LM] Marce Carter VILLALPANDO/L [RW] Balaji Malhotra PTA      User Key  (r) = Recorded By, (t) = Taken By, (c) = Cosigned By    Initials Name Effective Dates    RW Balaji Malhotra PTA 10/17/16 -     LM Marce Carter VILLALPANDO/FREEDOM 10/17/16 -                 IP PT Goals       09/19/17 1443 09/18/17 1445 09/18/17 1444    Transfer Training PT LTG    Transfer Training PT  LTG, Date Goal Reviewed  09/18/17  -RW     Transfer Training PT LTG, Outcome  goal met  -RW     Range of Motion PT LTG    Range of Motion Goal PT LTG, Date Goal Reviewed 09/19/17  -RW  09/18/17  -RW    Range of Motion Goal PT LTG, Outcome goal ongoing  -RW  goal ongoing  -RW      09/16/17 1255 09/15/17 1556 09/14/17 1557    Bed Mobility PT LTG    Bed Mobility PT LTG, Date Goal Reviewed   09/14/17  -RW    Bed Mobility PT LTG, Outcome   goal met  -RW    Transfer Training PT LTG    Transfer Training PT  LTG, Date Goal Reviewed 09/16/17  -JA 09/15/17  -RW 09/14/17  -RW    Transfer Training PT LTG, Outcome goal ongoing  -JA goal ongoing  -RW goal ongoing  -RW    Range of Motion PT LTG    Range of Motion Goal PT LTG, Date Goal Reviewed 09/16/17  -JA 09/15/17  -RW 09/14/17  -RW    Range of Motion Goal PT LTG, Outcome goal ongoing  -JA goal ongoing  -RW goal ongoing  -RW    Wheelchair Propulsion PT LTG    Wheelchair Propulsion Goal PT LTG, Date Goal Reviewed   09/14/17  -RW    Wheelchair Propulsion Goal PT LTG, Outcome   goal met  -RW    Physical Therapy PT LTG    Physical Therapy PT LTG, Date Goal Reviewed   09/14/17  -RW    Physical Therapy PT LTG, Outcome   goal met  -RW      09/13/17 1533 09/12/17 1355 09/11/17 1500    Bed Mobility PT LTG    Bed Mobility PT LTG, Date Established   09/11/17  -    Bed Mobility PT LTG, Time to Achieve   by  discharge  -LM    Bed Mobility PT LTG, Activity Type   supine to sit/sit to supine  -LM    Bed Mobility PT LTG, [place] Level   conditional independence  -LM    Bed Mobility PT Goal  LTG, Assist Device   overhead trapeze;bed rails  -LM    Bed Mobility PT LTG, Date Goal Reviewed 09/13/17  -RW 09/12/17  -LM     Bed Mobility PT LTG, Outcome goal ongoing  -RW goal ongoing  -LM goal ongoing  -LM    Transfer Training PT LTG    Transfer Training PT LTG, Date Established   09/11/17  -LM    Transfer Training PT LTG, Time to Achieve   by discharge  -LM    Transfer Training PT LTG, Activity Type   bed to chair /chair to bed  -LM    Transfer Training PT LTG, [place] Level   conditional independence  -LM    Transfer Training PT LTG, Assist Device   --   AAD  -LM    Transfer Training PT  LTG, Date Goal Reviewed 09/13/17  -RW 09/12/17  -LM     Transfer Training PT LTG, Outcome goal ongoing  -RW goal ongoing  -LM goal ongoing  -LM    Range of Motion PT LTG    Range of Motion Goal PT LTG, Date Established   09/11/17  -LM    Range of Motion Goal PT LTG, Time to Achieve   by discharge  -LM    Range fo Motion Goal PT LTG, Joint   L knee  -LM    Range of Motion Goal PT LTG, AROM Measure   Knee flex - 110 degrees  -LM    Range of Motion Goal PT LTG, Date Goal Reviewed 09/13/17  -RW 09/12/17  -LM     Range of Motion Goal PT LTG, Outcome goal ongoing  -RW goal ongoing  -LM goal ongoing  -LM    Wheelchair Propulsion PT LTG    Wheelchair Propulsion Goal PT LTG, Date Established  09/12/17  -LM     Wheelchair Propulsion Goal PT LTG, Time to Achieve  by discharge  -LM     Wheelchair Propulsion Goal PT LTG, [place] Level  conditional independence  -LM     Wheelchair Propulsion Goal PT LTG, Distance to Achieve  300 feet  -LM     Wheelchair Propulsion Goal PT LTG, Date Goal Reviewed 09/13/17  -RW      Wheelchair Propulsion Goal PT LTG, Outcome goal ongoing  -RW goal ongoing  -LM     Physical Therapy PT LTG    Physical Therapy PT  LTG, Date Established  09/12/17  -     Physical Therapy PT LTG, Time to Achieve  by discharge  -     Physical Therapy PT LTG, Activity Type  Pt will self propel w/c up/down ramp.  -LM     Physical Therapy PT LTG, [place] Level  conditional independence  -LM     Physical Therapy PT LTG, Date Goal Reviewed 09/13/17  -RW      Physical Therapy PT LTG, Outcome goal ongoing  -RW goal ongoing  -LM       09/11/17 1040 09/10/17 1300 09/09/17 1626    Transfer Training PT LTG    Transfer Training PT LTG, Date Established   09/09/17  -MAGNO    Transfer Training PT LTG, Time to Achieve   by discharge  -MAGNO    Transfer Training PT LTG, Activity Type   bed to chair /chair to bed  -MAGNO    Transfer Training PT LTG, Additional Goal   Once MD allows, pt will perform lateral transfer with conditional independence  -    Transfer Training PT  LTG, Date Goal Reviewed 09/11/17  -AM 09/10/17  -JCA     Transfer Training PT LTG, Outcome goal not met  -AM goal ongoing  -JCA goal ongoing  -MAGNO    Transfer Training PT LTG, Reason Goal Not Met discharged from facility  -AM      Patient Education PT LTG    Patient Education PT LTG, Date Established   09/09/17  -    Patient Education PT LTG, Time to Achieve   by discharge  -    Patient Education PT LTG, Education Type   precaution per surgeon;transfers;bed mobility;pain management;home safety  -    Patient Education PT LTG, Date Goal Reviewed 09/11/17  -AM 09/10/17  -JCA     Patient Education PT LTG Outcome goal met  -AM goal ongoing  -JCA goal ongoing  -MAGNO    Caregiver Training PT LTG    Caregiver Training PT LTG, Date Established   09/09/17  -MAGNO    Caregiver Training PT LTG, Time to Achieve   by discharge  -MAGNO    Caregiver Training PT LTG, Activity Type   home caregiver will demonstrate understanding assisting pt as needed with transfers/mobility as well as verbalize understanding of home care instructions  -    Caregiver Training PT LTG, Date Goal Reviewed 09/11/17  -AM 09/10/17   -A     Caregiver Training PT LTG, Outcome goal not met  -AM goal ongoing  -A goal ongoing  -    Caregiver Training PT LTG, Reason Goal Not Met discharged from facility  -AM      Physical Therapy PT STG    Physical Therapy PT STG, Date Established   09/09/17  -    Physical Therapy PT STG, Time to Achieve   by discharge  -    Physical Therapy PT STG, Activity Type   Pt will tolerate OOB 3-4 hours per day  -    Physical Therapy PT STG, Date Goal Reviewed  09/10/17  -A     Physical Therapy PT STG, Outcome  goal met  -[facility] goal ongoing  -      User Key  (r) = Recorded By, (t) = Taken By, (c) = Cosigned By    Initials Name Provider Type    LM Isela Chris, PT Physical Therapist    MAGNO Lupe Abel, PT Physical Therapist    ERICH Felton, PTA Physical Therapy Assistant    HELEN Pendleton, PTA Physical Therapy Assistant    SUSIE Young, PTA Physical Therapy Assistant    RW Balaji Malhotra, PTA Physical Therapy Assistant          Physical Therapy Education     Title: PT OT SLP Therapies (Active)     Topic: Physical Therapy (Active)     Point: Mobility training (Done)    Learning Progress Summary    Learner Readiness Method Response Comment Documented by Status   Patient Acceptance E VU reviewed  non wt bearing and transfering to from [facility] 09/13/17 1140 Done    Acceptance E NR Reviewed transferring towards stronger side and maintaining NWB with activity.  09/12/17 1608 Active    Acceptance E NR  TA 09/12/17 1515 Active    Acceptance E NR Reviewed safety with transfers.  Explained that Dr. Rutherford only wants stretcher chair used at this time.  09/11/17 1636 Active               Point: Precautions (Done)    Learning Progress Summary    Learner Readiness Method Response Comment Documented by Status   Patient Acceptance E VU reviewed  non wt bearing and transfering to from [facility] 09/13/17 1140 Done    Acceptance E NR Reviewed transferring towards stronger side and maintaining NWB with activity.   09/12/17 1608 Active    Acceptance E NR  TA 09/12/17 1515 Active    Acceptance E NR Reviewed safety with transfers.  Explained that Dr. Rutherford only wants stretcher chair used at this time. LM 09/11/17 1636 Active                      User Key     Initials Effective Dates Name Provider Type Discipline    LM 06/15/16 -  Isela Chris, PT Physical Therapist PT    TA 10/17/16 -  Geri Juan, DARIEN Physical Therapy Assistant PT    RW 10/17/16 -  Balaji Malhotra, DARIEN Physical Therapy Assistant PT                    PT Recommendation and Plan  Anticipated Discharge Disposition: home with home health  Planned Therapy Interventions: balance training, bed mobility training, home exercise program, motor coordination training, neuromuscular re-education, patient/family education, postural re-education, ROM (Range of Motion), strengthening, stretching, transfer training  PT Frequency: other (see comments) (5-14 times/wk)  Plan of Care Review  Plan Of Care Reviewed With: patient  Outcome Summary/Follow up Plan: pt doing well overall and making progress. plans to dc home on friday.          Outcome Measures       09/18/17 0900          How much help from another person do you currently need...    Turning from your back to your side while in flat bed without using bedrails? 4  -RW      Moving from lying on back to sitting on the side of a flat bed without bedrails? 4  -RW      Moving to and from a bed to a chair (including a wheelchair)? 4  -RW      Standing up from a chair using your arms (e.g., wheelchair, bedside chair)? 1  -RW      Climbing 3-5 steps with a railing? 1  -RW      To walk in hospital room? 1  -RW      AM-PAC 6 Clicks Score 15  -RW        User Key  (r) = Recorded By, (t) = Taken By, (c) = Cosigned By    Initials Name Provider Type    RW Balaji Malhotra, DARIEN Physical Therapy Assistant           Time Calculation:         PT Charges       09/19/17 1445 09/19/17 0923       Time Calculation    Start Time 1350  -RW 0805  -      Stop Time 1435  -RW 0900  -RW     Time Calculation (min) 45 min  -RW 55 min  -RW     Time Calculation- PT    Total Timed Code Minutes- PT 45 minute(s)  -RW 55 minute(s)  -RW       User Key  (r) = Recorded By, (t) = Taken By, (c) = Cosigned By    Initials Name Provider Type    JOHN Malhotra PTA Physical Therapy Assistant          Therapy Charges for Today     Code Description Service Date Service Provider Modifiers Qty    54340559601 HC PT THER PROC EA 15 MIN 9/18/2017 Balaji Malhotra, PTA GP 3    45469962906 HC PT THERAPEUTIC ACT EA 15 MIN 9/18/2017 Balaji Malhotra, PTA GP 2    58972702268 HC PT THER PROC EA 15 MIN 9/18/2017 Balaji Malhotra, PTA GP 3    18171238012 HC PT THER PROC EA 15 MIN 9/19/2017 Baljai Malhotra, PTA GP 2    93116460886 HC PT THERAPEUTIC ACT EA 15 MIN 9/19/2017 Balaji Malhotra, PTA GP 2    01262791667 HC PT THER PROC EA 15 MIN 9/19/2017 Balaji Malhotra, PTA GP 2    79780646949 HC PT THERAPEUTIC ACT EA 15 MIN 9/19/2017 Balaji Malhotra, PTA GP 1          PT G-Codes  PT Professional Judgement Used?: Yes  Outcome Measure Options: AM-PAC 6 Clicks Daily Activity (OT)  Score: 12  Functional Limitation: Mobility: Walking and moving around  Mobility: Walking and Moving Around Current Status (): At least 60 percent but less than 80 percent impaired, limited or restricted  Mobility: Walking and Moving Around Goal Status (): At least 20 percent but less than 40 percent impaired, limited or restricted    Balaji Malhotra PTA  9/19/2017

## 2017-09-19 NOTE — THERAPY TREATMENT NOTE
Inpatient Rehabilitation - Occupational Therapy Treatment Note  [facility]     Patient Name: Vangie Lopez  : 1950  MRN: [id number]  Today's Date: 2017  Onset of Illness/Injury or Date of Surgery Date: 17  Date of Referral to OT: 17  Referring Physician: Dr. Stephen      Admit Date: 2017    Visit Dx:     ICD-10-CM ICD-9-CM   1. Multiple trauma T07 959.8   2. Abnormality of gait and mobility R26.9 781.2   3. Muscle weakness (generalized) M62.81 728.87   4. Impaired mobility and ADLs Z74.09 799.89   5. Closed displaced comminuted fracture of right patella with routine healing, subsequent encounter S82.041D V54.16   6. Closed displaced fracture of navicular bone of left foot with routine healing, subsequent encounter S92.252D V54.19     Patient Active Problem List   Diagnosis   • Encounter for screening for malignant neoplasm of colon   • Closed displaced comminuted fracture of right patella   • Closed displaced fracture of navicular bone of left foot   • Closed dislocation of navicular bone of left foot   • Patella fracture   • Essential hypertension   • Mixed hyperlipidemia   • Osteoarthritis of hands, bilateral   • BPH (benign prostatic hyperplasia)   • Glaucoma   • Hyperlipidemia   • Closed fracture of navicular bone with dislocation of perilunate joint of left wrist   • Fracture of patella, right, closed   • Multiple trauma   • Encounter for rehabilitation   • History of kidney stones   • Recent onset of diabetes mellitus             Adult Rehabilitation Note       17 1445 17 1350 17 1035    Rehab Assessment/Intervention    Discipline occupational therapy assistant  -LM physical therapy assistant  -RW occupational therapy assistant  -LM    Document Type therapy note (daily note)  -LM therapy note (daily note)  -RW therapy note (daily note)  -LM    Subjective Information agree to therapy  -LM agree to therapy  -RW agree to therapy  -LM    Patient Effort,  Rehab Treatment good  -LM good  -RW good  -LM    Recorded by [LM] Marce Carter, VILLALPANDO/L [RW] Balaji Malhotra PTA [LM] Marce Carter, VILLALPANDO/L    Pain Assessment    Pain Assessment No/denies pain  -LM No/denies pain  -RW No/denies pain  -LM    Recorded by [LM] Marce Carter, VILLALPANDO/L [RW] Balaji Malhotra PTA [LM] Marce Carter, VILLALPANDO/L    Cognitive Assessment/Intervention    Current Cognitive/Communication Assessment functional  -LM functional  -RW functional  -LM    Orientation Status oriented x 4  -LM oriented x 4  -RW oriented x 4  -LM    Follows Commands/Answers Questions 100% of the time  -% of the time  -% of the time  -LM    Recorded by [LM] Marce Carter VILLALPANDO/L [RW] Balaji Malhotra PTA [LM] Marce Carter, VILLALPANDO/L    Mobility Assessment/Training    Left Lower Extremity Weight-Bearing  non weight-bearing  -RW     Right Lower Extremity Weight-Bearing  non weight-bearing  -RW     Recorded by  [RW] Balaji Malhotra PTA     Bed Mobility, Assessment/Treatment    Bed Mobility, Roll Right, Wallace   conditional independence  -LM    Bed Mob, Supine to Sit, Wallace  independent  -RW independent  -LM    Bed Mob, Sit to Supine, Wallace  independent  -RW independent  -LM    Recorded by  [RW] Balaji Malhotra PTA [LM] Marce Carter, VILLALPANDO/L    Transfer Assessment/Treatment    Transfers, Bed-Chair Wallace  conditional independence  -RW conditional independence  -LM    Transfers, Chair-Bed Wallace conditional independence  -LM  conditional independence  -LM    Transfers, Sit-Stand Wallace  not appropriate to assess  -RW not appropriate to assess  -LM    Recorded by [LM] Marce Carter VILLALPANDO/L [RW] Balaji Malhotra PTA [LM] Marce Carter, VILLALPANDO/L    Gait Assessment/Treatment    Gait, Wallace Level  not appropriate to assess  -RW     Recorded by  [RW] Balaji Malhotra PTA     Wheelchair Training/Management    Wheelchair, Distance Propelled  150 mod ind  -RW  --   150  -LM    Recorded by  [RW] Balaji Malhotra PTA [LM] NAV SmithA/L    Balance Skills Training    Sitting-Balance Activities  Reaching for weighted objects;Reaching for objects;Reaching across midline  -RW     Recorded by  [RW] Balaji Malhotra PTA     Therapy Exercises    Right Lower Extremity  AROM:;20 reps;quad sets;sitting   2/20  -RW     Left Lower Extremity  AROM:;20 reps;SLR;sitting   2/20  -RW     Bilateral Upper Extremity AROM:   lev 4 tband all UE planes increase strenvth 2-3 sets of 20   -LM  AROM:   all tband ex 20 reps 3 sets rickshaw 3 set 20 and UEE 21 min  -LM    Recorded by [LM] IRASEMA Smith/L [RW] Balaji Malhotra PTA [LM] NAV SmithA/L    Positioning and Restraints    Pre-Treatment Position sitting in chair/recliner  -LM  sitting in chair/recliner  -LM    Post Treatment Position bed  -LM  bed  -LM    Recorded by [LM] IRASEMA Smith/L  [LM] NAV SmithA/FREEDOM      09/19/17 0805 09/18/17 1400 09/18/17 1030    Rehab Assessment/Intervention    Discipline physical therapy assistant  -RW physical therapy assistant  -RW occupational therapy assistant  -LM    Document Type therapy note (daily note)  -RW therapy note (daily note)  -RW therapy note (daily note)  -LM    Subjective Information agree to therapy  -RW agree to therapy  -RW agree to therapy  -LM    Patient Effort, Rehab Treatment good  -RW good  -RW good  -LM    Recorded by [RW] Balaji Malhotra PTA [RW] Balaji Malhotra PTA [LM] Marce Carter VILLALPANDO/L    Pain Assessment    Pain Assessment No/denies pain  -RW --   left foot hurting but gives no rating  -RW     Pain Score   5  -LM    Post Pain Score   4  -LM    Pain Location   Ankle  -LM    Pain Orientation   Left  -LM    Pain Intervention(s)   Medication (See MAR)  -LM    Recorded by [RW] Balaji Malhotra PTA [RW] Balaji Malhotra PTA [LM] Marce Carter, VILLALPANDO/L    Cognitive Assessment/Intervention    Current Cognitive/Communication  Assessment functional  -RW functional  -RW functional  -LM    Orientation Status oriented x 4  -RW oriented x 4  -RW oriented x 4  -LM    Follows Commands/Answers Questions 100% of the time  -% of the time  -% of the time  -LM    Personal Safety Interventions gait belt  -RW  gait belt  -LM    Recorded by [RW] Balaji Malhotra PTA [RW] Balaji Malhotra PTA [LM] Marce Carter, VILLALPANDO/L    General LE Assessment    ROM Detail 95 arom  -RW      Recorded by [RW] Balaji Malhotra PTA      Mobility Assessment/Training    Left Lower Extremity Weight-Bearing non weight-bearing  -RW non weight-bearing  -RW     Right Lower Extremity Weight-Bearing non weight-bearing  -RW non weight-bearing  -RW     Recorded by [RW] Balaji Malhotra PTA [RW] Balaji Malhotra PTA     Bed Mobility, Assessment/Treatment    Bed Mobility, Roll Right, [place] conditional independence  -RW conditional independence  -RW conditional independence  -LM    Bed Mob, Supine to Sit, [place] independent  -RW independent  -RW independent  -LM    Bed Mob, Sit to Supine, [place] independent  -RW independent  -RW independent  -LM    Recorded by [RW] Balaji Malhotra PTA [RW] Balaji Malhotra PTA [LM] Marce Carter, VILLALPANDO/L    Transfer Assessment/Treatment    Transfers, Bed-Chair [place] conditional independence  -RW  conditional independence  -LM    Transfers, Chair-Bed [place] conditional independence  -RW  conditional independence  -LM    Transfers, Sit-Stand [place] not appropriate to assess  -RW not appropriate to assess  -RW not appropriate to assess  -LM    Recorded by [RW] Balaji Malhotra PTA [RW] Balaji Malhotra PTA [LM] Marce Carter, VILLALPANDO/L    Gait Assessment/Treatment    Gait, [place] Level not appropriate to assess  -RW not appropriate to assess  -RW not appropriate to assess  -LM    Recorded by [RW] Balaji Malhtora PTA [RW] Balaji Malhotra PTA [LM] Marce Carter, VILLALPANDO/L    Wheelchair  Training/Management    Wheelchair, Distance Propelled 150 mod ind  -RW  130  -LM    Wheelchair Training Comment   --   mod I  -LM    Recorded by [RW] Balaji Malhotra PTA  [LM] NAV SmithA/L    Balance Skills Training    Sitting-Balance Activities Reaching for weighted objects;Reaching for objects;Reaching across midline  -RW      Recorded by [RW] Balaji Malhotra PTA      Therapy Exercises    Right Lower Extremity AROM:;20 reps;supine;ankle pumps/circles;quad sets  -RW AROM:;20 reps;supine;ankle pumps/circles;quad sets  -RW     Left Lower Extremity AROM:;20 reps;supine;hip abduction/adduction;SLR;heel slides  -RW AROM:;20 reps;supine;hip abduction/adduction;SLR;heel slides  -RW     Bilateral Lower Extremities AROM:;20 reps;glut sets;quad sets   2 sets  -RW AROM:;20 reps;glut sets;quad sets  -RW     Bilateral Upper Extremity 20 reps;AROM:;sitting   tband rows 2 sets  -RW  AROM:;elbow flexion/extension;shoulder abduction/adduction;shoulder extension/flexion;shoulder horizontal abd/add;shoulder protraction/retraction   level 3 tband all planes and rickshaw 5x 20 reps  -LM    BUE Resistance   manual resistance- minimal   UEE bike x 20 min   -LM    Trunk Exercises 10 reps   abdominal crunchs 2 sets  -RW 10 reps   abdominal crunchs 2 sets  -RW     Recorded by [RW] Balaji Malhotra PTA [RW] Balaji Malhotra PTA [LM] Marce Carter, VILLALPANDO/L    Positioning and Restraints    Pre-Treatment Position sitting in chair/recliner  -RW in bed  -RW sitting in chair/recliner  -LM    Post Treatment Position wheelchair  -RW bed  -RW bed  -LM    In Bed  call light within reach  -RW     Recorded by [RW] Balaji Malhotra PTA [RW] Balaji Malhotra PTA [LM] NAV SmithA/FREEDOM      09/18/17 075          Rehab Assessment/Intervention    Discipline physical therapy assistant  -RW      Document Type therapy note (daily note)  -RW      Subjective Information agree to therapy  -RW      Patient Effort, Rehab Treatment good  -RW       Recorded by [RW] Balaji Malhotra PTA      Pain Assessment    Pain Score 5  -RW      Post Pain Score 3  -RW      Pain Location Ankle  -RW      Pain Orientation Left  -RW      Pain Intervention(s) Medication (See MAR)  -RW      Recorded by [RW] Balaji Malhotra PTA      Cognitive Assessment/Intervention    Current Cognitive/Communication Assessment functional  -RW      Orientation Status oriented x 4  -RW      Follows Commands/Answers Questions 100% of the time  -RW      Personal Safety Interventions gait belt  -RW      Recorded by [RW] Balaji Malhotra PTA      Mobility Assessment/Training    Left Lower Extremity Weight-Bearing non weight-bearing  -RW      Right Lower Extremity Weight-Bearing non weight-bearing  -RW      Recorded by [RW] Balaji Malhotra PTA      Bed Mobility, Assessment/Treatment    Bed Mobility, Roll Right, [place] conditional independence  -RW      Bed Mob, Supine to Sit, [place] independent  -RW      Bed Mob, Sit to Supine, [place] independent  -RW      Recorded by [RW] Balaji Malhotra PTA      Transfer Assessment/Treatment    Transfers, Bed-Chair [place] conditional independence;set up required  -RW      Transfers, Sit-Stand [place] not appropriate to assess  -RW      Recorded by [RW] Balaji Malhotra PTA      Gait Assessment/Treatment    Gait, [place] Level not appropriate to assess  -RW      Recorded by [RW] Balaji Malhotra PTA      Wheelchair Training/Management    Wheelchair Transfer Type lateral transfer  -RW      Wheelchair, Distance Propelled 130  -RW      Wheelchair Training Comment mod ind  -RW      Recorded by [RW] Balaji Malhotra PTA      Therapy Exercises    Right Lower Extremity AROM:;20 reps;supine;ankle pumps/circles;quad sets   2x20  -RW      Left Lower Extremity AROM:;20 reps;supine;hip abduction/adduction;SLR;heel slides   2x20  -RW      Bilateral Lower Extremities AROM:;20 reps;glut sets;quad sets   2x20  -RW      Bilateral Upper Extremity AROM:;20  reps;sitting   tband rows 2/20 with btb amd pro2 x 10 min l2  -RW      Recorded by [RW] Balaji Malhotra PTA      Positioning and Restraints    Pre-Treatment Position in bed  -RW      Post Treatment Position wheelchair  -RW      Recorded by [RW] Balaji Malhotra PTA        User Key  (r) = Recorded By, (t) = Taken By, (c) = Cosigned By    Initials Name Effective Dates    RW Balaji Malhotra PTA 10/17/16 -     LM Marce Carter VILLALPANDO/FREEDOM 10/17/16 -                 OT Goals       09/19/17 1400 09/18/17 1518 09/15/17 0810    Patient Education OT LTG    Patient Education OT LTG, Date Goal Reviewed 09/19/17  -LM 09/18/17  -LM 09/15/17  -KD    Patient Education OT LTG Outcome goal ongoing  -LM goal ongoing  -LM goal not met  -KD    ADL OT LTG    ADL OT LTG, Date Goal Reviewed 09/19/17  -LM 09/18/17  -LM 09/15/17  -KD    ADL OT LTG, Outcome goal ongoing  -LM goal ongoing  -LM goal not met  -KD      09/14/17 1413 09/13/17 1552 09/12/17 1455    Bed Mobility OT LTG    Bed Mobility OT LTG, Date Goal Reviewed 09/14/17  -LM 09/13/17  -LM 09/12/17  -RC    Bed Mobility OT LTG, Outcome goal met  -LM goal ongoing  -LM goal ongoing  -RC    Transfer Training OT LTG    Transfer Training OT LTG, Date Goal Reviewed 09/14/17  -LM 09/13/17  -LM 09/12/17  -    Transfer Training OT LTG, Outcome goal met  -LM goal ongoing  -LM goal ongoing  -RC    Patient Education OT LTG    Patient Education OT LTG, Date Goal Reviewed 09/14/17  -LM 09/13/17  -LM 09/12/17  -RC    Patient Education OT LTG Outcome goal ongoing  -LM goal ongoing  -LM goal ongoing  -RC    ADL OT LTG    ADL OT LTG, Date Goal Reviewed 09/14/17  -LM 09/13/17  -LM 09/12/17  -RC    ADL OT LTG, Outcome goal ongoing  -LM goal ongoing  -LM goal ongoing  -RC      09/12/17 0755 09/11/17 1609 09/11/17 1333    Bed Mobility OT LTG    Bed Mobility OT LTG, Date Established 09/12/17  -NATALIA (r) DANY (t) NATALIA (c)      Bed Mobility OT LTG, Time to Achieve by discharge  -NATALIA (r) DANY (t) NATALIA (c)      Bed  Mobility OT LTG, Activity Type all bed mobility  -BH (r) SP (t) BH (c)      Bed Mobility OT LTG, [place] Level supervision required;minimum assist (75% patient effort)   AE/adaptive techniques- RLE in knee immobilizer and extended  -BH (r) SP (t) BH (c)      Transfer Training OT LTG    Transfer Training OT LTG, Date Established 09/12/17  -BH (r) SP (t) BH (c)  09/11/17  -RB    Transfer Training OT LTG, Time to Achieve by discharge  -BH (r) SP (t) BH (c)  by discharge  -RB    Transfer Training OT LTG, Activity Type --   BSC, w/c  -BH (r) SP (t) BH (c)  all transfers  -RB    Transfer Training OT LTG, [place] Level minimum assist (75% patient effort);contact guard assist   RLE in knee immobilizer and extended at all times.  -BH (r) SP (t) BH (c)  supervision required;contact guard assist  -RB    Transfer Training OT LTG, Assist Device   other (see comments)   Sliding board if needed.  -RB    Transfer Training OT LTG, Date Goal Reviewed  09/04/17  -SG     Transfer Training OT LTG, Outcome  goal not met  -SG     Transfer Training OT LTG, Reason Goal Not Met  discharged from facility  -SG     Strength OT LTG    Strength Goal OT LTG, Date Established   09/11/17  -RB    Strength Goal OT LTG, Time to Achieve   by discharge  -RB    Strength Goal OT LTG, Measure to Achieve   2 sets of 10 reps all planes with 3-4 lb dumbells for B UE strength to increase independence with functional mobility/transfers.  -RB    Strength Goal OT LTG, Date Goal Reviewed  09/11/17  -SG     Strength Goal OT LTG, Outcome  goal not met  -SG     Strength Goal OT LTG, Reason Goal Not Met  discharged from facility  -SG     Dynamic Sitting Balance OT LTG    Dynamic Sitting Balance OT LTG, Date Established   09/11/17  -RB    Dynamic Sitting Balance OT LTG, Time to Achieve   by discharge  -RB    Dynamic Sitting Balance OT LTG, [place] Level   supervision required   10-15 minutes with functional activity.  -RB    Dynamic Sitting Balance OT  LTG, Assist Device   bed rails  -RB    Dynamic Sitting Balance OT LTG, Date Goal Reviewed  09/11/17  -     Dynamic Sitting Balance OT LTG, Outcome  goal not met  -     Dynamic Sitting Balance OT LTG, Reason Goal Not Met  discharged from facility  -     Patient Education OT LTG    Patient Education OT LTG, Date Established 09/12/17  -BH (r) SP (t) BH (c)      Patient Education OT LTG, Time to Achieve by discharge  -BH (r) SP (t) BH (c)      Patient Education OT LTG, Education Type HEP;precautions per surgeon;positioning;posture/body mechanics;home safety;adaptive equipment mgmt  -BH (r) SP (t) BH (c)      Patient Education OT LTG, Education Understanding independent;demonstrates adequately;verbalizes understanding  -BH (r) SP (t) BH (c)      ADL OT LTG    ADL OT LTG, Date Established 09/12/17  -BH (r) SP (t) BH (c)  09/11/17  -RB    ADL OT LTG, Time to Achieve by discharge  -BH (r) SP (t) BH (c)  by discharge  -RB    ADL OT LTG, Activity Type ADL skills  -BH (r) SP (t) BH (c)  ADL skills   Sponge bath and dress.  -RB    ADL OT LTG, [place] Level   contact guard;min assist  -RB    ADL OT LTG, Additional Goal set-up with self-feeding, grooming, UB dressing/bathing; min A with toileting; mod A with LB dressing/bathing  -BH (r) SP (t) BH (c)      ADL OT LTG, Date Goal Reviewed  09/11/17  -     ADL OT LTG, Outcome  goal not met  -     ADL OT LTG, Reason Goal Not Met  discharged from facility  -       User Key  (r) = Recorded By, (t) = Taken By, (c) = Cosigned By    Initials Name Provider Type    RB Axel Perez, OT Occupational Therapist    BH Isabel Jones, OTR/L Occupational Therapist    MARGE Jean-Baptiste, VILLALPANDO/L Occupational Therapy Assistant    VIPUL Ray, VILLALPANDO/L Occupational Therapy Assistant    YOEL Carter, VILLALPANDO/L Occupational Therapy Assistant    DANY Juárez, OT Student OT Student    SAHARA Rai, OT Occupational Therapist          Occupational Therapy Education      Title: PT OT SLP Therapies (Active)     Topic: Occupational Therapy (Active)     Point: ADL training (Done)    Description: Instruct learner(s) on proper safety adaptation and remediation techniques during self care or transfers.   Instruct in proper use of assistive devices.    Learning Progress Summary    Learner Readiness Method Response Comment Documented by Status   Patient Acceptance D STEPHEN   09/15/17 1036 Done    Acceptance E VU Educated on OT and POC. Educated about adhering to weight bearing precautions. Educated about safety with ADL and bed mobility. SP 09/12/17 1159 Done               Point: Precautions (Done)    Description: Instruct learner(s) on prescribed precautions during self-care and functional transfers.    Learning Progress Summary    Learner Readiness Method Response Comment Documented by Status   Patient Acceptance E VU Educated on OT and POC. Educated about adhering to weight bearing precautions. Educated about safety with ADL and bed mobility. SP 09/12/17 1159 Done               Point: Body mechanics (Done)    Description: Instruct learner(s) on proper positioning and spine alignment during self-care, functional mobility activities and/or exercises.    Learning Progress Summary    Learner Readiness Method Response Comment Documented by Status   Patient Acceptance E VU Educated on OT and POC. Educated about adhering to weight bearing precautions. Educated about safety with ADL and bed mobility. SP 09/12/17 1159 Done                      User Key     Initials Effective Dates Name Provider Type Discipline    KD 10/17/16 -  ELY Camacho Occupational Therapy Assistant OT    SP 01/31/17 -  Kenya Juárez OT Student OT Student OT                  OT Recommendation and Plan  Anticipated Equipment Needs At Discharge: hospital bed  Anticipated Discharge Disposition: home with 24/7 care, home with home health  Planned Therapy Interventions: activity intolerance, adaptive equipment  training, ADL retraining, bed mobility training, balance training, energy conservation, fine motor coordination training, home exercise program, motor coordination training, strengthening, transfer training  Therapy Frequency: other (see comments) (3-14x/wk)  Plan of Care Review  Plan Of Care Reviewed With: patient  Progress: improving  Outcome Summary/Follow up Plan: pt improving all goalsand increased strength end         Outcome Measures       09/18/17 0900          How much help from another person do you currently need...    Turning from your back to your side while in flat bed without using bedrails? 4  -RW      Moving from lying on back to sitting on the side of a flat bed without bedrails? 4  -RW      Moving to and from a bed to a chair (including a wheelchair)? 4  -RW      Standing up from a chair using your arms (e.g., wheelchair, bedside chair)? 1  -RW      Climbing 3-5 steps with a railing? 1  -RW      To walk in hospital room? 1  -RW      AM-PAC 6 Clicks Score 15  -RW        User Key  (r) = Recorded By, (t) = Taken By, (c) = Cosigned By    Initials Name Provider Type    RW Balaji Malhotra PTA Physical Therapy Assistant           Time Calculation:         Time Calculation- OT       09/19/17 1626 09/19/17 1342       Time Calculation- OT    OT Start Time 1445  -LM 1035  -LM     OT Stop Time 1510  -LM 1145  -LM     OT Time Calculation (min) 25 min  -LM 70 min  -LM     Total Timed Code Minutes- OT 25 minute(s)  -LM 70 minute(s)  -LM     OT Received On 09/19/17  -LM 09/19/17  -LM       User Key  (r) = Recorded By, (t) = Taken By, (c) = Cosigned By    Initials Name Provider Type     ELY Smith Occupational Therapy Assistant           Therapy Charges for Today     Code Description Service Date Service Provider Modifiers Qty    11545833876 HC OT THER PROC EA 15 MIN 9/18/2017 ELY Smith GO 5    41289356666 HC OT THERAPEUTIC ACT EA 15 MIN 9/18/2017 ELY Smith GO 1     01646489509 HC OT THER PROC EA 15 MIN 9/19/2017 Marce Carter VILLALPANDO/L GO 4    91491039511 HC OT THERAPEUTIC ACT EA 15 MIN 9/19/2017 Marce Carter VILLALPANDO/L GO 1    54470008977 HC OT THER PROC EA 15 MIN 9/19/2017 Marce Carter VILLALPANDO/L GO 1    33718951303 HC OT THERAPEUTIC ACT EA 15 MIN 9/19/2017 Marce Carter VILLALPANDO/L GO 1          OT G-codes  OT Professional Judgement Used?: Yes  OT Functional Scales Options: AM-PAC 6 Clicks Daily Activity (OT)  Score: 15  Functional Limitation: Self care  Self Care Current Status (): At least 40 percent but less than 60 percent impaired, limited or restricted  Self Care Goal Status (): At least 20 percent but less than 40 percent impaired, limited or restricted    NAV SmithA/L  9/19/2017

## 2017-09-19 NOTE — PROGRESS NOTES
LOS: 8 days   Patient Care Team:  Renan Rodríguez MD as PCP - General (Family Medicine)    Chief Complaint:   Multiple trauma          Interval History:     SUBJECTIVE:  Good spirits today no pain no shortness of breath  Tolerating medication well  Tolerating therapy well  History taken from: patient chart    Objective     Vital Signs  Temp:  [96.6 °F (35.9 °C)-98.1 °F (36.7 °C)] 96.6 °F (35.9 °C)  Heart Rate:  [83-86] 83  Resp:  [18] 18  BP: (128-136)/(67-71) 128/67  Last 3 weights    09/14/17  1500 09/17/17  0500   Weight: 182 lb (82.6 kg) 177 lb (80.3 kg)         Physical Exam:     General Appearance:    Alert, cooperative, in no acute distress   Head:    Normocephalic, without obvious abnormality, atraumatic   Eyes:            Lids and lashes normal, conjunctivae and sclerae normal, no   icterus, no pallor, corneas clear, PERRLA   Throat:   No oral lesions, no thrush, oral mucosa moist   Neck:   No adenopathy, supple, trachea midline, no thyromegaly, no   carotid bruit, no JVD       Lungs:     Clear to auscultation,respirations regular, even and                  Unlabored     Heart:    Regular rhythm and normal rate, normal S1 and S2, no            murmur, no gallop, no rub, no click    Chest Wall:    No abnormalities observed   Abdomen:     Normal bowel sounds, no masses, no organomegaly, soft        non-tender, non-distended, no guarding, no rebound                Tenderness    Extremities:   Dressings, splint, immobilizer in place  no edema at the feet        Skin:   No bleeding, bruising or rash   Lymph nodes:   No palpable adenopathy   Neurologic:   Cranial nerves 2 - 12 grossly intact, sensation intact, DTR       present and equal bilaterally        RESULTS REVIEW:     Lab Results (last 24 hours)     Procedure Component Value Units Date/Time    POC Glucose Fingerstick [[id number]]  (Abnormal) Collected:  09/18/17 1015    Specimen:  Blood Updated:  09/18/17 1043     Glucose 138 (H) mg/dL       RN  NotifiedMeter: [id number]: [id number] KAE RUTH ANN       POC Glucose Fingerstick [[id number]]  (Abnormal) Collected:  09/18/17 1608    Specimen:  Blood Updated:  09/18/17 1624     Glucose 150 (H) mg/dL       RN NotifiedMeter: [id number]: [id number] KAE RUTH ANN       POC Glucose Fingerstick [[id number]]  (Abnormal) Collected:  09/18/17 2037    Specimen:  Blood Updated:  09/19/17 0132     Glucose 179 (H) mg/dL       Meter: [id number]: [id number] CURLY COYLE       POC Glucose Fingerstick [[id number]]  (Abnormal) Collected:  09/19/17 0533    Specimen:  Blood Updated:  09/19/17 0615     Glucose 171 (H) mg/dL       Meter: [id number]: [id number] CURLY COYLE       Protime-INR [[id number]]  (Abnormal) Collected:  09/19/17 0533    Specimen:  Blood Updated:  09/19/17 0625     Protime 23.4 (H) Seconds      INR 2.07 (H)    Narrative:       Therapeutic range for most indications is 2.0-3.0 INR,  or 2.5-3.5 for mechanical heart valves.        Imaging Results (last 72 hours)     ** No results found for the last 72 hours. **          Assessment/Plan     Principal Problem:    Multiple trauma  Active Problems:    Closed displaced comminuted fracture of right patella    Closed displaced fracture of navicular bone of left foot    Osteoarthritis of hands, bilateral    BPH (benign prostatic hyperplasia)    Glaucoma    Encounter for rehabilitation    Recent onset of diabetes mellitus    Essential hypertension    Mixed hyperlipidemia    History of kidney stones        His diabetes still needs better control and metformin has been increased with the dosage today.  Durable [employer], has been ordered [facility] if [facility] it.  Blood pressure control is adequate.  We'll recheck lab work tomorrow if BUN and creatinine unchanged will consider change to an ARB or an ace instead of diuretic for blood pressure control and for renal protection.  Would like to check renal function on increased dose  first  He is making progress towards discharge goals and will discuss in team meeting today        Alec Stephen MD  09/19/17  8:55 AM

## 2017-09-19 NOTE — PROGRESS NOTES
"Case Management/Social Work    Patient Name:  Vangie Lopez  YOB: 1950  MRN: [id number]  Admit Date:  2017        JOHN had f/u visit with pt at bedside this date.  Pt reporting that he is progressing with therapy and is hopeful he will be able to return home by the end of the week.  Inquired about granddaughter.  He reported she was born end of last week, doing well and was discharged.  He stated his daughter is having a pacemaker placed and will hopefully be released soon as well.  Anticipates set up of everyone at home.  Jokes about how his chihuaua will respond to the  baby.  Pt reporting that he is eager to return home.  He has had a ramp build.  He reports that it may need some modifications, that he may oversee, and he plans for the ramp to just be temporary.  Pt reporting, \"I've learned my lesson.\"  He reports that he is still going to get on the roof, only the right way and with assistance next time.  Pt forward thinking and overall mood happy this date.  He shared pictures of his new granddaughter with JOHN.  No psychosocial distress and no discord at present.      Electronically signed by:  Lisa Larios LCSW  17 12:09 PM  "

## 2017-09-19 NOTE — PLAN OF CARE
Problem: Patient Care Overview (Adult)  Goal: Plan of Care Review  Outcome: Ongoing (interventions implemented as appropriate)    Problem: Inpatient Physical Therapy  Goal: Range of Motion Goal LTG- PT  Outcome: Ongoing (interventions implemented as appropriate)    09/11/17 1500 09/19/17 1443   Range of Motion PT LTG   Range of Motion Goal PT LTG, Date Established 09/11/17 --    Range of Motion Goal PT LTG, Time to Achieve by discharge --    Range fo Motion Goal PT LTG, Joint L knee --    Range of Motion Goal PT LTG, AROM Measure Knee flex - 110 degrees --    Range of Motion Goal PT LTG, Date Goal Reviewed --  09/19/17   Range of Motion Goal PT LTG, Outcome --  goal ongoing

## 2017-09-19 NOTE — PLAN OF CARE
Problem: Patient Care Overview (Adult)  Goal: Plan of Care Review  Outcome: Ongoing (interventions implemented as appropriate)    09/19/17 1400   Coping/Psychosocial Response Interventions   Plan Of Care Reviewed With patient   Patient Care Overview   Progress improving   Outcome Evaluation   Outcome Summary/Follow up Plan pt improving all goalsand increased strength end          Problem: Inpatient Occupational Therapy  Goal: Patient Education Goal LTG- OT  Outcome: Ongoing (interventions implemented as appropriate)    09/12/17 0755 09/19/17 1400   Patient Education OT LTG   Patient Education OT LTG, Date Established 09/12/17 --    Patient Education OT LTG, Time to Achieve by discharge --    Patient Education OT LTG, Education Type HEP;precautions per surgeon;positioning;posture/body mechanics;home safety;adaptive equipment mgmt --    Patient Education OT LTG, Education Understanding independent;demonstrates adequately;verbalizes understanding --    Patient Education OT LTG, Date Goal Reviewed --  09/19/17   Patient Education OT LTG Outcome --  goal ongoing       Goal: ADL Goal LTG- OT  Outcome: Ongoing (interventions implemented as appropriate)    09/12/17 0755 09/19/17 1400   ADL OT LTG   ADL OT LTG, Date Established 09/12/17 --    ADL OT LTG, Time to Achieve by discharge --    ADL OT LTG, Activity Type ADL skills --    ADL OT LTG, Additional Goal set-up with self-feeding, grooming, UB dressing/bathing; min A with toileting; mod A with LB dressing/bathing --    ADL OT LTG, Date Goal Reviewed --  09/19/17   ADL OT LTG, Outcome --  goal ongoing

## 2017-09-19 NOTE — PLAN OF CARE
Problem: Patient Care Overview (Adult)  Goal: Plan of Care Review  Outcome: Ongoing (interventions implemented as appropriate)    09/19/17 0549   Coping/Psychosocial Response Interventions   Plan Of Care Reviewed With patient   Patient Care Overview   Progress no change   Outcome Evaluation   Outcome Summary/Follow up Plan No complaint this am. Vitals stable.          Problem: Orthopaedic Fracture (Adult)  Goal: Signs and Symptoms of Listed Potential Problems Will be Absent or Manageable (Orthopaedic Fracture)  Outcome: Ongoing (interventions implemented as appropriate)    Problem: Fall Risk (Adult)  Goal: Absence of Falls  Outcome: Ongoing (interventions implemented as appropriate)

## 2017-09-20 LAB
ALBUMIN SERPL-MCNC: 3.5 G/DL (ref 3.4–4.8)
ANION GAP SERPL CALCULATED.3IONS-SCNC: 10 MMOL/L (ref 5–15)
BUN BLD-MCNC: 23 MG/DL (ref 7–21)
BUN/CREAT SERPL: 28.4 (ref 7–25)
CALCIUM SPEC-SCNC: 8.8 MG/DL (ref 8.4–10.2)
CHLORIDE SERPL-SCNC: 95 MMOL/L (ref 95–110)
CO2 SERPL-SCNC: 28 MMOL/L (ref 22–31)
CREAT BLD-MCNC: 0.81 MG/DL (ref 0.7–1.3)
GFR SERPL CREATININE-BSD FRML MDRD: 95 ML/MIN/1.73 (ref 49–113)
GLUCOSE BLD-MCNC: 144 MG/DL (ref 60–100)
GLUCOSE BLDC GLUCOMTR-MCNC: 121 MG/DL (ref 70–130)
GLUCOSE BLDC GLUCOMTR-MCNC: 128 MG/DL (ref 70–130)
GLUCOSE BLDC GLUCOMTR-MCNC: 153 MG/DL (ref 70–130)
GLUCOSE BLDC GLUCOMTR-MCNC: 163 MG/DL (ref 70–130)
HOLD SPECIMEN: NORMAL
INR PPP: 1.79 (ref 0.8–1.2)
PHOSPHATE SERPL-MCNC: 3.9 MG/DL (ref 2.4–4.4)
POTASSIUM BLD-SCNC: 3.9 MMOL/L (ref 3.5–5.1)
PROTHROMBIN TIME: 20.9 SECONDS (ref 11.1–15.3)
SODIUM BLD-SCNC: 133 MMOL/L (ref 137–145)
WHOLE BLOOD HOLD SPECIMEN: NORMAL

## 2017-09-20 PROCEDURE — 97535 SELF CARE MNGMENT TRAINING: CPT

## 2017-09-20 PROCEDURE — 80069 RENAL FUNCTION PANEL: CPT | Performed by: FAMILY MEDICINE

## 2017-09-20 PROCEDURE — 82962 GLUCOSE BLOOD TEST: CPT

## 2017-09-20 PROCEDURE — 97110 THERAPEUTIC EXERCISES: CPT

## 2017-09-20 PROCEDURE — 99232 SBSQ HOSP IP/OBS MODERATE 35: CPT | Performed by: FAMILY MEDICINE

## 2017-09-20 PROCEDURE — 97530 THERAPEUTIC ACTIVITIES: CPT

## 2017-09-20 PROCEDURE — 99024 POSTOP FOLLOW-UP VISIT: CPT | Performed by: PODIATRIST

## 2017-09-20 PROCEDURE — 97542 WHEELCHAIR MNGMENT TRAINING: CPT

## 2017-09-20 PROCEDURE — 85610 PROTHROMBIN TIME: CPT | Performed by: FAMILY MEDICINE

## 2017-09-20 RX ORDER — METFORMIN HYDROCHLORIDE 500 MG/1
500 TABLET, EXTENDED RELEASE ORAL 2 TIMES DAILY WITH MEALS
Qty: 60 TABLET | Refills: 1 | Status: SHIPPED | OUTPATIENT
Start: 2017-09-20 | End: 2019-03-27

## 2017-09-20 RX ORDER — WARFARIN SODIUM 4 MG/1
4 TABLET ORAL
Status: DISCONTINUED | OUTPATIENT
Start: 2017-09-20 | End: 2017-09-22 | Stop reason: HOSPADM

## 2017-09-20 RX ORDER — HYDROCODONE BITARTRATE AND ACETAMINOPHEN 10; 325 MG/1; MG/1
1 TABLET ORAL EVERY 4 HOURS PRN
Qty: 60 TABLET | Refills: 0 | Status: SHIPPED | OUTPATIENT
Start: 2017-09-20 | End: 2017-09-28

## 2017-09-20 RX ORDER — FERROUS SULFATE TAB EC 324 MG (65 MG FE EQUIVALENT) 324 (65 FE) MG
324 TABLET DELAYED RESPONSE ORAL
Qty: 30 TABLET | Refills: 0 | Status: SHIPPED | OUTPATIENT
Start: 2017-09-21 | End: 2017-09-22

## 2017-09-20 RX ORDER — WARFARIN SODIUM 4 MG/1
TABLET ORAL
Qty: 21 TABLET | Refills: 0 | Status: SHIPPED | OUTPATIENT
Start: 2017-09-20 | End: 2017-10-06

## 2017-09-20 RX ADMIN — Medication 1 TABLET: at 09:08

## 2017-09-20 RX ADMIN — HYDROCHLOROTHIAZIDE 25 MG: 25 TABLET ORAL at 09:09

## 2017-09-20 RX ADMIN — ATORVASTATIN CALCIUM 10 MG: 10 TABLET, FILM COATED ORAL at 09:09

## 2017-09-20 RX ADMIN — POTASSIUM CITRATE 10 MEQ: 10 TABLET ORAL at 17:00

## 2017-09-20 RX ADMIN — POTASSIUM CITRATE 10 MEQ: 10 TABLET ORAL at 09:08

## 2017-09-20 RX ADMIN — METFORMIN HYDROCHLORIDE 500 MG: 500 TABLET, EXTENDED RELEASE ORAL at 17:00

## 2017-09-20 RX ADMIN — FAMOTIDINE 40 MG: 40 TABLET ORAL at 09:09

## 2017-09-20 RX ADMIN — HYDROCODONE BITARTRATE AND ACETAMINOPHEN 1 TABLET: 10; 325 TABLET ORAL at 20:21

## 2017-09-20 RX ADMIN — HYDROCODONE BITARTRATE AND ACETAMINOPHEN 1 TABLET: 10; 325 TABLET ORAL at 14:48

## 2017-09-20 RX ADMIN — HYDROCODONE BITARTRATE AND ACETAMINOPHEN 1 TABLET: 10; 325 TABLET ORAL at 09:08

## 2017-09-20 RX ADMIN — MAGNESIUM HYDROXIDE 10 ML: 2400 SUSPENSION ORAL at 09:14

## 2017-09-20 RX ADMIN — FERROUS SULFATE TAB EC 324 MG (65 MG FE EQUIVALENT) 324 MG: 324 (65 FE) TABLET DELAYED RESPONSE at 09:08

## 2017-09-20 RX ADMIN — METFORMIN HYDROCHLORIDE 500 MG: 500 TABLET, EXTENDED RELEASE ORAL at 09:09

## 2017-09-20 RX ADMIN — FINASTERIDE 5 MG: 5 TABLET, FILM COATED ORAL at 20:21

## 2017-09-20 RX ADMIN — WARFARIN SODIUM 4 MG: 4 TABLET ORAL at 17:01

## 2017-09-20 RX ADMIN — SENNOSIDES AND DOCUSATE SODIUM 1 TABLET: 8.6; 5 TABLET ORAL at 09:08

## 2017-09-20 RX ADMIN — Medication 25 MG: at 20:21

## 2017-09-20 NOTE — PROGRESS NOTES
LOS: 9 days   Patient Care Team:  Renan Rodríguez MD as PCP - General (Family Medicine)    Chief Complaint:   Multiple trauma          Interval History:     SUBJECTIVE:  Doing well feels good today minimal pain.  Participating with therapy.  Plans to go home on Friday and he feels like he will do well.  History taken from: patient chart RN Reviewed with therapy staff and  because [facility].    Objective     Vital Signs  Temp:  [96.3 °F (35.7 °C)-96.7 °F (35.9 °C)] 96.7 °F (35.9 °C)  Heart Rate:  [] 103  Resp:  [18] 18  BP: (127-130)/(72-75) 127/75  Last 3 weights    09/14/17  1500 09/17/17  0500   Weight: 182 lb (82.6 kg) 177 lb (80.3 kg)         Physical Exam:     General Appearance:    Alert, cooperative, in no acute distress   Head:    Normocephalic, without obvious abnormality, atraumatic   Eyes:            Lids and lashes normal, conjunctivae and sclerae normal, no   icterus, no pallor, corneas clear, PERRLA   Throat:   No oral lesions, no thrush, oral mucosa moist   Neck:   No adenopathy, supple, trachea midline, no thyromegaly, no   carotid bruit, no JVD       Lungs:     Clear to auscultation,respirations regular, even and                  Unlabored     Heart:    Regular rhythm and normal rate, normal S1 and S2, no            murmur, no gallop, no rub, no click    Chest Wall:    No abnormalities observed   Abdomen:     Normal bowel sounds, no masses, no organomegaly, soft        non-tender, non-distended, no guarding, no rebound                Tenderness    Extremities:   Dressings immobilizer and splint to the lower extremities        Skin:   No bleeding, bruising or rash   Lymph nodes:   No palpable adenopathy   Neurologic:   Cranial nerves 2 - 12 grossly intact, sensation intact, DTR       present and equal bilaterally        RESULTS REVIEW:     Lab Results (last 24 hours)     Procedure Component Value Units Date/Time    POC Glucose Fingerstick [[id number]]   (Normal) Collected:  09/19/17 1116    Specimen:  Blood Updated:  09/19/17 1129     Glucose 126 mg/dL       RN NotifiedMeter: [id number]: [id number] JAZZMINE FORRESTERN       POC Glucose Fingerstick [[id number]]  (Normal) Collected:  09/19/17 1601    Specimen:  Blood Updated:  09/19/17 1617     Glucose 118 mg/dL       RN NotifiedMeter: [id number]: [id number] Jew KASHMIR       POC Glucose Fingerstick [[id number]]  (Abnormal) Collected:  09/19/17 2029    Specimen:  Blood Updated:  09/19/17 2048     Glucose 190 (H) mg/dL       Meter: [id number]: [id number] [facility]        POC Glucose Fingerstick [[id number]]  (Abnormal) Collected:  09/20/17 0557    Specimen:  Blood Updated:  09/20/17 0626     Glucose 153 (H) mg/dL       RN NotifiedMeter: [id number]: [id number] [facility] APRIL       Protime-INR [[id number]]  (Abnormal) Collected:  09/20/17 0729    Specimen:  Blood Updated:  09/20/17 0814     Protime 20.9 (H) Seconds      INR 1.79 (H)    Narrative:       Therapeutic range for most indications is 2.0-3.0 INR,  or 2.5-3.5 for mechanical heart valves.    Extra Tubes [[id number]] Collected:  09/20/17 0758    Specimen:  Blood from Blood, Venous Line Updated:  09/20/17 0902    Narrative:       The following orders were created for panel order Extra Tubes.  Procedure                               Abnormality         Status                     ---------                               -----------         ------                     Green Top (Gel)[[id number]]                                  Final result                 Please view results for these tests on the individual orders.    Green Top (Gel) [[id number]] Collected:  09/20/17 0758    Specimen:  Blood Updated:  09/20/17 0902     Extra Tube Hold for add-ons.      Auto resulted.       Extra Tubes [[id number]] Collected:  09/20/17 0758    Specimen:  Blood from Blood, Venous Line Updated:  09/20/17 0902    Narrative:       The following orders  were created for panel order Extra Tubes.  Procedure                               Abnormality         Status                     ---------                               -----------         ------                     Lavender Top[[id number]]                                     Final result                 Please view results for these tests on the individual orders.    Lavender Top [[id number]] Collected:  09/20/17 0758    Specimen:  Blood Updated:  09/20/17 0902     Extra Tube hold for add-on      Auto resulted       Renal Function Panel [[id number]] Collected:  09/20/17 0758    Specimen:  Blood Updated:  09/20/17 0945    POC Glucose Fingerstick [[id number]]  (Normal) Collected:  09/20/17 1016    Specimen:  Blood Updated:  09/20/17 1036     Glucose 121 mg/dL       RN NotifiedMeter: [id number]: [id number] KAE RUTH ANN           Imaging Results (last 72 hours)     ** No results found for the last 72 hours. **          Assessment/Plan     Principal Problem:    Multiple trauma  Active Problems:    Closed displaced comminuted fracture of right patella    Closed displaced fracture of navicular bone of left foot    Osteoarthritis of hands, bilateral    BPH (benign prostatic hyperplasia)    Glaucoma    Encounter for rehabilitation    Recent onset of diabetes mellitus    Essential hypertension    Mixed hyperlipidemia    History of kidney stones        He is participating well with therapies making progress.  He should be able to go home in 48 hours.  His diabetes is much better controlled on metformin twice a day.  [facility].  At discharge she'll need a multivitamin and iron for 30 days and Coumadin for 3 weeks.  He is taking trazodone at nighttime for sleep.  Does seem to help.  Diabetes is fairly well controlled on metformin extended release 500 mg twice a day.  I did write for that prescription also I did order a glucose meter lancets and strips      Alec Stephen,  MD  09/20/17  10:37 AM

## 2017-09-20 NOTE — PROGRESS NOTES
Nutrition Services    Patient Name:  Vangie Lopez  YOB: 1950  MRN: [id number]  Admit Date:  9/11/2017        Pt was eating his lunch. He was complimentary of the meals. Eating well most all meals. Choosing milk with meals too. D/c date is Friday pt said. Left written material for pt to read and will return for education at a later time. RDN staff to continue to monitor.      Electronically signed by:  Isela Benavides RD  09/20/17 2:57 PM

## 2017-09-20 NOTE — PLAN OF CARE
Problem: Patient Care Overview (Adult)  Goal: Plan of Care Review  Outcome: Ongoing (interventions implemented as appropriate)    09/20/17 0330 09/20/17 0726 09/20/17 0730   Coping/Psychosocial Response Interventions   Plan Of Care Reviewed With --  patient --    Patient Care Overview   Progress no change --  --    Outcome Evaluation   Outcome Summary/Follow up Plan --  --  Pt evelina tx well this AM, no new goals met       Goal: Discharge Needs Assessment  Outcome: Ongoing (interventions implemented as appropriate)    09/12/17 0755 09/17/17 0340 09/18/17 0541   Discharge Needs Assessment   Concerns To Be Addressed --  no discharge needs identified --    Concerns Comments --  --  showed a picture of ramp built at his home.   Living Environment   Transportation Available car;family or friend will provide --  --    Self-Care   Equipment Currently Used at Home (BSC, bath bench, shower chair, w/c, tripod cane; not used) --  --          Problem: Inpatient Occupational Therapy  Goal: Patient Education Goal LTG- OT  Outcome: Ongoing (interventions implemented as appropriate)    09/12/17 0755 09/20/17 0730   Patient Education OT LTG   Patient Education OT LTG, Date Established --  09/20/17   Patient Education OT LTG, Time to Achieve by discharge --    Patient Education OT LTG, Education Type HEP;precautions per surgeon;positioning;posture/body mechanics;home safety;adaptive equipment mgmt --    Patient Education OT LTG, Education Understanding independent;demonstrates adequately;verbalizes understanding --    Patient Education OT LTG, Date Goal Reviewed --  09/20/17   Patient Education OT LTG Outcome --  goal not met       Goal: ADL Goal LTG- OT  Outcome: Ongoing (interventions implemented as appropriate)    09/12/17 0755 09/20/17 0730   ADL OT LTG   ADL OT LTG, Date Established 09/12/17 --    ADL OT LTG, Time to Achieve by discharge --    ADL OT LTG, Activity Type ADL skills --    ADL OT LTG, Additional Goal set-up with  self-feeding, grooming, UB dressing/bathing; min A with toileting; mod A with LB dressing/bathing --    ADL OT LTG, Date Goal Reviewed --  09/20/17   ADL OT LTG, Outcome --  goal not met

## 2017-09-20 NOTE — THERAPY TREATMENT NOTE
Inpatient Rehabilitation - Physical Therapy Treatment Note  [facility]     Patient Name: Vangie Lopez  : 1950  MRN: [id number]  Today's Date: 2017  Onset of Illness/Injury or Date of Surgery Date: 17  Date of Referral to PT: 17  Referring Physician: Dr. Stephen    Admit Date: 2017    Visit Dx:    ICD-10-CM ICD-9-CM   1. Multiple trauma T07 959.8   2. Abnormality of gait and mobility R26.9 781.2   3. Muscle weakness (generalized) M62.81 728.87   4. Impaired mobility and ADLs Z74.09 799.89   5. Closed displaced comminuted fracture of right patella with routine healing, subsequent encounter S82.041D V54.16   6. Closed displaced fracture of navicular bone of left foot with routine healing, subsequent encounter S92.252D V54.19     Patient Active Problem List   Diagnosis   • Encounter for screening for malignant neoplasm of colon   • Closed displaced comminuted fracture of right patella   • Closed displaced fracture of navicular bone of left foot   • Closed dislocation of navicular bone of left foot   • Patella fracture   • Essential hypertension   • Mixed hyperlipidemia   • Osteoarthritis of hands, bilateral   • BPH (benign prostatic hyperplasia)   • Glaucoma   • Hyperlipidemia   • Closed fracture of navicular bone with dislocation of perilunate joint of left wrist   • Fracture of patella, right, closed   • Multiple trauma   • Encounter for rehabilitation   • History of kidney stones   • Recent onset of diabetes mellitus               Adult Rehabilitation Note       17 1300 17 1027 17 0730    Rehab Assessment/Intervention    Discipline physical therapy assistant  -RW physical therapy assistant  -RW occupational therapy assistant  -KD    Document Type therapy note (daily note)  -RW therapy note (daily note)  -RW therapy note (daily note)  -KD    Subjective Information agree to therapy  -RW agree to therapy  -RW agree to therapy  -KD    Patient Effort, Rehab  Treatment good  -RW good  -RW     Recorded by [RW] Balaji Malhotra PTA [RW] Balaji Malhotra PTA [KD] Virgie Ray, VILLALPANDO/L    Vital Signs    Pre Systolic BP Rehab   147  -KD    Pre Treatment Diastolic BP   80  -KD    Pretreatment Heart Rate (beats/min)   83  -KD    Posttreatment Heart Rate (beats/min)   90  -KD    Pre SpO2 (%)   96  -KD    O2 Delivery Pre Treatment   room air  -KD    Post SpO2 (%)   98  -KD    O2 Delivery Post Treatment   room air  -KD    Pre Patient Position   Supine  -KD    Intra Patient Position   Sitting  -KD    Post Patient Position   Sitting  -KD    Recorded by   [KD] Virgie Ray, VILLALPANDO/L    Pain Assessment    Pain Assessment  No/denies pain  -RW No/denies pain  -KD    Pain Score --   gives no rating  -RW      Pain Location Ankle  -RW      Pain Orientation Left  -RW      Recorded by [RW] Balaji Malhotra PTA [RW] Balaji Malhotra PTA [KD] Virgie Ray, VILLALPANDO/L    Cognitive Assessment/Intervention    Current Cognitive/Communication Assessment functional  -RW functional  -RW functional  -KD    Orientation Status oriented x 4  -RW oriented x 4  -RW oriented x 4  -KD    Follows Commands/Answers Questions 100% of the time  -% of the time  -% of the time  -KD    Personal Safety   WNL/WFL  -KD    Personal Safety Interventions gait belt  -RW gait belt  -RW gait belt  -KD    Recorded by [RW] Balaji Malhotra PTA [RW] Balaji Malhotra PTA [KD] Virgie Ray, VILLALPANDO/L    Mobility Assessment/Training    Left Lower Extremity Weight-Bearing non weight-bearing  -RW non weight-bearing  -RW     Right Lower Extremity Weight-Bearing non weight-bearing  -RW non weight-bearing  -RW     Recorded by [RW] Balaji Malhotra PTA [RW] Balaji Malhotra PTA     Bed Mobility, Assessment/Treatment    Bed Mobility, Roll Left, [place]   conditional independence  -KD    Bed Mobility, Scoot/Bridge, [place]   conditional independence  -KD    Bed Mob, Supine to Sit, [place]  independent  -RW independent   -KD    Bed Mob, Sit to Supine, [place]  independent  -RW     Bed Mob, Sidelying to Sit, [place]   independent  -KD    Recorded by  [RW] Balaji Malhotra, PTA [KD] Virgie Ray, VILLALPANDO/L    Transfer Assessment/Treatment    Transfers, Bed-Chair [place]  conditional independence  -RW conditional independence  -KD    Transfers, Chair-Bed [place]  conditional independence  -RW     Transfers, Sit-Stand [place] not appropriate to assess  -RW not appropriate to assess  -RW     Toilet Transfer, [place]   conditional independence  -KD    Toilet Transfer, Assistive Device   bedside commode with drop arms  -KD    Recorded by [RW] Balaji Malhotra, PTA [RW] Balaji Malhotra, DARIEN [KD] Virgie Ray, VILLALPANDO/L    Gait Assessment/Treatment    Gait, [place] Level not appropriate to assess  -RW not appropriate to assess  -RW     Recorded by [RW] Balaji Malhotra, DARIEN [RW] Balaji Malhotra PTA     Wheelchair Training/Management    Wheelchair Propulsion Training   forward propulsion  -KD    Wheelchair, Distance Propelled  130 mod ind  - mod indep  -KD    Recorded by  [RW] Balaji Malhotra, PTA [KD] Virgie Ray, VILLALPANDO/L    Upper Body Dressing Assessment/Training    UB Dressing Assess/Train, Clothing Type   donning:;t-shirt  -KD    UB Dressing Assess/Train, Position   sitting  -KD    UB Dressing Assess/Train, [place]   independent  -KD    Recorded by   [KD] Virgie Ray, VILLALPANDO/L    Lower Body Dressing Assessment/Training    LB Dressing Assess/Train, Clothing Type   doffing:;donning:;shorts  -KD    LB Dressing Assess/Train, Position   sitting  -KD    LB Dressing Assess/Train, [place]   set up required  -KD    LB Dressing Assess/Train, Impairments   strength decreased  -KD    LB Dressing Assess/Train, Comment   Min A for doffing  -KD    Recorded by   [KD] Virgie Ray, VILLALPANDO/L    Toileting Assessment/Training    Toileting Assess/Train, Assistive Device   bedside commode  -KD    Toileting  Assess/Train, Position   sitting  -KD    Toileting Assess/Train, Indepen Level   set up required  -KD    Recorded by   [KD] IRASEMA Camacho/L    Balance Skills Training    Sitting-Balance Activities  Reaching for weighted objects;Reaching for objects;Reaching across midline  -RW     Recorded by  [RW] Balaji Malhotra PTA     Therapy Exercises    Right Lower Extremity  AROM:;20 reps;quad sets;sitting   2/20  -RW     Left Lower Extremity  AROM:;20 reps;SLR;sitting   2/20  -RW     Bilateral Upper Extremity  15 reps;AROM:;sitting;elbow flexion/extension;shoulder abduction/adduction;shoulder horizontal abd/add   rows and pulldowns  -RW AROM:;20 reps;sitting;elbow flexion/extension;hand pumps;pronation/supination;shoulder abduction/adduction;shoulder extension/flexion;shoulder ER/IR;shoulder horizontal abd/add  -KD    BUE Resistance  --   perstack x 10-35 #  -RW --   UEE x 15 mins; CC 5 sets x 20 reps 20 lbs; RS 5 sets x 20 re  -KD    Recorded by  [RW] Balaji Malhotra PTA [KD] IRASEMA Camacho/L    Positioning and Restraints    Pre-Treatment Position  in bed  -RW in bed  -KD    Post Treatment Position  bed  -RW bsc  -KD    In Bed  call light within reach  -RW     On BS commode   sitting;call light within reach;encouraged to call for assist  -KD    Recorded by  [RW] Balaji Malhotra PTA [KD] ELY Camacho      09/19/17 1445 09/19/17 1350 09/19/17 1035    Rehab Assessment/Intervention    Discipline occupational therapy assistant  -LM physical therapy assistant  -RW occupational therapy assistant  -LM    Document Type therapy note (daily note)  -LM therapy note (daily note)  -RW therapy note (daily note)  -LM    Subjective Information agree to therapy  -LM agree to therapy  -RW agree to therapy  -LM    Patient Effort, Rehab Treatment good  -LM good  -RW good  -LM    Recorded by [LM] IRASEMA Smith/L [RW] Balaji Malhotra PTA [LM] IRASEMA Smith/L    Pain Assessment    Pain Assessment  No/denies pain  -LM No/denies pain  -RW No/denies pain  -LM    Recorded by [LM] NAV SmithA/L [RW] Balaji Malhotra PTA [LM] ANV SmithA/L    Cognitive Assessment/Intervention    Current Cognitive/Communication Assessment functional  -LM functional  -RW functional  -LM    Orientation Status oriented x 4  -LM oriented x 4  -RW oriented x 4  -LM    Follows Commands/Answers Questions 100% of the time  -% of the time  -% of the time  -LM    Recorded by [LM] NAV SmithA/L [RW] Balaji Malhotra PTA [LM] NAV SmithA/L    Mobility Assessment/Training    Left Lower Extremity Weight-Bearing  non weight-bearing  -RW     Right Lower Extremity Weight-Bearing  non weight-bearing  -RW     Recorded by  [RW] Balaji Malhotra PTA     Bed Mobility, Assessment/Treatment    Bed Mobility, Roll Right, [place]   conditional independence  -LM    Bed Mob, Supine to Sit, [place]  independent  -RW independent  -LM    Bed Mob, Sit to Supine, [place]  independent  -RW independent  -LM    Recorded by  [RW] Balaji Malhotra PTA [LM] NAV SmithA/L    Transfer Assessment/Treatment    Transfers, Bed-Chair [place]  conditional independence  -RW conditional independence  -LM    Transfers, Chair-Bed [place] conditional independence  -LM  conditional independence  -LM    Transfers, Sit-Stand [place]  not appropriate to assess  -RW not appropriate to assess  -LM    Recorded by [LM] NAV SmithA/L [RW] Balaji Malhotra PTA [LM] Marce Carter VILLALPANDO/L    Gait Assessment/Treatment    Gait, [place] Level  not appropriate to assess  -RW     Recorded by  [RW] Balaji Malhotra PTA     Wheelchair Training/Management    Wheelchair, Distance Propelled  150 mod ind  -RW --   150  -LM    Recorded by  [RW] Balaji Malhotra PTA [LM] Marce Carter VILLALPANDO/L    Balance Skills Training    Sitting-Balance Activities  Reaching for weighted objects;Reaching for  objects;Reaching across midline  -RW     Recorded by  [RW] Balaji Malhotra PTA     Therapy Exercises    Right Lower Extremity  AROM:;20 reps;quad sets;sitting   2/20  -RW     Left Lower Extremity  AROM:;20 reps;SLR;sitting   2/20  -RW     Bilateral Upper Extremity AROM:   lev 4 tband all UE planes increase strenvth 2-3 sets of 20   -LM  AROM:   all tband ex 20 reps 3 sets rickshaw 3 set 20 and UEE 21 min  -LM    Recorded by [LM] NAV SmithA/L [RW] Balaji Malhotra PTA [LM] NAV SmithA/L    Positioning and Restraints    Pre-Treatment Position sitting in chair/recliner  -LM  sitting in chair/recliner  -LM    Post Treatment Position bed  -LM  bed  -LM    Recorded by [LM] IRASEMA Smith/L  [LM] NAV SmithA/FREEDOM      09/19/17 0805 09/18/17 1400 09/18/17 1030    Rehab Assessment/Intervention    Discipline physical therapy assistant  -RW physical therapy assistant  -RW occupational therapy assistant  -LM    Document Type therapy note (daily note)  -RW therapy note (daily note)  -RW therapy note (daily note)  -LM    Subjective Information agree to therapy  -RW agree to therapy  -RW agree to therapy  -LM    Patient Effort, Rehab Treatment good  -RW good  -RW good  -LM    Recorded by [RW] Balaji Malhotra PTA [RW] Balaji Malhotra PTA [LM] NAV SmithA/L    Pain Assessment    Pain Assessment No/denies pain  -RW --   left foot hurting but gives no rating  -RW     Pain Score   5  -LM    Post Pain Score   4  -LM    Pain Location   Ankle  -LM    Pain Orientation   Left  -LM    Pain Intervention(s)   Medication (See MAR)  -LM    Recorded by [RW] Balaji Malhotra PTA [RW] Balaji Malhotra PTA [LM] NAV SmithA/L    Cognitive Assessment/Intervention    Current Cognitive/Communication Assessment functional  -RW functional  -RW functional  -LM    Orientation Status oriented x 4  -RW oriented x 4  -RW oriented x 4  -LM    Follows Commands/Answers Questions 100% of the time  -RW  100% of the time  -% of the time  -LM    Personal Safety Interventions gait belt  -RW  gait belt  -LM    Recorded by [RW] Balaji Malhotra PTA [RW] Balaji Malhotra PTA [LM] Marce Carter, VILLALPANDO/L    General LE Assessment    ROM Detail 95 arom  -RW      Recorded by [RW] Balaji Malhotra PTA      Mobility Assessment/Training    Left Lower Extremity Weight-Bearing non weight-bearing  -RW non weight-bearing  -RW     Right Lower Extremity Weight-Bearing non weight-bearing  -RW non weight-bearing  -RW     Recorded by [RW] Balaji Malhotra PTA [RW] Balaji Malhotra PTA     Bed Mobility, Assessment/Treatment    Bed Mobility, Roll Right, [place] conditional independence  -RW conditional independence  -RW conditional independence  -LM    Bed Mob, Supine to Sit, [place] independent  -RW independent  -RW independent  -LM    Bed Mob, Sit to Supine, [place] independent  -RW independent  -RW independent  -LM    Recorded by [RW] Balaji Malhotra PTA [RW] Balaji Malhotra, DARIEN [LM] Marce Carter, VILLALPANDO/L    Transfer Assessment/Treatment    Transfers, Bed-Chair [place] conditional independence  -RW  conditional independence  -LM    Transfers, Chair-Bed [place] conditional independence  -RW  conditional independence  -LM    Transfers, Sit-Stand [place] not appropriate to assess  -RW not appropriate to assess  -RW not appropriate to assess  -LM    Recorded by [RW] Balaji Malhotra PTA [RW] Balaji Malhotra PTA [LM] Marce Carter, VILLALPANDO/L    Gait Assessment/Treatment    Gait, [place] Level not appropriate to assess  -RW not appropriate to assess  -RW not appropriate to assess  -LM    Recorded by [RW] Balaji Malhotra PTA [RW] Balaji Malhotra PTA [LM] Marce Carter, VILLALPANDO/L    Wheelchair Training/Management    Wheelchair, Distance Propelled 150 mod ind  -RW  130  -LM    Wheelchair Training Comment   --   mod I  -LM    Recorded by [RW] Balaji Malhotra PTA  [LM] Marce Carter, VILLALPANDO/L     Balance Skills Training    Sitting-Balance Activities Reaching for weighted objects;Reaching for objects;Reaching across midline  -RW      Recorded by [RW] Balaji Malhotra PTA      Therapy Exercises    Right Lower Extremity AROM:;20 reps;supine;ankle pumps/circles;quad sets  -RW AROM:;20 reps;supine;ankle pumps/circles;quad sets  -RW     Left Lower Extremity AROM:;20 reps;supine;hip abduction/adduction;SLR;heel slides  -RW AROM:;20 reps;supine;hip abduction/adduction;SLR;heel slides  -RW     Bilateral Lower Extremities AROM:;20 reps;glut sets;quad sets   2 sets  -RW AROM:;20 reps;glut sets;quad sets  -RW     Bilateral Upper Extremity 20 reps;AROM:;sitting   tband rows 2 sets  -RW  AROM:;elbow flexion/extension;shoulder abduction/adduction;shoulder extension/flexion;shoulder horizontal abd/add;shoulder protraction/retraction   level 3 tband all planes and rickshaw 5x 20 reps  -LM    BUE Resistance   manual resistance- minimal   UEE bike x 20 min   -LM    Trunk Exercises 10 reps   abdominal crunchs 2 sets  -RW 10 reps   abdominal crunchs 2 sets  -RW     Recorded by [RW] Balaji Malhotra PTA [RW] Balaji Malhotra PTA [LM] Marce Carter, VILLALPANDO/L    Positioning and Restraints    Pre-Treatment Position sitting in chair/recliner  -RW in bed  -RW sitting in chair/recliner  -LM    Post Treatment Position wheelchair  -RW bed  -RW bed  -LM    In Bed  call light within reach  -RW     Recorded by [RW] Balaji Malhotra PTA [RW] Balaji Malhotra PTA [LM] Marce Carter, VILLALPANDO/L      09/18/17 0755          Rehab Assessment/Intervention    Discipline physical therapy assistant  -RW      Document Type therapy note (daily note)  -RW      Subjective Information agree to therapy  -RW      Patient Effort, Rehab Treatment good  -RW      Recorded by [RW] Balaji Malhotra PTA      Pain Assessment    Pain Score 5  -RW      Post Pain Score 3  -RW      Pain Location Ankle  -RW      Pain Orientation Left  -RW      Pain Intervention(s)  Medication (See MAR)  -RW      Recorded by [RW] Balaji Malhotra PTA      Cognitive Assessment/Intervention    Current Cognitive/Communication Assessment functional  -RW      Orientation Status oriented x 4  -RW      Follows Commands/Answers Questions 100% of the time  -RW      Personal Safety Interventions gait belt  -RW      Recorded by [RW] Balaji Malhotra PTA      Mobility Assessment/Training    Left Lower Extremity Weight-Bearing non weight-bearing  -RW      Right Lower Extremity Weight-Bearing non weight-bearing  -RW      Recorded by [RW] Balaji Malhotra PTA      Bed Mobility, Assessment/Treatment    Bed Mobility, Roll Right, [place] conditional independence  -RW      Bed Mob, Supine to Sit, [place] independent  -RW      Bed Mob, Sit to Supine, [place] independent  -RW      Recorded by [RW] Balaji Malhotra PTA      Transfer Assessment/Treatment    Transfers, Bed-Chair [place] conditional independence;set up required  -RW      Transfers, Sit-Stand [place] not appropriate to assess  -RW      Recorded by [RW] Balaji Malhotra PTA      Gait Assessment/Treatment    Gait, [place] Level not appropriate to assess  -RW      Recorded by [RW] Balaji Malhotra PTA      Wheelchair Training/Management    Wheelchair Transfer Type lateral transfer  -RW      Wheelchair, Distance Propelled 130  -RW      Wheelchair Training Comment mod ind  -RW      Recorded by [RW] Balaji Malhotra PTA      Therapy Exercises    Right Lower Extremity AROM:;20 reps;supine;ankle pumps/circles;quad sets   2x20  -RW      Left Lower Extremity AROM:;20 reps;supine;hip abduction/adduction;SLR;heel slides   2x20  -RW      Bilateral Lower Extremities AROM:;20 reps;glut sets;quad sets   2x20  -RW      Bilateral Upper Extremity AROM:;20 reps;sitting   tband rows 2/20 with btb amd pro2 x 10 min l2  -RW      Recorded by [RW] Balaji Malhotra PTA      Positioning and Restraints    Pre-Treatment Position in bed  -RW      Post Treatment  Position wheelchair  -RW      Recorded by [RW] Balaji Malhotra PTA        User Key  (r) = Recorded By, (t) = Taken By, (c) = Cosigned By    Initials Name Effective Dates    RW Balaji Malhotra, PTA 10/17/16 -     KD Virgie Ray, VILLALPANDO/L 10/17/16 -     LM Marce Carter, VILLALPANDO/L 10/17/16 -                 IP PT Goals       09/20/17 1424 09/19/17 1443 09/18/17 1445    Transfer Training PT LTG    Transfer Training PT  LTG, Date Goal Reviewed   09/18/17  -RW    Transfer Training PT LTG, Outcome   goal met  -RW    Range of Motion PT LTG    Range of Motion Goal PT LTG, Date Goal Reviewed 09/20/17  -RW 09/19/17  -RW     Range of Motion Goal PT LTG, Outcome goal ongoing  -RW goal ongoing  -RW       09/18/17 1444 09/16/17 1255 09/15/17 1556    Transfer Training PT LTG    Transfer Training PT  LTG, Date Goal Reviewed  09/16/17  -JA 09/15/17  -RW    Transfer Training PT LTG, Outcome  goal ongoing  -JA goal ongoing  -RW    Range of Motion PT LTG    Range of Motion Goal PT LTG, Date Goal Reviewed 09/18/17  -RW 09/16/17  -JA 09/15/17  -RW    Range of Motion Goal PT LTG, Outcome goal ongoing  -RW goal ongoing  -JA goal ongoing  -RW      09/14/17 1557 09/13/17 1533 09/12/17 1355    Bed Mobility PT LTG    Bed Mobility PT LTG, Date Goal Reviewed 09/14/17  -RW 09/13/17  -RW 09/12/17  -LM    Bed Mobility PT LTG, Outcome goal met  -RW goal ongoing  -RW goal ongoing  -LM    Transfer Training PT LTG    Transfer Training PT  LTG, Date Goal Reviewed 09/14/17  -RW 09/13/17  -RW 09/12/17  -LM    Transfer Training PT LTG, Outcome goal ongoing  -RW goal ongoing  -RW goal ongoing  -LM    Range of Motion PT LTG    Range of Motion Goal PT LTG, Date Goal Reviewed 09/14/17  -RW 09/13/17  -RW 09/12/17  -LM    Range of Motion Goal PT LTG, Outcome goal ongoing  -RW goal ongoing  -RW goal ongoing  -LM    Wheelchair Propulsion PT LTG    Wheelchair Propulsion Goal PT LTG, Date Established   09/12/17  -LM    Wheelchair Propulsion Goal PT LTG, Time to  Achieve   by discharge  -LM    Wheelchair Propulsion Goal PT LTG, [place] Level   conditional independence  -LM    Wheelchair Propulsion Goal PT LTG, Distance to Achieve   300 feet  -LM    Wheelchair Propulsion Goal PT LTG, Date Goal Reviewed 09/14/17  -RW 09/13/17  -RW     Wheelchair Propulsion Goal PT LTG, Outcome goal met  -RW goal ongoing  -RW goal ongoing  -LM    Physical Therapy PT LTG    Physical Therapy PT LTG, Date Established   09/12/17  -LM    Physical Therapy PT LTG, Time to Achieve   by discharge  -LM    Physical Therapy PT LTG, Activity Type   Pt will self propel w/c up/down ramp.  -LM    Physical Therapy PT LTG, [place] Level   conditional independence  -LM    Physical Therapy PT LTG, Date Goal Reviewed 09/14/17  -RW 09/13/17  -RW     Physical Therapy PT LTG, Outcome goal met  -RW goal ongoing  -RW goal ongoing  -LM      09/11/17 1500 09/11/17 1040 09/10/17 1300    Bed Mobility PT LTG    Bed Mobility PT LTG, Date Established 09/11/17  -LM      Bed Mobility PT LTG, Time to Achieve by discharge  -LM      Bed Mobility PT LTG, Activity Type supine to sit/sit to supine  -LM      Bed Mobility PT LTG, [place] Level conditional independence  -LM      Bed Mobility PT Goal  LTG, Assist Device overhead trapeze;bed rails  -LM      Bed Mobility PT LTG, Outcome goal ongoing  -LM      Transfer Training PT LTG    Transfer Training PT LTG, Date Established 09/11/17  -LM      Transfer Training PT LTG, Time to Achieve by discharge  -LM      Transfer Training PT LTG, Activity Type bed to chair /chair to bed  -LM      Transfer Training PT LTG, [place] Level conditional independence  -LM      Transfer Training PT LTG, Assist Device --   AAD  -LM      Transfer Training PT  LTG, Date Goal Reviewed  09/11/17  -AM 09/10/17  -MAGNO    Transfer Training PT LTG, Outcome goal ongoing  -LM goal not met  -AM goal ongoing  -MAGNO    Transfer Training PT LTG, Reason Goal Not Met  discharged from facility  -AM     Range  of Motion PT LTG    Range of Motion Goal PT LTG, Date Established 09/11/17  -LM      Range of Motion Goal PT LTG, Time to Achieve by discharge  -LM      Range fo Motion Goal PT LTG, Joint L knee  -LM      Range of Motion Goal PT LTG, AROM Measure Knee flex - 110 degrees  -LM      Range of Motion Goal PT LTG, Outcome goal ongoing  -      Patient Education PT LTG    Patient Education PT LTG, Date Goal Reviewed  09/11/17  -AM 09/10/17  -MAGNO    Patient Education PT LTG Outcome  goal met  -AM goal ongoing  -    Caregiver Training PT LTG    Caregiver Training PT LTG, Date Goal Reviewed  09/11/17  -AM 09/10/17  -MAGNO    Caregiver Training PT LTG, Outcome  goal not met  -AM goal ongoing  -    Caregiver Training PT LTG, Reason Goal Not Met  discharged from facility  -AM     Physical Therapy PT STG    Physical Therapy PT STG, Date Goal Reviewed   09/10/17  -    Physical Therapy PT STG, Outcome   goal met  -MAGNO      09/09/17 1626          Transfer Training PT LTG    Transfer Training PT LTG, Date Established 09/09/17  -JCA      Transfer Training PT LTG, Time to Achieve by discharge  -JCA      Transfer Training PT LTG, Activity Type bed to chair /chair to bed  -A      Transfer Training PT LTG, Additional Goal Once MD allows, pt will perform lateral transfer with conditional independence  -A      Transfer Training PT LTG, Outcome goal ongoing  -JCA      Patient Education PT LTG    Patient Education PT LTG, Date Established 09/09/17  -A      Patient Education PT LTG, Time to Achieve by discharge  -JCA      Patient Education PT LTG, Education Type precaution per surgeon;transfers;bed mobility;pain management;home safety  -A      Patient Education PT LTG Outcome goal ongoing  -JCA      Caregiver Training PT LTG    Caregiver Training PT LTG, Date Established 09/09/17  -JCA      Caregiver Training PT LTG, Time to Achieve by discharge  -JCA      Caregiver Training PT LTG, Activity Type home caregiver will demonstrate  understanding assisting pt as needed with transfers/mobility as well as verbalize understanding of home care instructions  -[facility]      Caregiver Training PT LTG, Outcome goal ongoing  -[facility]      Physical Therapy PT STG    Physical Therapy PT STG, Date Established 09/09/17  -[facility]      Physical Therapy PT STG, Time to Achieve by discharge  -[facility]      Physical Therapy PT STG, Activity Type Pt will tolerate OOB 3-4 hours per day  -[facility]      Physical Therapy PT STG, Outcome goal ongoing  -[facility]        User Key  (r) = Recorded By, (t) = Taken By, (c) = Cosigned By    Initials Name Provider Type     Isela Chris, PT Physical Therapist    HELEN Abel, PT Physical Therapist    ERICH Felton, PTA Physical Therapy Assistant    MAGNO All Pendleton, PTA Physical Therapy Assistant    SUSIE Young, PTA Physical Therapy Assistant    RW Balaji Malhotra, PTA Physical Therapy Assistant          Physical Therapy Education     Title: PT OT SLP Therapies (Active)     Topic: Physical Therapy (Done)     Point: Mobility training (Done)    Learning Progress Summary    Learner Readiness Method Response Comment Documented by Status   Patient Acceptance E VU reviewed  non wt bearing and transfering to from  RW 09/13/17 1140 Done    Acceptance E NR Reviewed transferring towards stronger side and maintaining NWB with activity.  09/12/17 1608 Active    Acceptance E NR  TA 09/12/17 1515 Active    Acceptance E NR Reviewed safety with transfers.  Explained that Dr. Rutherford only wants stretcher chair used at this time.  09/11/17 1636 Active               Point: Home exercise program (Done)    Learning Progress Summary    Learner Readiness Method Response Comment Documented by Status   Patient Acceptance E VU provided picture HEP RW 09/20/17 1256 Done               Point: Precautions (Done)    Learning Progress Summary    Learner Readiness Method Response Comment Documented by Status   Patient Acceptance E VU reviewed  non wt bearing and  transfering to from  RW 09/13/17 1140 Done    Acceptance E NR Reviewed transferring towards stronger side and maintaining NWB with activity.  09/12/17 1608 Active    Acceptance E NR  TA 09/12/17 1515 Active    Acceptance E NR Reviewed safety with transfers.  Explained that Dr. Rutherford only wants stretcher chair used at this time.  09/11/17 1636 Active                      User Key     Initials Effective Dates Name Provider Type Discipline     06/15/16 -  Isela Chris, PT Physical Therapist PT    TA 10/17/16 -  Geri Juan, PTA Physical Therapy Assistant PT     10/17/16 -  Balaji Malhotra, DARIEN Physical Therapy Assistant PT                    PT Recommendation and Plan  Anticipated Discharge Disposition: home with home health  Planned Therapy Interventions: balance training, bed mobility training, home exercise program, motor coordination training, neuromuscular re-education, patient/family education, postural re-education, ROM (Range of Motion), strengthening, stretching, transfer training  PT Frequency: other (see comments) (5-14 times/wk)  Plan of Care Review  Plan Of Care Reviewed With: patient  Outcome Summary/Follow up Plan: overall doing well and on track for OR home friday.          Outcome Measures       09/20/17 1100 09/20/17 0730 09/18/17 0900    How much help from another person do you currently need...    Turning from your back to your side while in flat bed without using bedrails? 4  -RW  4  -RW    Moving from lying on back to sitting on the side of a flat bed without bedrails? 4  -RW  4  -RW    Moving to and from a bed to a chair (including a wheelchair)? 4  -RW  4  -RW    Standing up from a chair using your arms (e.g., wheelchair, bedside chair)? 1  -RW  1  -RW    Climbing 3-5 steps with a railing? 1  -RW  1  -RW    To walk in hospital room? 1  -RW  1  -RW    AM-PAC 6 Clicks Score 15  -RW  15  -RW    How much help from another is currently needed...    Putting on and taking off regular lower  body clothing?  3  -KD     Bathing (including washing, rinsing, and drying)  3  -KD     Toileting (which includes using toilet bed pan or urinal)  3  -KD     Putting on and taking off regular upper body clothing  4  -KD     Taking care of personal grooming (such as brushing teeth)  4  -KD     Eating meals  4  -KD     Score  21  -KD       User Key  (r) = Recorded By, (t) = Taken By, (c) = Cosigned By    Initials Name Provider Type    JOHN Malhotra PTA Physical Therapy Assistant    IRASEMA Shelby/L Occupational Therapy Assistant           Time Calculation:         PT Charges       09/20/17 1426 09/20/17 1257       Time Calculation    Start Time 1300  -RW 1027  -RW     Stop Time 1338  -RW 1120  -RW     Time Calculation (min) 38 min  -RW 53 min  -RW     Time Calculation- PT    Total Timed Code Minutes- PT 38 minute(s)  -RW 53 minute(s)  -RW       User Key  (r) = Recorded By, (t) = Taken By, (c) = Cosigned By    Initials Name Provider Type    JOHN Malhotra PTA Physical Therapy Assistant          Therapy Charges for Today     Code Description Service Date Service Provider Modifiers Qty    96542516896 HC PT THER PROC EA 15 MIN 9/19/2017 Balaji Malhotra PTA GP 2    40096987094 HC PT THERAPEUTIC ACT EA 15 MIN 9/19/2017 Balaji Malhotra PTA GP 2    26353993396 HC PT THER PROC EA 15 MIN 9/19/2017 Balaji Malhotra PTA GP 2    66101501442 HC PT THERAPEUTIC ACT EA 15 MIN 9/19/2017 Balaji Malhotra PTA GP 1    45331731718 HC PT THER PROC EA 15 MIN 9/20/2017 Balaji Malhotra PTA GP 3    41341338976 HC PT THERAPEUTIC ACT EA 15 MIN 9/20/2017 Balaji Malhotra PTA GP 1    16533641979 HC PT THER PROC EA 15 MIN 9/20/2017 Balaji Malhotra PTA GP 3          PT G-Codes  PT Professional Judgement Used?: Yes  Outcome Measure Options: AM-PAC 6 Clicks Daily Activity (OT)  Score: 12  Functional Limitation: Mobility: Walking and moving around  Mobility: Walking and Moving Around Current Status (): At least 60 percent but less  than 80 percent impaired, limited or restricted  Mobility: Walking and Moving Around Goal Status (): At least 20 percent but less than 40 percent impaired, limited or restricted    Balaji Malhotra, PTA  9/20/2017

## 2017-09-20 NOTE — PLAN OF CARE
Problem: Patient Care Overview (Adult)  Goal: Plan of Care Review  Outcome: Ongoing (interventions implemented as appropriate)    09/20/17 0330   Coping/Psychosocial Response Interventions   Plan Of Care Reviewed With patient   Patient Care Overview   Progress no change   Outcome Evaluation   Outcome Summary/Follow up Plan pt doing well this shift          Problem: Orthopaedic Fracture (Adult)  Goal: Signs and Symptoms of Listed Potential Problems Will be Absent or Manageable (Orthopaedic Fracture)  Outcome: Ongoing (interventions implemented as appropriate)    Problem: Fall Risk (Adult)  Goal: Absence of Falls  Outcome: Ongoing (interventions implemented as appropriate)

## 2017-09-20 NOTE — PLAN OF CARE
Problem: Patient Care Overview (Adult)  Goal: Plan of Care Review  Outcome: Ongoing (interventions implemented as appropriate)    09/20/17 1424   Coping/Psychosocial Response Interventions   Plan Of Care Reviewed With patient   Outcome Evaluation   Outcome Summary/Follow up Plan overall doing well and on track for dc home friday.         Problem: Inpatient Physical Therapy  Goal: Range of Motion Goal LTG- PT  Outcome: Ongoing (interventions implemented as appropriate)    09/11/17 1500 09/20/17 1424   Range of Motion PT LTG   Range of Motion Goal PT LTG, Date Established 09/11/17 --    Range of Motion Goal PT LTG, Time to Achieve by discharge --    Range fo Motion Goal PT LTG, Joint L knee --    Range of Motion Goal PT LTG, AROM Measure Knee flex - 110 degrees --    Range of Motion Goal PT LTG, Date Goal Reviewed --  09/20/17   Range of Motion Goal PT LTG, Outcome --  goal ongoing

## 2017-09-20 NOTE — DISCHARGE INSTR - OTHER ORDERS
[facility]: Andrew Plata  DME ordered and approved by Dr Rodríguez's office.  DME delivery arrangements scheduled for Friday 9-

## 2017-09-20 NOTE — PROGRESS NOTES
"Anticoagulation by Pharmacy - Warfarin Day 13 of 28    Vangie Lopez is a 67 y.o.male  [Ht: 69\" (175.3 cm); Wt: 177 lb (80.3 kg)] on Warfarin 4 mg PO  for indication of VTE prophylaxis s/p ortho procedure.    Goal INR: 1.7-2.5  Today's INR:   Lab Results   Component Value Date    INR 1.79 (H) 09/20/2017         Lab Results   Component Value Date    INR 1.79 (H) 09/20/2017    INR 2.07 (H) 09/19/2017    INR 2.00 (H) 09/18/2017    PROTIME 20.9 (H) 09/20/2017    PROTIME 23.4 (H) 09/19/2017    PROTIME 22.8 (H) 09/18/2017     Lab Results   Component Value Date    HGB 12.7 (L) 09/16/2017    HGB 12.0 (L) 09/12/2017    HGB 13.2 (L) 09/10/2017    HGB 13.2 (L) 09/10/2017     Lab Results   Component Value Date    HCT 36.1 (L) 09/16/2017    HCT 33.4 (L) 09/12/2017    HCT 37.0 (L) 09/10/2017    HCT 37.0 (L) 09/10/2017     Assessment/Plan:  INR is within goal, however has been steadily dropping over the past several days, including a large drop from yesterday. Will increase to 4 mg warfarin daily to stay within INR goal range. Pharmacy will continue to follow and adjust accordingly.    Ilsa Ferrara, [facility]  09/20/17 2:59 PM       "

## 2017-09-21 VITALS
WEIGHT: 177 LBS | SYSTOLIC BLOOD PRESSURE: 132 MMHG | OXYGEN SATURATION: 99 % | HEIGHT: 69 IN | DIASTOLIC BLOOD PRESSURE: 73 MMHG | HEART RATE: 84 BPM | TEMPERATURE: 97.2 F | RESPIRATION RATE: 18 BRPM | BODY MASS INDEX: 26.22 KG/M2

## 2017-09-21 LAB
GLUCOSE BLDC GLUCOMTR-MCNC: 113 MG/DL (ref 70–130)
GLUCOSE BLDC GLUCOMTR-MCNC: 115 MG/DL (ref 70–130)
GLUCOSE BLDC GLUCOMTR-MCNC: 123 MG/DL (ref 70–130)
GLUCOSE BLDC GLUCOMTR-MCNC: 166 MG/DL (ref 70–130)
HOLD SPECIMEN: NORMAL
INR PPP: 1.86 (ref 0.8–1.2)
PROTHROMBIN TIME: 21.5 SECONDS (ref 11.1–15.3)
WHOLE BLOOD HOLD SPECIMEN: NORMAL

## 2017-09-21 PROCEDURE — 85610 PROTHROMBIN TIME: CPT | Performed by: FAMILY MEDICINE

## 2017-09-21 PROCEDURE — 99232 SBSQ HOSP IP/OBS MODERATE 35: CPT | Performed by: FAMILY MEDICINE

## 2017-09-21 PROCEDURE — 97110 THERAPEUTIC EXERCISES: CPT

## 2017-09-21 PROCEDURE — 82962 GLUCOSE BLOOD TEST: CPT

## 2017-09-21 PROCEDURE — 97530 THERAPEUTIC ACTIVITIES: CPT

## 2017-09-21 RX ADMIN — HYDROCODONE BITARTRATE AND ACETAMINOPHEN 1 TABLET: 10; 325 TABLET ORAL at 11:22

## 2017-09-21 RX ADMIN — POTASSIUM CITRATE 10 MEQ: 10 TABLET ORAL at 07:13

## 2017-09-21 RX ADMIN — Medication 1 TABLET: at 08:26

## 2017-09-21 RX ADMIN — WARFARIN SODIUM 4 MG: 4 TABLET ORAL at 17:27

## 2017-09-21 RX ADMIN — Medication 25 MG: at 20:03

## 2017-09-21 RX ADMIN — SENNOSIDES AND DOCUSATE SODIUM 1 TABLET: 8.6; 5 TABLET ORAL at 08:26

## 2017-09-21 RX ADMIN — HYDROCODONE BITARTRATE AND ACETAMINOPHEN 1 TABLET: 10; 325 TABLET ORAL at 15:58

## 2017-09-21 RX ADMIN — HYDROCODONE BITARTRATE AND ACETAMINOPHEN 1 TABLET: 10; 325 TABLET ORAL at 22:04

## 2017-09-21 RX ADMIN — SENNOSIDES AND DOCUSATE SODIUM 1 TABLET: 8.6; 5 TABLET ORAL at 17:27

## 2017-09-21 RX ADMIN — METFORMIN HYDROCHLORIDE 500 MG: 500 TABLET, EXTENDED RELEASE ORAL at 07:13

## 2017-09-21 RX ADMIN — FERROUS SULFATE TAB EC 324 MG (65 MG FE EQUIVALENT) 324 MG: 324 (65 FE) TABLET DELAYED RESPONSE at 07:13

## 2017-09-21 RX ADMIN — ATORVASTATIN CALCIUM 10 MG: 10 TABLET, FILM COATED ORAL at 08:26

## 2017-09-21 RX ADMIN — METFORMIN HYDROCHLORIDE 500 MG: 500 TABLET, EXTENDED RELEASE ORAL at 17:27

## 2017-09-21 RX ADMIN — POTASSIUM CITRATE 10 MEQ: 10 TABLET ORAL at 17:27

## 2017-09-21 RX ADMIN — HYDROCHLOROTHIAZIDE 25 MG: 25 TABLET ORAL at 08:26

## 2017-09-21 RX ADMIN — FAMOTIDINE 40 MG: 40 TABLET ORAL at 08:26

## 2017-09-21 RX ADMIN — HYDROCODONE BITARTRATE AND ACETAMINOPHEN 1 TABLET: 10; 325 TABLET ORAL at 07:11

## 2017-09-21 RX ADMIN — FINASTERIDE 5 MG: 5 TABLET, FILM COATED ORAL at 20:03

## 2017-09-21 NOTE — THERAPY TREATMENT NOTE
Inpatient Rehabilitation - Occupational Therapy Treatment Note  [facility]     Patient Name: Vangie Lopez  : 1950  MRN: [id number]  Today's Date: 2017  Onset of Illness/Injury or Date of Surgery Date: 17  Date of Referral to OT: 17  Referring Physician: Dr. Stephen      Admit Date: 2017    Visit Dx:     ICD-10-CM ICD-9-CM   1. Multiple trauma T07 959.8   2. Abnormality of gait and mobility R26.9 781.2   3. Muscle weakness (generalized) M62.81 728.87   4. Impaired mobility and ADLs Z74.09 799.89   5. Closed displaced comminuted fracture of right patella with routine healing, subsequent encounter S82.041D V54.16   6. Closed displaced fracture of navicular bone of left foot with routine healing, subsequent encounter S92.252D V54.19     Patient Active Problem List   Diagnosis   • Encounter for screening for malignant neoplasm of colon   • Closed displaced comminuted fracture of right patella   • Closed displaced fracture of navicular bone of left foot   • Closed dislocation of navicular bone of left foot   • Patella fracture   • Essential hypertension   • Mixed hyperlipidemia   • Osteoarthritis of hands, bilateral   • BPH (benign prostatic hyperplasia)   • Glaucoma   • Hyperlipidemia   • Closed fracture of navicular bone with dislocation of perilunate joint of left wrist   • Fracture of patella, right, closed   • Multiple trauma   • Encounter for rehabilitation   • History of kidney stones   • Recent onset of diabetes mellitus             Adult Rehabilitation Note       17 1330 17 1015 17 0830    Rehab Assessment/Intervention    Discipline occupational therapy assistant  -LM physical therapy assistant  -JA occupational therapy assistant  -LM    Document Type therapy note (daily note)  -LM therapy note (daily note)  -JA therapy note (daily note)  -LM    Subjective Information agree to therapy  -LM agree to therapy  -JA agree to therapy  -LM    Patient Effort,  Rehab Treatment good  -LM good  -JA good  -LM    Precautions/Limitations  non-weight bearing status  -JA     Recorded by [LM] IRASEMA Smith/FREEDOM [JA] Dwayne Felton PTA [LM] IRASEMA Smith/L    Pain Assessment    Pain Assessment No/denies pain  -LM 0-10  -JA     Pain Score  5  -JA     Post Pain Score 0  -LM 5  -JA     Pain Type Acute pain  -LM Acute pain  -JA     Pain Location  Ankle  -JA     Pain Orientation  Left  -JA     Pain Intervention(s)  Medication (See MAR)  -JA Medication (See MAR)  -LM    Recorded by [LM] IRASEMA Smith/FREEDOM [JA] Dwayne eFlton PTA [LM] IRASEMA Smith/L    General LE Assessment    ROM  knee, left: LE ROM deficit  -JA     ROM Detail  96arom  -JA     Recorded by  [JA] Dwayne Felton PTA     Mobility Assessment/Training    Left Lower Extremity Weight-Bearing  non weight-bearing  -JA     Right Lower Extremity Weight-Bearing  non weight-bearing  -JA     Recorded by  [JA] Dwayne Felton PTA     Bed Mobility, Assessment/Treatment    Bed Mob, Supine to Sit, [place]   independent  -LM    Bed Mob, Sit to Supine, [place]   independent  -LM    Recorded by   [LM] IRASEMA Smith/L    Transfer Assessment/Treatment    Transfers, Chair-Bed [place]   conditional independence  -LM    Recorded by   [LM] IRASEMA Smith/L    Therapy Exercises    Right Lower Extremity  AROM:;20 reps;supine;ankle pumps/circles   2x20  -JA     Left Lower Extremity  AROM:;20 reps;supine;hip abduction/adduction;SLR;heel slides   2x20  -JA     Bilateral Lower Extremities  AROM:;20 reps;supine;glut sets;quad sets   2x20  -JA     BLE Resistance   manual resistance- minimal   level 4 tband all planes 2-3 sets of 20 reps   -LM    Bilateral Upper Extremity AROM:;20 reps;elbow flexion/extension;pronation/supination;shoulder abduction/adduction;shoulder extension/flexion;shoulder horizontal abd/add;shoulder protraction/retraction;shoulder rolls/shrugs   3  sets of 20 reps all ex with 3lb wt   -LM  AROM:   essie x 3 sets of 20 reps   -LM    Recorded by [LM] NAV SmithA/FREEDOM [JA] Dwayne Felton PTA [LM] NAV SmithA/L    Positioning and Restraints    Pre-Treatment Position in bed  -LM in bed  -JA sitting in chair/recliner  -LM    Post Treatment Position bed  -LM bed  -JA bed  -LM    Recorded by [LM] NAV SmithA/FREEDOM [JA] Dwayne Felton PTA [LM] NAV SmithA/L      09/20/17 1300 09/20/17 1027 09/20/17 0730    Rehab Assessment/Intervention    Discipline physical therapy assistant  -RW physical therapy assistant  -RW occupational therapy assistant  -KD    Document Type therapy note (daily note)  -RW therapy note (daily note)  -RW therapy note (daily note)  -KD    Subjective Information agree to therapy  -RW agree to therapy  -RW agree to therapy  -KD    Patient Effort, Rehab Treatment good  -RW good  -RW     Recorded by [RW] Balaji Malhotra PTA [RW] Balaji Malhotra PTA [KD] Virgie Ray, VILLALPANDO/L    Vital Signs    Pre Systolic BP Rehab   147  -KD    Pre Treatment Diastolic BP   80  -KD    Pretreatment Heart Rate (beats/min)   83  -KD    Posttreatment Heart Rate (beats/min)   90  -KD    Pre SpO2 (%)   96  -KD    O2 Delivery Pre Treatment   room air  -KD    Post SpO2 (%)   98  -KD    O2 Delivery Post Treatment   room air  -KD    Pre Patient Position   Supine  -KD    Intra Patient Position   Sitting  -KD    Post Patient Position   Sitting  -KD    Recorded by   [KD] Virgie Ray, VILLALPANDO/L    Pain Assessment    Pain Assessment  No/denies pain  -RW No/denies pain  -KD    Pain Score --   gives no rating  -RW      Pain Location Ankle  -RW      Pain Orientation Left  -RW      Recorded by [RW] Balaji Malhotra PTA [RW] Balaji Malhotra PTA [KD] Virgie Ray, VILLALPANDO/L    Cognitive Assessment/Intervention    Current Cognitive/Communication Assessment functional  -RW functional  -RW functional  -KD    Orientation Status oriented x 4   -RW oriented x 4  -RW oriented x 4  -KD    Follows Commands/Answers Questions 100% of the time  -% of the time  -% of the time  -KD    Personal Safety   WNL/WFL  -KD    Personal Safety Interventions gait belt  -RW gait belt  -RW gait belt  -KD    Recorded by [RW] Balaji Malhotra PTA [RW] Balaji Malhotra PTA [KD] Virgie Ray, VILLALPANDO/L    Mobility Assessment/Training    Left Lower Extremity Weight-Bearing non weight-bearing  -RW non weight-bearing  -RW     Right Lower Extremity Weight-Bearing non weight-bearing  -RW non weight-bearing  -RW     Recorded by [RW] Balaji Malhotra PTA [RW] Balaji Malhotra PTA     Bed Mobility, Assessment/Treatment    Bed Mobility, Roll Left, [place]   conditional independence  -KD    Bed Mobility, Scoot/Bridge, [place]   conditional independence  -KD    Bed Mob, Supine to Sit, [place]  independent  -RW independent  -KD    Bed Mob, Sit to Supine, [place]  independent  -RW     Bed Mob, Sidelying to Sit, [place]   independent  -KD    Recorded by  [RW] Balaji Malhotra PTA [KD] Virgie Ray, VILL[place]O/L    Transfer Assessment/Treatment    Transfers, Bed-Chair [place]  conditional independence  -RW conditional independence  -KD    Transfers, Chair-Bed [place]  conditional independence  -RW     Transfers, Sit-Stand [place] not appropriate to assess  -RW not appropriate to assess  -RW     Toilet Transfer, [place]   conditional independence  -KD    Toilet Transfer, Assistive Device   bedside commode with drop arms  -KD    Recorded by [RW] Baljai Malhotra PTA [RW] Balaji Malhotra, DARIEN [KD] Virgie Ray, VILLALPANDO/L    Gait Assessment/Treatment    Gait, [place] Level not appropriate to assess  -RW not appropriate to assess  -RW     Recorded by [RW] Balaji Malhotra PTA [RW] Balaji Malhotra PTA     Wheelchair Training/Management    Wheelchair Propulsion Training   forward propulsion  -KD    Wheelchair, Distance Propelled  130 mod ind  - mod  indep  -KD    Recorded by  [RW] Balaji Malhotra PTA [KD] NAV CamachoA/L    Upper Body Dressing Assessment/Training    UB Dressing Assess/Train, Clothing Type   donning:;t-shirt  -KD    UB Dressing Assess/Train, Position   sitting  -KD    UB Dressing Assess/Train, [place]   independent  -KD    Recorded by   [KD] NAV CamachoA/L    Lower Body Dressing Assessment/Training    LB Dressing Assess/Train, Clothing Type   doffing:;donning:;shorts  -KD    LB Dressing Assess/Train, Position   sitting  -KD    LB Dressing Assess/Train, [place]   set up required  -KD    LB Dressing Assess/Train, Impairments   strength decreased  -KD    LB Dressing Assess/Train, Comment   Min A for doffing  -KD    Recorded by   [KD] IRASEMA Camacho/L    Toileting Assessment/Training    Toileting Assess/Train, Assistive Device   bedside commode  -KD    Toileting Assess/Train, Position   sitting  -KD    Toileting Assess/Train, [place]ep[place] Level   set up required  -KD    Recorded by   [KD] NAV CamachoA/L    Balance Skills Training    Sitting-Balance Activities  Reaching for weighted objects;Reaching for objects;Reaching across midline  -RW     Recorded by  [RW] Balaji Malhotra PTA     Therapy Exercises    Right Lower Extremity  AROM:;20 reps;quad sets;sitting   2/20  -RW     Left Lower Extremity  AROM:;20 reps;SLR;sitting   2/20  -RW     Bilateral Upper Extremity  15 reps;AROM:;sitting;elbow flexion/extension;shoulder abduction/adduction;shoulder horizontal abd/add   rows and pulldowns  -RW AROM:;20 reps;sitting;elbow flexion/extension;hand pumps;pronation/supination;shoulder abduction/adduction;shoulder extension/flexion;shoulder ER/IR;shoulder horizontal abd/add  -KD    BUE Resistance  --   perstack x 10-35 #  -RW --   UEE x 15 mins; CC 5 sets x 20 reps 20 lbs; RS 5 sets x 20 re  -KD    Recorded by  [RW] Balaji Malhotra PTA [KD] NAV CamachoA/L    Positioning and Restraints    Pre-Treatment Position  in bed   -RW in bed  -KD    Post Treatment Position  bed  -RW bsc  -KD    In Bed  call light within reach  -RW     On BS commode   sitting;call light within reach;encouraged to call for assist  -KD    Recorded by  [RW] Balaji Malhotra PTA [KD] NAV CamachoA/FREEDOM      09/19/17 1445 09/19/17 1350 09/19/17 1035    Rehab Assessment/Intervention    Discipline occupational therapy assistant  -LM physical therapy assistant  -RW occupational therapy assistant  -LM    Document Type therapy note (daily note)  -LM therapy note (daily note)  -RW therapy note (daily note)  -LM    Subjective Information agree to therapy  -LM agree to therapy  -RW agree to therapy  -LM    Patient Effort, Rehab Treatment good  -LM good  -RW good  -LM    Recorded by [LM] NAV SmithA/L [RW] Balaji Malhotra PTA [LM] NAV SmithA/L    Pain Assessment    Pain Assessment No/denies pain  -LM No/denies pain  -RW No/denies pain  -LM    Recorded by [LM] NAV SmithA/L [RW] Balaji Malhotra PTA [LM] Marce Carter VILLALPANDO/L    Cognitive Assessment/Intervention    Current Cognitive/Communication Assessment functional  -LM functional  -RW functional  -LM    Orientation Status oriented x 4  -LM oriented x 4  -RW oriented x 4  -LM    Follows Commands/Answers Questions 100% of the time  -% of the time  -% of the time  -LM    Recorded by [LM] NAV SmithA/L [RW] Balaji Malhotra PTA [LM] NAV SmithA/L    Mobility Assessment/Training    Left Lower Extremity Weight-Bearing  non weight-bearing  -RW     Right Lower Extremity Weight-Bearing  non weight-bearing  -RW     Recorded by  [RW] Balaji Malhotra PTA     Bed Mobility, Assessment/Treatment    Bed Mobility, Roll Right, [place]   conditional independence  -LM    Bed Mob, Supine to Sit, [place]  independent  -RW independent  -LM    Bed Mob, Sit to Supine, [place]  independent  -RW independent  -LM    Recorded by  [RW] Balaji Malhotra PTA  [LM] NAV SmithA/L    Transfer Assessment/Treatment    Transfers, Bed-Chair [place]  conditional independence  -RW conditional independence  -LM    Transfers, Chair-Bed [place] conditional independence  -LM  conditional independence  -LM    Transfers, Sit-Stand [place]  not appropriate to assess  -RW not appropriate to assess  -LM    Recorded by [LM] IRASEMA Simth/L [RW] Balaji Malhotra PTA [LM] NAV SmithA/L    Gait Assessment/Treatment    Gait, [place] Level  not appropriate to assess  -RW     Recorded by  [RW] Balaji Malhotra PTA     Wheelchair Training/Management    Wheelchair, Distance Propelled  150 mod ind  -RW --   150  -LM    Recorded by  [RW] Balaji Malhotra PTA [LM] IRASEMA Smith/L    Balance Skills Training    Sitting-Balance Activities  Reaching for weighted objects;Reaching for objects;Reaching across midline  -RW     Recorded by  [RW] Balaji Malhotra PTA     Therapy Exercises    Right Lower Extremity  AROM:;20 reps;quad sets;sitting   2/20  -RW     Left Lower Extremity  AROM:;20 reps;SLR;sitting   2/20  -RW     Bilateral Upper Extremity AROM:   lev 4 tband all UE planes increase strenvth 2-3 sets of 20   -LM  AROM:   all tband ex 20 reps 3 sets rickshaw 3 set 20 and UEE 21 min  -LM    Recorded by [LM] IRASEMA Smith/L [RW] Balaji Malhotra PTA [LM] IRASEMA Smith/L    Positioning and Restraints    Pre-Treatment Position sitting in chair/recliner  -LM  sitting in chair/recliner  -LM    Post Treatment Position bed  -LM  bed  -LM    Recorded by [LM] IRASEMA Smith/L  [LM] IRASEMA Smith/L      09/19/17 0805          Rehab Assessment/Intervention    Discipline physical therapy assistant  -RW      Document Type therapy note (daily note)  -RW      Subjective Information agree to therapy  -RW      Patient Effort, Rehab Treatment good  -RW      Recorded by [RW] Balaji Malhotra PTA      Pain Assessment    Pain  Assessment No/denies pain  -RW      Recorded by [RW] Balaji Malhotra PTA      Cognitive Assessment/Intervention    Current Cognitive/Communication Assessment functional  -RW      Orientation Status oriented x 4  -RW      Follows Commands/Answers Questions 100% of the time  -RW      Personal Safety Interventions gait belt  -RW      Recorded by [RW] Balaji Malhotra PTA      General LE Assessment    ROM Detail 95 arom  -RW      Recorded by [RW] Balaji Malhotra PTA      Mobility Assessment/Training    Left Lower Extremity Weight-Bearing non weight-bearing  -RW      Right Lower Extremity Weight-Bearing non weight-bearing  -RW      Recorded by [RW] Balaji Malhotra PTA      Bed Mobility, Assessment/Treatment    Bed Mobility, Roll Right, [place] conditional independence  -RW      Bed Mob, Supine to Sit, [place] independent  -RW      Bed Mob, Sit to Supine, [place] independent  -RW      Recorded by [RW] Balaji Malhotra PTA      Transfer Assessment/Treatment    Transfers, Bed-Chair [place] conditional independence  -RW      Transfers, Chair-Bed [place] conditional independence  -RW      Transfers, Sit-Stand [place] not appropriate to assess  -RW      Recorded by [RW] Balaji Malhotra PTA      Gait Assessment/Treatment    Gait, [place] Level not appropriate to assess  -RW      Recorded by [RW] Balaji Malhotra PTA      Wheelchair Training/Management    Wheelchair, Distance Propelled 150 mod ind  -RW      Recorded by [RW] Balaji Malhotra PTA      Balance Skills Training    Sitting-Balance Activities Reaching for weighted objects;Reaching for objects;Reaching across midline  -RW      Recorded by [RW] Balaji Malhotra PTA      Therapy Exercises    Right Lower Extremity AROM:;20 reps;supine;ankle pumps/circles;quad sets  -RW      Left Lower Extremity AROM:;20 reps;supine;hip abduction/adduction;SLR;heel slides  -RW      Bilateral Lower Extremities AROM:;20 reps;glut sets;quad sets   2 sets  -RW       Bilateral Upper Extremity 20 reps;AROM:;sitting   tband rows 2 sets  -RW      Trunk Exercises 10 reps   abdominal crunchs 2 sets  -RW      Recorded by [RW] Balaji Malhotra PTA      Positioning and Restraints    Pre-Treatment Position sitting in chair/recliner  -RW      Post Treatment Position wheelchair  -RW      Recorded by [RW] Balaji Malhotra PTA        User Key  (r) = Recorded By, (t) = Taken By, (c) = Cosigned By    Initials Name Effective Dates    ERICH Felton, PTA 10/17/16 -     RW Balaji Malhotra, PTA 10/17/16 -     KD Virgie Ray, VILLALPANDO/L 10/17/16 -     LM Marce Carter, VILLALPANDO/L 10/17/16 -                 OT Goals       09/21/17 1302 09/20/17 0730 09/19/17 1400    Patient Education OT LTG    Patient Education OT LTG, Date Established  09/20/17  -KD     Patient Education OT LTG, Date Goal Reviewed 09/21/17  -LM 09/20/17  -KD 09/19/17  -LM    Patient Education OT LTG Outcome goal ongoing  -LM goal not met  -KD goal ongoing  -LM    ADL OT LTG    ADL OT LTG, Date Goal Reviewed 09/21/17  -LM 09/20/17  -KD 09/19/17  -LM    ADL OT LTG, Outcome goal ongoing  -LM goal not met  -KD goal ongoing  -LM      09/18/17 1518 09/15/17 0810 09/14/17 1413    Bed Mobility OT LTG    Bed Mobility OT LTG, Date Goal Reviewed   09/14/17  -LM    Bed Mobility OT LTG, Outcome   goal met  -LM    Transfer Training OT LTG    Transfer Training OT LTG, Date Goal Reviewed   09/14/17  -LM    Transfer Training OT LTG, Outcome   goal met  -LM    Patient Education OT LTG    Patient Education OT LTG, Date Goal Reviewed 09/18/17  -LM 09/15/17  -KD 09/14/17  -LM    Patient Education OT LTG Outcome goal ongoing  -LM goal not met  -KD goal ongoing  -LM    ADL OT LTG    ADL OT LTG, Date Goal Reviewed 09/18/17  -LM 09/15/17  -KD 09/14/17  -LM    ADL OT LTG, Outcome goal ongoing  -LM goal not met  -KD goal ongoing  -LM      09/13/17 1552 09/12/17 1455 09/12/17 0755    Bed Mobility OT LTG    Bed Mobility OT LTG, Date Established   09/12/17   -BH (r) SP (t) BH (c)    Bed Mobility OT LTG, Time to Achieve   by discharge  -BH (r) SP (t) BH (c)    Bed Mobility OT LTG, Activity Type   all bed mobility  -BH (r) SP (t) BH (c)    Bed Mobility OT LTG, [place] Level   supervision required;minimum assist (75% patient effort)   AE/adaptive techniques- RLE in knee immobilizer and extended  -BH (r) SP (t) BH (c)    Bed Mobility OT LTG, Date Goal Reviewed 09/13/17  -LM 09/12/17  -     Bed Mobility OT LTG, Outcome goal ongoing  -LM goal ongoing  -     Transfer Training OT LTG    Transfer Training OT LTG, Date Established   09/12/17  -BH (r) SP (t) BH (c)    Transfer Training OT LTG, Time to Achieve   by discharge  -BH (r) SP (t) BH (c)    Transfer Training OT LTG, Activity Type   --   BSC, w/c  -BH (r) SP (t) BH (c)    Transfer Training OT LTG, [place] Level   minimum assist (75% patient effort);contact guard assist   RLE in knee immobilizer and extended at all times.  -BH (r) SP (t) BH (c)    Transfer Training OT LTG, Date Goal Reviewed 09/13/17  -LM 09/12/17  -     Transfer Training OT LTG, Outcome goal ongoing  -LM goal ongoing  -     Patient Education OT LTG    Patient Education OT LTG, Date Established   09/12/17  -BH (r) SP (t) BH (c)    Patient Education OT LTG, Time to Achieve   by discharge  -BH (r) SP (t) BH (c)    Patient Education OT LTG, Education Type   HEP;precautions per surgeon;positioning;posture/body mechanics;home safety;adaptive equipment mgmt  -BH (r) SP (t) BH (c)    Patient Education OT LTG, Education Understanding   independent;demonstrates adequately;verbalizes understanding  -BH (r) SP (t) BH (c)    Patient Education OT LTG, Date Goal Reviewed 09/13/17  -LM 09/12/17  -     Patient Education OT LTG Outcome goal ongoing  -LM goal ongoing  -     ADL OT LTG    ADL OT LTG, Date Established   09/12/17  -BH (r) SP (t) BH (c)    ADL OT LTG, Time to Achieve   by discharge  -BH (r) SP (t) BH (c)    ADL OT LTG, Activity Type   ADL  skills  -BH (r) SP (t) BH (c)    ADL OT LTG, Additional Goal   set-up with self-feeding, grooming, UB dressing/bathing; min A with toileting; mod A with LB dressing/bathing  -BH (r) SP (t) BH (c)    ADL OT LTG, Date Goal Reviewed 09/13/17  -LM 09/12/17  -RC     ADL OT LTG, Outcome goal ongoing  -LM goal ongoing  -RC       09/11/17 1609 09/11/17 1333       Transfer Training OT LTG    Transfer Training OT LTG, Date Established  09/11/17  -RB     Transfer Training OT LTG, Time to Achieve  by discharge  -RB     Transfer Training OT LTG, Activity Type  all transfers  -RB     Transfer Training OT LTG, [place] Level  supervision required;contact guard assist  -RB     Transfer Training OT LTG, Assist Device  other (see comments)   Sliding board if needed.  -RB     Transfer Training OT LTG, Date Goal Reviewed 09/04/17  -SG      Transfer Training OT LTG, Outcome goal not met  -SG      Transfer Training OT LTG, Reason Goal Not Met discharged from facility  -SG      Strength OT LTG    Strength Goal OT LTG, Date Established  09/11/17  -RB     Strength Goal OT LTG, Time to Achieve  by discharge  -RB     Strength Goal OT LTG, Measure to Achieve  2 sets of 10 reps all planes with 3-4 lb dumbells for B UE strength to increase independence with functional mobility/transfers.  -RB     Strength Goal OT LTG, Date Goal Reviewed 09/11/17  -SG      Strength Goal OT LTG, Outcome goal not met  -SG      Strength Goal OT LTG, Reason Goal Not Met discharged from facility  -SG      Dynamic Sitting Balance OT LTG    Dynamic Sitting Balance OT LTG, Date Established  09/11/17  -RB     Dynamic Sitting Balance OT LTG, Time to Achieve  by discharge  -RB     Dynamic Sitting Balance OT LTG, [place] Level  supervision required   10-15 minutes with functional activity.  -RB     Dynamic Sitting Balance OT LTG, Assist Device  bed rails  -RB     Dynamic Sitting Balance OT LTG, Date Goal Reviewed 09/11/17  -SG      Dynamic Sitting Balance OT LTG,  Outcome goal not met  -SG      Dynamic Sitting Balance OT LTG, Reason Goal Not Met discharged from facility  -SG      ADL OT LTG    ADL OT LTG, Date Established  09/11/17  -RB     ADL OT LTG, Time to Achieve  by discharge  -RB     ADL OT LTG, Activity Type  ADL skills   Sponge bath and dress.  -RB     ADL OT LTG, [place] Level  contact guard;min assist  -RB     ADL OT LTG, Date Goal Reviewed 09/11/17  -SG      ADL OT LTG, Outcome goal not met  -SG      ADL OT LTG, Reason Goal Not Met discharged from facility  -SG        User Key  (r) = Recorded By, (t) = Taken By, (c) = Cosigned By    Initials Name Provider Type    RB Axel Perez, OT Occupational Therapist    BH Isabel Jones, OTR/L Occupational Therapist    RC Shruthi Jean-Baptiste, VILLALPANDO/L Occupational Therapy Assistant    VIPUL Ray, VILLALPANDO/L Occupational Therapy Assistant    YOEL Carter, VILLALPANDO/L Occupational Therapy Assistant    DANY Juárez, OT Student OT Student    SAHARA Rai, OT Occupational Therapist          Occupational Therapy Education     Title: PT OT SLP Therapies (Active)     Topic: Occupational Therapy (Active)     Point: ADL training (Done)    Description: Instruct learner(s) on proper safety adaptation and remediation techniques during self care or transfers.   Instruct in proper use of assistive devices.    Learning Progress Summary    Learner Readiness Method Response Comment Documented by Status   Patient Acceptance TIMOTHY DAVILA 09/15/17 1036 Done    Acceptance E VU Educated on OT and POC. Educated about adhering to weight bearing precautions. Educated about safety with ADL and bed mobility. SP 09/12/17 1159 Done               Point: Precautions (Done)    Description: Instruct learner(s) on prescribed precautions during self-care and functional transfers.    Learning Progress Summary    Learner Readiness Method Response Comment Documented by Status   Patient Acceptance E VU Educated on OT and POC. Educated about adhering to  weight bearing precautions. Educated about safety with ADL and bed mobility. SP 09/12/17 1159 Done               Point: Body mechanics (Done)    Description: Instruct learner(s) on proper positioning and spine alignment during self-care, functional mobility activities and/or exercises.    Learning Progress Summary    Learner Readiness Method Response Comment Documented by Status   Patient Acceptance E VU Educated on OT and POC. Educated about adhering to weight bearing precautions. Educated about safety with ADL and bed mobility. SP 09/12/17 1159 Done                      User Key     Initials Effective Dates Name Provider Type Discipline    KD 10/17/16 -  ELY Camacho Occupational Therapy Assistant OT    SP 01/31/17 -  Kenya Juárez OT Student OT Student OT                  OT Recommendation and Plan  Anticipated Equipment Needs At Discharge: hospital bed  Anticipated Discharge Disposition: home with 24/7 care, home with home health  Planned Therapy Interventions: activity intolerance, adaptive equipment training, ADL retraining, bed mobility training, balance training, energy conservation, fine motor coordination training, home exercise program, motor coordination training, strengthening, transfer training  Therapy Frequency: other (see comments) (3-14x/wk)  Plan of Care Review  Plan Of Care Reviewed With: patient  Progress: improving  Outcome Summary/Follow up Plan: improving on all adl perf and strength tf         Outcome Measures       09/21/17 1015 09/20/17 1100 09/20/17 0730    How much help from another person do you currently need...    Turning from your back to your side while in flat bed without using bedrails? 4  -JA 4  -RW     Moving from lying on back to sitting on the side of a flat bed without bedrails? 4  -JA 4  -RW     Moving to and from a bed to a chair (including a wheelchair)? 4  -JA 4  -RW     Standing up from a chair using your arms (e.g., wheelchair, bedside chair)? 1  -JA 1  -RW      Climbing 3-5 steps with a railing? 1  -JA 1  -RW     To walk in hospital room? 1  -JA 1  -RW     AM-PAC 6 Clicks Score 15  -JA 15  -RW     How much help from another is currently needed...    Putting on and taking off regular lower body clothing?   3  -KD    Bathing (including washing, rinsing, and drying)   3  -KD    Toileting (which includes using toilet bed pan or urinal)   3  -KD    Putting on and taking off regular upper body clothing   4  -KD    Taking care of personal grooming (such as brushing teeth)   4  -KD    Eating meals   4  -KD    Score   21  -KD    Functional Assessment    Outcome Measure Options AM-PAC 6 Clicks Basic Mobility (PT)  -JA        User Key  (r) = Recorded By, (t) = Taken By, (c) = Cosigned By    Initials Name Provider Type    ERICH Felton, PTA Physical Therapy Assistant    RW Balaji Malhotra, PTA Physical Therapy Assistant    VIPUL Ray, VILLALPANDO/L Occupational Therapy Assistant           Time Calculation:         Time Calculation- OT       09/21/17 1342 09/21/17 1030       Time Calculation- OT    OT Start Time 1330  -LM 0830  -LM     OT Stop Time 1408  -LM 0925  -LM     OT Time Calculation (min) 38 min  -LM 55 min  -LM     Total Timed Code Minutes- OT 38 minute(s)  -LM 55 minute(s)  -LM     OT Received On 09/21/17  -LM 09/21/17  -LM       User Key  (r) = Recorded By, (t) = Taken By, (c) = Cosigned By    Initials Name Provider Type     IRASEMA Smith/L Occupational Therapy Assistant           Therapy Charges for Today     Code Description Service Date Service Provider Modifiers Qty    05509179040 HC OT THERAPEUTIC ACT EA 15 MIN 9/21/2017 NAV SmithA/L GO 1    59834411148 HC OT THER PROC EA 15 MIN 9/21/2017 IRASEMA Smith/L GO 3    73909309296 HC OT THER PROC EA 15 MIN 9/21/2017 IRASEMA Smith/L GO 2    66965824254 HC OT THERAPEUTIC ACT EA 15 MIN 9/21/2017 IRASEMA Smith/L GO 1          OT G-codes  OT Professional Judgement  Used?: Yes  OT Functional Scales Options: AM-PAC 6 Clicks Daily Activity (OT)  Score: 15  Functional Limitation: Self care  Self Care Current Status (): At least 40 percent but less than 60 percent impaired, limited or restricted  Self Care Goal Status (): At least 20 percent but less than 40 percent impaired, limited or restricted    ELY Smith  9/21/2017

## 2017-09-21 NOTE — PLAN OF CARE
Problem: Patient Care Overview (Adult)  Goal: Plan of Care Review  Outcome: Ongoing (interventions implemented as appropriate)    09/21/17 1630   Coping/Psychosocial Response Interventions   Plan Of Care Reviewed With patient   Patient Care Overview   Progress improving   Outcome Evaluation   Outcome Summary/Follow up Plan pt will d/c tomorrow and is excited and motivated and will keep feet on the ground,incision looks wnl.Dr Rutherford will be here prior to discharge in the morning eber look at it and possibly take maura out.Dr Rievra appt made today for Monday to have cast put on         Problem: Orthopaedic Fracture (Adult)  Goal: Signs and Symptoms of Listed Potential Problems Will be Absent or Manageable (Orthopaedic Fracture)  Outcome: Ongoing (interventions implemented as appropriate)    Problem: Fall Risk (Adult)  Goal: Absence of Falls  Outcome: Ongoing (interventions implemented as appropriate)

## 2017-09-21 NOTE — PLAN OF CARE
Problem: Patient Care Overview (Adult)  Goal: Plan of Care Review  Outcome: Ongoing (interventions implemented as appropriate)    09/21/17 0337   Coping/Psychosocial Response Interventions   Plan Of Care Reviewed With patient   Patient Care Overview   Progress no change         Problem: Orthopaedic Fracture (Adult)  Goal: Signs and Symptoms of Listed Potential Problems Will be Absent or Manageable (Orthopaedic Fracture)  Outcome: Ongoing (interventions implemented as appropriate)    Problem: Fall Risk (Adult)  Goal: Absence of Falls  Outcome: Ongoing (interventions implemented as appropriate)

## 2017-09-21 NOTE — PLAN OF CARE
Problem: Patient Care Overview (Adult)  Goal: Plan of Care Review  Outcome: Ongoing (interventions implemented as appropriate)    09/21/17 1430   Coping/Psychosocial Response Interventions   Plan Of Care Reviewed With patient   Patient Care Overview   Progress improving   Outcome Evaluation   Outcome Summary/Follow up Plan Pt. has met all goals except for ROM goal at this time. Pt. ready for D/C home tomorrow        Goal: Discharge Needs Assessment  Outcome: Ongoing (interventions implemented as appropriate)    09/12/17 0755 09/17/17 0340 09/18/17 0541   Discharge Needs Assessment   Concerns To Be Addressed --  no discharge needs identified --    Concerns Comments --  --  showed a picture of ramp built at his home.   Living Environment   Transportation Available car;family or friend will provide --  --    Self-Care   Equipment Currently Used at Home (BSC, bath bench, shower chair, w/c, tripod cane; not used) --  --          Problem: Inpatient Physical Therapy  Goal: Range of Motion Goal LTG- PT  Outcome: Ongoing (interventions implemented as appropriate)    09/11/17 1500 09/21/17 1430   Range of Motion PT LTG   Range of Motion Goal PT LTG, Date Established 09/11/17 --    Range of Motion Goal PT LTG, Time to Achieve by discharge --    Range fo Motion Goal PT LTG, Joint L knee --    Range of Motion Goal PT LTG, AROM Measure Knee flex - 110 degrees --    Range of Motion Goal PT LTG, Date Goal Reviewed --  09/21/17   Range of Motion Goal PT LTG, Outcome --  goal ongoing

## 2017-09-21 NOTE — PROGRESS NOTES
LOS: 10 days   Patient Care Team:  Renan Rodríguez MD as PCP - General (Family Medicine)    Chief Complaint:   Multiple trauma          Interval History:     SUBJECTIVE:  Good spirits feels good anxious to go home tomorrow  History taken from: patient chart RN    Objective     Vital Signs  Temp:  [96.8 °F (36 °C)-97.5 °F (36.4 °C)] 97.5 °F (36.4 °C)  Heart Rate:  [87-96] 96  Resp:  [18] 18  BP: (134-142)/(75-78) 142/78  Last 3 weights    09/14/17  1500 09/17/17  0500   Weight: 182 lb (82.6 kg) 177 lb (80.3 kg)         Physical Exam:     General Appearance:    Alert, cooperative, in no acute distress   Head:    Normocephalic, without obvious abnormality, atraumatic   Eyes:            Lids and lashes normal, conjunctivae and sclerae normal, no   icterus, no pallor, corneas clear, PERRLA   Throat:   No oral lesions, no thrush, oral mucosa moist   Neck:   No adenopathy, supple, trachea midline, no thyromegaly, no   carotid bruit, no JVD       Lungs:     Clear to auscultation,respirations regular, even and                  Unlabored     Heart:    Regular rhythm and normal rate, normal S1 and S2, no            murmur, no gallop, no rub, no click    Chest Wall:    No abnormalities observed   Abdomen:     Normal bowel sounds, no masses, no organomegaly, soft        non-tender, non-distended, no guarding, no rebound                Tenderness    Extremities:   Moves all extremities well, no edema, no cyanosis, no             Redness He has these immobilizer on the right leg splint on the left        Skin:   No bleeding, bruising or rash   Lymph nodes:   No palpable adenopathy   Neurologic:   Cranial nerves 2 - 12 grossly intact, sensation intact, DTR       present and equal bilaterally        RESULTS REVIEW:     Lab Results (last 24 hours)     Procedure Component Value Units Date/Time    Renal Function Panel [[id number]]  (Abnormal) Collected:  09/20/17 1440    Specimen:  Blood Updated:  09/20/17 1515     Glucose 144 (H)  mg/dL      BUN 23 (H) mg/dL      Creatinine 0.81 mg/dL      Sodium 133 (L) mmol/L      Potassium 3.9 mmol/L      Chloride 95 mmol/L      CO2 28.0 mmol/L      Calcium 8.8 mg/dL      Albumin 3.50 g/dL      Phosphorus 3.9 mg/dL      Anion Gap 10.0 mmol/L      BUN/Creatinine Ratio 28.4 (H)     eGFR Non African Amer 95 mL/min/1.73     POC Glucose Fingerstick [[id number]]  (Normal) Collected:  09/20/17 1601    Specimen:  Blood Updated:  09/20/17 1624     Glucose 128 mg/dL       RN NotifiedMeter: [id number]: [id number] KAE RUTH ANN       POC Glucose Fingerstick [[id number]]  (Abnormal) Collected:  09/20/17 2024    Specimen:  Blood Updated:  09/20/17 2038     Glucose 163 (H) mg/dL       Meter: [id number]: [id number] [facility] REY        POC Glucose Fingerstick [[id number]]  (Normal) Collected:  09/21/17 0616    Specimen:  Blood Updated:  09/21/17 0632     Glucose 123 mg/dL       Meter: [id number]: [id number] [facility] BevyUp        Protime-INR [[id number]]  (Abnormal) Collected:  09/21/17 0630    Specimen:  Blood Updated:  09/21/17 0701     Protime 21.5 (H) Seconds      INR 1.86 (H)    Narrative:       Therapeutic range for most indications is 2.0-3.0 INR,  or 2.5-3.5 for mechanical heart valves.    Extra Tubes [[id number]] Collected:  09/21/17 0657    Specimen:  Blood from Blood, Venous Line Updated:  09/21/17 0801    Narrative:       The following orders were created for panel order Extra Tubes.  Procedure                               Abnormality         Status                     ---------                               -----------         ------                     Lavender Top[[id number]]                                     Final result               Green Top (Gel)[[id number]]                                  Final result                 Please view results for these tests on the individual orders.    Lavender Top [[id number]] Collected:  09/21/17 0657    Specimen:  Blood Updated:   09/21/17 0801     Extra Tube hold for add-on      Auto resulted       Green Top (Gel) [[id number]] Collected:  09/21/17 0657    Specimen:  Blood Updated:  09/21/17 0801     Extra Tube Hold for add-ons.      Auto resulted.           Imaging Results (last 72 hours)     ** No results found for the last 72 hours. **          Assessment/Plan     Principal Problem:    Multiple trauma  Active Problems:    Closed displaced comminuted fracture of right patella    Closed displaced fracture of navicular bone of left foot    Osteoarthritis of hands, bilateral    BPH (benign prostatic hyperplasia)    Glaucoma    Encounter for rehabilitation    Recent onset of diabetes mellitus    Essential hypertension    Mixed hyperlipidemia    History of kidney stones      Diabetes is well-controlled  Adequately anticoagulated  He continues to progress with therapy making progress towards goals.  He will be going home tomorrow with home health.  All equipment ordered he has a friend build a ramp for him.  I did discuss with Dr. Rutherford that he would be going home tomorrow and Dr. Rutherford we'll see him tomorrow morning postoperative day 14 and decide whether staples should be removed all tomorrow or in the office.  He will see Dr. Rivera back in the office on Monday       Alec Stephen MD  09/21/17  10:38 AM

## 2017-09-21 NOTE — THERAPY TREATMENT NOTE
Inpatient Rehabilitation - Physical Therapy Treatment Note  [facility]     Patient Name: Vangie Lopez  : 1950  MRN: [id number]  Today's Date: 2017  Onset of Illness/Injury or Date of Surgery Date: 17  Date of Referral to PT: 17  Referring Physician: Dr. Stephen    Admit Date: 2017    Visit Dx:    ICD-10-CM ICD-9-CM   1. Multiple trauma T07 959.8   2. Abnormality of gait and mobility R26.9 781.2   3. Muscle weakness (generalized) M62.81 728.87   4. Impaired mobility and ADLs Z74.09 799.89   5. Closed displaced comminuted fracture of right patella with routine healing, subsequent encounter S82.041D V54.16   6. Closed displaced fracture of navicular bone of left foot with routine healing, subsequent encounter S92.252D V54.19     Patient Active Problem List   Diagnosis   • Encounter for screening for malignant neoplasm of colon   • Closed displaced comminuted fracture of right patella   • Closed displaced fracture of navicular bone of left foot   • Closed dislocation of navicular bone of left foot   • Patella fracture   • Essential hypertension   • Mixed hyperlipidemia   • Osteoarthritis of hands, bilateral   • BPH (benign prostatic hyperplasia)   • Glaucoma   • Hyperlipidemia   • Closed fracture of navicular bone with dislocation of perilunate joint of left wrist   • Fracture of patella, right, closed   • Multiple trauma   • Encounter for rehabilitation   • History of kidney stones   • Recent onset of diabetes mellitus               Adult Rehabilitation Note       17 1430 17 1330 17 1015    Rehab Assessment/Intervention    Discipline physical therapy assistant  -ERICH occupational therapy assistant  -LM physical therapy assistant  -ERICH    Document Type therapy note (daily note)  -ERICH therapy note (daily note)  -YOEL therapy note (daily note)  -ERICH    Subjective Information agree to therapy  -ERICH agree to therapy  -YOEL agree to therapy  -ERICH    Patient Effort, Rehab  Treatment good  -JA good  -LM good  -JA    Precautions/Limitations non-weight bearing status  -JA non-weight bearing status  -LM non-weight bearing status  -JA    Recorded by [JA] Dwayne Felton PTA [LM] NAV SmithA/L [JA] Dwayne Felton PTA    Pain Assessment    Pain Assessment 0-10  -JA No/denies pain  -LM 0-10  -JA    Pain Score 6  -JA  5  -JA    Post Pain Score 6  -JA 0  -LM 5  -JA    Pain Type  Acute pain  -LM Acute pain  -JA    Pain Location Ankle  -JA  Ankle  -JA    Pain Orientation Left  -JA  Left  -JA    Pain Intervention(s) Medication (See MAR)  -JA  Medication (See MAR)  -JA    Recorded by [JA] Dwayne Felton PTA [LM] IRASEMA Smith/FREEDOM [JA] Dwayne Felton PTA    General LE Assessment    ROM   knee, left: LE ROM deficit  -JA    ROM Detail   96arom  -JA    Recorded by   [JA] Dwayne Felton PTA    Mobility Assessment/Training    Extremity Weight-Bearing Status left lower extremity;right lower extremity  -JA      Left Lower Extremity Weight-Bearing non weight-bearing  -JA  non weight-bearing  -JA    Right Lower Extremity Weight-Bearing non weight-bearing  -JA  non weight-bearing  -JA    Recorded by [JA] Dwayne Felton PTA  [JA] Dwayne Felton PTA    Bed Mobility, Assessment/Treatment    Bed Mobility, Assistive Device head of bed elevated  -JA      Bed Mobility, Scoot/Bridge, [place] conditional independence  -JA      Bed Mob, Supine to Sit, [place] independent  -JA      Bed Mob, Sit to Supine, [place] independent  -JA      Recorded by [JA] Dwayne Felton PTA      Transfer Assessment/Treatment    Transfers, Bed-Chair [place] conditional independence  -JA      Transfers, Chair-Bed [place] conditional independence  -JA      Transfers, Bed-Chair-Bed, Assist Device sliding board  -JA      Recorded by [ERICH] Dwayne Felton PTA      Wheelchair Training/Management    Wheelchair, Distance Propelled 150 conditional independent   -JA       Recorded by [JA] Dwayne Felton PTA      Therapy Exercises    Right Lower Extremity AROM:;20 reps;supine;ankle pumps/circles   2x20  -JA  AROM:;20 reps;supine;ankle pumps/circles   2x20  -JA    Left Lower Extremity AAROM:;20 reps;supine;heel slides;AROM:;hip abduction/adduction;SLR   2x20  -JA  AROM:;20 reps;supine;hip abduction/adduction;SLR;heel slides   2x20  -JA    Bilateral Lower Extremities AROM:;20 reps;supine;glut sets;quad sets   2x20  -JA  AROM:;20 reps;supine;glut sets;quad sets   2x20  -JA    Bilateral Upper Extremity AROM:;20 reps;sitting;15 reps;elbow flexion/extension   mid/high rows, lat pulldows 35#/ 15# bicep curl/tricep ext.  -JA AROM:;20 reps;elbow flexion/extension;pronation/supination;shoulder abduction/adduction;shoulder extension/flexion;shoulder horizontal abd/add;shoulder protraction/retraction;shoulder rolls/shrugs   3 sets of 20 reps all ex with 3lb wt   -LM     Recorded by [JA] Dwayne Felton PTA [LM] IRASEMA Smith/FREEDOM [JA] Dwayne Felton PTA    Positioning and Restraints    Pre-Treatment Position in bed  -JA in bed  - in bed  -    Post Treatment Position bed  - bed  - bed  -    Recorded by [JA] Dwayne Felton PTA [LM] IRASEMA Smith/FREEDOM [JA] Dwayne Felton PTA      09/21/17 0830 09/20/17 1300 09/20/17 1027    Rehab Assessment/Intervention    Discipline occupational therapy assistant  -LM physical therapy assistant  -RW physical therapy assistant  -RW    Document Type therapy note (daily note)  -LM therapy note (daily note)  -RW therapy note (daily note)  -RW    Subjective Information agree to therapy  -LM agree to therapy  -RW agree to therapy  -RW    Patient Effort, Rehab Treatment good  -LM good  -RW good  -RW    Recorded by [LM] IRASEMA Smith/L [RW] Balaji Malhotra PTA [RW] Balaji Malhotra PTA    Pain Assessment    Pain Assessment   No/denies pain  -RW    Pain Score  --   gives no rating  -RW     Pain Location  Ankle   -RW     Pain Orientation  Left  -RW     Pain Intervention(s) Medication (See MAR)  -LM      Recorded by [LM] NAV SmithA/L [RW] Balaji Malhotra PTA [RW] Balaji Malhotra PTA    Cognitive Assessment/Intervention    Current Cognitive/Communication Assessment  functional  -RW functional  -RW    Orientation Status  oriented x 4  -RW oriented x 4  -RW    Follows Commands/Answers Questions  100% of the time  -% of the time  -RW    Personal Safety Interventions  gait belt  -RW gait belt  -RW    Recorded by  [RW] Balaji Malhotra PTA [RW] Balaji Malhotra PTA    Mobility Assessment/Training    Left Lower Extremity Weight-Bearing  non weight-bearing  -RW non weight-bearing  -RW    Right Lower Extremity Weight-Bearing  non weight-bearing  -RW non weight-bearing  -RW    Recorded by  [RW] Balaji Malhotra PTA [RW] Balaji Malhotra PTA    Bed Mobility, Assessment/Treatment    Bed Mob, Supine to Sit, [place] independent  -LM  independent  -RW    Bed Mob, Sit to Supine, [place] independent  -LM  independent  -RW    Recorded by [LM] NAV SmithA/L  [RW] Balaji Malhotra PTA    Transfer Assessment/Treatment    Transfers, Bed-Chair [place]   conditional independence  -RW    Transfers, Chair-Bed [place] conditional independence  -LM  conditional independence  -RW    Transfers, Sit-Stand [place]  not appropriate to assess  -RW not appropriate to assess  -RW    Recorded by [LM] IRASEMA Smith/L [RW] Balaji Malhotra PTA [RW] Balaji Malhotra PTA    Gait Assessment/Treatment    Gait, [place] Level  not appropriate to assess  -RW not appropriate to assess  -RW    Recorded by  [RW] Balaji Malhotra PTA [RW] Balaji Malhotra PTA    Wheelchair Training/Management    Wheelchair, Distance Propelled   130 mod ind  -RW    Recorded by   [RW] Balaji Malhotra PTA    Balance Skills Training    Sitting-Balance Activities   Reaching for weighted objects;Reaching for objects;Reaching across  midline  -RW    Recorded by   [RW] Balaji Malhotra PTA    Therapy Exercises    Right Lower Extremity   AROM:;20 reps;quad sets;sitting   2/20  -RW    Left Lower Extremity   AROM:;20 reps;SLR;sitting   2/20  -RW    BLE Resistance manual resistance- minimal   level 4 tband all planes 2-3 sets of 20 reps   -LM      Bilateral Upper Extremity AROM:   rickshaw x 3 sets of 20 reps   -LM  15 reps;AROM:;sitting;elbow flexion/extension;shoulder abduction/adduction;shoulder horizontal abd/add   rows and pulldowns  -RW    BUE Resistance   --   perstack x 10-35 #  -RW    Recorded by [LM] IRASEMA Smith/L  [RW] Balaji Malhotra PTA    Positioning and Restraints    Pre-Treatment Position sitting in chair/recliner  -LM  in bed  -RW    Post Treatment Position bed  -LM  bed  -RW    In Bed   call light within reach  -RW    Recorded by [LM] IRASEMA Smith/L  [RW] Balaji Malhotra PTA      09/20/17 0730 09/19/17 1445 09/19/17 1350    Rehab Assessment/Intervention    Discipline occupational therapy assistant  -KD occupational therapy assistant  -LM physical therapy assistant  -RW    Document Type therapy note (daily note)  -KD therapy note (daily note)  -LM therapy note (daily note)  -RW    Subjective Information agree to therapy  -KD agree to therapy  -LM agree to therapy  -RW    Patient Effort, Rehab Treatment  good  -LM good  -RW    Recorded by [KD] IRASEMA Camacho/FREEDOM [LM] IRASEMA Smith/L [RW] Balaji Malhotra PTA    Vital Signs    Pre Systolic BP Rehab 147  -KD      Pre Treatment Diastolic BP 80  -KD      Pretreatment Heart Rate (beats/min) 83  -KD      Posttreatment Heart Rate (beats/min) 90  -KD      Pre SpO2 (%) 96  -KD      O2 Delivery Pre Treatment room air  -KD      Post SpO2 (%) 98  -KD      O2 Delivery Post Treatment room air  -KD      Pre Patient Position Supine  -KD      Intra Patient Position Sitting  -KD      Post Patient Position Sitting  -KD      Recorded by [KD] IRASEMA Camacho/FREEDOM       Pain Assessment    Pain Assessment No/denies pain  -KD No/denies pain  -LM No/denies pain  -RW    Recorded by [KD] Virgie Ray, VILLALPANDO/L [LM] Marce Carter, VILLALPANDO/L [RW] Balaji Malhotra, DARIEN    Cognitive Assessment/Intervention    Current Cognitive/Communication Assessment functional  -KD functional  -LM functional  -RW    Orientation Status oriented x 4  -KD oriented x 4  -LM oriented x 4  -RW    Follows Commands/Answers Questions 100% of the time  -% of the time  -% of the time  -RW    Personal Safety WNL/WFL  -KD      Personal Safety Interventions gait belt  -KD      Recorded by [KD] Virgie Ray, VILLALPANDO/L [LM] aMrce Carter, VILLALPANDO/L [RW] Balaji Malhotra PTA    Mobility Assessment/Training    Left Lower Extremity Weight-Bearing   non weight-bearing  -RW    Right Lower Extremity Weight-Bearing   non weight-bearing  -RW    Recorded by   [RW] Balaji Mahlotra PTA    Bed Mobility, Assessment/Treatment    Bed Mobility, Roll Left, [place] conditional independence  -KD      Bed Mobility, Scoot/Bridge, [place] conditional independence  -KD      Bed Mob, Supine to Sit, [place] independent  -KD  independent  -RW    Bed Mob, Sit to Supine, [place]   independent  -RW    Bed Mob, Sidelying to Sit, [place] independent  -KD      Recorded by [KD] Virgie Ray, VILLALPANDO/L  [RW] Balaji Malhotra, DARIEN    Transfer Assessment/Treatment    Transfers, Bed-Chair [place] conditional independence  -KD  conditional independence  -RW    Transfers, Chair-Bed [place]  conditional independence  -LM     Transfers, Sit-Stand [place]   not appropriate to assess  -RW    Toilet Transfer, [place] conditional independence  -KD      Toilet Transfer, Assistive Device bedside commode with drop arms  -KD      Recorded by [KD] Virgie Ray, VILLALPANDO/L [LM] Marce Carter, VILLALPANDO/L [RW] Balaji Malhotra, DARIEN    Gait Assessment/Treatment    Gait, [place] Level   not appropriate to assess  -RW     Recorded by   [RW] Balaji Malhotra PTA    Wheelchair Training/Management    Wheelchair Propulsion Training forward propulsion  -KD      Wheelchair, Distance Propelled 130 mod indep  -KD  150 mod ind  -RW    Recorded by [KD] ELY Camacho  [RW] Balaji Malhotra PTA    Upper Body Dressing Assessment/Training    UB Dressing Assess/Train, Clothing Type donning:;t-shirt  -KD      UB Dressing Assess/Train, Position sitting  -KD      UB Dressing Assess/Train, [place] independent  -KD      Recorded by [KD] ELY Camacho      Lower Body Dressing Assessment/Training    LB Dressing Assess/Train, Clothing Type doffing:;donning:;shorts  -KD      LB Dressing Assess/Train, Position sitting  -KD      LB Dressing Assess/Train, [place] set up required  -KD      LB Dressing Assess/Train, Impairments strength decreased  -KD      LB Dressing Assess/Train, Comment Min A for doffing  -KD      Recorded by [KD] IRASEMA Camacho/L      Toileting Assessment/Training    Toileting Assess/Train, Assistive Device bedside commode  -KD      Toileting Assess/Train, Position sitting  -KD      Toileting Assess/Train, [place] Level set up required  -KD      Recorded by [KD] IRASEMA Camacho/L      Balance Skills Training    Sitting-Balance Activities   Reaching for weighted objects;Reaching for objects;Reaching across midline  -RW    Recorded by   [RW] Balaji Malhotra PTA    Therapy Exercises    Right Lower Extremity   AROM:;20 reps;quad sets;sitting   2/20  -RW    Left Lower Extremity   AROM:;20 reps;SLR;sitting   2/20  -RW    Bilateral Upper Extremity AROM:;20 reps;sitting;elbow flexion/extension;hand pumps;pronation/supination;shoulder abduction/adduction;shoulder extension/flexion;shoulder ER/IR;shoulder horizontal abd/add  -KD AROM:   lev 4 tband all UE planes increase strenvth 2-3 sets of 20   -LM     BUE Resistance --   UEE x 15 mins; CC 5 sets x 20 reps 20 lbs; RS 5 sets x 20 re  -KD      Recorded by [KD]  IRASEMA Camacho/FREEDOM [LM] NAV SmithA/L [RW] Balaji Malhotra PTA    Positioning and Restraints    Pre-Treatment Position in bed  -KD sitting in chair/recliner  -LM     Post Treatment Position bsc  -KD bed  -LM     On BS commode sitting;call light within reach;encouraged to call for assist  -KD      Recorded by [KD] NAV CamachoA/L [LM] IRASEMA Smith/FREEDOM       09/19/17 1035 09/19/17 0805       Rehab Assessment/Intervention    Discipline occupational therapy assistant  -LM physical therapy assistant  -RW     Document Type therapy note (daily note)  -LM therapy note (daily note)  -RW     Subjective Information agree to therapy  -LM agree to therapy  -RW     Patient Effort, Rehab Treatment good  -LM good  -RW     Recorded by [LM] IRASEMA Smith/L [RW] Balaji Malhotra PTA     Pain Assessment    Pain Assessment No/denies pain  -LM No/denies pain  -RW     Recorded by [LM] NAV SmithA/L [RW] Balaji Malhotra PTA     Cognitive Assessment/Intervention    Current Cognitive/Communication Assessment functional  -LM functional  -RW     Orientation Status oriented x 4  -LM oriented x 4  -RW     Follows Commands/Answers Questions 100% of the time  -% of the time  -RW     Personal Safety Interventions  gait belt  -RW     Recorded by [LM] IRASEMA Smith/L [RW] Balaji Malhotra PTA     General LE Assessment    ROM Detail  95 arom  -RW     Recorded by  [RW] Balaji Malhotra PTA     Mobility Assessment/Training    Left Lower Extremity Weight-Bearing  non weight-bearing  -RW     Right Lower Extremity Weight-Bearing  non weight-bearing  -RW     Recorded by  [RW] Balaji Malhotra PTA     Bed Mobility, Assessment/Treatment    Bed Mobility, Roll Right, [place] conditional independence  -LM conditional independence  -RW     Bed Mob, Supine to Sit, [place] independent  -LM independent  -RW     Bed Mob, Sit to Supine, [place] independent  -LM independent  -RW      Recorded by [LM] IRASEMA Smith/L [RW] Balaji Malhotra PTA     Transfer Assessment/Treatment    Transfers, Bed-Chair [place] conditional independence  -LM conditional independence  -RW     Transfers, Chair-Bed [place] conditional independence  -LM conditional independence  -RW     Transfers, Sit-Stand [place] not appropriate to assess  -LM not appropriate to assess  -RW     Recorded by [LM] NAV SmithA/L [RW] Balaji Malhotra PTA     Gait Assessment/Treatment    Gait, [place] Level  not appropriate to assess  -RW     Recorded by  [RW] Balaji Malhotra PTA     Wheelchair Training/Management    Wheelchair, Distance Propelled --   150  - mod ind  -RW     Recorded by [LM] IRASEMA Smith/L [RW] Balaji Malhotra PTA     Balance Skills Training    Sitting-Balance Activities  Reaching for weighted objects;Reaching for objects;Reaching across midline  -RW     Recorded by  [RW] Balaji Malhotra PTA     Therapy Exercises    Right Lower Extremity  AROM:;20 reps;supine;ankle pumps/circles;quad sets  -RW     Left Lower Extremity  AROM:;20 reps;supine;hip abduction/adduction;SLR;heel slides  -RW     Bilateral Lower Extremities  AROM:;20 reps;glut sets;quad sets   2 sets  -RW     Bilateral Upper Extremity AROM:   all tband ex 20 reps 3 sets rickshaw 3 set 20 and UEE 21 min  -LM 20 reps;AROM:;sitting   tband rows 2 sets  -RW     Trunk Exercises  10 reps   abdominal crunchs 2 sets  -RW     Recorded by [LM] IRASEMA Smith/L [RW] Balaji Malhotra PTA     Positioning and Restraints    Pre-Treatment Position sitting in chair/recliner  -LM sitting in chair/recliner  -RW     Post Treatment Position bed  -LM wheelchair  -RW     Recorded by [LM] NAV SmithA/L [RW] Balaji Malhotra PTA       User Key  (r) = Recorded By, (t) = Taken By, (c) = Cosigned By    Initials Name Effective Dates    ERICH Felton, PTA 10/17/16 -     RW Balaji Malhotra PTA 10/17/16 -     KD  Virgie Ray, VILLALPANDO/L 10/17/16 -     LM Marce Carter, VILLALPANDO/L 10/17/16 -                 IP PT Goals       09/21/17 1430 09/20/17 1424 09/19/17 1443    Range of Motion PT LTG    Range of Motion Goal PT LTG, Date Goal Reviewed 09/21/17  -JA 09/20/17  -RW 09/19/17  -RW    Range of Motion Goal PT LTG, Outcome goal ongoing  -JA goal ongoing  -RW goal ongoing  -RW      09/18/17 1445 09/18/17 1444 09/16/17 1255    Transfer Training PT LTG    Transfer Training PT  LTG, Date Goal Reviewed 09/18/17  -RW  09/16/17  -JA    Transfer Training PT LTG, Outcome goal met  -RW  goal ongoing  -JA    Range of Motion PT LTG    Range of Motion Goal PT LTG, Date Goal Reviewed  09/18/17  -RW 09/16/17  -JA    Range of Motion Goal PT LTG, Outcome  goal ongoing  -RW goal ongoing  -JA      09/15/17 1556 09/14/17 1557 09/13/17 1533    Bed Mobility PT LTG    Bed Mobility PT LTG, Date Goal Reviewed  09/14/17  -RW 09/13/17  -RW    Bed Mobility PT LTG, Outcome  goal met  -RW goal ongoing  -RW    Transfer Training PT LTG    Transfer Training PT  LTG, Date Goal Reviewed 09/15/17  -RW 09/14/17  -RW 09/13/17  -RW    Transfer Training PT LTG, Outcome goal ongoing  -RW goal ongoing  -RW goal ongoing  -RW    Range of Motion PT LTG    Range of Motion Goal PT LTG, Date Goal Reviewed 09/15/17  -RW 09/14/17  -RW 09/13/17  -RW    Range of Motion Goal PT LTG, Outcome goal ongoing  -RW goal ongoing  -RW goal ongoing  -RW    Wheelchair Propulsion PT LTG    Wheelchair Propulsion Goal PT LTG, Date Goal Reviewed  09/14/17  -RW 09/13/17  -RW    Wheelchair Propulsion Goal PT LTG, Outcome  goal met  -RW goal ongoing  -RW    Physical Therapy PT LTG    Physical Therapy PT LTG, Date Goal Reviewed  09/14/17  -RW 09/13/17  -RW    Physical Therapy PT LTG, Outcome  goal met  -RW goal ongoing  -RW      09/12/17 1355 09/11/17 1500 09/11/17 1040    Bed Mobility PT LTG    Bed Mobility PT LTG, Date Established  09/11/17  -LM     Bed Mobility PT LTG, Time to Achieve  by  discharge  -LM     Bed Mobility PT LTG, Activity Type  supine to sit/sit to supine  -LM     Bed Mobility PT LTG, [place] Level  conditional independence  -LM     Bed Mobility PT Goal  LTG, Assist Device  overhead trapeze;bed rails  -LM     Bed Mobility PT LTG, Date Goal Reviewed 09/12/17  -LM      Bed Mobility PT LTG, Outcome goal ongoing  -LM goal ongoing  -LM     Transfer Training PT LTG    Transfer Training PT LTG, Date Established  09/11/17  -LM     Transfer Training PT LTG, Time to Achieve  by discharge  -LM     Transfer Training PT LTG, Activity Type  bed to chair /chair to bed  -LM     Transfer Training PT LTG, [place] Level  conditional independence  -LM     Transfer Training PT LTG, Assist Device  --   AAD  -LM     Transfer Training PT  LTG, Date Goal Reviewed 09/12/17  -LM  09/11/17  -AM    Transfer Training PT LTG, Outcome goal ongoing  -LM goal ongoing  -LM goal not met  -AM    Transfer Training PT LTG, Reason Goal Not Met   discharged from facility  -AM    Range of Motion PT LTG    Range of Motion Goal PT LTG, Date Established  09/11/17  -LM     Range of Motion Goal PT LTG, Time to Achieve  by discharge  -LM     Range fo Motion Goal PT LTG, Joint  L knee  -LM     Range of Motion Goal PT LTG, AROM Measure  Knee flex - 110 degrees  -LM     Range of Motion Goal PT LTG, Date Goal Reviewed 09/12/17  -LM      Range of Motion Goal PT LTG, Outcome goal ongoing  -LM goal ongoing  -LM     Patient Education PT LTG    Patient Education PT LTG, Date Goal Reviewed   09/11/17  -AM    Patient Education PT LTG Outcome   goal met  -AM    Wheelchair Propulsion PT LTG    Wheelchair Propulsion Goal PT LTG, Date Established 09/12/17  -LM      Wheelchair Propulsion Goal PT LTG, Time to Achieve by discharge  -LM      Wheelchair Propulsion Goal PT LTG, [place] Level conditional independence  -LM      Wheelchair Propulsion Goal PT LTG, Distance to Achieve 300 feet  -LM      Wheelchair Propulsion Goal PT LTG,  Outcome goal ongoing  -      Caregiver Training PT LTG    Caregiver Training PT LTG, Date Goal Reviewed   09/11/17  -AM    Caregiver Training PT LTG, Outcome   goal not met  -AM    Caregiver Training PT LTG, Reason Goal Not Met   discharged from facility  -AM    Physical Therapy PT LTG    Physical Therapy PT LTG, Date Established 09/12/17  -      Physical Therapy PT LTG, Time to Achieve by discharge  -      Physical Therapy PT LTG, Activity Type Pt will self propel w/c up/down ramp.  -      Physical Therapy PT LTG, [place] Level conditional independence  -      Physical Therapy PT LTG, Outcome goal ongoing  -        09/10/17 1300 09/09/17 1626       Transfer Training PT LTG    Transfer Training PT LTG, Date Established  09/09/17  -     Transfer Training PT LTG, Time to Achieve  by discharge  -     Transfer Training PT LTG, Activity Type  bed to chair /chair to bed  -     Transfer Training PT LTG, Additional Goal  Once MD allows, pt will perform lateral transfer with conditional independence  -     Transfer Training PT  LTG, Date Goal Reviewed 09/10/17  -A      Transfer Training PT LTG, Outcome goal ongoing  -A goal ongoing  -MAGNO     Patient Education PT LTG    Patient Education PT LTG, Date Established  09/09/17  -     Patient Education PT LTG, Time to Achieve  by discharge  -     Patient Education PT LTG, Education Type  precaution per surgeon;transfers;bed mobility;pain management;home safety  -     Patient Education PT LTG, Date Goal Reviewed 09/10/17  -A      Patient Education PT LTG Outcome goal ongoing  -[facility] goal ongoing  -     Caregiver Training PT LTG    Caregiver Training PT LTG, Date Established  09/09/17  -     Caregiver Training PT LTG, Time to Achieve  by discharge  -     Caregiver Training PT LTG, Activity Type  home caregiver will demonstrate understanding assisting pt as needed with transfers/mobility as well as verbalize understanding of home care  instructions  -     Caregiver Training PT LTG, Date Goal Reviewed 09/10/17  -[facility]      Caregiver Training PT LTG, Outcome goal ongoing  -[facility] goal ongoing  -     Physical Therapy PT STG    Physical Therapy PT STG, Date Established  09/09/17  -     Physical Therapy PT STG, Time to Achieve  by discharge  -     Physical Therapy PT STG, Activity Type  Pt will tolerate OOB 3-4 hours per day  -     Physical Therapy PT STG, Date Goal Reviewed 09/10/17  -[facility]      Physical Therapy PT STG, Outcome goal met  -[facility] goal ongoing  -       User Key  (r) = Recorded By, (t) = Taken By, (c) = Cosigned By    Initials Name Provider Type    LM Isela Chris, PT Physical Therapist    MAGNO Lupe Abel, PT Physical Therapist    JA Dwayne Felton, PTA Physical Therapy Assistant    HELEN Pendleton, PTA Physical Therapy Assistant    SUSIE Young, PTA Physical Therapy Assistant    RW Balaji Malhotra, PTA Physical Therapy Assistant          Physical Therapy Education     Title: PT OT SLP Therapies (Active)     Topic: Physical Therapy (Done)     Point: Mobility training (Done)    Learning Progress Summary    Learner Readiness Method Response Comment Documented by Status   Patient Acceptance E VU reviewed  non wt bearing and transfering to from  RW 09/13/17 1140 Done    Acceptance E NR Reviewed transferring towards stronger side and maintaining NWB with activity.  09/12/17 1608 Active    Acceptance E NR  TA 09/12/17 1515 Active    Acceptance E NR Reviewed safety with transfers.  Explained that Dr. Rutherford only wants stretcher chair used at this time.  09/11/17 1636 Active               Point: Home exercise program (Done)    Learning Progress Summary    Learner Readiness Method Response Comment Documented by Status   Patient Acceptance E VU provided picture HEP RW 09/20/17 1256 Done               Point: Precautions (Done)    Learning Progress Summary    Learner Readiness Method Response Comment Documented by Status    Patient Acceptance E VU reviewed  non wt bearing and transfering to from  RW 09/13/17 1140 Done    Acceptance E NR Reviewed transferring towards stronger side and maintaining NWB with activity.  09/12/17 1608 Active    Acceptance E NR  TA 09/12/17 1515 Active    Acceptance E NR Reviewed safety with transfers.  Explained that Dr. Rutherford only wants stretcher chair used at this time.  09/11/17 1636 Active                      User Key     Initials Effective Dates Name Provider Type Discipline     06/15/16 -  Isela Chris, PT Physical Therapist PT    TA 10/17/16 -  Geri Juan, PTA Physical Therapy Assistant PT    RW 10/17/16 -  Balaji Malhotra, PTA Physical Therapy Assistant PT                    PT Recommendation and Plan  Anticipated Discharge Disposition: home with home health  Planned Therapy Interventions: balance training, bed mobility training, home exercise program, motor coordination training, neuromuscular re-education, patient/family education, postural re-education, ROM (Range of Motion), strengthening, stretching, transfer training  PT Frequency: other (see comments) (5-14 times/wk)  Plan of Care Review  Plan Of Care Reviewed With: patient  Progress: improving  Outcome Summary/Follow up Plan: Pt. has met all goals except for ROM goal at this time. Pt. ready for D/C home tomorrow           Outcome Measures       09/21/17 1015 09/20/17 1100 09/20/17 0730    How much help from another person do you currently need...    Turning from your back to your side while in flat bed without using bedrails? 4  -JA 4  -RW     Moving from lying on back to sitting on the side of a flat bed without bedrails? 4  -JA 4  -RW     Moving to and from a bed to a chair (including a wheelchair)? 4  -JA 4  -RW     Standing up from a chair using your arms (e.g., wheelchair, bedside chair)? 1  -JA 1  -RW     Climbing 3-5 steps with a railing? 1  -JA 1  -RW     To walk in hospital room? 1  -JA 1  -RW     AM-PAC 6 Clicks Score  15  -JA 15  -RW     How much help from another is currently needed...    Putting on and taking off regular lower body clothing?   3  -KD    Bathing (including washing, rinsing, and drying)   3  -KD    Toileting (which includes using toilet bed pan or urinal)   3  -KD    Putting on and taking off regular upper body clothing   4  -KD    Taking care of personal grooming (such as brushing teeth)   4  -KD    Eating meals   4  -KD    Score   21  -KD    Functional Assessment    Outcome Measure Options AM-PAC 6 Clicks Basic Mobility (PT)  -JA        User Key  (r) = Recorded By, (t) = Taken By, (c) = Cosigned By    Initials Name Provider Type    ERICH Felton PTA Physical Therapy Assistant    JOHN Malhotra PTA Physical Therapy Assistant    ELY Shelby Occupational Therapy Assistant           Time Calculation:         PT Charges       09/21/17 1524 09/21/17 1026       Time Calculation    Start Time 1430  -JA 1015  -JA     Stop Time 1530  -JA 1045  -JA     Time Calculation (min) 60 min  -JA 30 min  -JA     Time Calculation- PT    Total Timed Code Minutes- PT 60 minute(s)  -JA 30 minute(s)  -JA       User Key  (r) = Recorded By, (t) = Taken By, (c) = Cosigned By    Initials Name Provider Type    ERICH Felton PTA Physical Therapy Assistant          Therapy Charges for Today     Code Description Service Date Service Provider Modifiers Qty    29128008562 HC PT THER PROC EA 15 MIN 9/21/2017 Dwayne Felton PTA GP 2    32047843367 HC PT THERAPEUTIC ACT EA 15 MIN 9/21/2017 Dwayne Felton PTA GP 1    15579007750 HC PT THER PROC EA 15 MIN 9/21/2017 Dwayne Felton PTA GP 3          PT G-Codes  PT Professional Judgement Used?: Yes  Outcome Measure Options: AM-PAC 6 Clicks Basic Mobility (PT)  Score: 12  Functional Limitation: Mobility: Walking and moving around  Mobility: Walking and Moving Around Current Status (): At least 60 percent but less than 80 percent impaired, limited or  restricted  Mobility: Walking and Moving Around Goal Status (): At least 20 percent but less than 40 percent impaired, limited or restricted    Dwayne Felton, PTA  9/21/2017

## 2017-09-21 NOTE — PLAN OF CARE
Problem: Patient Care Overview (Adult)  Goal: Plan of Care Review  Outcome: Ongoing (interventions implemented as appropriate)    09/21/17 1302   Coping/Psychosocial Response Interventions   Plan Of Care Reviewed With patient   Patient Care Overview   Progress improving   Outcome Evaluation   Outcome Summary/Follow up Plan improving on all adl perf and strength tf          Problem: Inpatient Occupational Therapy  Goal: Patient Education Goal LTG- OT    09/12/17 0755 09/20/17 0730 09/21/17 1302   Patient Education OT LTG   Patient Education OT LTG, Date Established --  09/20/17 --    Patient Education OT LTG, Time to Achieve by discharge --  --    Patient Education OT LTG, Education Type HEP;precautions per surgeon;positioning;posture/body mechanics;home safety;adaptive equipment mgmt --  --    Patient Education OT LTG, Education Understanding independent;demonstrates adequately;verbalizes understanding --  --    Patient Education OT LTG, Date Goal Reviewed --  --  09/21/17   Patient Education OT LTG Outcome --  --  goal ongoing       Goal: ADL Goal LTG- OT  Outcome: Ongoing (interventions implemented as appropriate)    09/12/17 0755 09/21/17 1302   ADL OT LTG   ADL OT LTG, Date Established 09/12/17 --    ADL OT LTG, Time to Achieve by discharge --    ADL OT LTG, Activity Type ADL skills --    ADL OT LTG, Additional Goal set-up with self-feeding, grooming, UB dressing/bathing; min A with toileting; mod A with LB dressing/bathing --    ADL OT LTG, Date Goal Reviewed --  09/21/17   ADL OT LTG, Outcome --  goal ongoing

## 2017-09-21 NOTE — PROGRESS NOTES
"Anticoagulation by Pharmacy - Warfarin Day 14 of 28    Vangie Lopez is a 67 y.o.male  [Ht: 69\" (175.3 cm); Wt: 177 lb (80.3 kg)] on Warfarin 4 mg PO  for indication of VTE prophylaxis s/p ortho procedure.    Goal INR: 1.7-2.5  Today's INR:   Lab Results   Component Value Date    INR 1.86 (H) 09/21/2017         Lab Results   Component Value Date    INR 1.86 (H) 09/21/2017    INR 1.79 (H) 09/20/2017    INR 2.07 (H) 09/19/2017    PROTIME 21.5 (H) 09/21/2017    PROTIME 20.9 (H) 09/20/2017    PROTIME 23.4 (H) 09/19/2017     Lab Results   Component Value Date    HGB 12.7 (L) 09/16/2017    HGB 12.0 (L) 09/12/2017    HGB 13.2 (L) 09/10/2017    HGB 13.2 (L) 09/10/2017     Lab Results   Component Value Date    HCT 36.1 (L) 09/16/2017    HCT 33.4 (L) 09/12/2017    HCT 37.0 (L) 09/10/2017    HCT 37.0 (L) 09/10/2017     Assessment/Plan:  INR 1.86, which is stable and therapeutic. Continue warfarin 4 mg daily. Pharmacy will continue to monitor.    Ilsa Ferrara RPH  09/21/17 10:08 AM       "

## 2017-09-21 NOTE — PROGRESS NOTES
LOS: 9 days   Patient Care Team:  Renan Rodríguez MD as PCP - General (Family Medicine)    Chief Complaint: Multiple lower extremity trauma    Subjective   Patient seen at bedside resting comfortably.  No acute distress.  Splint to left lower extremity is clean, dry and intact. Patient states that he is going home this Friday.     History taken from: patient    Objective     Vital Signs  Temp:  [96.7 °F (35.9 °C)-96.8 °F (36 °C)] 96.8 °F (36 °C)  Heart Rate:  [] 87  Resp:  [18] 18  BP: (127-134)/(75) 134/75    Objective    Left lower extremity exam: Splint is clean, dry and intact.  Capillary fill time is immediate to the distal toes.  Sensation is intact to the distal digits. Negative Homans    Results Review:     I reviewed the patient's new clinical results.      Assessment/Plan     Principal Problem:    Multiple trauma  Active Problems:    Closed displaced comminuted fracture of right patella    Closed displaced fracture of navicular bone of left foot    Essential hypertension    Mixed hyperlipidemia    Osteoarthritis of hands, bilateral    BPH (benign prostatic hyperplasia)    Glaucoma    Encounter for rehabilitation    History of kidney stones    Recent onset of diabetes mellitus      POD 14 Closed reduction and percutaneous fixation of left navicular fracture dislocation  Nonweightbearing to left lower extremity   Patient can f/u in Podiatry clinic on 9/25/17  Please do not hesitate to call with questions.          This document has been electronically signed by Simone Rivera DPM on September 20, 2017 7:48 PM

## 2017-09-22 ENCOUNTER — ANTICOAGULATION VISIT (OUTPATIENT)
Dept: PHARMACY | Facility: HOSPITAL | Age: 67
End: 2017-09-22

## 2017-09-22 LAB
GLUCOSE BLDC GLUCOMTR-MCNC: 124 MG/DL (ref 70–130)
GLUCOSE BLDC GLUCOMTR-MCNC: 133 MG/DL (ref 70–130)
INR PPP: 1.8 (ref 0.8–1.2)
PROTHROMBIN TIME: 21 SECONDS (ref 11.1–15.3)

## 2017-09-22 PROCEDURE — 97110 THERAPEUTIC EXERCISES: CPT

## 2017-09-22 PROCEDURE — 97535 SELF CARE MNGMENT TRAINING: CPT

## 2017-09-22 PROCEDURE — 85610 PROTHROMBIN TIME: CPT | Performed by: FAMILY MEDICINE

## 2017-09-22 PROCEDURE — 99238 HOSP IP/OBS DSCHRG MGMT 30/<: CPT | Performed by: FAMILY MEDICINE

## 2017-09-22 PROCEDURE — 82962 GLUCOSE BLOOD TEST: CPT

## 2017-09-22 RX ORDER — FERROUS SULFATE TAB EC 324 MG (65 MG FE EQUIVALENT) 324 (65 FE) MG
324 TABLET DELAYED RESPONSE ORAL
Qty: 30 TABLET | Refills: 0 | Status: SHIPPED | OUTPATIENT
Start: 2017-09-22 | End: 2019-03-27

## 2017-09-22 RX ORDER — TRAZODONE HYDROCHLORIDE 50 MG/1
25 TABLET ORAL NIGHTLY
Qty: 30 TABLET | Refills: 0 | Status: SHIPPED | OUTPATIENT
Start: 2017-09-22 | End: 2017-09-22

## 2017-09-22 RX ORDER — SENNA AND DOCUSATE SODIUM 50; 8.6 MG/1; MG/1
1 TABLET, FILM COATED ORAL 2 TIMES DAILY
Qty: 60 TABLET | Refills: 1 | Status: SHIPPED | OUTPATIENT
Start: 2017-09-22 | End: 2019-03-27

## 2017-09-22 RX ORDER — TRAZODONE HYDROCHLORIDE 50 MG/1
25 TABLET ORAL NIGHTLY
Qty: 30 TABLET | Refills: 0 | Status: SHIPPED | OUTPATIENT
Start: 2017-09-22 | End: 2019-03-27

## 2017-09-22 RX ORDER — WARFARIN SODIUM 4 MG/1
TABLET ORAL
Qty: 28 TABLET | Refills: 0 | Status: SHIPPED | OUTPATIENT
Start: 2017-09-22 | End: 2017-10-06

## 2017-09-22 RX ADMIN — HYDROCHLOROTHIAZIDE 25 MG: 25 TABLET ORAL at 08:16

## 2017-09-22 RX ADMIN — ATORVASTATIN CALCIUM 10 MG: 10 TABLET, FILM COATED ORAL at 08:15

## 2017-09-22 RX ADMIN — FAMOTIDINE 40 MG: 40 TABLET ORAL at 08:15

## 2017-09-22 RX ADMIN — POTASSIUM CITRATE 10 MEQ: 10 TABLET ORAL at 08:16

## 2017-09-22 RX ADMIN — METFORMIN HYDROCHLORIDE 500 MG: 500 TABLET, EXTENDED RELEASE ORAL at 08:15

## 2017-09-22 RX ADMIN — SENNOSIDES AND DOCUSATE SODIUM 1 TABLET: 8.6; 5 TABLET ORAL at 08:15

## 2017-09-22 RX ADMIN — HYDROCODONE BITARTRATE AND ACETAMINOPHEN 1 TABLET: 10; 325 TABLET ORAL at 07:12

## 2017-09-22 RX ADMIN — FERROUS SULFATE TAB EC 324 MG (65 MG FE EQUIVALENT) 324 MG: 324 (65 FE) TABLET DELAYED RESPONSE at 08:15

## 2017-09-22 RX ADMIN — Medication 1 TABLET: at 08:15

## 2017-09-22 NOTE — THERAPY TREATMENT NOTE
Inpatient Rehabilitation - Occupational Therapy Treatment Note  [facility]     Patient Name: Vangie Lopez  : 1950  MRN: [id number]  Today's Date: 2017  Onset of Illness/Injury or Date of Surgery Date: 17  Date of Referral to OT: 17  Referring Physician: Dr. Stephen      Admit Date: 2017    Visit Dx:     ICD-10-CM ICD-9-CM   1. Multiple trauma T07 959.8   2. Abnormality of gait and mobility R26.9 781.2   3. Muscle weakness (generalized) M62.81 728.87   4. Impaired mobility and ADLs Z74.09 799.89   5. Closed displaced comminuted fracture of right patella with routine healing, subsequent encounter S82.041D V54.16   6. Closed displaced fracture of navicular bone of left foot with routine healing, subsequent encounter S92.252D V54.19     Patient Active Problem List   Diagnosis   • Encounter for screening for malignant neoplasm of colon   • Closed displaced comminuted fracture of right patella   • Closed displaced fracture of navicular bone of left foot   • Closed dislocation of navicular bone of left foot   • Patella fracture   • Essential hypertension   • Mixed hyperlipidemia   • Osteoarthritis of hands, bilateral   • BPH (benign prostatic hyperplasia)   • Glaucoma   • Hyperlipidemia   • Closed fracture of navicular bone with dislocation of perilunate joint of left wrist   • Fracture of patella, right, closed   • Multiple trauma   • Encounter for rehabilitation   • History of kidney stones   • Recent onset of diabetes mellitus             Adult Rehabilitation Note       17 1103 17 1000 17 1430    Rehab Assessment/Intervention    Discipline occupational therapy assistant  -VIPUL physical therapy assistant  -ERICH physical therapy assistant  -ERICH    Document Type therapy note (daily note)  -KD therapy note (daily note)  -ERICH therapy note (daily note)  -ERICH    Subjective Information agree to therapy  -VIPUL agree to therapy  -ERICH agree to therapy  -ERICH    Patient Effort,  Rehab Treatment  good  -JA good  -JA    Precautions/Limitations  non-weight bearing status  -JA non-weight bearing status  -JA    Recorded by [KD] IRASEMA Camacho/FREEDOM [JA] Dwayne Felton PTA [JA] Dwayne Felton PTA    Vital Signs    Pre Systolic BP Rehab 132  -KD      Pre Treatment Diastolic BP 73  -KD      Pretreatment Heart Rate (beats/min) 85  -KD      Posttreatment Heart Rate (beats/min) 90  -KD      Pre SpO2 (%) 95  -KD      O2 Delivery Pre Treatment room air  -KD      Post SpO2 (%) 98  -KD      O2 Delivery Post Treatment room air  -KD      Pre Patient Position Supine  -KD      Intra Patient Position Sitting  -KD      Recorded by [KD] IRASEMA Camacho/L      Pain Assessment    Pain Assessment No/denies pain  -KD No/denies pain  -JA 0-10  -JA    Pain Score   6  -JA    Post Pain Score   6  -JA    Pain Location   Ankle  -JA    Pain Orientation   Left  -JA    Pain Intervention(s)  Medication (See MAR)  -JA Medication (See MAR)  -JA    Recorded by [KD] IRASEMA Camacho/FREEDOM [JA] Dwayne Felton PTA [JA] Dwayne Felton PTA    General LE Assessment    ROM  knee, left: LE ROM deficit  -JA     ROM Detail  112arom   -JA     Recorded by  [ERICH] Dwayne Felton PTA     Mobility Assessment/Training    Extremity Weight-Bearing Status  left lower extremity;right lower extremity  -JA left lower extremity;right lower extremity  -JA    Left Lower Extremity Weight-Bearing  non weight-bearing  -JA non weight-bearing  -JA    Right Lower Extremity Weight-Bearing  non weight-bearing  -JA non weight-bearing  -JA    Recorded by  [ERICH] Dwayne Felton PTA [JA] Dwayne Felton PTA    Bed Mobility, Assessment/Treatment    Bed Mobility, Assistive Device   head of bed elevated  -JA    Bed Mobility, Scoot/Bridge, [place]   conditional independence  -JA    Bed Mob, Supine to Sit, [place]   independent  -JA    Bed Mob, Sit to Supine, [place]   independent  -JA    Recorded by   [ERICH] Dwayne WORLEY  DARIEN Felton    Transfer Assessment/Treatment    Transfers, Bed-Chair [place]   conditional independence  -JA    Transfers, Chair-Bed [place]   conditional independence  -JA    Transfers, Bed-Chair-Bed, Assist Device   sliding board  -JA    Recorded by   [ERICH] Dwayne Felton PTA    Wheelchair Training/Management    Wheelchair, Distance Propelled   150 conditional independent   -JA    Recorded by   [JA] Dwayne Felton PTA    Therapy Exercises    Right Lower Extremity   AROM:;20 reps;supine;ankle pumps/circles   2x20  -JA    Left Lower Extremity  AROM:;20 reps;sitting;knee flexion  -JA AAROM:;20 reps;supine;heel slides;AROM:;hip abduction/adduction;SLR   2x20  -JA    Bilateral Lower Extremities   AROM:;20 reps;supine;glut sets;quad sets   2x20  -JA    Bilateral Upper Extremity AROM:;20 reps;sitting;elbow flexion/extension;hand pumps;pronation/supination;shoulder abduction/adduction;shoulder ER/IR;shoulder extension/flexion;shoulder horizontal abd/add  -KD  AROM:;20 reps;sitting;15 reps;elbow flexion/extension   mid/high rows, lat pulldows 35#/ 15# bicep curl/tricep ext.  -JA    BUE Resistance manual resistance- moderate   2 sets  -KD      Recorded by [KD] IRASEMA Camacho/FREEDOM [JA] Dwayne Felton PTA [JA] Dwayne Felton PTA    Positioning and Restraints    Pre-Treatment Position in bed  -KD sitting in chair/recliner  -JA in bed  -JA    Post Treatment Position bed  -KD wheelchair  -JA bed  -JA    In Bed sitting;call light within reach;encouraged to call for assist;exit alarm on  -KD      Recorded by [KD] IRASEMA Camacho/FREEDOM [JA] Dwayne Felton PTA [JA] Dwayne Felton PTA      09/21/17 1330 09/21/17 1015 09/21/17 0830    Rehab Assessment/Intervention    Discipline occupational therapy assistant  -YOEL physical therapy assistant  -JA occupational therapy assistant  -YOEL    Document Type therapy note (daily note)  -YOEL therapy note (daily note)  -ERICH therapy note (daily note)  -YOEL     Subjective Information agree to therapy  -LM agree to therapy  -JA agree to therapy  -LM    Patient Effort, Rehab Treatment good  -LM good  -JA good  -LM    Precautions/Limitations non-weight bearing status  -LM non-weight bearing status  -JA     Recorded by [LM] ELY Smith [JA] Dwayne Felton PTA [LM] IRASEMA Smith/L    Pain Assessment    Pain Assessment No/denies pain  -LM 0-10  -JA     Pain Score  5  -JA     Post Pain Score 0  -LM 5  -JA     Pain Type Acute pain  -LM Acute pain  -JA     Pain Location  Ankle  -JA     Pain Orientation  Left  -JA     Pain Intervention(s)  Medication (See MAR)  -JA Medication (See MAR)  -LM    Recorded by [LM] ELY Smith [JA] Dwayne Felton PTA [LM] IRASEMA Smith/L    General LE Assessment    ROM  knee, left: LE ROM deficit  -JA     ROM Detail  96arom  -JA     Recorded by  [JA] Dwayne Felton PTA     Mobility Assessment/Training    Left Lower Extremity Weight-Bearing  non weight-bearing  -JA     Right Lower Extremity Weight-Bearing  non weight-bearing  -JA     Recorded by  [JA] Dwayne Felton PTA     Bed Mobility, Assessment/Treatment    Bed Mob, Supine to Sit, [place]   independent  -LM    Bed Mob, Sit to Supine, [place]   independent  -LM    Recorded by   [LM] IRASEMA Smith/L    Transfer Assessment/Treatment    Transfers, Chair-Bed [place]   conditional independence  -LM    Recorded by   [LM] IRASEMA Smith/L    Therapy Exercises    Right Lower Extremity  AROM:;20 reps;supine;ankle pumps/circles   2x20  -JA     Left Lower Extremity  AROM:;20 reps;supine;hip abduction/adduction;SLR;heel slides   2x20  -JA     Bilateral Lower Extremities  AROM:;20 reps;supine;glut sets;quad sets   2x20  -JA     BLE Resistance   manual resistance- minimal   level 4 tband all planes 2-3 sets of 20 reps   -LM    Bilateral Upper Extremity AROM:;20 reps;elbow  flexion/extension;pronation/supination;shoulder abduction/adduction;shoulder extension/flexion;shoulder horizontal abd/add;shoulder protraction/retraction;shoulder rolls/shrugs   3 sets of 20 reps all ex with 3lb wt   -LM  AROM:   rickshaw x 3 sets of 20 reps   -LM    Recorded by [LM] NAV SmithA/L [JA] Dwayne Felton PTA [LM] NAV SmithA/L    Positioning and Restraints    Pre-Treatment Position in bed  -LM in bed  -JA sitting in chair/recliner  -LM    Post Treatment Position bed  -LM bed  -JA bed  -LM    Recorded by [LM] IRASEMA Smith/FREEDOM [JA] Dwayne Felton PTA [LM] IRASEMA Smith/L      09/20/17 1300 09/20/17 1027 09/20/17 0730    Rehab Assessment/Intervention    Discipline physical therapy assistant  -RW physical therapy assistant  -RW occupational therapy assistant  -KD    Document Type therapy note (daily note)  -RW therapy note (daily note)  -RW therapy note (daily note)  -KD    Subjective Information agree to therapy  -RW agree to therapy  -RW agree to therapy  -KD    Patient Effort, Rehab Treatment good  -RW good  -RW     Recorded by [RW] Baalji Malhotra PTA [RW] Balaji Malhotra PTA [KD] NAV CamachoA/L    Vital Signs    Pre Systolic BP Rehab   147  -KD    Pre Treatment Diastolic BP   80  -KD    Pretreatment Heart Rate (beats/min)   83  -KD    Posttreatment Heart Rate (beats/min)   90  -KD    Pre SpO2 (%)   96  -KD    O2 Delivery Pre Treatment   room air  -KD    Post SpO2 (%)   98  -KD    O2 Delivery Post Treatment   room air  -KD    Pre Patient Position   Supine  -KD    Intra Patient Position   Sitting  -KD    Post Patient Position   Sitting  -KD    Recorded by   [KD] Virgie Ray, VILLALPANDO/L    Pain Assessment    Pain Assessment  No/denies pain  -RW No/denies pain  -KD    Pain Score --   gives no rating  -RW      Pain Location Ankle  -RW      Pain Orientation Left  -RW      Recorded by [RW] Balaji Malhotra PTA [RW] Balaji Malhotra PTA [KD] Virgie GLEZ  Cory, VILLALPANDO/L    Cognitive Assessment/Intervention    Current Cognitive/Communication Assessment functional  -RW functional  -RW functional  -KD    Orientation Status oriented x 4  -RW oriented x 4  -RW oriented x 4  -KD    Follows Commands/Answers Questions 100% of the time  -% of the time  -% of the time  -KD    Personal Safety   WNL/WFL  -KD    Personal Safety Interventions gait belt  -RW gait belt  -RW gait belt  -KD    Recorded by [RW] Balaji Malhotra PTA [RW] Balaji Malhotra, DARIEN [KD] Virgie Ray, VILLALPANDO/L    Mobility Assessment/Training    Left Lower Extremity Weight-Bearing non weight-bearing  -RW non weight-bearing  -RW     Right Lower Extremity Weight-Bearing non weight-bearing  -RW non weight-bearing  -RW     Recorded by [RW] Balaji Malhotra, DARIEN [RW] Balaji Malhotra PTA     Bed Mobility, Assessment/Treatment    Bed Mobility, Roll Left, [place]   conditional independence  -KD    Bed Mobility, Scoot/Bridge, [place]   conditional independence  -KD    Bed Mob, Supine to Sit, [place]  independent  -RW independent  -KD    Bed Mob, Sit to Supine, [place]  independent  -RW     Bed Mob, Sidelying to Sit, [place]   independent  -KD    Recorded by  [RW] Balaji Malhotra PTA [KD] Virgie Ray, VILLALPANDO/L    Transfer Assessment/Treatment    Transfers, Bed-Chair [place]  conditional independence  -RW conditional independence  -KD    Transfers, Chair-Bed [place]  conditional independence  -RW     Transfers, Sit-Stand [place] not appropriate to assess  -RW not appropriate to assess  -RW     Toilet Transfer, [place]   conditional independence  -KD    Toilet Transfer, Assistive Device   bedside commode with drop arms  -KD    Recorded by [RW] Balaji Malhotra PTA [RW] Balaji Malhotra, PTA [KD] Virgie Ray, VILLALPANDO/L    Gait Assessment/Treatment    Gait, [place] Level not appropriate to assess  -RW not appropriate to assess  -RW     Recorded by [RW] Balaji Malhotra,  PTA [RW] Balaji Malhotra PTA     Wheelchair Training/Management    Wheelchair Propulsion Training   forward propulsion  -KD    Wheelchair, Distance Propelled  130 mod ind  - mod indep  -KD    Recorded by  [RW] Balaji Malhotra PTA [KD] NAV CamachoA/L    Upper Body Dressing Assessment/Training    UB Dressing Assess/Train, Clothing Type   donning:;t-shirt  -KD    UB Dressing Assess/Train, Position   sitting  -KD    UB Dressing Assess/Train, [place]   independent  -KD    Recorded by   [KD] NAV CamachoA/L    Lower Body Dressing Assessment/Training    LB Dressing Assess/Train, Clothing Type   doffing:;donning:;shorts  -KD    LB Dressing Assess/Train, Position   sitting  -KD    LB Dressing Assess/Train, [place]   set up required  -KD    LB Dressing Assess/Train, Impairments   strength decreased  -KD    LB Dressing Assess/Train, Comment   Min A for doffing  -KD    Recorded by   [KD] IRASEMA Camacho/L    Toileting Assessment/Training    Toileting Assess/Train, Assistive Device   bedside commode  -KD    Toileting Assess/Train, Position   sitting  -KD    Toileting Assess/Train, [place] Level   set up required  -KD    Recorded by   [KD] NAV CamachoA/L    Balance Skills Training    Sitting-Balance Activities  Reaching for weighted objects;Reaching for objects;Reaching across midline  -RW     Recorded by  [RW] Balaji Malhotra PTA     Therapy Exercises    Right Lower Extremity  AROM:;20 reps;quad sets;sitting   2/20  -RW     Left Lower Extremity  AROM:;20 reps;SLR;sitting   2/20  -RW     Bilateral Upper Extremity  15 reps;AROM:;sitting;elbow flexion/extension;shoulder abduction/adduction;shoulder horizontal abd/add   rows and pulldowns  -RW AROM:;20 reps;sitting;elbow flexion/extension;hand pumps;pronation/supination;shoulder abduction/adduction;shoulder extension/flexion;shoulder ER/IR;shoulder horizontal abd/add  -KD    BUE Resistance  --   perstack x 10-35 #  -RW --   UEE x 15 mins;  CC 5 sets x 20 reps 20 lbs; RS 5 sets x 20 re  -KD    Recorded by  [RW] Balaji Malhotra PTA [KD] NAV CamachoA/L    Positioning and Restraints    Pre-Treatment Position  in bed  -RW in bed  -KD    Post Treatment Position  bed  -RW bsc  -KD    In Bed  call light within reach  -RW     On BS commode   sitting;call light within reach;encouraged to call for assist  -KD    Recorded by  [RW] Balaji Malhotra PTA [KD] NAV CamachoA/FREEDOM      09/19/17 1445 09/19/17 1350       Rehab Assessment/Intervention    Discipline occupational therapy assistant  -LM physical therapy assistant  -RW     Document Type therapy note (daily note)  -LM therapy note (daily note)  -RW     Subjective Information agree to therapy  -LM agree to therapy  -RW     Patient Effort, Rehab Treatment good  -LM good  -RW     Recorded by [LM] IRASEMA Smith/L [RW] Balaji Malhotra PTA     Pain Assessment    Pain Assessment No/denies pain  -LM No/denies pain  -RW     Recorded by [LM] NAV SmithA/L [RW] Balaji Malhotra PTA     Cognitive Assessment/Intervention    Current Cognitive/Communication Assessment functional  -LM functional  -RW     Orientation Status oriented x 4  -LM oriented x 4  -RW     Follows Commands/Answers Questions 100% of the time  -% of the time  -RW     Recorded by [LM] NAV SmithA/L [RW] Balaji Malhotra PTA     Mobility Assessment/Training    Left Lower Extremity Weight-Bearing  non weight-bearing  -RW     Right Lower Extremity Weight-Bearing  non weight-bearing  -RW     Recorded by  [RW] Balaji Malhotra PTA     Bed Mobility, Assessment/Treatment    Bed Mob, Supine to Sit, [place]  independent  -RW     Bed Mob, Sit to Supine, [place]  independent  -RW     Recorded by  [RW] Balaji Malhotra PTA     Transfer Assessment/Treatment    Transfers, Bed-Chair [place]  conditional independence  -RW     Transfers, Chair-Bed [place] conditional independence  -LM      Transfers,  Sit-Stand [place]  not appropriate to assess  -RW     Recorded by [LM] NAV SmithA/L [RW] aBlaji Malhotra PTA     Gait Assessment/Treatment    Gait, [place] Level  not appropriate to assess  -RW     Recorded by  [RW] Balaji Malhotra PTA     Wheelchair Training/Management    Wheelchair, Distance Propelled  150 mod ind  -RW     Recorded by  [RW] Balaji Malhotra PTA     Balance Skills Training    Sitting-Balance Activities  Reaching for weighted objects;Reaching for objects;Reaching across midline  -RW     Recorded by  [RW] Balaji Malhotra PTA     Therapy Exercises    Right Lower Extremity  AROM:;20 reps;quad sets;sitting   2/20  -RW     Left Lower Extremity  AROM:;20 reps;SLR;sitting   2/20  -RW     Bilateral Upper Extremity AROM:   lev 4 tband all UE planes increase strenvth 2-3 sets of 20   -LM      Recorded by [LM] NAV SmithA/L [RW] Balaji Malhotra PTA     Positioning and Restraints    Pre-Treatment Position sitting in chair/recliner  -LM      Post Treatment Position bed  -LM      Recorded by [LM] NAV SmithA/L        User Key  (r) = Recorded By, (t) = Taken By, (c) = Cosigned By    Initials Name Effective Dates    JA Dwayne Felton, PTA 10/17/16 -     RW Balaji Malhotra, DARIEN 10/17/16 -     KD Virgie Ray, VILLALPANDO/L 10/17/16 -     LM Marce Carter VILLALPANDO/L 10/17/16 -                 OT Goals       09/22/17 1103 09/21/17 1302 09/20/17 0730    Patient Education OT LTG    Patient Education OT LTG, Date Established   09/20/17  -KD    Patient Education OT LTG, Date Goal Reviewed 09/22/17  -KD 09/21/17  -LM 09/20/17  -KD    Patient Education OT LTG Outcome goal met  -KD goal ongoing  -LM goal not met  -KD    ADL OT LTG    ADL OT LTG, Date Goal Reviewed 09/22/17  -KD 09/21/17  -LM 09/20/17  -KD    ADL OT LTG, Outcome  goal ongoing  -LM goal not met  -KD      09/19/17 1400 09/18/17 1518 09/15/17 0810    Patient Education OT LTG    Patient Education OT LTG, Date Goal  Reviewed 09/19/17  -LM 09/18/17  -LM 09/15/17  -KD    Patient Education OT LTG Outcome goal ongoing  -LM goal ongoing  -LM goal not met  -KD    ADL OT LTG    ADL OT LTG, Date Goal Reviewed 09/19/17  -LM 09/18/17  -LM 09/15/17  -KD    ADL OT LTG, Outcome goal ongoing  -LM goal ongoing  -LM goal not met  -KD      09/14/17 1413 09/13/17 1552 09/12/17 1455    Bed Mobility OT LTG    Bed Mobility OT LTG, Date Goal Reviewed 09/14/17  -LM 09/13/17  -LM 09/12/17  -RC    Bed Mobility OT LTG, Outcome goal met  -LM goal ongoing  -LM goal ongoing  -RC    Transfer Training OT LTG    Transfer Training OT LTG, Date Goal Reviewed 09/14/17  -LM 09/13/17  -LM 09/12/17  -RC    Transfer Training OT LTG, Outcome goal met  -LM goal ongoing  -LM goal ongoing  -RC    Patient Education OT LTG    Patient Education OT LTG, Date Goal Reviewed 09/14/17  -LM 09/13/17  -LM 09/12/17  -RC    Patient Education OT LTG Outcome goal ongoing  -LM goal ongoing  -LM goal ongoing  -RC    ADL OT LTG    ADL OT LTG, Date Goal Reviewed 09/14/17  -LM 09/13/17  -LM 09/12/17  -RC    ADL OT LTG, Outcome goal ongoing  -LM goal ongoing  -LM goal ongoing  -RC      09/12/17 0755 09/11/17 1609 09/11/17 1333    Bed Mobility OT LTG    Bed Mobility OT LTG, Date Established 09/12/17  -BH (r) SP (t) BH (c)      Bed Mobility OT LTG, Time to Achieve by discharge  -BH (r) SP (t) BH (c)      Bed Mobility OT LTG, Activity Type all bed mobility  -BH (r) SP (t) BH (c)      Bed Mobility OT LTG, [place] Level supervision required;minimum assist (75% patient effort)   AE/adaptive techniques- RLE in knee immobilizer and extended  -BH (r) SP (t) BH (c)      Transfer Training OT LTG    Transfer Training OT LTG, Date Established 09/12/17  -BH (r) SP (t) BH (c)  09/11/17  -RB    Transfer Training OT LTG, Time to Achieve by discharge  -BH (r) SP (t) BH (c)  by discharge  -RB    Transfer Training OT LTG, Activity Type --   BSC, w/c  -BH (r) SP (t) BH (c)  all transfers  -RB    Transfer  Training OT LTG, [place] Level minimum assist (75% patient effort);contact guard assist   RLE in knee immobilizer and extended at all times.  -BH (r) SP (t) BH (c)  supervision required;contact guard assist  -RB    Transfer Training OT LTG, Assist Device   other (see comments)   Sliding board if needed.  -RB    Transfer Training OT LTG, Date Goal Reviewed  09/04/17  -SG     Transfer Training OT LTG, Outcome  goal not met  -SG     Transfer Training OT LTG, Reason Goal Not Met  discharged from facility  -SG     Strength OT LTG    Strength Goal OT LTG, Date Established   09/11/17  -RB    Strength Goal OT LTG, Time to Achieve   by discharge  -RB    Strength Goal OT LTG, Measure to Achieve   2 sets of 10 reps all planes with 3-4 lb dumbells for B UE strength to increase independence with functional mobility/transfers.  -RB    Strength Goal OT LTG, Date Goal Reviewed  09/11/17  -SG     Strength Goal OT LTG, Outcome  goal not met  -SG     Strength Goal OT LTG, Reason Goal Not Met  discharged from facility  -SG     Dynamic Sitting Balance OT LTG    Dynamic Sitting Balance OT LTG, Date Established   09/11/17  -RB    Dynamic Sitting Balance OT LTG, Time to Achieve   by discharge  -RB    Dynamic Sitting Balance OT LTG, [place] Level   supervision required   10-15 minutes with functional activity.  -RB    Dynamic Sitting Balance OT LTG, Assist Device   bed rails  -RB    Dynamic Sitting Balance OT LTG, Date Goal Reviewed  09/11/17  -     Dynamic Sitting Balance OT LTG, Outcome  goal not met  -SG     Dynamic Sitting Balance OT LTG, Reason Goal Not Met  discharged from facility  -SG     Patient Education OT LTG    Patient Education OT LTG, Date Established 09/12/17  -BH (r) SP (t) BH (c)      Patient Education OT LTG, Time to Achieve by discharge  -BH (r) SP (t) BH (c)      Patient Education OT LTG, Education Type HEP;precautions per surgeon;positioning;posture/body mechanics;home safety;adaptive equipment mgmt  -BH  (r) SP (t) BH (c)      Patient Education OT LTG, Education Understanding independent;demonstrates adequately;verbalizes understanding  -BH (r) SP (t) BH (c)      ADL OT LTG    ADL OT LTG, Date Established 09/12/17  -BH (r) SP (t) BH (c)  09/11/17  -RB    ADL OT LTG, Time to Achieve by discharge  -BH (r) SP (t) BH (c)  by discharge  -RB    ADL OT LTG, Activity Type ADL skills  -BH (r) SP (t) BH (c)  ADL skills   Sponge bath and dress.  -RB    ADL OT LTG, [place] Level   contact guard;min assist  -RB    ADL OT LTG, Additional Goal set-up with self-feeding, grooming, UB dressing/bathing; min A with toileting; mod A with LB dressing/bathing  -BH (r) SP (t) BH (c)      ADL OT LTG, Date Goal Reviewed  09/11/17  -SG     ADL OT LTG, Outcome  goal not met  -SG     ADL OT LTG, Reason Goal Not Met  discharged from facility  -       User Key  (r) = Recorded By, (t) = Taken By, (c) = Cosigned By    Initials Name Provider Type    RB Axel Perez, OT Occupational Therapist     Isabel Jones, OTR/L Occupational Therapist    MARGE Jean-Baptiste, VILLALPANDO/L Occupational Therapy Assistant    VIPUL Ray VILLALPANDO/L Occupational Therapy Assistant    YOEL Carter, VILLALPANDO/L Occupational Therapy Assistant    DANY Juárez, OT Student OT Student    SAHARA Rai, OT Occupational Therapist          Occupational Therapy Education     Title: PT OT SLP Therapies (Done)     Topic: Occupational Therapy (Done)     Point: ADL training (Done)    Description: Instruct learner(s) on proper safety adaptation and remediation techniques during self care or transfers.   Instruct in proper use of assistive devices.    Learning Progress Summary    Learner Readiness Method Response Comment Documented by Status   Patient Acceptance E VU   09/22/17 1138 Done    Acceptance D STEPHEN   09/15/17 1036 Done    Acceptance E LENA Educated on OT and POC. Educated about adhering to weight bearing precautions. Educated about safety with ADL and bed  mobility.  09/12/17 1159 Done               Point: Home exercise program (Done)    Description: Instruct learner(s) on appropriate technique for monitoring, assisting and/or progressing therapeutic exercises/activities.    Learning Progress Summary    Learner Readiness Method Response Comment Documented by Status   Patient Acceptance E [facility] 09/22/17 1138 Done               Point: Precautions (Done)    Description: Instruct learner(s) on prescribed precautions during self-care and functional transfers.    Learning Progress Summary    Learner Readiness Method Response Comment Documented by Status   Patient Acceptance E [facility] 09/22/17 1138 Done    Acceptance E VU Educated on OT and POC. Educated about adhering to weight bearing precautions. Educated about safety with ADL and bed mobility.  09/12/17 1159 Done               Point: Body mechanics (Done)    Description: Instruct learner(s) on proper positioning and spine alignment during self-care, functional mobility activities and/or exercises.    Learning Progress Summary    Learner Readiness Method Response Comment Documented by Status   Patient Acceptance E [facility] 09/22/17 1138 Done    Acceptance E VU Educated on OT and POC. Educated about adhering to weight bearing precautions. Educated about safety with ADL and bed mobility.  09/12/17 1159 Done                      User Key     Initials Effective Dates Name Provider Type Discipline     10/17/16 -  IRASEMA Camacho/L Occupational Therapy Assistant OT    SP 01/31/17 -  Kenya Juárez OT Student OT Student OT                  OT Recommendation and Plan  Anticipated Equipment Needs At Discharge: hospital bed  Anticipated Discharge Disposition: home with 24/7 care, home with home health  Planned Therapy Interventions: activity intolerance, adaptive equipment training, ADL retraining, bed mobility training, balance training, energy conservation, fine motor coordination training, home exercise program,  motor coordination training, strengthening, transfer training  Therapy Frequency: other (see comments) (3-14x/wk)  Plan of Care Review  Outcome Summary/Follow up Plan: Pt met 4/5 goals prior to d/c home        Outcome Measures       09/22/17 1103 09/22/17 1000 09/21/17 1015    How much help from another person do you currently need...    Turning from your back to your side while in flat bed without using bedrails?  4  -JA 4  -JA    Moving from lying on back to sitting on the side of a flat bed without bedrails?  4  -JA 4  -JA    Moving to and from a bed to a chair (including a wheelchair)?  4  -JA 4  -JA    Standing up from a chair using your arms (e.g., wheelchair, bedside chair)?  1  -JA 1  -JA    Climbing 3-5 steps with a railing?  1  -JA 1  -JA    To walk in hospital room?  1  -JA 1  -JA    AM-PAC 6 Clicks Score  15  -JA 15  -JA    How much help from another is currently needed...    Putting on and taking off regular lower body clothing? 3  -KD      Bathing (including washing, rinsing, and drying) 3  -KD      Toileting (which includes using toilet bed pan or urinal) 3  -KD      Putting on and taking off regular upper body clothing 4  -KD      Taking care of personal grooming (such as brushing teeth) 4  -KD      Eating meals 4  -KD      Score 21  -KD      Functional Assessment    Outcome Measure Options  AM-PAC 6 Clicks Basic Mobility (PT)  -JA AM-PAC 6 Clicks Basic Mobility (PT)  -JA      09/20/17 1100 09/20/17 0730       How much help from another person do you currently need...    Turning from your back to your side while in flat bed without using bedrails? 4  -RW      Moving from lying on back to sitting on the side of a flat bed without bedrails? 4  -RW      Moving to and from a bed to a chair (including a wheelchair)? 4  -RW      Standing up from a chair using your arms (e.g., wheelchair, bedside chair)? 1  -RW      Climbing 3-5 steps with a railing? 1  -RW      To walk in hospital room? 1  -RW      AM-PAC  6 Clicks Score 15  -RW      How much help from another is currently needed...    Putting on and taking off regular lower body clothing?  3  -KD     Bathing (including washing, rinsing, and drying)  3  -KD     Toileting (which includes using toilet bed pan or urinal)  3  -KD     Putting on and taking off regular upper body clothing  4  -KD     Taking care of personal grooming (such as brushing teeth)  4  -KD     Eating meals  4  -KD     Score  21  -KD       User Key  (r) = Recorded By, (t) = Taken By, (c) = Cosigned By    Initials Name Provider Type    ERICH Felton, PTA Physical Therapy Assistant    RW Balaji Malhotra, PTA Physical Therapy Assistant    IRASEMA Shelby/L Occupational Therapy Assistant           Time Calculation:         Time Calculation- OT       09/22/17 1139          Time Calculation- OT    OT Start Time 1103  -KD      OT Stop Time 1133  -KD      OT Time Calculation (min) 30 min  -KD      Total Timed Code Minutes- OT 30 minute(s)  -KD      OT Received On 09/22/17  -KD        User Key  (r) = Recorded By, (t) = Taken By, (c) = Cosigned By    Initials Name Provider Type    IRASEMA Shelby/L Occupational Therapy Assistant           Therapy Charges for Today     Code Description Service Date Service Provider Modifiers Qty    06368430530  OT THER PROC EA 15 MIN 9/22/2017 IRASEMA Camacho/L GO 1    [id number]  OT SELF CARE/MGMT/TRAIN EA 15 MIN 9/22/2017 IRASEMA Camacho/L GO 1          OT G-codes  OT Professional Judgement Used?: Yes  OT Functional Scales Options: AM-PAC 6 Clicks Daily Activity (OT)  Score: 15  Functional Limitation: Self care  Self Care Current Status (): At least 40 percent but less than 60 percent impaired, limited or restricted  Self Care Goal Status (): At least 20 percent but less than 40 percent impaired, limited or restricted    IRASEMA Camacho/FREEDOM  9/22/2017

## 2017-09-22 NOTE — PROGRESS NOTES
Anticoagulation- Warfarin     Completed Discharge Warfarin Counseling    Discussed the followin. Reason for Medication and Length of therapy  2. Drug-Drug Interactions  3. Drug-Diet Interactions  4. Drug- Alcohol Interactions  5. Signs and Symptoms of Bleeding  6. Fall risk- go to the Emergency Room  7. Home procedures:  Patient is going home with warfarin 4 mg  8. Sent the Rx to: [facility] (sent through [facility]) Pharmacy for warfarin 4 mg, Number: 30  Si tablet every night or As Directed + 0 refills  9. Gave Warfarin booklet  10. Answered all Questions  11. Called in lab orders to [facility]    [facility]  17  10:05 AM

## 2017-09-22 NOTE — DISCHARGE SUMMARY
[address] [address]  T - [phone]     Rehabilitation Discharge Summary       BRIEF OVERVIEW   PATIENT NAME: Vangie Lopez                      PHYSICIAN: Alec Stephen MD  : 1950  MRN: [id number]    ADMISSION/DISCHARGE INFO   Admitting Provider: Alec Stephen MD  Discharge Provider: Alec Stephen MD  ADMISSION DATE: 2017   DISCHARGE DATE:  17    ADMISSION DIAGNOSES: Multiple trauma [T07]  DISCHARGE DIAGNOSES: Principal Problem:    Multiple trauma  Active Problems:    Closed displaced comminuted fracture of right patella    Closed displaced fracture of navicular bone of left foot    Osteoarthritis of hands, bilateral    BPH (benign prostatic hyperplasia)    Glaucoma    Encounter for rehabilitation    Recent onset of diabetes mellitus    Essential hypertension    Mixed hyperlipidemia    History of kidney stones      Primary Care Physician at Discharge: Renan Rodríguez [phone]     Secondary Discharge Diagnosis  Patient Active Problem List   Diagnosis Code   • Encounter for screening for malignant neoplasm of colon Z12.11   • Closed displaced comminuted fracture of right patella S82.041A   • Closed displaced fracture of navicular bone of left foot S92.252A   • Closed dislocation of navicular bone of left foot S93.315A   • Patella fracture S82.009A   • Essential hypertension I10   • Mixed hyperlipidemia E78.2   • Osteoarthritis of hands, bilateral M19.041, M19.042   • BPH (benign prostatic hyperplasia) N40.0   • Glaucoma H40.9   • Hyperlipidemia E78.5   • Closed fracture of navicular bone with dislocation of perilunate joint of left wrist S62.002A, S63.035A   • Fracture of patella, right, closed S82.001A   • Multiple trauma T07   • Encounter for rehabilitation Z51.89   • History of kidney stones Z87.442   • Recent onset of diabetes mellitus E11.9       Discharge Disposition  He will be going home with home  health    Code Status at Discharge: Full code       CONSULTS   Consult Orders (all)     Start     Ordered    09/15/17 1045  Inpatient Consult to Nutrition  Once     Comments:  new diagnosis of adult-onset diabetes needs diabetic education and diet   Provider:  (Not yet assigned)    09/15/17 1045    09/11/17 1423  Inpatient Consult to Podiatry  Once     Specialty:  Podiatry  Provider:  Simone Rivera DPM    09/11/17 1422    09/11/17 1422  Inpatient Consult to Orthopedic Surgery  Once     Specialty:  Orthopedic Surgery  Provider:  Checo Rutherford MD    09/11/17 1421    09/11/17 1422  Inpatient Consult to Orthopedic Surgery  Once     Specialty:  Orthopedic Surgery  Provider:  Checo Rutherford MD    09/11/17 1422    09/11/17 1421  Inpatient consult to Nutrition  Once     Provider:  (Not yet assigned)    09/11/17 1420          DETAILS OF HOSPITAL STAY    Functional Status:                ADMIT             Discharge   Eating                                          6                        6   Grooming                                    5                        6  Bathing                                         2                        4  Dressing upper body                    5                        7  Dressing lower body                     1                        4  Toileting                                        4                        5  Bladder Management                   6                        6  Bowel Management                     6                        6  Transfers:bed chair wheelchair    1                        6  Transfers: toilet                            0                        4  Transfers: tub/shower                  0                        1  Locomotion: walking                    0                        1  Locomotion: Wheelchair              5                        6  Locomotion: stairs                       0                         1  Comprehension                           6                          6  Expression                                   7                         7  Social interaction                         5                        6  Problem solving                           5                        6  Memory                                        0                        6                                          Rehabilitation Course  he participated with therapy making good progress is transfers have improved activities of daily living have improved glucose has improved he'll be discharged to home      Heart Rate: 84  Resp: 18  BP: 132/73  Temp: 97.2 °F (36.2 °C)   Weight: 177 lb (80.3 kg)    Pertinent Test Results:    Lab Results (last 72 hours)     Procedure Component Value Units Date/Time    POC Glucose Fingerstick [[id number]]  (Normal) Collected:  09/19/17 1116    Specimen:  Blood Updated:  09/19/17 1129     Glucose 126 mg/dL       RN NotifiedMeter: [id number]: [id number] [employer] KASHMIR       POC Glucose Fingerstick [[id number]]  (Normal) Collected:  09/19/17 1601    Specimen:  Blood Updated:  09/19/17 1617     Glucose 118 mg/dL       RN NotifiedMeter: [id number]: [id number] Conf[employer]ianism KASHMIR       POC Glucose Fingerstick [[id number]]  (Abnormal) Collected:  09/19/17 2029    Specimen:  Blood Updated:  09/19/17 2048     Glucose 190 (H) mg/dL       Meter: [id number]: [id number] [facility] REY        POC Glucose Fingerstick [[id number]]  (Abnormal) Collected:  09/20/17 0557    Specimen:  Blood Updated:  09/20/17 0626     Glucose 153 (H) mg/dL       RN NotifiedMeter: [id number]: [id number] BRUC APRIL       Protime-INR [[id number]]  (Abnormal) Collected:  09/20/17 0729    Specimen:  Blood Updated:  09/20/17 0814     Protime 20.9 (H) Seconds      INR 1.79 (H)    Narrative:       Therapeutic range for most indications is 2.0-3.0 INR,  or 2.5-3.5 for mechanical heart valves.    Extra Tubes [[id number]] Collected:  09/20/17 0758    Specimen:  Blood from  Blood, Venous Line Updated:  09/20/17 0902    Narrative:       The following orders were created for panel order Extra Tubes.  Procedure                               Abnormality         Status                     ---------                               -----------         ------                     Green Top (Gel)[[id number]]                                  Final result                 Please view results for these tests on the individual orders.    Green Top (Gel) [[id number]] Collected:  09/20/17 0758    Specimen:  Blood Updated:  09/20/17 0902     Extra Tube Hold for add-ons.      Auto resulted.       Extra Tubes [[id number]] Collected:  09/20/17 0758    Specimen:  Blood from Blood, Venous Line Updated:  09/20/17 0902    Narrative:       The following orders were created for panel order Extra Tubes.  Procedure                               Abnormality         Status                     ---------                               -----------         ------                     Lavender Top[[id number]]                                     Final result                 Please view results for these tests on the individual orders.    Lavender Top [[id number]] Collected:  09/20/17 0758    Specimen:  Blood Updated:  09/20/17 0902     Extra Tube hold for add-on      Auto resulted       POC Glucose Fingerstick [[id number]]  (Normal) Collected:  09/20/17 1016    Specimen:  Blood Updated:  09/20/17 1036     Glucose 121 mg/dL       RN NotifiedMeter: [id number]: [id number] KAE RUTH ANN       Renal Function Panel [[id number]]  (Abnormal) Collected:  09/20/17 1440    Specimen:  Blood Updated:  09/20/17 1515     Glucose 144 (H) mg/dL      BUN 23 (H) mg/dL      Creatinine 0.81 mg/dL      Sodium 133 (L) mmol/L      Potassium 3.9 mmol/L      Chloride 95 mmol/L      CO2 28.0 mmol/L      Calcium 8.8 mg/dL      Albumin 3.50 g/dL      Phosphorus 3.9 mg/dL      Anion Gap 10.0 mmol/L      BUN/Creatinine Ratio 28.4 (H)     eGFR Non  African Amer 95 mL/min/1.73     POC Glucose Fingerstick [[id number]]  (Normal) Collected:  09/20/17 1601    Specimen:  Blood Updated:  09/20/17 1624     Glucose 128 mg/dL       RN NotifiedMeter: [id number]: [id number] KAE VALLECILLO       POC Glucose Fingerstick [[id number]]  (Abnormal) Collected:  09/20/17 2024    Specimen:  Blood Updated:  09/20/17 2038     Glucose 163 (H) mg/dL       Meter: [id number]: [id number] [facility]        POC Glucose Fingerstick [[id number]]  (Normal) Collected:  09/21/17 0616    Specimen:  Blood Updated:  09/21/17 0632     Glucose 123 mg/dL       Meter: [id number]: [id number] [facility]        Protime-INR [[id number]]  (Abnormal) Collected:  09/21/17 0630    Specimen:  Blood Updated:  09/21/17 0701     Protime 21.5 (H) Seconds      INR 1.86 (H)    Narrative:       Therapeutic range for most indications is 2.0-3.0 INR,  or 2.5-3.5 for mechanical heart valves.    Extra Tubes [[id number]] Collected:  09/21/17 0657    Specimen:  Blood from Blood, Venous Line Updated:  09/21/17 0801    Narrative:       The following orders were created for panel order Extra Tubes.  Procedure                               Abnormality         Status                     ---------                               -----------         ------                     Lavender Top[[id number]]                                     Final result               Green Top (Gel)[[id number]]                                  Final result                 Please view results for these tests on the individual orders.    Lavender Top [[id number]] Collected:  09/21/17 0657    Specimen:  Blood Updated:  09/21/17 0801     Extra Tube hold for add-on      Auto resulted       Green Top (Gel) [[id number]] Collected:  09/21/17 0657    Specimen:  Blood Updated:  09/21/17 0801     Extra Tube Hold for add-ons.      Auto resulted.       POC Glucose Fingerstick [[id number]]  (Normal) Collected:  09/21/17 1116     Specimen:  Blood Updated:  09/21/17 1201     Glucose 115 mg/dL       Meter: [id number]: [id number] [employer]       POC Glucose Fingerstick [[id number]]  (Normal) Collected:  09/21/17 1609    Specimen:  Blood Updated:  09/21/17 1623     Glucose 113 mg/dL       Meter: [id number]: [id number] [employer]       POC Glucose Fingerstick [[id number]]  (Abnormal) Collected:  09/21/17 2002    Specimen:  Blood Updated:  09/21/17 2017     Glucose 166 (H) mg/dL       RN NotifiedMeter: [id number]: [id number] CADEN SAENZ       POC Glucose Fingerstick [[id number]]  (Normal) Collected:  09/22/17 0519    Specimen:  Blood Updated:  09/22/17 0535     Glucose 124 mg/dL       Meter: [id number]: [id number] CADEN SAENZ       Protime-INR [[id number]]  (Abnormal) Collected:  09/22/17 0517    Specimen:  Blood Updated:  09/22/17 0656     Protime 21.0 (H) Seconds      INR 1.80 (H)    Narrative:       Therapeutic range for most indications is 2.0-3.0 INR,  or 2.5-3.5 for mechanical heart valves.        Imaging Results (all)     None          Presenting Problem/History of Present Illness   Vangie Lopez is a 67 y.o. male who was admitted to [facility] after falling off a roof he sustained a dislocation fracture of the left ankle navicular bone.  And he sustained a fracture of the right patella.  He underwent ORIF of the patella by Dr. Rutherford and repair and follow-up the navicular by Dr. Rivera.  He participated with therapy and we were asked to admit him because of his medical problems and his need to be nonweightbearing on both lower extremities.  Also during his stay in the hospital was noted that his glucoses were elevated and these need to be followed.  He was admitted to acute rehabilitation and participated with therapy making good progress.  It was noted that his sugars were elevated and he had a glucose as high as 273.  His hemoglobin A1c was elevated at admission 5.9.   I discussed diabetic diet with him with minimal improvement of his glucose.  He was started on metformin extended release 500 mg with supper and subsequently increased to 500 mg twice a day.  I stressed to him that it was important to control his sugar so the wounds would be more likely that he'll he was agreeable.  On medication glucoses improved renal function stable he continued to progress with therapy and is discharged to home with his family    Dr. Rutherford did see him today and staples were removed from the patella incision.  Dr. Rivera is followed him and we'll see him back in the office on Monday, place a cast on the left      Physical Exam at Discharge      He is alert and oriented in no distress ENT is all normal neck supple no adenopathy lungs are clear without rales rhonchi or wheezes heart rate is regular without murmurs gallops or rubs   Abdomen is soft nontender with good bowel sounds no rebound guarding or rigidity.  Incision in the right patella is well-healed staples have been removed  He has this big splint on the left leg and ankle per Dr. Rivera                 DISPOSITION   He is going home with home health     DISCHARGE MEDICATIONS        Your medication list       As of 9/22/2017  9:40 AM      START taking these medications       Instructions Last Dose Given Next Dose Due    Blood Glucose Monitoring Suppl device        Use as directed to monitor sugar         Blood Glucose Monitoring Suppl device        One lancet twice daily to check  Fingerstick  Sugar   E11.9         ferrous sulfate 324 (65 Fe) MG tablet delayed-release EC tablet        Take 1 tablet by mouth Daily With Breakfast.         folic acid-vit B6-vit B12 2.5-25-1 MG tablet tablet   Commonly known as:  FOLBEE        Take 1 tablet by mouth Daily.         glucose blood test strip        Check sugar twice a day         HYDROcodone-acetaminophen  MG per tablet   Commonly known as:  NORCO        Take 1 tablet by mouth Every 4 (Four)  Hours As Needed for Severe Pain  for up to 8 days.         metFORMIN  MG 24 hr tablet   Commonly known as:  GLUCOPHAGE-XR        Take 1 tablet by mouth 2 (Two) Times a Day With Meals.         PHARMACY TO DOSE WARFARIN        Continuous As Needed (twice weekly for 28 days).         sennosides-docusate sodium 8.6-50 MG tablet   Commonly known as:  SENOKOT-S        Take 1 tablet by mouth 2 (Two) Times a Day.         traZODone 50 MG tablet   Commonly known as:  DESYREL        Take 0.5 tablets by mouth Every Night.         warfarin 4 MG tablet   Commonly known as:  COUMADIN        One daily for 3 weeks         warfarin 4 MG tablet   Commonly known as:  COUMADIN        On daily for 4 weeks           CONTINUE taking these medications       Instructions Last Dose Given Next Dose Due    finasteride 5 MG tablet   Commonly known as:  PROSCAR              hydrochlorothiazide 25 MG tablet   Commonly known as:  HYDRODIURIL              potassium citrate 10 MEQ (1080 MG) CR tablet   Commonly known as:  UROCIT-K              pravastatin 40 MG tablet   Commonly known as:  PRAVACHOL              sildenafil 100 MG tablet   Commonly known as:  VIAGRA                   Where to Get Your Medications      These medications were sent to [facility] [address] [place] [address] [place] AT [facility] [address] - [phone]  - [phone] [address] [place], [facility] [address]     Phone:  [phone]    • ferrous sulfate 324 (65 Fe) MG tablet delayed-release EC tablet   • folic acid-vit B6-vit B12 2.5-25-1 MG tablet tablet   • sennosides-docusate sodium 8.6-50 MG tablet   • traZODone 50 MG tablet   • warfarin 4 MG tablet         You can get these medications from any pharmacy     Bring a paper prescription for each of these medications    • Blood Glucose Monitoring Suppl device   • Blood Glucose Monitoring Suppl device   • glucose blood test strip   • HYDROcodone-acetaminophen  MG per tablet   •  metFORMIN  MG 24 hr tablet   • PHARMACY TO DOSE WARFARIN   • warfarin 4 MG tablet             INSTRUCTIONS   Activity: He will be nonweightbearing on both lower extremities and he will have a knee immobilizer on the right splint on the left     Diet: Diabetic diet has been discussed with him and he will follow with also will be on vitamin K restrictions     Special Instructions:   Wheelchair has been written bedside [facility] bed with trapeze bar glucose monitor and supplies he'll check his sugar twice a day    FOLLOW UP   Additional Instructions for the Follow-ups that You Need to Schedule     Ambulatory Referral to Home Health    As directed    Face to Face Visit Date:  9/18/2017   Follow-up Provider for Plan of Care?:  I treated the patient in an acute care facility and will not continue treatment after discharge.   Follow-up Provider:  SARAH CORRAL   Reason/Clinical Findings:  bilateral lower extremity fractures   Describe mobility limitations that make leaving home difficult:  non weight bearing bilaterally   Nursing/Therapeutic Services Requested:   Skilled Nursing  Physical Therapy  Occupational Therapy      Skilled nursing orders:  Medication education Comment - new diagnosis of diabetes, diabetic teaching,inr twice weekly on monday and thursday followed by [facility] pharmacy for 4 weeks   PT orders:   Therapeutic exercise Comment - 3 times weekly, non weight bearing bilateral lower exrtremities  Transfer training  Strengthening      Occupational orders:   Activities of daily living Comment - 3 times a week. non wt bearing bilateral lower extremities  Strengthening  Home safety assessment      Frequency:  1 Week 1             Follow-up Information     Follow up with [facility] .    Specialty:  Home Health Services    Contact information:    [facility] [address] [place] [address] [phone]        Follow up with Simnoe Rivera DPM Follow up in 3 day(s).     Specialty:  Podiatry    Why:  see dr Rivera on Monday as he directed    Contact information:    [address]   [place] [id number] [phone]          Follow up with Checo Rutherford MD Follow up in 2 week(s).    Specialty:  Orthopedic Surgery    Contact information:    [address]  [facility] [id number] [phone]          Follow up with Renan Rodríguez MD. Schedule an appointment as soon as possible for a visit in 1 week(s).    Specialty:  Family Medicine    Why:  Please call to make appointment for 1 week.    Contact information:    [address] [place] [address]  [place] [address] [phone]          Future Appointments  Date Time Provider Department Center   9/25/2017 3:30 PM Simone Rivera DPM [facility] None   10/20/2017 9:10 AM Checo Rutherford MD [facility] None                  Alec Stephen MD  09/22/17  10:24 AM

## 2017-09-22 NOTE — PLAN OF CARE
Problem: Patient Care Overview (Adult)  Goal: Plan of Care Review  Outcome: Outcome(s) achieved Date Met:  09/22/17 09/22/17 1000   Coping/Psychosocial Response Interventions   Plan Of Care Reviewed With patient   Patient Care Overview   Progress progress toward functional goals as expected   Outcome Evaluation   Outcome Summary/Follow up Plan Pt. met all goals prior to D/C home with HHPT & 24/7 care        Goal: Discharge Needs Assessment  Outcome: Outcome(s) achieved Date Met:  09/22/17 09/12/17 0755 09/18/17 0541 09/22/17 0904   Discharge Needs Assessment   Concerns To Be Addressed --  --  no discharge needs identified   Concerns Comments --  showed a picture of ramp built at his home. --    Living Environment   Transportation Available car;family or friend will provide --  --    Self-Care   Equipment Currently Used at Home (BSC, bath bench, shower chair, w/c, tripod cane; not used) --  --          Problem: Inpatient Physical Therapy  Goal: Range of Motion Goal LTG- PT  Outcome: Outcome(s) achieved Date Met:  09/22/17 09/11/17 1500 09/22/17 1000   Range of Motion PT LTG   Range of Motion Goal PT LTG, Date Established 09/11/17 --    Range of Motion Goal PT LTG, Time to Achieve by discharge --    Range fo Motion Goal PT LTG, Joint L knee --    Range of Motion Goal PT LTG, AROM Measure Knee flex - 110 degrees --    Range of Motion Goal PT LTG, Date Goal Reviewed --  09/22/17   Range of Motion Goal PT LTG, Outcome --  goal met

## 2017-09-22 NOTE — PLAN OF CARE
Problem: Patient Care Overview (Adult)  Goal: Plan of Care Review  Outcome: Ongoing (interventions implemented as appropriate)    09/22/17 1103   Outcome Evaluation   Outcome Summary/Follow up Plan Pt met 4/5 goals prior to d/c home         Problem: Inpatient Occupational Therapy  Goal: Patient Education Goal LTG- OT  Outcome: Outcome(s) achieved Date Met:  09/22/17 09/12/17 0755 09/20/17 0730 09/22/17 1103   Patient Education OT LTG   Patient Education OT LTG, Date Established --  09/20/17 --    Patient Education OT LTG, Time to Achieve by discharge --  --    Patient Education OT LTG, Education Type HEP;precautions per surgeon;positioning;posture/body mechanics;home safety;adaptive equipment mgmt --  --    Patient Education OT LTG, Education Understanding independent;demonstrates adequately;verbalizes understanding --  --    Patient Education OT LTG, Date Goal Reviewed --  --  09/22/17   Patient Education OT LTG Outcome --  --  goal met       Goal: ADL Goal LTG- OT  Outcome: Ongoing (interventions implemented as appropriate)    09/12/17 0755 09/22/17 1103   ADL OT LTG   ADL OT LTG, Date Established 09/12/17 --    ADL OT LTG, Time to Achieve by discharge --    ADL OT LTG, Activity Type ADL skills --    ADL OT LTG, Additional Goal set-up with self-feeding, grooming, UB dressing/bathing; min A with toileting; mod A with LB dressing/bathing --    ADL OT LTG, Date Goal Reviewed --  09/22/17

## 2017-09-22 NOTE — PLAN OF CARE
Problem: Patient Care Overview (Adult)  Goal: Plan of Care Review  Outcome: Ongoing (interventions implemented as appropriate)    09/22/17 0201   Coping/Psychosocial Response Interventions   Plan Of Care Reviewed With patient   Patient Care Overview   Progress improving   Outcome Evaluation   Outcome Summary/Follow up Plan pt rested well during night         Problem: Orthopaedic Fracture (Adult)  Goal: Signs and Symptoms of Listed Potential Problems Will be Absent or Manageable (Orthopaedic Fracture)  Outcome: Ongoing (interventions implemented as appropriate)    Problem: Fall Risk (Adult)  Goal: Absence of Falls  Outcome: Ongoing (interventions implemented as appropriate)

## 2017-09-22 NOTE — PROGRESS NOTES
LOS: 11 days   Patient Care Team:  Renan Rodríguez MD as PCP - General (Family Medicine)    Chief Complaint:  S/p patella fracture with orif.      Subjective  no complaints.    Objective  wound healed, sutures removed. Wound steri-stripped.     Vital Signs  Temp:  [97.2 °F (36.2 °C)] 97.2 °F (36.2 °C)  Heart Rate:  [84] 84  Resp:  [18] 18  BP: (132)/(73) 132/73      Assessment/Plan s/p rt patella fracture with orif. Plan home with home health.      Principal Problem:    Multiple trauma  Active Problems:    Closed displaced comminuted fracture of right patella    Closed displaced fracture of navicular bone of left foot    Essential hypertension    Mixed hyperlipidemia    Osteoarthritis of hands, bilateral    BPH (benign prostatic hyperplasia)    Glaucoma    Encounter for rehabilitation    History of kidney stones    Recent onset of diabetes mellitus        Checo Rutherford MD  09/22/17  8:36 AM

## 2017-09-22 NOTE — PLAN OF CARE
Problem: Patient Care Overview (Adult)  Goal: Plan of Care Review  Outcome: Outcome(s) achieved Date Met:  09/22/17  Goal: Adult Individualization and Mutuality  Outcome: Outcome(s) achieved Date Met:  09/22/17  Goal: Discharge Needs Assessment  Outcome: Outcome(s) achieved Date Met:  09/22/17    Problem: Orthopaedic Fracture (Adult)  Goal: Signs and Symptoms of Listed Potential Problems Will be Absent or Manageable (Orthopaedic Fracture)  Outcome: Unable to achieve outcome(s) by discharge Date Met:  09/22/17    Problem: Fall Risk (Adult)  Goal: Absence of Falls  Outcome: Outcome(s) achieved Date Met:  09/22/17

## 2017-09-22 NOTE — SIGNIFICANT NOTE
09/22/17 1033   PT Discharge Summary   Anticipated Discharge Disposition home with home health;home with 24/7 care   Reason for Discharge All goals achieved   Outcomes Achieved Able to achieve all goals within established timeline   Discharge Destination Home with home health;Home with assist

## 2017-09-23 NOTE — THERAPY DISCHARGE NOTE
Inpatient Rehabilitation - Occupational Therapy Discharge Summary  [facility]     Patient Name: Vangie Lopez  : 1950  MRN: [id number]    Today's Date: 2017  Onset of Illness/Injury or Date of Surgery Date: 17    Date of Referral to OT: 17  Referring Physician: Dr. Stephen      Admit Date: 2017        OT Recommendation and Plan    Visit Dx:    ICD-10-CM ICD-9-CM   1. Multiple trauma T07 959.8   2. Abnormality of gait and mobility R26.9 781.2   3. Muscle weakness (generalized) M62.81 728.87   4. Impaired mobility and ADLs Z74.09 799.89   5. Closed displaced comminuted fracture of right patella with routine healing, subsequent encounter S82.041D V54.16   6. Closed displaced fracture of navicular bone of left foot with routine healing, subsequent encounter S92.252D V54.19                     OT Goals       17 0738 17 1103 17 1302    Bed Mobility OT LTG    Bed Mobility OT LTG, Reason Goal Not Met discharged from [facility]  -      Transfer Training OT LTG    Transfer Training OT LTG, Reason Goal Not Met discharged from [facility]  -      Patient Education OT LTG    Patient Education OT LTG, Date Goal Reviewed  17  - 17  -    Patient Education OT LTG Outcome  goal met  - goal ongoing  -    Patient Education OT LTG, Reason Goal Not Met discharged from [facility]  -      ADL OT LTG    ADL OT LTG, Date Goal Reviewed 17  - 17  -KD 17  -    ADL OT LTG, Outcome goal not met  -  goal ongoing  -LM    ADL OT LTG, Reason Goal Not Met discharged from [facility]  -        17 0730 17 1400 17 1518    Patient Education OT LTG    Patient Education OT LTG, Date Established 17  -      Patient Education OT LTG, Date Goal Reviewed 17  -KD 17  -LM 17  -LM    Patient Education OT LTG Outcome goal not met  - goal ongoing  - goal ongoing  -LM    ADL OT LTG    ADL OT LTG, Date Goal Reviewed  09/20/17  -KD 09/19/17  -LM 09/18/17  -LM    ADL OT LTG, Outcome goal not met  -KD goal ongoing  -LM goal ongoing  -LM      09/15/17 0810 09/14/17 1413 09/13/17 1552    Bed Mobility OT LTG    Bed Mobility OT LTG, Date Goal Reviewed  09/14/17  -LM 09/13/17  -LM    Bed Mobility OT LTG, Outcome  goal met  -LM goal ongoing  -LM    Transfer Training OT LTG    Transfer Training OT LTG, Date Goal Reviewed  09/14/17  -LM 09/13/17  -LM    Transfer Training OT LTG, Outcome  goal met  -LM goal ongoing  -LM    Patient Education OT LTG    Patient Education OT LTG, Date Goal Reviewed 09/15/17  -KD 09/14/17  -LM 09/13/17  -LM    Patient Education OT LTG Outcome goal not met  -KD goal ongoing  -LM goal ongoing  -LM    ADL OT LTG    ADL OT LTG, Date Goal Reviewed 09/15/17  -KD 09/14/17  -LM 09/13/17  -LM    ADL OT LTG, Outcome goal not met  -KD goal ongoing  -LM goal ongoing  -LM      09/12/17 1455 09/12/17 0755 09/11/17 1609    Bed Mobility OT LTG    Bed Mobility OT LTG, Date Established  09/12/17  -BH (r) SP (t) BH (c)     Bed Mobility OT LTG, Time to Achieve  by discharge  -BH (r) SP (t) BH (c)     Bed Mobility OT LTG, Activity Type  all bed mobility  -BH (r) SP (t) BH (c)     Bed Mobility OT LTG, [place] Level  supervision required;minimum assist (75% patient effort)   AE/adaptive techniques- RLE in knee immobilizer and extended  -BH (r) SP (t) BH (c)     Bed Mobility OT LTG, Date Goal Reviewed 09/12/17  -RC      Bed Mobility OT LTG, Outcome goal ongoing  -RC      Transfer Training OT LTG    Transfer Training OT LTG, Date Established  09/12/17  -BH (r) SP (t) BH (c)     Transfer Training OT LTG, Time to Achieve  by discharge  -BH (r) SP (t) BH (c)     Transfer Training OT LTG, Activity Type  --   BSC, w/c  -BH (r) SP (t) BH (c)     Transfer Training OT LTG, [place] Level  minimum assist (75% patient effort);contact guard assist   RLE in knee immobilizer and extended at all times.  -BH (r) SP (t) BH (c)     Transfer  Training OT LTG, Date Goal Reviewed 09/12/17  -RC  09/04/17  -    Transfer Training OT LTG, Outcome goal ongoing  -RC  goal not met  -    Transfer Training OT LTG, Reason Goal Not Met   discharged from facility  -    Strength OT LTG    Strength Goal OT LTG, Date Goal Reviewed   09/11/17  -    Strength Goal OT LTG, Outcome   goal not met  -SG    Strength Goal OT LTG, Reason Goal Not Met   discharged from facility  -    Dynamic Sitting Balance OT LTG    Dynamic Sitting Balance OT LTG, Date Goal Reviewed   09/11/17  -    Dynamic Sitting Balance OT LTG, Outcome   goal not met  -    Dynamic Sitting Balance OT LTG, Reason Goal Not Met   discharged from facility  -    Patient Education OT LTG    Patient Education OT LTG, Date Established  09/12/17  -BH (r) SP (t) BH (c)     Patient Education OT LTG, Time to Achieve  by discharge  -BH (r) SP (t) BH (c)     Patient Education OT LTG, Education Type  HEP;precautions per surgeon;positioning;posture/body mechanics;home safety;adaptive equipment mgmt  -BH (r) SP (t) BH (c)     Patient Education OT LTG, Education Understanding  independent;demonstrates adequately;verbalizes understanding  -BH (r) SP (t) BH (c)     Patient Education OT LTG, Date Goal Reviewed 09/12/17  -      Patient Education OT LTG Outcome goal ongoing  -RC      ADL OT LTG    ADL OT LTG, Date Established  09/12/17  -BH (r) SP (t) BH (c)     ADL OT LTG, Time to Achieve  by discharge  -BH (r) SP (t) BH (c)     ADL OT LTG, Activity Type  ADL skills  -BH (r) SP (t) BH (c)     ADL OT LTG, Additional Goal  set-up with self-feeding, grooming, UB dressing/bathing; min A with toileting; mod A with LB dressing/bathing  -BH (r) SP (t) BH (c)     ADL OT LTG, Date Goal Reviewed 09/12/17  -RC  09/11/17  -    ADL OT LTG, Outcome goal ongoing  -RC  goal not met  -    ADL OT LTG, Reason Goal Not Met   discharged from [facility]  -      09/11/17 1333          Transfer Training OT LTG    Transfer Training OT  LTG, Date Established 09/11/17  -RB      Transfer Training OT LTG, Time to Achieve by discharge  -RB      Transfer Training OT LTG, Activity Type all transfers  -RB      Transfer Training OT LTG, [place] Level supervision required;contact guard assist  -RB      Transfer Training OT LTG, Assist Device other (see comments)   Sliding board if needed.  -RB      Strength OT LTG    Strength Goal OT LTG, Date Established 09/11/17  -RB      Strength Goal OT LTG, Time to Achieve by discharge  -RB      Strength Goal OT LTG, Measure to Achieve 2 sets of 10 reps all planes with 3-4 lb dumbells for B UE strength to increase independence with functional mobility/transfers.  -RB      Dynamic Sitting Balance OT LTG    Dynamic Sitting Balance OT LTG, Date Established 09/11/17  -RB      Dynamic Sitting Balance OT LTG, Time to Achieve by discharge  -RB      Dynamic Sitting Balance OT LTG, [place] Level supervision required   10-15 minutes with functional activity.  -RB      Dynamic Sitting Balance OT LTG, Assist Device bed rails  -RB      ADL OT LTG    ADL OT LTG, Date Established 09/11/17  -RB      ADL OT LTG, Time to Achieve by discharge  -RB      ADL OT LTG, Activity Type ADL skills   Sponge bath and dress.  -RB      ADL OT LTG, [place] Level contact guard;min assist  -RB        User Key  (r) = Recorded By, (t) = Taken By, (c) = Cosigned By    Initials Name Provider Type    CLAIR Perez, OT Occupational Therapist     Isabel Jones, OTR/L Occupational Therapist    MARGE Jean-Baptiste VILLALPANDO/L Occupational Therapy Assistant    VIPUL Ray VILLALPANDO/L Occupational Therapy Assistant    YOEL Carter, VILLALPANDO/L Occupational Therapy Assistant    DANY Juárez, OT Student OT Student    SAHARA Rai OT Occupational Therapist                Outcome Measures       09/22/17 1103 09/22/17 1000 09/21/17 1015    How much help from another person do you currently need...    Turning from your back to your side  while in flat bed without using bedrails?  4  -JA 4  -JA    Moving from lying on back to sitting on the side of a flat bed without bedrails?  4  -JA 4  -JA    Moving to and from a bed to a chair (including a wheelchair)?  4  -JA 4  -JA    Standing up from a chair using your arms (e.g., wheelchair, bedside chair)?  1  -JA 1  -JA    Climbing 3-5 steps with a railing?  1  -JA 1  -JA    To walk in hospital room?  1  -JA 1  -JA    AM-PAC 6 Clicks Score  15  -JA 15  -JA    How much help from another is currently needed...    Putting on and taking off regular lower body clothing? 3  -KD      Bathing (including washing, rinsing, and drying) 3  -KD      Toileting (which includes using toilet bed pan or urinal) 3  -KD      Putting on and taking off regular upper body clothing 4  -KD      Taking care of personal grooming (such as brushing teeth) 4  -KD      Eating meals 4  -KD      Score 21  -KD      Functional Assessment    Outcome Measure Options  AM-PAC 6 Clicks Basic Mobility (PT)  - AM-PAC 6 Clicks Basic Mobility (PT)  -      09/20/17 1100          How much help from another person do you currently need...    Turning from your back to your side while in flat bed without using bedrails? 4  -RW      Moving from lying on back to sitting on the side of a flat bed without bedrails? 4  -RW      Moving to and from a bed to a chair (including a wheelchair)? 4  -RW      Standing up from a chair using your arms (e.g., wheelchair, bedside chair)? 1  -RW      Climbing 3-5 steps with a railing? 1  -RW      To walk in hospital room? 1  -RW      AM-PAC 6 Clicks Score 15  -RW        User Key  (r) = Recorded By, (t) = Taken By, (c) = Cosigned By    Initials Name Provider Type    ERICH Felton PTA Physical Therapy Assistant    JOHN Malhotra PTA Physical Therapy Assistant    IRASEMA Shelby/L Occupational Therapy Assistant              OT Discharge Summary  Anticipated Discharge Disposition: home with 24/7 care,  home with home health  Reason for Discharge: Discharge from facility  Outcomes Achieved: Patient able to partially acheive established goals  Discharge Destination: Home with home health      Isabel Jones OTR/FREEDOM  9/23/2017

## 2017-09-23 NOTE — PLAN OF CARE
Problem: Inpatient Occupational Therapy  Goal: Bed Mobility Goal LTG- OT  Outcome: Outcome(s) achieved Date Met:  09/23/17 09/12/17 0755 09/14/17 1413 09/23/17 0738   Bed Mobility OT LTG   Bed Mobility OT LTG, Date Established 09/12/17 --  --    Bed Mobility OT LTG, Time to Achieve by discharge --  --    Bed Mobility OT LTG, Activity Type all bed mobility --  --    Bed Mobility OT LTG, [place] Level supervision required;minimum assist (75% patient effort)  (AE/adaptive techniques- RLE in knee immobilizer and extended) --  --    Bed Mobility OT LTG, Date Goal Reviewed --  09/14/17 --    Bed Mobility OT LTG, Outcome --  goal met --    Bed Mobility OT LTG, Reason Goal Not Met --  --  discharged from facility       Goal: Transfer Training Goal 1 LTG- OT  Outcome: Outcome(s) achieved Date Met:  09/23/17 09/12/17 0755 09/14/17 1413 09/23/17 0738   Transfer Training OT LTG   Transfer Training OT LTG, Date Established 09/12/17 --  --    Transfer Training OT LTG, Time to Achieve by discharge --  --    Transfer Training OT LTG, Activity Type (BSC, w/c) --  --    Transfer Training OT LTG, [place] Level minimum assist (75% patient effort);contact guard assist  (RLE in knee immobilizer and extended at all times.) --  --    Transfer Training OT LTG, Date Goal Reviewed --  09/14/17 --    Transfer Training OT LTG, Outcome --  goal met --    Transfer Training OT LTG, Reason Goal Not Met --  --  discharged from facility       Goal: Patient Education Goal LTG- OT  Outcome: Outcome(s) achieved Date Met:  09/23/17 09/12/17 0755 09/20/17 0730 09/22/17 1103   Patient Education OT LTG   Patient Education OT LTG, Date Established --  09/20/17 --    Patient Education OT LTG, Time to Achieve by discharge --  --    Patient Education OT LTG, Education Type HEP;precautions per surgeon;positioning;posture/body mechanics;home safety;adaptive equipment mgmt --  --    Patient Education OT LTG, Education Understanding  independent;demonstrates adequately;verbalizes understanding --  --    Patient Education OT LTG, Date Goal Reviewed --  --  09/22/17   Patient Education OT LTG Outcome --  --  goal met   Patient Education OT LTG, Reason Goal Not Met --  --  --      09/23/17 0738   Patient Education OT LTG   Patient Education OT LTG, Date Established --    Patient Education OT LTG, Time to Achieve --    Patient Education OT LTG, Education Type --    Patient Education OT LTG, Education Understanding --    Patient Education OT LTG, Date Goal Reviewed --    Patient Education OT LTG Outcome --    Patient Education OT LTG, Reason Goal Not Met discharged from facility       Goal: ADL Goal LTG- OT  Outcome: Unable to achieve outcome(s) by discharge Date Met:  09/23/17 09/12/17 0755 09/23/17 0738   ADL OT LTG   ADL OT LTG, Date Established 09/12/17 --    ADL OT LTG, Time to Achieve by discharge --    ADL OT LTG, Activity Type ADL skills --    ADL OT LTG, Additional Goal set-up with self-feeding, grooming, UB dressing/bathing; min A with toileting; mod A with LB dressing/bathing --    ADL OT LTG, Date Goal Reviewed --  09/23/17   ADL OT LTG, Outcome --  goal not met   ADL OT LTG, Reason Goal Not Met --  discharged from facility

## 2017-09-26 ENCOUNTER — OFFICE VISIT (OUTPATIENT)
Dept: PODIATRY | Facility: CLINIC | Age: 67
End: 2017-09-26

## 2017-09-26 VITALS
WEIGHT: 177 LBS | HEIGHT: 69 IN | HEART RATE: 95 BPM | SYSTOLIC BLOOD PRESSURE: 165 MMHG | OXYGEN SATURATION: 98 % | BODY MASS INDEX: 26.22 KG/M2 | DIASTOLIC BLOOD PRESSURE: 84 MMHG

## 2017-09-26 DIAGNOSIS — S93.315D: ICD-10-CM

## 2017-09-26 DIAGNOSIS — S92.252D CLOSED DISPLACED FRACTURE OF NAVICULAR BONE OF LEFT FOOT WITH ROUTINE HEALING, SUBSEQUENT ENCOUNTER: Primary | ICD-10-CM

## 2017-09-26 PROCEDURE — 29405 APPL SHORT LEG CAST: CPT | Performed by: PODIATRIST

## 2017-09-26 NOTE — PROGRESS NOTES
Vangie Lopez  1950  67 y.o. male     Patient presents today for a post op recheck of his left foot.    9/26/17    Chief Complaint   Patient presents with   • Left Foot - postop recheck           History of Present Illness    Patient presents to clinic today for follow-up of his left foot navicular fracture dislocation.  He had  close reduction and percutaneous fixation of navicular fracture dislocation date of surgery September 6, 2017.  He is currently nonweightbearing in a wheelchair.  He had bilateral lower extremity injuries.  His right lower extremity patella fracture was treated by Dr. Rutherford.  He states his pain is well controlled.  He has no new pedal complaints.          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   • Hyperlipidemia    • Multiple trauma 09/06/2017   • Osteoarthritis of hands, bilateral     retired due to arthritis,          Past Surgical History:   Procedure Laterality Date   • COLONOSCOPY  12/08/2006   • COLONOSCOPY N/A 4/3/2017    Procedure: COLONOSCOPY;  Surgeon: Michael Ang MD;  Location: [facility] ENDOSCOPY;  Service:    • CYSTOSCOPY     • EXTERNAL FIXATION ANKLE FRACTURE Left 9/6/2017    Procedure: ANKLE EXTERNAL FIXATOR APPLICATION;  Surgeon: Simone Rivera DPM;  Location: [facility] OR;  Service:    • KNEE SURGERY     • PATELLA OPEN REDUCTION INTERNAL FIXATION Right 9/8/2017    Procedure: OPEN REDUCTION INTERNAL FIXATION RIGHT PATELLA       (C-ARM#3);  Surgeon: Checo Rutherford MD;  Location: [facility] OR;  Service:    • SCAPHOID FRACTURE SURGERY Left 09/07/2017    fracture dislocation left foot, closed reduction with pinning   • SHOULDER SURGERY            Family History   Problem Relation Age of Onset   • Alzheimer's disease Mother    • Heart attack Father          Social History     Social History   • Marital status:      Spouse name: N/A   • Number of children: N/A   • Years of education: N/A     Occupational History   •       retired due to arthritis in hands     Social History Main Topics   • Smoking status: Never Smoker   • Smokeless tobacco: Not on file   • Alcohol use Yes      Comment: social drinker/rarely uses alcohol   • Drug use: No   • Sexual activity: Not on file     Other Topics Concern   • Not on file     Social History Narrative    Lives with wife, single story home, two steps to enter home, no ramp         Current Outpatient Prescriptions   Medication Sig Dispense Refill   • Blood Glucose Monitoring Suppl device Use as directed to monitor sugar 1 each 0   • Blood Glucose Monitoring Suppl device One lancet twice daily to check  Fingerstick  Sugar   E11.9 100 each 0   • ferrous sulfate 324 (65 Fe) MG tablet delayed-release EC tablet Take 1 tablet by mouth Daily With Breakfast. 30 tablet 0   • finasteride (PROSCAR) 5 MG tablet Take 5 mg by mouth Every Night.     • folic acid-vit B6-vit B12 (FOLBEE) 2.5-25-1 MG tablet tablet Take 1 tablet by mouth Daily. 30 tablet 0   • glucose blood test strip Check sugar twice a day 100 each 5   • hydrochlorothiazide (HYDRODIURIL) 25 MG tablet Take 12.5 mg by mouth Daily. Pt takes 1/2 (25 mg) tablet = 12.5 mg per dose.     • HYDROcodone-acetaminophen (NORCO)  MG per tablet Take 1 tablet by mouth Every 4 (Four) Hours As Needed for Severe Pain  for up to 8 days. 60 tablet 0   • metFORMIN ER (GLUCOPHAGE-XR) 500 MG 24 hr tablet Take 1 tablet by mouth 2 (Two) Times a Day With Meals. 60 tablet 1   • PHARMACY TO DOSE WARFARIN Continuous As Needed (twice weekly for 28 days). 1 each 0   • potassium citrate (UROCIT-K) 10 MEQ (1080 MG) CR tablet Take 10 mEq by mouth 2 (Two) Times a Day With  "Meals.     • pravastatin (PRAVACHOL) 40 MG tablet Take 20 mg by mouth Every Night. Pt takes 1/2 (40 mg) tablet = 20 mg per dose.     • sennosides-docusate sodium (SENOKOT-S) 8.6-50 MG tablet Take 1 tablet by mouth 2 (Two) Times a Day. 60 tablet 1   • sildenafil (VIAGRA) 100 MG tablet Take 100 mg by mouth Daily As Needed for erectile dysfunction.     • traZODone (DESYREL) 50 MG tablet Take 0.5 tablets by mouth Every Night. 30 tablet 0   • warfarin (COUMADIN) 4 MG tablet One daily for 3 weeks 21 tablet 0   • warfarin (COUMADIN) 4 MG tablet On daily for 4 weeks 28 tablet 0     No current facility-administered medications for this visit.          OBJECTIVE    /84  Pulse 95  Ht 69\" (175.3 cm)  Wt 177 lb (80.3 kg)  SpO2 98%  BMI 26.14 kg/m2      Review of Systems   Constitutional: Negative for chills and fever.   Cardiovascular: Negative for chest pain.   Gastrointestinal: Negative for constipation, diarrhea, nausea and vomiting.         Constitutional: well developed, well nourished    HEENT: Normocephalic and atraumatic, normal hearing    Respiratory: Non labored respirations noted    Left lower extremity exam: Splint is clean, dry and intact.  Upon removal percutaneous wires are in place with no signs of loosening noted.   Capillary fill time is immediate to the distal toes.  Sensation is intact to the distal digits. Negative Homans.  Small area of superficial skin necrosis from previous fracture blister noted to the dorsal aspect of the foot.       Psychiatric: A&O x 3 with normal mood and affect. NAD.     Radiographs:  3 views of the left foot were obtained today.  Hardware intact to navicular reduction.  No signs of loosening or failure noted.          Procedures        ASSESSMENT AND PLAN    Vangie was seen today for postop recheck.    Diagnoses and all orders for this visit:    Closed displaced fracture of navicular bone of left foot with routine healing, subsequent encounter  -     XR Foot 3+ View " Left    Closed dislocation of navicular bone of left foot, subsequent encounter    - X-rays taken and reviewed   - Applied nonweightbearing short leg fiberglass cast   -  all questions were answered   - Return to clinic in 2 weeks             This document has been electronically signed by Simone Rivera DPM on September 27, 2017 7:23 AM     9/27/2017  7:23 AM    DENIA Dragon/Transcription disclaimer:   Much of this encounter note is an electronic transcription/translation of spoken language to printed text. The electronic translation of spoken language may permit erroneous, or at times, nonsensical words or phrases to be inadvertently transcribed; Although I have reviewed the note for such errors, some may still exist.

## 2017-09-27 ENCOUNTER — ANTICOAGULATION VISIT (OUTPATIENT)
Dept: PHARMACY | Facility: HOSPITAL | Age: 67
End: 2017-09-27

## 2017-09-28 ENCOUNTER — ANTICOAGULATION VISIT (OUTPATIENT)
Dept: PHARMACY | Facility: HOSPITAL | Age: 67
End: 2017-09-28

## 2017-09-28 LAB — INR PPP: 2.4 (ref 1.7–2.5)

## 2017-09-28 NOTE — PROGRESS NOTES
Spoke with [facility] RN while at [facility]. Reviewed current lab.  No s/s of bleeding. No new meds. No missed doses. Reviewed schedule for follow week. Pt read back regimen and verbalized understanding. All questions answered. Stop date is 10/7, no need for another INR check.     Maximus Espinoza [facility]  09/28/17  1:47 PM

## 2017-10-03 ENCOUNTER — TELEPHONE (OUTPATIENT)
Dept: PODIATRY | Facility: CLINIC | Age: 67
End: 2017-10-03

## 2017-10-03 NOTE — TELEPHONE ENCOUNTER
[facility] WANTS TO KNOW IF YOU CAN GIVE THEM AN ORDER FOR A SLIDE BOARD.    [phone]    THANKS.

## 2017-10-06 ENCOUNTER — TELEPHONE (OUTPATIENT)
Dept: PHARMACY | Facility: HOSPITAL | Age: 67
End: 2017-10-06

## 2017-10-06 NOTE — TELEPHONE ENCOUNTER
Spoke with Mr Lopez that tomorrow (10/6/17) will be his last day to tale warfarin.  No complaints or problems.  He verbalized understanding no warfarin or bloodwork after tomorrow.

## 2017-10-10 ENCOUNTER — TELEPHONE (OUTPATIENT)
Dept: PODIATRY | Facility: CLINIC | Age: 67
End: 2017-10-10

## 2017-10-10 ENCOUNTER — OFFICE VISIT (OUTPATIENT)
Dept: PODIATRY | Facility: CLINIC | Age: 67
End: 2017-10-10

## 2017-10-10 VITALS
WEIGHT: 177 LBS | SYSTOLIC BLOOD PRESSURE: 163 MMHG | OXYGEN SATURATION: 97 % | HEIGHT: 69 IN | HEART RATE: 97 BPM | BODY MASS INDEX: 26.22 KG/M2 | DIASTOLIC BLOOD PRESSURE: 82 MMHG

## 2017-10-10 DIAGNOSIS — S93.315D: Primary | ICD-10-CM

## 2017-10-10 DIAGNOSIS — S92.252D CLOSED DISPLACED FRACTURE OF NAVICULAR BONE OF LEFT FOOT WITH ROUTINE HEALING, SUBSEQUENT ENCOUNTER: ICD-10-CM

## 2017-10-10 PROCEDURE — 29405 APPL SHORT LEG CAST: CPT | Performed by: PODIATRIST

## 2017-10-10 NOTE — PROGRESS NOTES
Vangie Lopez  1950  67 y.o. male     Patient presents today for a post op recheck of his left foot.    10/10/17      Chief Complaint   Patient presents with   • Left Foot - postop recheck           History of Present Illness    Patient presents to clinic today for follow-up of his left foot navicular fracture dislocation.  He had  closed reduction and percutaneous fixation of navicular fracture dislocation date of surgery September 6, 2017.        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   • Hyperlipidemia    • Multiple trauma 09/06/2017   • Osteoarthritis of hands, bilateral     retired due to arthritis,    • Recent onset of diabetes mellitus          Past Surgical History:   Procedure Laterality Date   • COLONOSCOPY  12/08/2006   • COLONOSCOPY N/A 4/3/2017    Procedure: COLONOSCOPY;  Surgeon: Michael Ang MD;  Location: [facility] ENDOSCOPY;  Service:    • CYSTOSCOPY     • EXTERNAL FIXATION ANKLE FRACTURE Left 9/6/2017    Procedure: ANKLE EXTERNAL FIXATOR APPLICATION;  Surgeon: Simone Rivera DPM;  Location: [facility] OR;  Service:    • FOOT SURGERY     • KNEE SURGERY     • PATELLA OPEN REDUCTION INTERNAL FIXATION Right 9/8/2017    Procedure: OPEN REDUCTION INTERNAL FIXATION RIGHT PATELLA       (C-ARM#3);  Surgeon: Checo Rutherford MD;  Location: [facility] OR;  Service:    • SCAPHOID FRACTURE SURGERY Left 09/07/2017    fracture dislocation left foot, closed reduction with pinning   • SHOULDER SURGERY           Family History   Problem Relation Age of Onset   • Alzheimer's disease Mother    • Heart attack Father          Social History     Social History   • Marital status:       Spouse name: N/A   • Number of children: N/A   • Years of education: N/A     Occupational History   •       retired due to arthritis in hands     Social History Main Topics   • Smoking status: Never Smoker   • Smokeless tobacco: Not on file   • Alcohol use Yes      Comment: social drinker/rarely uses alcohol   • Drug use: No   • Sexual activity: Not on file     Other Topics Concern   • Not on file     Social History Narrative    Lives with wife, single story home, two steps to enter home, no ramp         Current Outpatient Prescriptions   Medication Sig Dispense Refill   • Blood Glucose Monitoring Suppl device Use as directed to monitor sugar 1 each 0   • Blood Glucose Monitoring Suppl device One lancet twice daily to check  Fingerstick  Sugar   E11.9 100 each 0   • ferrous sulfate 324 (65 Fe) MG tablet delayed-release EC tablet Take 1 tablet by mouth Daily With Breakfast. 30 tablet 0   • finasteride (PROSCAR) 5 MG tablet Take 5 mg by mouth Every Night.     • folic acid-vit B6-vit B12 (FOLBEE) 2.5-25-1 MG tablet tablet Take 1 tablet by mouth Daily. 30 tablet 0   • glucose blood test strip Check sugar twice a day 100 each 5   • hydrochlorothiazide (HYDRODIURIL) 25 MG tablet Take 12.5 mg by mouth Daily. Pt takes 1/2 (25 mg) tablet = 12.5 mg per dose.     • metFORMIN ER (GLUCOPHAGE-XR) 500 MG 24 hr tablet Take 1 tablet by mouth 2 (Two) Times a Day With Meals. 60 tablet 1   • PHARMACY TO DOSE WARFARIN Continuous As Needed (twice weekly for 28 days). 1 each 0   • potassium citrate (UROCIT-K) 10 MEQ (1080 MG) CR tablet Take 10 mEq by mouth 2 (Two) Times a Day With Meals.     • pravastatin (PRAVACHOL) 40 MG tablet Take 20 mg by mouth Every Night. Pt takes 1/2 (40 mg) tablet = 20 mg per dose.     • sennosides-docusate sodium (SENOKOT-S) 8.6-50 MG tablet Take 1 tablet by mouth 2 (Two) Times a Day. 60 tablet 1   • sildenafil (VIAGRA) 100 MG tablet Take 100 mg by mouth Daily As Needed for erectile  "dysfunction.     • traZODone (DESYREL) 50 MG tablet Take 0.5 tablets by mouth Every Night. 30 tablet 0     No current facility-administered medications for this visit.          OBJECTIVE    /82  Pulse 97  Ht 69\" (175.3 cm)  Wt 177 lb (80.3 kg)  SpO2 97%  BMI 26.14 kg/m2      Review of Systems   Constitutional: Negative for chills and fever.   Cardiovascular: Negative for chest pain.   Gastrointestinal: Negative for constipation, diarrhea, nausea and vomiting.         Constitutional: well developed, well nourished    HEENT: Normocephalic and atraumatic, normal hearing    Respiratory: Non labored respirations noted    Left lower extremity exam: Splint is clean, dry and intact.  Upon removal percutaneous wires are in place with no signs of loosening noted.   Capillary fill time is immediate to the distal toes.  Sensation is intact to the distal digits. Negative Homans.  Small area of superficial skin necrosis from previous fracture blister noted to the dorsal aspect of the foot which is improving.        Psychiatric: A&O x 3 with normal mood and affect. NAD.         Procedures        ASSESSMENT AND PLAN    Vangie was seen today for postop recheck.    Diagnoses and all orders for this visit:    Closed dislocation of navicular bone of left foot, subsequent encounter    Closed displaced fracture of navicular bone of left foot with routine healing, subsequent encounter    - Applied nonweightbearing short leg fiberglass cast   - plan for pin removal pin 2 weeks  -  all questions were answered   - Return to clinic in 2 weeks             This document has been electronically signed by Simone Rivera DPM on October 10, 2017 6:23 PM     10/10/2017  6:23 PM    EMR Dragon/Transcription disclaimer:   Much of this encounter note is an electronic transcription/translation of spoken language to printed text. The electronic translation of spoken language may permit erroneous, or at times, nonsensical words or phrases to be " inadvertently transcribed; Although I have reviewed the note for such errors, some may still exist.

## 2017-10-20 ENCOUNTER — OFFICE VISIT (OUTPATIENT)
Dept: ORTHOPEDIC SURGERY | Facility: CLINIC | Age: 67
End: 2017-10-20

## 2017-10-20 VITALS
DIASTOLIC BLOOD PRESSURE: 74 MMHG | HEIGHT: 69 IN | BODY MASS INDEX: 26.22 KG/M2 | SYSTOLIC BLOOD PRESSURE: 132 MMHG | WEIGHT: 177 LBS

## 2017-10-20 DIAGNOSIS — S92.252A CLOSED DISPLACED FRACTURE OF NAVICULAR BONE OF LEFT FOOT, INITIAL ENCOUNTER: ICD-10-CM

## 2017-10-20 DIAGNOSIS — S82.001A CLOSED DISPLACED FRACTURE OF RIGHT PATELLA, UNSPECIFIED FRACTURE MORPHOLOGY, INITIAL ENCOUNTER: Primary | ICD-10-CM

## 2017-10-20 PROCEDURE — 99024 POSTOP FOLLOW-UP VISIT: CPT | Performed by: ORTHOPAEDIC SURGERY

## 2017-10-20 NOTE — PROGRESS NOTES
Subjective   Vangie Lopez is a 67 y.o. male. [birth year]- Recheck of right patella fracture         History of Present Illness   Patient is here for recheck of right patella fracture with ORIF. Patients date of surgery- 9/8/2017.      The following portions of the patient's history were reviewed and updated as appropriate:   He  has a past medical history of BPH (benign prostatic hyperplasia); Cancer; Closed fracture of navicular bone with dislocation of perilunate joint of left wrist (09/06/2017); Essential hypertension (9/8/2017); Fracture of patella, right, closed (09/06/2017); Glaucoma; Hyperlipidemia; Multiple trauma (09/06/2017); Osteoarthritis of hands, bilateral; and Recent onset of diabetes mellitus.  He  does not have any pertinent problems on file.  He  has a past surgical history that includes Colonoscopy (12/08/2006); Shoulder surgery; Cystoscopy; Knee surgery; Colonoscopy (N/A, 4/3/2017); Patella Open Reduction Internal Fixation (Right, 9/8/2017); Scaphoid fracture surgery (Left, 09/07/2017); External fixation ankle fracture (Left, 9/6/2017); and Foot surgery.  His family history includes Alzheimer's disease in his mother; Heart attack in his father.  He  reports that he has never smoked. He has never used smokeless tobacco. He reports that he drinks alcohol. He reports that he does not use illicit drugs.  Current Outpatient Prescriptions   Medication Sig Dispense Refill   • Blood Glucose Monitoring Suppl device Use as directed to monitor sugar 1 each 0   • Blood Glucose Monitoring Suppl device One lancet twice daily to check  Fingerstick  Sugar   E11.9 100 each 0   • ferrous sulfate 324 (65 Fe) MG tablet delayed-release EC tablet Take 1 tablet by mouth Daily With Breakfast. 30 tablet 0   • finasteride (PROSCAR) 5 MG tablet Take 5 mg by mouth Every Night.     • folic acid-vit B6-vit B12 (FOLBEE) 2.5-25-1 MG tablet tablet Take 1 tablet by mouth Daily. 30 tablet 0   • glucose blood test  strip Check sugar twice a day 100 each 5   • hydrochlorothiazide (HYDRODIURIL) 25 MG tablet Take 12.5 mg by mouth Daily. Pt takes 1/2 (25 mg) tablet = 12.5 mg per dose.     • metFORMIN ER (GLUCOPHAGE-XR) 500 MG 24 hr tablet Take 1 tablet by mouth 2 (Two) Times a Day With Meals. 60 tablet 1   • PHARMACY TO DOSE WARFARIN Continuous As Needed (twice weekly for 28 days). 1 each 0   • potassium citrate (UROCIT-K) 10 MEQ (1080 MG) CR tablet Take 10 mEq by mouth 2 (Two) Times a Day With Meals.     • pravastatin (PRAVACHOL) 40 MG tablet Take 20 mg by mouth Every Night. Pt takes 1/2 (40 mg) tablet = 20 mg per dose.     • sennosides-docusate sodium (SENOKOT-S) 8.6-50 MG tablet Take 1 tablet by mouth 2 (Two) Times a Day. 60 tablet 1   • sildenafil (VIAGRA) 100 MG tablet Take 100 mg by mouth Daily As Needed for erectile dysfunction.     • traZODone (DESYREL) 50 MG tablet Take 0.5 tablets by mouth Every Night. 30 tablet 0     No current facility-administered medications for this visit.      Current Outpatient Prescriptions on File Prior to Visit   Medication Sig   • Blood Glucose Monitoring Suppl device Use as directed to monitor sugar   • Blood Glucose Monitoring Suppl device One lancet twice daily to check  Fingerstick  Sugar   E11.9   • ferrous sulfate 324 (65 Fe) MG tablet delayed-release EC tablet Take 1 tablet by mouth Daily With Breakfast.   • finasteride (PROSCAR) 5 MG tablet Take 5 mg by mouth Every Night.   • folic acid-vit B6-vit B12 (FOLBEE) 2.5-25-1 MG tablet tablet Take 1 tablet by mouth Daily.   • glucose blood test strip Check sugar twice a day   • hydrochlorothiazide (HYDRODIURIL) 25 MG tablet Take 12.5 mg by mouth Daily. Pt takes 1/2 (25 mg) tablet = 12.5 mg per dose.   • metFORMIN ER (GLUCOPHAGE-XR) 500 MG 24 hr tablet Take 1 tablet by mouth 2 (Two) Times a Day With Meals.   • PHARMACY TO DOSE WARFARIN Continuous As Needed (twice weekly for 28 days).   • potassium citrate (UROCIT-K) 10 MEQ (1080 MG) CR tablet  "Take 10 mEq by mouth 2 (Two) Times a Day With Meals.   • pravastatin (PRAVACHOL) 40 MG tablet Take 20 mg by mouth Every Night. Pt takes 1/2 (40 mg) tablet = 20 mg per dose.   • sennosides-docusate sodium (SENOKOT-S) 8.6-50 MG tablet Take 1 tablet by mouth 2 (Two) Times a Day.   • sildenafil (VIAGRA) 100 MG tablet Take 100 mg by mouth Daily As Needed for erectile dysfunction.   • traZODone (DESYREL) 50 MG tablet Take 0.5 tablets by mouth Every Night.     No current facility-administered medications on file prior to visit.      He has No Known Allergies..    Review of Systems  REVIEW OF SYSTEMS:  Negative, other than presenting complaint.  HEENT: No headaches, diplopia, blurred vision, tinnitus, vertigo, epistaxis, hoarseness or sore throat.  Pulmonary: No cough, sputum, hemoptysis, dyspnea, wheezing, or chest pain.  Cardiac: No chest pain, palpitations, orthopnea, paroxysmal nocturnal dyspnea, shortness of breath, or pedal edema.  Gastrointestinal: No diarrhea, melena, or constipation.  Genitourinary: No dysuria, hematuria, nocturia, frequency, bladder or bowel incontinence.  Hematology: No history of any anemia, fatigue, fever, or chills or night sweats.  Dermatology: No rashes, pruritus, or increased pigmentation changes of the skin.     Objective   Physical Exam  /74 (BP Location: Right arm, Patient Position: Sitting)  Ht 69\" (175.3 cm)  Wt 177 lb (80.3 kg)  BMI 26.14 kg/m2    Social History     Social History   • Marital status:      Spouse name: N/A   • Number of children: N/A   • Years of education: N/A     Occupational History   •       retired due to arthritis in hands     Social History Main Topics   • Smoking status: Never Smoker   • Smokeless tobacco: Never Used   • Alcohol use Yes      Comment: social drinker/rarely uses alcohol   • Drug use: No   • Sexual activity: Defer     Other Topics Concern   • Not on file     Social History Narrative    Lives with wife, single story " home, two steps to enter home, no ramp       HEENT: Normocephalic.  PERRLA.  TM's clear bilaterally.  Oropharynx: Clear.  Neck: Supple, with no adenopathy.  Chest: Equal bilateral expansion.  Clear to auscultation and percussion.  Heart: Regular sinus rhythm, S1 and S2 normal.  No murmurs or extra heart sounds heard.  Abdomen: Soft, nontender, and no organomegaly.  Neurological: cranial nerves II-XII normal Vascular: pulses are present  Dermatological: no rashes  or blemishes, or any abnormality of the skin.  The wound is healing well , able to straight leg raise well. Neuro intact. Plan: PT see back in 4 weeks with x rays.        Assessment/Plan   Vangie was seen today for follow-up.    Diagnoses and all orders for this visit:    Closed displaced fracture of right patella, unspecified fracture morphology, initial encounter  -     XR Knee 4+ View Right  -     XR Tibia Fibula 2 View Right    Closed displaced fracture of navicular bone of left foot, initial encounter  -     Cancel: XR Foot 3+ View Left

## 2017-10-24 ENCOUNTER — OFFICE VISIT (OUTPATIENT)
Dept: PODIATRY | Facility: CLINIC | Age: 67
End: 2017-10-24

## 2017-10-24 VITALS — HEIGHT: 69 IN | BODY MASS INDEX: 26.22 KG/M2 | WEIGHT: 177 LBS

## 2017-10-24 DIAGNOSIS — S92.252D CLOSED DISPLACED FRACTURE OF NAVICULAR BONE OF LEFT FOOT WITH ROUTINE HEALING, SUBSEQUENT ENCOUNTER: Primary | ICD-10-CM

## 2017-10-24 PROCEDURE — 99024 POSTOP FOLLOW-UP VISIT: CPT | Performed by: PODIATRIST

## 2017-10-24 NOTE — PROGRESS NOTES
Vangie Lopez  1950  67 y.o. male     Patient presents today for a post op recheck of his left foot.    10/24/17      Chief Complaint   Patient presents with   • Left Foot - Follow-up           History of Present Illness    Patient presents to clinic today for follow-up of his left foot navicular fracture dislocation.  He had  closed reduction and percutaneous fixation of navicular fracture dislocation date of surgery September 6, 2017.        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   • Hyperlipidemia    • Multiple trauma 09/06/2017   • Osteoarthritis of hands, bilateral     retired due to arthritis,    • Recent onset of diabetes mellitus          Past Surgical History:   Procedure Laterality Date   • COLONOSCOPY  12/08/2006   • COLONOSCOPY N/A 4/3/2017    Procedure: COLONOSCOPY;  Surgeon: Michael Ang MD;  Location: [facility] ENDOSCOPY;  Service:    • CYSTOSCOPY     • EXTERNAL FIXATION ANKLE FRACTURE Left 9/6/2017    Procedure: ANKLE EXTERNAL FIXATOR APPLICATION;  Surgeon: Simone Rivera DPM;  Location: [facility] OR;  Service:    • FOOT SURGERY     • KNEE SURGERY     • PATELLA OPEN REDUCTION INTERNAL FIXATION Right 9/8/2017    Procedure: OPEN REDUCTION INTERNAL FIXATION RIGHT PATELLA       (C-ARM#3);  Surgeon: Checo Rutherford MD;  Location: [facility] OR;  Service:    • SCAPHOID FRACTURE SURGERY Left 09/07/2017    fracture dislocation left foot, closed reduction with pinning   • SHOULDER SURGERY           Family History   Problem Relation Age of Onset   • Alzheimer's disease Mother    • Heart attack Father          Social History     Social History   • Marital status:       Spouse name: N/A   • Number of children: N/A   • Years of education: N/A     Occupational History   •       retired due to arthritis in hands     Social History Main Topics   • Smoking status: Never Smoker   • Smokeless tobacco: Never Used   • Alcohol use Yes      Comment: social drinker/rarely uses alcohol   • Drug use: No   • Sexual activity: Defer     Other Topics Concern   • Not on file     Social History Narrative    Lives with wife, single story home, two steps to enter home, no ramp         Current Outpatient Prescriptions   Medication Sig Dispense Refill   • Blood Glucose Monitoring Suppl device Use as directed to monitor sugar 1 each 0   • Blood Glucose Monitoring Suppl device One lancet twice daily to check  Fingerstick  Sugar   E11.9 100 each 0   • ferrous sulfate 324 (65 Fe) MG tablet delayed-release EC tablet Take 1 tablet by mouth Daily With Breakfast. 30 tablet 0   • finasteride (PROSCAR) 5 MG tablet Take 5 mg by mouth Every Night.     • folic acid-vit B6-vit B12 (FOLBEE) 2.5-25-1 MG tablet tablet Take 1 tablet by mouth Daily. 30 tablet 0   • glucose blood test strip Check sugar twice a day 100 each 5   • hydrochlorothiazide (HYDRODIURIL) 25 MG tablet Take 12.5 mg by mouth Daily. Pt takes 1/2 (25 mg) tablet = 12.5 mg per dose.     • metFORMIN ER (GLUCOPHAGE-XR) 500 MG 24 hr tablet Take 1 tablet by mouth 2 (Two) Times a Day With Meals. 60 tablet 1   • PHARMACY TO DOSE WARFARIN Continuous As Needed (twice weekly for 28 days). 1 each 0   • potassium citrate (UROCIT-K) 10 MEQ (1080 MG) CR tablet Take 10 mEq by mouth 2 (Two) Times a Day With Meals.     • pravastatin (PRAVACHOL) 40 MG tablet Take 20 mg by mouth Every Night. Pt takes 1/2 (40 mg) tablet = 20 mg per dose.     • sennosides-docusate sodium (SENOKOT-S) 8.6-50 MG tablet Take 1 tablet by mouth 2 (Two) Times a Day. 60 tablet 1   • sildenafil (VIAGRA) 100 MG tablet Take 100 mg by mouth Daily As Needed for erectile dysfunction.     •  "traZODone (DESYREL) 50 MG tablet Take 0.5 tablets by mouth Every Night. 30 tablet 0     No current facility-administered medications for this visit.          OBJECTIVE    Ht 69\" (175.3 cm)  Wt 177 lb (80.3 kg)  BMI 26.14 kg/m2      Review of Systems   Constitutional: Negative for chills and fever.   Cardiovascular: Negative for chest pain.   Gastrointestinal: Negative for constipation, diarrhea, nausea and vomiting.         Constitutional: well developed, well nourished    HEENT: Normocephalic and atraumatic, normal hearing    Respiratory: Non labored respirations noted    Left lower extremity exam: Splint is clean, dry and intact.  Upon removal percutaneous wires are in place with no signs of loosening noted.   Capillary fill time is immediate to the distal toes.  Sensation is intact to the distal digits. Negative Homans.  Small area of superficial skin necrosis from previous fracture blister noted to the dorsal aspect of the foot which continues to improve.        Psychiatric: A&O x 3 with normal mood and affect. NAD.         Procedures        ASSESSMENT AND PLAN    Vangie was seen today for follow-up.    Diagnoses and all orders for this visit:    Closed displaced fracture of navicular bone of left foot with routine healing, subsequent encounter  -     XR Foot 3+ View Left    - pins removed  - x-rays taken and reviewed  - dispensed cam boot. AICHAB  -  all questions were answered   - Return to clinic in 1 week            This document has been electronically signed by Simone Rivera DPM on October 24, 2017 11:04 AM     10/24/2017  11:04 AM       "

## 2017-10-26 DIAGNOSIS — M62.81 QUADRICEPS WEAKNESS: Primary | ICD-10-CM

## 2017-10-26 DIAGNOSIS — S82.001D CLOSED DISPLACED FRACTURE OF RIGHT PATELLA WITH ROUTINE HEALING, UNSPECIFIED FRACTURE MORPHOLOGY, SUBSEQUENT ENCOUNTER: ICD-10-CM

## 2017-10-31 ENCOUNTER — APPOINTMENT (OUTPATIENT)
Dept: PHYSICAL THERAPY | Facility: HOSPITAL | Age: 67
End: 2017-10-31

## 2017-10-31 ENCOUNTER — OFFICE VISIT (OUTPATIENT)
Dept: PODIATRY | Facility: CLINIC | Age: 67
End: 2017-10-31

## 2017-10-31 VITALS — HEIGHT: 69 IN | WEIGHT: 177 LBS | BODY MASS INDEX: 26.22 KG/M2

## 2017-10-31 DIAGNOSIS — S92.252D CLOSED DISPLACED FRACTURE OF NAVICULAR BONE OF LEFT FOOT WITH ROUTINE HEALING, SUBSEQUENT ENCOUNTER: Primary | ICD-10-CM

## 2017-10-31 DIAGNOSIS — S91.302D UNSPECIFIED OPEN WOUND, LEFT FOOT, SUBSEQUENT ENCOUNTER: ICD-10-CM

## 2017-10-31 PROCEDURE — 99024 POSTOP FOLLOW-UP VISIT: CPT | Performed by: PODIATRIST

## 2017-10-31 NOTE — PROGRESS NOTES
Vangie Lopez  1950  67 y.o. male     Patient presents today for a post op recheck of his left foot.    10/31/17      Chief Complaint   Patient presents with   • Left Foot - post op recheck           History of Present Illness    Patient presents to clinic today for follow-up of his left foot navicular fracture dislocation.  He had  closed reduction and percutaneous fixation of navicular fracture dislocation date of surgery September 6, 2017.  He had pin removal last visit. He is doing very well today.       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   • Hyperlipidemia    • Multiple trauma 09/06/2017   • Osteoarthritis of hands, bilateral     retired due to arthritis,    • Recent onset of diabetes mellitus          Past Surgical History:   Procedure Laterality Date   • COLONOSCOPY  12/08/2006   • COLONOSCOPY N/A 4/3/2017    Procedure: COLONOSCOPY;  Surgeon: Michael Ang MD;  Location: [facility] ENDOSCOPY;  Service:    • CYSTOSCOPY     • EXTERNAL FIXATION ANKLE FRACTURE Left 9/6/2017    Procedure: ANKLE EXTERNAL FIXATOR APPLICATION;  Surgeon: Simone Rivera DPM;  Location: [facility] OR;  Service:    • FOOT SURGERY     • KNEE SURGERY     • PATELLA OPEN REDUCTION INTERNAL FIXATION Right 9/8/2017    Procedure: OPEN REDUCTION INTERNAL FIXATION RIGHT PATELLA       (C-ARM#3);  Surgeon: Checo Ruthefrord MD;  Location: [facility] OR;  Service:    • SCAPHOID FRACTURE SURGERY Left 09/07/2017    fracture dislocation left foot, closed reduction with pinning   • SHOULDER SURGERY           Family History   Problem Relation Age of Onset   • Alzheimer's disease Mother    • Heart attack Father           Social History     Social History   • Marital status:      Spouse name: N/A   • Number of children: N/A   • Years of education: N/A     Occupational History   •       retired due to arthritis in hands     Social History Main Topics   • Smoking status: Never Smoker   • Smokeless tobacco: Never Used   • Alcohol use Yes      Comment: social drinker/rarely uses alcohol   • Drug use: No   • Sexual activity: Defer     Other Topics Concern   • Not on file     Social History Narrative    Lives with wife, single story home, two steps to enter home, no ramp         Current Outpatient Prescriptions   Medication Sig Dispense Refill   • Blood Glucose Monitoring Suppl device Use as directed to monitor sugar 1 each 0   • Blood Glucose Monitoring Suppl device One lancet twice daily to check  Fingerstick  Sugar   E11.9 100 each 0   • ferrous sulfate 324 (65 Fe) MG tablet delayed-release EC tablet Take 1 tablet by mouth Daily With Breakfast. 30 tablet 0   • finasteride (PROSCAR) 5 MG tablet Take 5 mg by mouth Every Night.     • folic acid-vit B6-vit B12 (FOLBEE) 2.5-25-1 MG tablet tablet Take 1 tablet by mouth Daily. 30 tablet 0   • glucose blood test strip Check sugar twice a day 100 each 5   • hydrochlorothiazide (HYDRODIURIL) 25 MG tablet Take 12.5 mg by mouth Daily. Pt takes 1/2 (25 mg) tablet = 12.5 mg per dose.     • metFORMIN ER (GLUCOPHAGE-XR) 500 MG 24 hr tablet Take 1 tablet by mouth 2 (Two) Times a Day With Meals. 60 tablet 1   • PHARMACY TO DOSE WARFARIN Continuous As Needed (twice weekly for 28 days). 1 each 0   • potassium citrate (UROCIT-K) 10 MEQ (1080 MG) CR tablet Take 10 mEq by mouth 2 (Two) Times a Day With Meals.     • pravastatin (PRAVACHOL) 40 MG tablet Take 20 mg by mouth Every Night. Pt takes 1/2 (40 mg) tablet = 20 mg per dose.     • sennosides-docusate sodium (SENOKOT-S) 8.6-50 MG tablet Take 1 tablet by mouth 2 (Two) Times a Day. 60 tablet 1   • sildenafil (VIAGRA) 100 MG  "tablet Take 100 mg by mouth Daily As Needed for erectile dysfunction.     • traZODone (DESYREL) 50 MG tablet Take 0.5 tablets by mouth Every Night. 30 tablet 0     No current facility-administered medications for this visit.          OBJECTIVE    Ht 69\" (175.3 cm)  Wt 177 lb (80.3 kg)  BMI 26.14 kg/m2      Review of Systems   Constitutional: Negative for chills and fever.   Cardiovascular: Negative for chest pain.   Gastrointestinal: Negative for constipation, diarrhea, nausea and vomiting.         Constitutional: well developed, well nourished    HEENT: Normocephalic and atraumatic, normal hearing    Respiratory: Non labored respirations noted    Left lower extremity exam:    area of superficial skin necrosis from previous fracture blister to the dorsal aspect of the foot continues to improve and is smaller than last visit. No signs of infection.        Psychiatric: A&O x 3 with normal mood and affect. NAD.         Procedures        ASSESSMENT AND PLAN    Vangie was seen today for post op recheck.    Diagnoses and all orders for this visit:    Closed displaced fracture of navicular bone of left foot with routine healing, subsequent encounter    Unspecified open wound, left foot, subsequent encounter    - pt is able from my standpoint to be PWB in the boot. However, he is still not ambulatory from a RLE standpoint.   - daily dressing changes at home with medihoney  -  all questions were answered   - Return to clinic in 2 weeks            This document has been electronically signed by Simone Rivera DPM on October 31, 2017 11:26 AM     10/31/2017  11:26 AM       "

## 2017-11-02 DIAGNOSIS — S82.001A CLOSED DISPLACED FRACTURE OF RIGHT PATELLA, UNSPECIFIED FRACTURE MORPHOLOGY, INITIAL ENCOUNTER: Primary | ICD-10-CM

## 2017-11-14 ENCOUNTER — OFFICE VISIT (OUTPATIENT)
Dept: PODIATRY | Facility: CLINIC | Age: 67
End: 2017-11-14

## 2017-11-14 VITALS — OXYGEN SATURATION: 98 % | WEIGHT: 177 LBS | BODY MASS INDEX: 26.22 KG/M2 | HEIGHT: 69 IN | HEART RATE: 95 BPM

## 2017-11-14 DIAGNOSIS — S92.252D CLOSED DISPLACED FRACTURE OF NAVICULAR BONE OF LEFT FOOT WITH ROUTINE HEALING, SUBSEQUENT ENCOUNTER: Primary | ICD-10-CM

## 2017-11-14 DIAGNOSIS — S91.302D UNSPECIFIED OPEN WOUND, LEFT FOOT, SUBSEQUENT ENCOUNTER: ICD-10-CM

## 2017-11-14 PROCEDURE — 11042 DBRDMT SUBQ TIS 1ST 20SQCM/<: CPT | Performed by: PODIATRIST

## 2017-11-14 NOTE — PROGRESS NOTES
Vangie Lopez  1950  67 y.o. male     Patient presents today for a post op recheck of his left foot.    11/14/17    Chief Complaint   Patient presents with   • Left Foot - post op recheck, Wound Check           History of Present Illness    Patient presents to clinic today for follow-up of his left foot navicular fracture dislocation.  He had  closed reduction and percutaneous fixation of navicular fracture dislocation date of surgery September 6, 2017.   He has no pain. He continues to dress the wound to the top of his left foot daily.       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   • Hyperlipidemia    • Multiple trauma 09/06/2017   • Osteoarthritis of hands, bilateral     retired due to arthritis,    • Recent onset of diabetes mellitus          Past Surgical History:   Procedure Laterality Date   • COLONOSCOPY  12/08/2006   • COLONOSCOPY N/A 4/3/2017    Procedure: COLONOSCOPY;  Surgeon: Michael Ang MD;  Location: [facility] ENDOSCOPY;  Service:    • CYSTOSCOPY     • EXTERNAL FIXATION ANKLE FRACTURE Left 9/6/2017    Procedure: ANKLE EXTERNAL FIXATOR APPLICATION;  Surgeon: Simone Rivera DPM;  Location: [facility] OR;  Service:    • FOOT SURGERY     • KNEE SURGERY     • PATELLA OPEN REDUCTION INTERNAL FIXATION Right 9/8/2017    Procedure: OPEN REDUCTION INTERNAL FIXATION RIGHT PATELLA       (C-ARM#3);  Surgeon: Checo Rutherford MD;  Location: [facility] OR;  Service:    • SCAPHOID FRACTURE SURGERY Left 09/07/2017    fracture dislocation left foot, closed reduction with pinning   • SHOULDER SURGERY           Family History   Problem Relation Age of Onset   • Alzheimer's disease  Mother    • Heart attack Father          Social History     Social History   • Marital status:      Spouse name: N/A   • Number of children: N/A   • Years of education: N/A     Occupational History   •       retired due to arthritis in hands     Social History Main Topics   • Smoking status: Never Smoker   • Smokeless tobacco: Never Used   • Alcohol use Yes      Comment: social drinker/rarely uses alcohol   • Drug use: No   • Sexual activity: Defer     Other Topics Concern   • Not on file     Social History Narrative    Lives with wife, single story home, two steps to enter home, no ramp         Current Outpatient Prescriptions   Medication Sig Dispense Refill   • Blood Glucose Monitoring Suppl device Use as directed to monitor sugar 1 each 0   • Blood Glucose Monitoring Suppl device One lancet twice daily to check  Fingerstick  Sugar   E11.9 100 each 0   • ferrous sulfate 324 (65 Fe) MG tablet delayed-release EC tablet Take 1 tablet by mouth Daily With Breakfast. 30 tablet 0   • finasteride (PROSCAR) 5 MG tablet Take 5 mg by mouth Every Night.     • folic acid-vit B6-vit B12 (FOLBEE) 2.5-25-1 MG tablet tablet Take 1 tablet by mouth Daily. 30 tablet 0   • glucose blood test strip Check sugar twice a day 100 each 5   • hydrochlorothiazide (HYDRODIURIL) 25 MG tablet Take 12.5 mg by mouth Daily. Pt takes 1/2 (25 mg) tablet = 12.5 mg per dose.     • metFORMIN ER (GLUCOPHAGE-XR) 500 MG 24 hr tablet Take 1 tablet by mouth 2 (Two) Times a Day With Meals. 60 tablet 1   • PHARMACY TO DOSE WARFARIN Continuous As Needed (twice weekly for 28 days). 1 each 0   • potassium citrate (UROCIT-K) 10 MEQ (1080 MG) CR tablet Take 10 mEq by mouth 2 (Two) Times a Day With Meals.     • pravastatin (PRAVACHOL) 40 MG tablet Take 20 mg by mouth Every Night. Pt takes 1/2 (40 mg) tablet = 20 mg per dose.     • sennosides-docusate sodium (SENOKOT-S) 8.6-50 MG tablet Take 1 tablet by mouth 2 (Two) Times a Day. 60 tablet 1   •  "sildenafil (VIAGRA) 100 MG tablet Take 100 mg by mouth Daily As Needed for erectile dysfunction.     • traZODone (DESYREL) 50 MG tablet Take 0.5 tablets by mouth Every Night. 30 tablet 0     No current facility-administered medications for this visit.          OBJECTIVE    Pulse 95  Ht 69\" (175.3 cm)  Wt 177 lb (80.3 kg)  SpO2 98%  BMI 26.14 kg/m2      Review of Systems   Constitutional: Negative for chills and fever.   Cardiovascular: Negative for chest pain.   Gastrointestinal: Negative for constipation, diarrhea, nausea and vomiting.   Dermatological: Open wound left foot      Constitutional: well developed, well nourished    HEENT: Normocephalic and atraumatic, normal hearing    Respiratory: Non labored respirations noted    Left lower extremity exam:    area of superficial skin necrosis from previous fracture blister to the dorsal aspect of the foot continues to improve and is smaller than last visit. No signs of infection.  No pain on palpation noted.  Limited ankle joint range of motion without pain       Psychiatric: A&O x 3 with normal mood and affect. NAD.         Procedures        ASSESSMENT AND PLAN    Vangie was seen today for post op recheck and wound check.    Diagnoses and all orders for this visit:    Closed displaced fracture of navicular bone of left foot with routine healing, subsequent encounter    Unspecified open wound, left foot, subsequent encounter    - Open wound noted in objective was debrided down to level subcutaneous tissue with a #15 blade.  Post-debridement measurements of the wound are 1.8 x 0.8 x 0.2 cm.  Wound bed is healthy granular.  - daily dressing changes at home with medihoney  - Orders for home health to start physical therapy to the left lower extremity  -  all questions were answered   - Return to clinic in 2 weeks            This document has been electronically signed by Simone Rivera DPM on November 15, 2017 7:51 AM     11/15/2017  7:51 AM       "

## 2017-11-16 ENCOUNTER — TELEPHONE (OUTPATIENT)
Dept: PODIATRY | Facility: CLINIC | Age: 67
End: 2017-11-16

## 2017-11-16 DIAGNOSIS — S82.001D CLOSED NONDISPLACED FRACTURE OF RIGHT PATELLA WITH ROUTINE HEALING, UNSPECIFIED FRACTURE MORPHOLOGY, SUBSEQUENT ENCOUNTER: Primary | ICD-10-CM

## 2017-11-17 ENCOUNTER — TELEPHONE (OUTPATIENT)
Dept: PODIATRY | Facility: CLINIC | Age: 67
End: 2017-11-17

## 2017-11-17 ENCOUNTER — OFFICE VISIT (OUTPATIENT)
Dept: ORTHOPEDIC SURGERY | Facility: CLINIC | Age: 67
End: 2017-11-17

## 2017-11-17 VITALS
SYSTOLIC BLOOD PRESSURE: 148 MMHG | WEIGHT: 177 LBS | HEIGHT: 69 IN | DIASTOLIC BLOOD PRESSURE: 74 MMHG | BODY MASS INDEX: 26.22 KG/M2

## 2017-11-17 DIAGNOSIS — S82.001D CLOSED NONDISPLACED FRACTURE OF RIGHT PATELLA WITH ROUTINE HEALING, UNSPECIFIED FRACTURE MORPHOLOGY, SUBSEQUENT ENCOUNTER: Primary | ICD-10-CM

## 2017-11-17 PROCEDURE — 99024 POSTOP FOLLOW-UP VISIT: CPT | Performed by: ORTHOPAEDIC SURGERY

## 2017-11-17 NOTE — TELEPHONE ENCOUNTER
I've already faxed Home Health PT orders the day Mr. Lopez was in the office and faxed for a walker yesterday. Is he needing me to send something else?

## 2017-11-17 NOTE — TELEPHONE ENCOUNTER
NOT SURE.  LEFT MESSAGE AT HOME.  I AM THINKING SHE WAS JUST EXCITED TO GET THE [employer] FOR HIM TO START WALKING AND WANTED TO MAKE SURE WE GOT WALKER FOR HIM.  BUT IF SHE CALLS BACK I WILL LET YOU KNOW..    VELVET

## 2017-11-17 NOTE — PROGRESS NOTES
Subjective   Vangie Lopez is a 67 y.o. male. 1950- 4 week recheck- right patella fracture      History of Present Illness   Patient is here for 4 week recheck of right patella fracture. Date of surgery- 9/8/2017. Patient states that he starts therapy today. Patient states that he has not had much discomfort. Patient was sent to xray upon arrival.     The following portions of the patient's history were reviewed and updated as appropriate:   He  has a past medical history of BPH (benign prostatic hyperplasia); Cancer; Closed fracture of navicular bone with dislocation of perilunate joint of left wrist (09/06/2017); Essential hypertension (9/8/2017); Fracture of patella, right, closed (09/06/2017); Glaucoma; Hyperlipidemia; Multiple trauma (09/06/2017); Osteoarthritis of hands, bilateral; and Recent onset of diabetes mellitus.  He  does not have any pertinent problems on file.  He  has a past surgical history that includes Colonoscopy (12/08/2006); Shoulder surgery; Cystoscopy; Knee surgery; Colonoscopy (N/A, 4/3/2017); Patella Open Reduction Internal Fixation (Right, 9/8/2017); Scaphoid fracture surgery (Left, 09/07/2017); External fixation ankle fracture (Left, 9/6/2017); and Foot surgery.  His family history includes Alzheimer's disease in his mother; Heart attack in his father.  He  reports that he has never smoked. He has never used smokeless tobacco. He reports that he drinks alcohol. He reports that he does not use illicit drugs.  Current Outpatient Prescriptions   Medication Sig Dispense Refill   • Blood Glucose Monitoring Suppl device Use as directed to monitor sugar 1 each 0   • Blood Glucose Monitoring Suppl device One lancet twice daily to check  Fingerstick  Sugar   E11.9 100 each 0   • ferrous sulfate 324 (65 Fe) MG tablet delayed-release EC tablet Take 1 tablet by mouth Daily With Breakfast. 30 tablet 0   • finasteride (PROSCAR) 5 MG tablet Take 5 mg by mouth Every Night.     • folic  acid-vit B6-vit B12 (FOLBEE) 2.5-25-1 MG tablet tablet Take 1 tablet by mouth Daily. 30 tablet 0   • glucose blood test strip Check sugar twice a day 100 each 5   • hydrochlorothiazide (HYDRODIURIL) 25 MG tablet Take 12.5 mg by mouth Daily. Pt takes 1/2 (25 mg) tablet = 12.5 mg per dose.     • metFORMIN ER (GLUCOPHAGE-XR) 500 MG 24 hr tablet Take 1 tablet by mouth 2 (Two) Times a Day With Meals. 60 tablet 1   • PHARMACY TO DOSE WARFARIN Continuous As Needed (twice weekly for 28 days). 1 each 0   • potassium citrate (UROCIT-K) 10 MEQ (1080 MG) CR tablet Take 10 mEq by mouth 2 (Two) Times a Day With Meals.     • pravastatin (PRAVACHOL) 40 MG tablet Take 20 mg by mouth Every Night. Pt takes 1/2 (40 mg) tablet = 20 mg per dose.     • sennosides-docusate sodium (SENOKOT-S) 8.6-50 MG tablet Take 1 tablet by mouth 2 (Two) Times a Day. 60 tablet 1   • sildenafil (VIAGRA) 100 MG tablet Take 100 mg by mouth Daily As Needed for erectile dysfunction.     • traZODone (DESYREL) 50 MG tablet Take 0.5 tablets by mouth Every Night. 30 tablet 0     No current facility-administered medications for this visit.      Current Outpatient Prescriptions on File Prior to Visit   Medication Sig   • Blood Glucose Monitoring Suppl device Use as directed to monitor sugar   • Blood Glucose Monitoring Suppl device One lancet twice daily to check  Fingerstick  Sugar   E11.9   • ferrous sulfate 324 (65 Fe) MG tablet delayed-release EC tablet Take 1 tablet by mouth Daily With Breakfast.   • finasteride (PROSCAR) 5 MG tablet Take 5 mg by mouth Every Night.   • folic acid-vit B6-vit B12 (FOLBEE) 2.5-25-1 MG tablet tablet Take 1 tablet by mouth Daily.   • glucose blood test strip Check sugar twice a day   • hydrochlorothiazide (HYDRODIURIL) 25 MG tablet Take 12.5 mg by mouth Daily. Pt takes 1/2 (25 mg) tablet = 12.5 mg per dose.   • metFORMIN ER (GLUCOPHAGE-XR) 500 MG 24 hr tablet Take 1 tablet by mouth 2 (Two) Times a Day With Meals.   • PHARMACY TO  "DOSE WARFARIN Continuous As Needed (twice weekly for 28 days).   • potassium citrate (UROCIT-K) 10 MEQ (1080 MG) CR tablet Take 10 mEq by mouth 2 (Two) Times a Day With Meals.   • pravastatin (PRAVACHOL) 40 MG tablet Take 20 mg by mouth Every Night. Pt takes 1/2 (40 mg) tablet = 20 mg per dose.   • sennosides-docusate sodium (SENOKOT-S) 8.6-50 MG tablet Take 1 tablet by mouth 2 (Two) Times a Day.   • sildenafil (VIAGRA) 100 MG tablet Take 100 mg by mouth Daily As Needed for erectile dysfunction.   • traZODone (DESYREL) 50 MG tablet Take 0.5 tablets by mouth Every Night.     No current facility-administered medications on file prior to visit.      He has No Known Allergies..    Review of Systems  REVIEW OF SYSTEMS:  Negative, other than presenting complaint.  HEENT: No headaches, diplopia, blurred vision, tinnitus, vertigo, epistaxis, hoarseness or sore throat.  Pulmonary: No cough, sputum, hemoptysis, dyspnea, wheezing, or chest pain.  Cardiac: No chest pain, palpitations, orthopnea, paroxysmal nocturnal dyspnea, shortness of breath, or pedal edema.  Gastrointestinal: No diarrhea, melena, or constipation.  Genitourinary: No dysuria, hematuria, nocturia, frequency, bladder or bowel incontinence.  Hematology: No history of any anemia, fatigue, fever, or chills or night sweats.  Dermatology: No rashes, pruritus, or increased pigmentation changes of the skin.     Objective   Physical Exam  /74 (BP Location: Right arm, Patient Position: Sitting)  Ht 69\" (175.3 cm)  Wt 177 lb (80.3 kg)  BMI 26.14 kg/m2    Social History     Social History   • Marital status:      Spouse name: N/A   • Number of children: N/A   • Years of education: N/A     Occupational History   •       retired due to arthritis in hands     Social History Main Topics   • Smoking status: Never Smoker   • Smokeless tobacco: Never Used   • Alcohol use Yes      Comment: social drinker/rarely uses alcohol   • Drug use: No   • " Sexual activity: Defer     Other Topics Concern   • Not on file     Social History Narrative    Lives with wife, single story home, two steps to enter home, no ramp       HEENT: Normocephalic.  PERRLA.  TM's clear bilaterally.  Oropharynx: Clear.  Neck: Supple, with no adenopathy.  Chest: Equal bilateral expansion.  Clear to auscultation and percussion.  Heart: Regular sinus rhythm, S1 and S2 normal.  No murmurs or extra heart sounds heard.  Abdomen: Soft, nontender, and no organomegaly.  Neurological: cranial nerves II-XII normal Vascular: pulses are present  Dermatological: no rashes  or blemishes, or any abnormality of the skin.    Exam  right knee show swell healed incisions. Range 0 to 50 degrees. quads4-/5 neuro intact.    Fracture is healing by x rays taken today.      Plan: PT.    Assessment/Plan   Problems Addressed this Visit     None

## 2017-11-28 ENCOUNTER — OFFICE VISIT (OUTPATIENT)
Dept: PODIATRY | Facility: CLINIC | Age: 67
End: 2017-11-28

## 2017-11-28 ENCOUNTER — TELEPHONE (OUTPATIENT)
Dept: PODIATRY | Facility: CLINIC | Age: 67
End: 2017-11-28

## 2017-11-28 VITALS — BODY MASS INDEX: 26.22 KG/M2 | WEIGHT: 177 LBS | HEIGHT: 69 IN

## 2017-11-28 DIAGNOSIS — S91.302D UNSPECIFIED OPEN WOUND, LEFT FOOT, SUBSEQUENT ENCOUNTER: ICD-10-CM

## 2017-11-28 DIAGNOSIS — S92.252D CLOSED DISPLACED FRACTURE OF NAVICULAR BONE OF LEFT FOOT WITH ROUTINE HEALING, SUBSEQUENT ENCOUNTER: Primary | ICD-10-CM

## 2017-11-28 PROCEDURE — 99024 POSTOP FOLLOW-UP VISIT: CPT | Performed by: PODIATRIST

## 2017-11-28 RX ORDER — HYDROCODONE BITARTRATE AND ACETAMINOPHEN 7.5; 325 MG/1; MG/1
1 TABLET ORAL EVERY 8 HOURS PRN
Qty: 30 TABLET | Refills: 0 | Status: SHIPPED | OUTPATIENT
Start: 2017-11-28 | End: 2019-03-27

## 2017-11-28 NOTE — PROGRESS NOTES
Vangie Napolessteven  1950  67 y.o. male     Patient presents today for a post op recheck of his left foot.    11/28/17    Chief Complaint   Patient presents with   • Left Foot - Wound Check, Post-op Recheck           History of Present Illness    Patient presents to clinic today for follow-up of his left foot navicular fracture dislocation.  He had  closed reduction and percutaneous fixation of navicular fracture dislocation date of surgery September 6, 2017.   He has no pain. He is currently ambulating in a cam boot with the assistance of a walker.  He continues to dress the wound to the top of his left foot daily. It is improving.       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   • Hyperlipidemia    • Multiple trauma 09/06/2017   • Osteoarthritis of hands, bilateral     retired due to arthritis,    • Recent onset of diabetes mellitus          Past Surgical History:   Procedure Laterality Date   • COLONOSCOPY  12/08/2006   • COLONOSCOPY N/A 4/3/2017    Procedure: COLONOSCOPY;  Surgeon: Michael Ang MD;  Location: [facility] ENDOSCOPY;  Service:    • CYSTOSCOPY     • EXTERNAL FIXATION ANKLE FRACTURE Left 9/6/2017    Procedure: ANKLE EXTERNAL FIXATOR APPLICATION;  Surgeon: Simone Rivera DPM;  Location: [facility] OR;  Service:    • FOOT SURGERY     • KNEE SURGERY     • PATELLA OPEN REDUCTION INTERNAL FIXATION Right 9/8/2017    Procedure: OPEN REDUCTION INTERNAL FIXATION RIGHT PATELLA       (C-ARM#3);  Surgeon: Checo Rutherford MD;  Location: [facility] OR;  Service:    • SCAPHOID FRACTURE SURGERY Left 09/07/2017    fracture dislocation left foot, closed reduction with pinning   • SHOULDER  SURGERY           Family History   Problem Relation Age of Onset   • Alzheimer's disease Mother    • Heart attack Father          Social History     Social History   • Marital status:      Spouse name: N/A   • Number of children: N/A   • Years of education: N/A     Occupational History   •       retired due to arthritis in hands     Social History Main Topics   • Smoking status: Never Smoker   • Smokeless tobacco: Never Used   • Alcohol use Yes      Comment: social drinker/rarely uses alcohol   • Drug use: No   • Sexual activity: Defer     Other Topics Concern   • Not on file     Social History Narrative    Lives with wife, single story home, two steps to enter home, no ramp         Current Outpatient Prescriptions   Medication Sig Dispense Refill   • Blood Glucose Monitoring Suppl device Use as directed to monitor sugar 1 each 0   • Blood Glucose Monitoring Suppl device One lancet twice daily to check  Fingerstick  Sugar   E11.9 100 each 0   • ferrous sulfate 324 (65 Fe) MG tablet delayed-release EC tablet Take 1 tablet by mouth Daily With Breakfast. 30 tablet 0   • finasteride (PROSCAR) 5 MG tablet Take 5 mg by mouth Every Night.     • folic acid-vit B6-vit B12 (FOLBEE) 2.5-25-1 MG tablet tablet Take 1 tablet by mouth Daily. 30 tablet 0   • glucose blood test strip Check sugar twice a day 100 each 5   • hydrochlorothiazide (HYDRODIURIL) 25 MG tablet Take 12.5 mg by mouth Daily. Pt takes 1/2 (25 mg) tablet = 12.5 mg per dose.     • metFORMIN ER (GLUCOPHAGE-XR) 500 MG 24 hr tablet Take 1 tablet by mouth 2 (Two) Times a Day With Meals. 60 tablet 1   • PHARMACY TO DOSE WARFARIN Continuous As Needed (twice weekly for 28 days). 1 each 0   • potassium citrate (UROCIT-K) 10 MEQ (1080 MG) CR tablet Take 10 mEq by mouth 2 (Two) Times a Day With Meals.     • pravastatin (PRAVACHOL) 40 MG tablet Take 20 mg by mouth Every Night. Pt takes 1/2 (40 mg) tablet = 20 mg per dose.     • sennosides-docusate sodium  "(SENOKOT-S) 8.6-50 MG tablet Take 1 tablet by mouth 2 (Two) Times a Day. 60 tablet 1   • sildenafil (VIAGRA) 100 MG tablet Take 100 mg by mouth Daily As Needed for erectile dysfunction.     • traZODone (DESYREL) 50 MG tablet Take 0.5 tablets by mouth Every Night. 30 tablet 0   • HYDROcodone-acetaminophen (NORCO) 7.5-325 MG per tablet Take 1 tablet by mouth Every 8 (Eight) Hours As Needed for Moderate Pain . 30 tablet 0     No current facility-administered medications for this visit.          OBJECTIVE    Ht 69\" (175.3 cm)  Wt 177 lb (80.3 kg)  BMI 26.14 kg/m2      Review of Systems   Constitutional: Negative for chills and fever.   Cardiovascular: Negative for chest pain.   Gastrointestinal: Negative for constipation, diarrhea, nausea and vomiting.   Dermatological: Open wound left foot      Constitutional: well developed, well nourished    HEENT: Normocephalic and atraumatic, normal hearing    Respiratory: Non labored respirations noted    Left lower extremity exam:    Small open wound noted to the dorsal lateral aspect of the left foot.  Area measures approximately 1.2 x 0.5 x 0.2 cm.  Basis fibro-granular.  No signs of infection.  The wound is smaller than last visit.    Psychiatric: A&O x 3 with normal mood and affect. NAD.         Procedures        ASSESSMENT AND PLAN    Vangie was seen today for wound check and post-op recheck.    Diagnoses and all orders for this visit:    Closed displaced fracture of navicular bone of left foot with routine healing, subsequent encounter    Unspecified open wound, left foot, subsequent encounter    Other orders  -     HYDROcodone-acetaminophen (NORCO) 7.5-325 MG per tablet; Take 1 tablet by mouth Every 8 (Eight) Hours As Needed for Moderate Pain .    - Open wound noted in objective was debrided down to level subcutaneous tissue with a #15 blade.  Post-debridement measurements of the wound are 1.4x 0.7 x 0.2 cm.  Wound bed is healthy granular.  - daily dressing changes at " home with medihoney  - Refill pain medicine  - Continue physical therapy  - all questions were answered   - Return to clinic in 2 weeks            This document has been electronically signed by Simone Rivera DPM on November 29, 2017 7:08 AM     11/29/2017  7:08 AM

## 2017-11-30 ENCOUNTER — HOSPITAL ENCOUNTER (OUTPATIENT)
Dept: PHYSICAL THERAPY | Facility: HOSPITAL | Age: 67
Setting detail: THERAPIES SERIES
Discharge: HOME | End: 2017-11-30

## 2017-12-12 ENCOUNTER — OFFICE VISIT (OUTPATIENT)
Dept: PODIATRY | Facility: CLINIC | Age: 67
End: 2017-12-12

## 2017-12-12 VITALS — BODY MASS INDEX: 26.22 KG/M2 | WEIGHT: 177.03 LBS | HEIGHT: 69 IN

## 2017-12-12 DIAGNOSIS — S91.302D UNSPECIFIED OPEN WOUND, LEFT FOOT, SUBSEQUENT ENCOUNTER: ICD-10-CM

## 2017-12-12 DIAGNOSIS — S92.252D CLOSED DISPLACED FRACTURE OF NAVICULAR BONE OF LEFT FOOT WITH ROUTINE HEALING, SUBSEQUENT ENCOUNTER: Primary | ICD-10-CM

## 2017-12-12 PROCEDURE — 99213 OFFICE O/P EST LOW 20 MIN: CPT | Performed by: PODIATRIST

## 2017-12-12 NOTE — PROGRESS NOTES
Vangie Lopez  1950  67 y.o. male     Patient presents today for recheck of his left foot.        Chief Complaint   Patient presents with   • Left Foot - Follow-up           History of Present Illness    Patient presents to clinic today for follow-up of his left foot navicular fracture dislocation.  He had  closed reduction and percutaneous fixation of navicular fracture dislocation date of surgery September 6, 2017.   He has no pain. He is currently ambulating in a cam boot with the assistance of a walker.  He continues to dress the wound to the top of his left foot daily.      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   • Hyperlipidemia    • Multiple trauma 09/06/2017   • Osteoarthritis of hands, bilateral     retired due to arthritis,    • Recent onset of diabetes mellitus          Past Surgical History:   Procedure Laterality Date   • COLONOSCOPY  12/08/2006   • COLONOSCOPY N/A 4/3/2017    Procedure: COLONOSCOPY;  Surgeon: Michael Ang MD;  Location: [facility] ENDOSCOPY;  Service:    • CYSTOSCOPY     • EXTERNAL FIXATION ANKLE FRACTURE Left 9/6/2017    Procedure: ANKLE EXTERNAL FIXATOR APPLICATION;  Surgeon: Simone Rivera DPM;  Location: [facility] OR;  Service:    • FOOT SURGERY     • KNEE SURGERY     • PATELLA OPEN REDUCTION INTERNAL FIXATION Right 9/8/2017    Procedure: OPEN REDUCTION INTERNAL FIXATION RIGHT PATELLA       (C-ARM#3);  Surgeon: Checo Rutherford MD;  Location: [facility] OR;  Service:    • SCAPHOID FRACTURE SURGERY Left 09/07/2017    fracture dislocation left foot, closed reduction with pinning   • SHOULDER SURGERY           Family History   Problem Relation  Age of Onset   • Alzheimer's disease Mother    • Heart attack Father          Social History     Social History   • Marital status:      Spouse name: N/A   • Number of children: N/A   • Years of education: N/A     Occupational History   •       retired due to arthritis in hands     Social History Main Topics   • Smoking status: Never Smoker   • Smokeless tobacco: Never Used   • Alcohol use Yes      Comment: social drinker/rarely uses alcohol   • Drug use: No   • Sexual activity: Defer     Other Topics Concern   • Not on file     Social History Narrative    Lives with wife, single story home, two steps to enter home, no ramp         Current Outpatient Prescriptions   Medication Sig Dispense Refill   • Blood Glucose Monitoring Suppl device Use as directed to monitor sugar 1 each 0   • Blood Glucose Monitoring Suppl device One lancet twice daily to check  Fingerstick  Sugar   E11.9 100 each 0   • ferrous sulfate 324 (65 Fe) MG tablet delayed-release EC tablet Take 1 tablet by mouth Daily With Breakfast. 30 tablet 0   • finasteride (PROSCAR) 5 MG tablet Take 5 mg by mouth Every Night.     • folic acid-vit B6-vit B12 (FOLBEE) 2.5-25-1 MG tablet tablet Take 1 tablet by mouth Daily. 30 tablet 0   • glucose blood test strip Check sugar twice a day 100 each 5   • hydrochlorothiazide (HYDRODIURIL) 25 MG tablet Take 12.5 mg by mouth Daily. Pt takes 1/2 (25 mg) tablet = 12.5 mg per dose.     • HYDROcodone-acetaminophen (NORCO) 7.5-325 MG per tablet Take 1 tablet by mouth Every 8 (Eight) Hours As Needed for Moderate Pain . 30 tablet 0   • metFORMIN ER (GLUCOPHAGE-XR) 500 MG 24 hr tablet Take 1 tablet by mouth 2 (Two) Times a Day With Meals. 60 tablet 1   • PHARMACY TO DOSE WARFARIN Continuous As Needed (twice weekly for 28 days). 1 each 0   • potassium citrate (UROCIT-K) 10 MEQ (1080 MG) CR tablet Take 10 mEq by mouth 2 (Two) Times a Day With Meals.     • pravastatin (PRAVACHOL) 40 MG tablet Take 20 mg by  "mouth Every Night. Pt takes 1/2 (40 mg) tablet = 20 mg per dose.     • sennosides-docusate sodium (SENOKOT-S) 8.6-50 MG tablet Take 1 tablet by mouth 2 (Two) Times a Day. 60 tablet 1   • sildenafil (VIAGRA) 100 MG tablet Take 100 mg by mouth Daily As Needed for erectile dysfunction.     • traZODone (DESYREL) 50 MG tablet Take 0.5 tablets by mouth Every Night. 30 tablet 0     No current facility-administered medications for this visit.          OBJECTIVE    Ht 175.3 cm (69.02\")  Wt 80.3 kg (177 lb 0.5 oz)  BMI 26.13 kg/m2      Review of Systems   Constitutional: Negative for chills and fever.   Cardiovascular: Negative for chest pain.   Gastrointestinal: Negative for constipation, diarrhea, nausea and vomiting.          Constitutional: well developed, well nourished    HEENT: Normocephalic and atraumatic, normal hearing    Respiratory: Non labored respirations noted    Left lower extremity exam:    Dorsal left foot wound is healed.  No signs of infection. Muscle strength is 4/5. No pain on palpation.      Psychiatric: A&O x 3 with normal mood and affect. NAD.         Procedures        ASSESSMENT AND PLAN    Vangie was seen today for follow-up.    Diagnoses and all orders for this visit:    Closed displaced fracture of navicular bone of left foot with routine healing, subsequent encounter  -     XR Foot 3+ View Left    Unspecified open wound, left foot, subsequent encounter      - previous wound is healed  - transition to regular shoe gear  - continue PT to left lower extremity  - all questions were answered   - Return to clinic in 4 weeks            This document has been electronically signed by Simone Rivera DPM on December 13, 2017 8:01 AM     12/13/2017  8:01 AM       "

## 2017-12-19 ENCOUNTER — TELEPHONE (OUTPATIENT)
Dept: ORTHOPEDIC SURGERY | Facility: CLINIC | Age: 67
End: 2017-12-19

## 2017-12-19 DIAGNOSIS — S82.041D CLOSED DISPLACED COMMINUTED FRACTURE OF RIGHT PATELLA WITH ROUTINE HEALING, SUBSEQUENT ENCOUNTER: Primary | ICD-10-CM

## 2017-12-20 ENCOUNTER — OFFICE VISIT (OUTPATIENT)
Dept: ORTHOPEDIC SURGERY | Facility: CLINIC | Age: 67
End: 2017-12-20

## 2017-12-20 VITALS — BODY MASS INDEX: 26.42 KG/M2 | WEIGHT: 179 LBS

## 2017-12-20 DIAGNOSIS — S92.252D CLOSED DISPLACED FRACTURE OF NAVICULAR BONE OF LEFT FOOT WITH ROUTINE HEALING, SUBSEQUENT ENCOUNTER: ICD-10-CM

## 2017-12-20 DIAGNOSIS — S82.031D CLOSED DISPLACED TRANSVERSE FRACTURE OF RIGHT PATELLA WITH ROUTINE HEALING, SUBSEQUENT ENCOUNTER: Primary | ICD-10-CM

## 2017-12-20 DIAGNOSIS — M79.672 LEFT FOOT PAIN: ICD-10-CM

## 2017-12-20 PROCEDURE — 99213 OFFICE O/P EST LOW 20 MIN: CPT | Performed by: NURSE PRACTITIONER

## 2017-12-20 NOTE — PROGRESS NOTES
Vangie Lopez is a 67 y.o. male returns for recheck of right knee     Chief Complaint   Patient presents with   • Right Knee - Follow-up       HISTORY OF PRESENT ILLNESS: Patient is here for recheck of ORIF right patella. Date of surgery- 09/08/2017- performed by Dr. Checo Rutherford. Patient states that he has a appointment with PT tomorrow. Patient states that his right knee is stiff but denies pain.      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   • Hyperlipidemia    • Multiple trauma 09/06/2017   • Osteoarthritis of hands, bilateral     retired due to arthritis,    • Recent onset of diabetes mellitus        No Known Allergies      Current Outpatient Prescriptions:   •  Blood Glucose Monitoring Suppl device, Use as directed to monitor sugar, Disp: 1 each, Rfl: 0  •  Blood Glucose Monitoring Suppl device, One lancet twice daily to check  Fingerstick  Sugar   E11.9, Disp: 100 each, Rfl: 0  •  ferrous sulfate 324 (65 Fe) MG tablet delayed-release EC tablet, Take 1 tablet by mouth Daily With Breakfast., Disp: 30 tablet, Rfl: 0  •  finasteride (PROSCAR) 5 MG tablet, Take 5 mg by mouth Every Night., Disp: , Rfl:   •  folic acid-vit B6-vit B12 (FOLBEE) 2.5-25-1 MG tablet tablet, Take 1 tablet by mouth Daily., Disp: 30 tablet, Rfl: 0  •  glucose blood test strip, Check sugar twice a day, Disp: 100 each, Rfl: 5  •  hydrochlorothiazide (HYDRODIURIL) 25 MG tablet, Take 12.5 mg by mouth Daily. Pt takes 1/2 (25 mg) tablet = 12.5 mg per dose., Disp: , Rfl:   •  HYDROcodone-acetaminophen (NORCO) 7.5-325 MG per tablet, Take 1 tablet by mouth Every 8 (Eight) Hours  As Needed for Moderate Pain ., Disp: 30 tablet, Rfl: 0  •  metFORMIN ER (GLUCOPHAGE-XR) 500 MG 24 hr tablet, Take 1 tablet by mouth 2 (Two) Times a Day With Meals., Disp: 60 tablet, Rfl: 1  •  PHARMACY TO DOSE WARFARIN, Continuous As Needed (twice weekly for 28 days)., Disp: 1 each, Rfl: 0  •  potassium citrate (UROCIT-K) 10 MEQ (1080 MG) CR tablet, Take 10 mEq by mouth 2 (Two) Times a Day With Meals., Disp: , Rfl:   •  pravastatin (PRAVACHOL) 40 MG tablet, Take 20 mg by mouth Every Night. Pt takes 1/2 (40 mg) tablet = 20 mg per dose., Disp: , Rfl:   •  sennosides-docusate sodium (SENOKOT-S) 8.6-50 MG tablet, Take 1 tablet by mouth 2 (Two) Times a Day., Disp: 60 tablet, Rfl: 1  •  sildenafil (VIAGRA) 100 MG tablet, Take 100 mg by mouth Daily As Needed for erectile dysfunction., Disp: , Rfl:   •  traZODone (DESYREL) 50 MG tablet, Take 0.5 tablets by mouth Every Night., Disp: 30 tablet, Rfl: 0    Past Surgical History:   Procedure Laterality Date   • COLONOSCOPY  12/08/2006   • COLONOSCOPY N/A 4/3/2017    Procedure: COLONOSCOPY;  Surgeon: Michael Ang MD;  Location: [facility] ENDOSCOPY;  Service:    • CYSTOSCOPY     • EXTERNAL FIXATION ANKLE FRACTURE Left 9/6/2017    Procedure: ANKLE EXTERNAL FIXATOR APPLICATION;  Surgeon: Simone Rivera DPM;  Location: [facility] OR;  Service:    • FOOT SURGERY     • KNEE SURGERY     • PATELLA OPEN REDUCTION INTERNAL FIXATION Right 9/8/2017    Procedure: OPEN REDUCTION INTERNAL FIXATION RIGHT PATELLA       (C-ARM#3);  Surgeon: Checo Rutherford MD;  Location: [facility] OR;  Service:    • SCAPHOID FRACTURE SURGERY Left 09/07/2017    fracture dislocation left foot, closed reduction with pinning   • SHOULDER SURGERY         ROS  No fevers or chills.  No chest pain or shortness of air.  No GI or  disturbances.    PHYSICAL EXAMINATION:       Wt 81.2 kg (179 lb)  BMI 26.42 kg/m2    Physical Exam   Constitutional: He is oriented to person, place, and time. Vital signs are normal. He appears  well-developed and well-nourished. He is cooperative.   HENT:   Head: Normocephalic and atraumatic.   Neck: Trachea normal and phonation normal.   Pulmonary/Chest: Effort normal. No respiratory distress.   Abdominal: Soft. Normal appearance. He exhibits no distension.   Musculoskeletal:        Right knee: He exhibits no effusion.   Neurological: He is alert and oriented to person, place, and time. GCS eye subscore is 4. GCS verbal subscore is 5. GCS motor subscore is 6.   Skin: Skin is warm, dry and intact.   Psychiatric: He has a normal mood and affect. His speech is normal and behavior is normal. Judgment and thought content normal. Cognition and memory are normal.   Vitals reviewed.      GAIT:     [x]  Normal  []  Antalgic    Assistive device: [x]  None  []  Walker     []  Crutches  []  Cane     []  Wheelchair  []  Stretcher    Right Knee Exam     Tenderness   The patient is experiencing tenderness in the patella.    Range of Motion   Right knee extension: -10.   Right knee flexion: 90.     Other   Erythema: absent  Scars: present  Sensation: normal  Pulse: present  Swelling: mild  Other tests: no effusion present      Left Knee Exam   Left knee exam is normal.    Muscle Strength     The patient has normal left knee strength.              Xr Foot 3+ View Left    Result Date: 12/12/2017  Narrative: PROCEDURE: Three views left foot COMPARISON: Three views left foot October 24, 2017. HISTORY: Displaced fracture of the navicular of left foot, subsequent encounter for fracture with routine healing FINDINGS: Diffuse loss of bone mineral density. No acute fractures appreciated. Grossly normal alignment. Several small bony fragments at the dorsal aspect of the navicular bone, similar. The navicular bone is otherwise intact. Mild arthropathy of the midfoot..     Impression: 1. No acute osseous abnormality. Diffuse loss of bone mineral density. 2. The navicular bone appears grossly intact. Several small bony fragments  dorsal to the navicular bone are stable. Electronically signed by:  Frank Fowler MD  12/12/2017 12:36 PM CST Workstation: [email or web address]            ASSESSMENT:    Diagnoses and all orders for this visit:    Closed displaced transverse fracture of right patella with routine healing, subsequent encounter  -     Cancel: Ambulatory Referral to Sports Medicine  -     Cancel: Ambulatory Referral to Physical Therapy Evaluate and treat  -     Ambulatory Referral to Physical Therapy Evaluate and treat    Left foot pain  -     Cancel: Ambulatory Referral to Sports Medicine  -     Cancel: Ambulatory Referral to Physical Therapy Evaluate and treat  -     Ambulatory Referral to Physical Therapy Evaluate and treat    Closed displaced fracture of navicular bone of left foot with routine healing, subsequent encounter  -     Cancel: Ambulatory Referral to Physical Therapy Evaluate and treat  -     Ambulatory Referral to Physical Therapy Evaluate and treat          PLAN  Plan proceeding with PT/HEP of the right knee and left foot which was ordered by Dr. Rivera who has been following him for a navicular fx.   Follow up on six weeks for recheck.   No Follow-up on file.    LEIGHTON Rodriguez

## 2017-12-21 ENCOUNTER — HOSPITAL ENCOUNTER (OUTPATIENT)
Dept: PHYSICAL THERAPY | Facility: HOSPITAL | Age: 67
Setting detail: THERAPIES SERIES
Discharge: HOME | End: 2017-12-21

## 2017-12-21 ENCOUNTER — TRANSCRIBE ORDERS (OUTPATIENT)
Dept: ORTHOPEDIC SURGERY | Facility: CLINIC | Age: 67
End: 2017-12-21

## 2017-12-21 DIAGNOSIS — S82.031D CLOSED DISPLACED TRANSVERSE FRACTURE OF RIGHT PATELLA WITH ROUTINE HEALING: Primary | ICD-10-CM

## 2017-12-21 DIAGNOSIS — S92.252D CLOSED DISPLACED FRACTURE OF NAVICULAR BONE OF LEFT FOOT WITH ROUTINE HEALING, SUBSEQUENT ENCOUNTER: ICD-10-CM

## 2017-12-21 PROCEDURE — 97162 PT EVAL MOD COMPLEX 30 MIN: CPT | Performed by: PHYSICAL THERAPIST

## 2017-12-21 PROCEDURE — 97110 THERAPEUTIC EXERCISES: CPT | Performed by: PHYSICAL THERAPIST

## 2017-12-21 PROCEDURE — 97110 THERAPEUTIC EXERCISES: CPT

## 2017-12-22 NOTE — THERAPY EVALUATION
Outpatient Physical Therapy Ortho Initial Evaluation  [facility]     Patient Name: Vangie Lopez  : 1950  MRN: [id number]  Today's Date: 2017      Visit Date: 2017  Attendance:  (14 visits approved through 18)  Subjective Improvement: n/a  Next MD Appt: 1/15/18 - Dr. 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   • Hyperlipidemia    • Multiple trauma 2017   • Osteoarthritis of hands, bilateral     retired due to arthritis,    • Recent onset of diabetes mellitus         Past Surgical History:   Procedure Laterality Date   • COLONOSCOPY  2006   • COLONOSCOPY N/A 4/3/2017    Procedure: COLONOSCOPY;  Surgeon: Michael Ang MD;  Location: [facility] ENDOSCOPY;  Service:    • CYSTOSCOPY     • EXTERNAL FIXATION ANKLE FRACTURE Left 2017    Procedure: ANKLE EXTERNAL FIXATOR APPLICATION;  Surgeon: Simone Rivera DPM;  Location: [facility] OR;  Service:    • FOOT SURGERY     • KNEE SURGERY     • PATELLA OPEN REDUCTION INTERNAL FIXATION Right 2017    Procedure: OPEN REDUCTION INTERNAL FIXATION RIGHT PATELLA       (C-ARM#3);  Surgeon: Checo Rutherford MD;  Location: [facility] OR;  Service:    • SCAPHOID FRACTURE SURGERY Left 2017    fracture dislocation left foot, closed reduction with pinning   • SHOULDER SURGERY         Visit Dx:     ICD-10-CM ICD-9-CM   1. Closed displaced transverse fracture of right patella with routine healing S82.031D  "V54.16   2. Closed displaced fracture of navicular bone of left foot with routine healing, subsequent encounter S92.252D V54.19             Patient History       12/21/17 0900          History    Chief Complaint Difficulty Walking;Difficulty with daily activities;Pain;Joint stiffness;Joint swelling  -      Type of Pain Foot pain;Ankle pain;Knee pain  -      Date Current Problem(s) Began 09/06/17  -      Brief Description of Current Complaint Patient had climbed a ladder up to his roof. He went to turn around to sit on the roof. Had 1 foot putting pressure on the ladder and the ladder slid. He fell down to ground level with his left foot caught up in the ladder and the right knee hitting one of the rungs. He had a fracture dislocation of the L navicular bone and R patellar fracture. He underwent closed reduction with percutaneous pinning of the L foot to stabilize the navicular fracture-dislocation on 9/6/17 and ORIF of the R patella fracture 9/8/17. He was in a wheelchair for 2 months. He was released for weightbearing approximately 2 weeks ago. He has trouble bending the right knee. He has to be careful on stairs, especially stepping down. Left ankle is a little stiff and hurts with side to side motion. Continued swelling L foot and ankle.  male with children. Has 1 child and grandchild living with them. Resides in single story house with a ramp to enter and no stairs inside. There will be 3 steps to enter once the ramp is removed.  -SS      Patient/Caregiver Goals --   \"Just be able to walk without hurting.\"  -SS      Current Tobacco Use no  -SS      Smoking Status no  -SS      Patient's Rating of General Health Excellent  -SS      Occupation/sports/leisure activities Retired. Hobbies: work on vehicles,   -SS      What clinical tests have you had for this problem? X-ray  -      Results of Clinical Tests fractures healing  -      Surgery Date 1 09/06/17   L navicular fx-dislocation  -SS      " Surgery Date 2 09/08/17   ORIF R patella  -SS      Pain     Pain Location Foot;Ankle;Knee  -SS      Pain at Present 2  -SS      Pain at Best 2   over past 1 month  -SS      Pain at Worst 6   over past 1 month  -SS      Pain Frequency Constant/continuous  -SS      Pain Description Sharp  -SS      What Performance Factors Make the Current Problem(s) WORSE? bending R knee, up on feet, walking  -SS      What Performance Factors Make the Current Problem(s) BETTER? sitting down  -SS      Is your sleep disturbed? Yes  -SS      Is medication used to assist with sleep? Yes  -SS      Difficulties at work? n/a  -SS      Difficulties with ADL's? decreased  -SS      Difficulties with recreational activities? decreased  -SS      Fall Risk Assessment    Any falls in the past year: Yes  -SS      Number of falls reported in the last 12 months 1  -SS      Factors that contributed to the fall: Other (comment)   ladder fell out from under him  -SS      Does patient have a fear of falling No  -SS      Daily Activities    Primary Language English  -SS      Safety    Are you being hurt, hit, or frightened by anyone at home or in your life? No  -SS      Are you being neglected by a caregiver No  -SS        User Key  (r) = Recorded By, (t) = Taken By, (c) = Cosigned By    Initials Name Provider Type     Jarod Hernandez, PT DPT Physical Therapist                PT Ortho       12/21/17 0900    Subjective Comments    Subjective Comments see Therapy Patient History  -SS    Precautions and Contraindications    Precautions/Limitations no known precautions/limitations  -SS    Precautions none  -SS    Contraindications none  -SS    Subjective Pain    Able to rate subjective pain? yes  -SS    Pre-Treatment Pain Level 2  -SS    Post-Treatment Pain Level 2  -SS    Posture/Observations    Posture/Observations Comments Antalgic gait. Stands and ambulates with R LE externally rotated.  -SS    Left Knee    Extension/Flexion AROM Deficit 0-130  -SS     Right Knee    Extension/Flexion AROM Deficit 0-10-84; pain with flexion  -SS    Left Ankle    Dorsiflexion AROM Deficit -5  -SS    Plantarflexion AROM Deficit 50  -SS    Inversion AROM Deficit 21; stiff  -SS    Eversion AROM Deficit 18; stiff  -SS    Right Ankle    Dorsiflexion AROM Deficit -5  -SS    Plantarflexion AROM Deficit 60  -SS    Inversion AROM Deficit 37  -SS    Eversion AROM Deficit 35  -SS    Lower Extremity    Lower Ext Manual Muscle Testing Detail Independent SLR on R without lag  -      User Key  (r) = Recorded By, (t) = Taken By, (c) = Cosigned By    Initials Name Provider Type     Jarod Hernandez, PT DPT Physical Therapist                      Therapy Education  Education Details: seated HS stretch, DF stretch with strap, heelsides with strap  Given: HEP  Program: New  How Provided: Verbal, Demonstration, Written  Provided to: Patient  Level of Understanding: Verbalized, Demonstrated           PT OP Goals       12/21/17 0900       PT Short Term Goals    STG Date to Achieve 01/11/18  -     STG 1 Note a >/= 50% subjective improvement  -     STG 2 LEFS score to be >/= 40/80  -     STG 3 Increase R knee active extension by >/= 5 degrees.   -     STG 4 Increase R knee active flexion to >/= 100 degrees  -     STG 5 Increase B ankle DF to neutral or better  -     Long Term Goals    LTG Date to Achieve 02/01/18  -     LTG 1 Independent with HEP  -     LTG 2 LEFS score to be >/= 55/80  -     LTG 3 R knee AROM WNLs  -     LTG 4 L ankle AROM WFLs  -     LTG 5 Demonstrate ability to ascend and descend 5 steps reciprocally  -     Time Calculation    PT Goal Re-Cert Due Date 01/11/18  -       User Key  (r) = Recorded By, (t) = Taken By, (c) = Cosigned By    Initials Name Provider Type     Jarod Hernandez, PT DPT Physical Therapist                PT Assessment/Plan       12/21/17 0900       PT Assessment    Functional Limitations Impaired gait;Limitations in  community activities;Performance in leisure activities  -     Impairments Balance;Endurance;Gait;Pain;Range of motion;Muscle strength  -     Assessment Comments Patient is 15 weeks s/p injury and surgeries.  -SS     Rehab Potential Good  -SS     Patient/caregiver participated in establishment of treatment plan and goals Yes  -SS     Patient would benefit from skilled therapy intervention Yes  -SS     PT Plan    PT Frequency 2x/week  -     Predicted Duration of Therapy Intervention (days/wks) 4-6 weeks  -     Planned CPT's? PT EVAL MOD COMPLEXITY: 29680;PT THER PROC EA 15 MIN: 80119;PT THER ACT EA 15 MIN: 64964;PT MANUAL THERAPY EA 15 MIN: 37427;PT GAIT TRAINING EA 15 MIN: 50649;PT HOT OR COLD PACK TREAT MCARE;PT ELECTRICAL STIM UNATTEND: ;PT THER SUPP EA 15 MIN  -     PT Plan Comments ROM, stretching, strengthening, gait training, stair training, heat or ice as needed for pain.  -       User Key  (r) = Recorded By, (t) = Taken By, (c) = Cosigned By    Initials Name Provider Type     Jarod Hernandez, PT DPT Physical Therapist                  Exercises       12/21/17 0900          Subjective Comments    Subjective Comments see Therapy Patient History  -      Subjective Pain    Able to rate subjective pain? yes  -SS      Pre-Treatment Pain Level 2  -SS      Post-Treatment Pain Level 2  -SS      Aquatics    Aquatics performed? No  -SS      Exercise 1    Exercise Name 1 Pro2, Seat 13, ROM  -SS      Cueing 1 Verbal  -SS      Time (Minutes) 1 8 minutes  -SS      Additional Comments Level 1  -SS      Exercise 2    Exercise Name 2 seated HS stretch  -CP      Cueing 2 Verbal  -CP      Sets 2 3  -CP      Time (Seconds) 2 30  -CP      Additional Comments bilateral  -CP      Exercise 3    Exercise Name 3 DF stretch with Strap  -CP      Cueing 3 Verbal  -CP      Sets 3 3  -CP      Time (Seconds) 3 30  -CP      Additional Comments bilateral  -CP      Exercise 4    Exercise Name 4 Heelslides with strap   -CP      Cueing 4 Verbal  -CP      Sets 4 3  -CP      Time (Seconds) 4 30  -CP      Additional Comments bilateral  -CP        User Key  (r) = Recorded By, (t) = Taken By, (c) = Cosigned By    Initials Name Provider Type    SS Jarod Hernandez, PT DPT Physical Therapist    CP Sagrario Jacobsen PTA Physical Therapy Assistant                        Outcome Measure Options: Lower Extremity Functional Scale (LEFS)  Lower Extremity Functional Index  Any of your usual work, housework or school activities: Moderate difficulty  Your usual hobbies, recreational or sporting activities: Extreme difficulty or unable to perform activity  Getting into or out of the bath: A little bit of difficulty  Walking between rooms: No difficulty  Putting on your shoes or socks: No difficulty  Squatting: Extreme difficulty or unable to perform activity  Lifting an object, like a bag of groceries from the floor: Extreme difficulty or unable to perform activity  Performing light activities around your home: Moderate difficulty  Performing heavy activities around your home: Extreme difficulty or unable to perform activity  Getting into or out of a car: Moderate difficulty  Walking 2 blocks: Extreme difficulty or unable to perform activity  Walking a mile: Extreme difficulty or unable to perform activity  Going up or down 10 stairs (about 1 flight of stairs): Extreme difficulty or unable to perform activity  Standing for 1 hour: Moderate difficulty  Sitting for 1 hour: No difficulty  Running on even ground: Extreme difficulty or unable to perform activity  Running on uneven ground: Extreme difficulty or unable to perform activity  Making sharp turns while running fast: Extreme difficulty or unable to perform activity  Hopping: Extreme difficulty or unable to perform activity  Rolling over in bed: No difficulty  Total: 27      Time Calculation:   Start Time: 1022  Stop Time: 1042  Time Calculation (min): 20 min  Total Timed Code Minutes- PT:  20 minute(s)     Therapy Charges for Today     Code Description Service Date Service Provider Modifiers Qty    20802958228 HC PT THER PROC EA 15 MIN 12/21/2017 Jarod Hernandez, PT DPT GP 1    72888051273 HC PT EVAL MOD COMPLEXITY 2 12/21/2017 Jarod Hernandez, PT DPT GP 1    67365277656 HC PT THER PROC EA 15 MIN 12/21/2017 Sagrario Jacobsen, PTA GP  1                   Jarod Hernandez, PT, DPT, CHT  12/21/2017

## 2017-12-26 ENCOUNTER — HOSPITAL ENCOUNTER (OUTPATIENT)
Dept: PHYSICAL THERAPY | Facility: HOSPITAL | Age: 67
Setting detail: THERAPIES SERIES
Discharge: HOME | End: 2017-12-26

## 2017-12-26 DIAGNOSIS — S92.252D CLOSED DISPLACED FRACTURE OF NAVICULAR BONE OF LEFT FOOT WITH ROUTINE HEALING, SUBSEQUENT ENCOUNTER: ICD-10-CM

## 2017-12-26 DIAGNOSIS — S82.031D CLOSED DISPLACED TRANSVERSE FRACTURE OF RIGHT PATELLA WITH ROUTINE HEALING: Primary | ICD-10-CM

## 2017-12-26 PROCEDURE — 97110 THERAPEUTIC EXERCISES: CPT

## 2017-12-26 NOTE — THERAPY TREATMENT NOTE
Outpatient Physical Therapy Ortho Treatment Note  [facility]     Patient Name: Vangie Lopze  : 1950  MRN: [id number]  Today's Date: 2017      Visit Date: 2017     Subjective Improvement 0  visits 2/2  Visits approved  14 until 2018  RTMD 1/15/2018 Dr. Rivera, 2018 norberto Fong  recert Date 2018    S/P closed reduction percutaneous fixation Left navicular fracture-dislocation on 2017  ORIF r patella fracture on 2017      Visit Dx:    ICD-10-CM ICD-9-CM   1. Closed displaced transverse fracture of right patella with routine healing S82.031D V54.16   2. Closed displaced fracture of navicular bone of left foot with routine healing, subsequent encounter S92.252D V54.19       Patient Active Problem List   Diagnosis   • Encounter for screening for malignant neoplasm of colon   • Closed displaced comminuted fracture of right patella   • Closed displaced fracture of navicular bone of left foot   • Closed dislocation of navicular bone of left foot   • Patella fracture   • Essential hypertension   • Mixed hyperlipidemia   • Osteoarthritis of hands, bilateral   • BPH (benign prostatic hyperplasia)   • Glaucoma   • Hyperlipidemia   • Closed fracture of navicular bone with dislocation of perilunate joint of left wrist   • Fracture of patella, right, closed   • Multiple trauma   • Encounter for rehabilitation   • History of kidney stones   • Recent onset of diabetes mellitus        Past Medical History:   Diagnosis Date   • BPH (benign prostatic hyperplasia)    • Cancer     skin cancer   • Closed fracture of navicular bone with dislocation of perilunate joint of left wrist 2017    closed reduction pinning  17   • Essential hypertension 2017   • Fracture of patella, right, closed 2017    orif 17   • Glaucoma     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 ENDOSCOPY;  Service:    • CYSTOSCOPY     • EXTERNAL FIXATION ANKLE FRACTURE Left 9/6/2017    Procedure: ANKLE EXTERNAL FIXATOR APPLICATION;  Surgeon: Simone Rivera DPM;  Location: [facility] OR;  Service:    • FOOT SURGERY     • KNEE SURGERY     • PATELLA OPEN REDUCTION INTERNAL FIXATION Right 9/8/2017    Procedure: OPEN REDUCTION INTERNAL FIXATION RIGHT PATELLA       (C-ARM#3);  Surgeon: Checo Rutherford MD;  Location: [facility] OR;  Service:    • SCAPHOID FRACTURE SURGERY Left 09/07/2017    fracture dislocation left foot, closed reduction with pinning   • SHOULDER SURGERY               PT Ortho       12/26/17 0900    Right Knee    Extension/Flexion AROM Deficit AROM   -CP    Left Ankle    Dorsiflexion AROM Deficit 2  -CP      User Key  (r) = Recorded By, (t) = Taken By, (c) = Cosigned By    Initials Name Provider Type    CP Sagrario Jacobsen PTA Physical Therapy Assistant                            PT Assessment/Plan       12/26/17 1040       PT Assessment    Assessment Comments Patient is compliant with HEP  -CP     PT Plan    PT Frequency 2x/week  -CP     Predicted Duration of Therapy Intervention (days/wks) 4 to 6 weeks  -CP     PT Plan Comments cont with poc.  Incline stretch, standing HS and lunge stretch  -CP       User Key  (r) = Recorded By, (t) = Taken By, (c) = Cosigned By    Initials Name Provider Type    CP Sagrario Jacobsen PTA Physical Therapy Assistant                    Exercises       12/26/17 0900          Subjective Comments    Subjective Comments Had increase pain over the weekend however today reports no pain.  -CP      Subjective Pain    Able to rate subjective pain? yes  -CP      Pre-Treatment Pain Level 0  -CP      Post-Treatment Pain Level 0  -CP       Aquatics    Aquatics performed? No  -CP      Exercise 1    Exercise Name 1 Pro II level 2  -CP      Time (Minutes) 1 10  -CP      Exercise 2    Exercise Name 2 DF strtch with strap  -CP      Sets 2 3  -CP      Time (Seconds) 2 30  -CP      Additional Comments bilateral  -CP      Exercise 3    Exercise Name 3 Seated HS Stretch  -CP      Sets 3 3  -CP      Time (Seconds) 3 30  -CP      Additional Comments bilateral  -CP      Exercise 4    Exercise Name 4 heel slides with strap  -CP      Reps 4 30  -CP      Time (Minutes) 4 bilateral  -CP      Exercise 5    Exercise Name 5 SLR  -CP      Sets 5 2  -CP      Reps 5 10  -CP      Time (Minutes) 5 bilateral  -CP      Exercise 6    Exercise Name 6 Left ankle AROM all motions  -CP      Sets 6 2  -CP      Reps 6 10  -CP      Exercise 7    Exercise Name 7 Left ankle circles  -CP      Reps 7 20  -CP      Time (Minutes) 7 CW/CCW  -CP        User Key  (r) = Recorded By, (t) = Taken By, (c) = Cosigned By    Initials Name Provider Type    CP Sagrario Jacobsen, PTA Physical Therapy Assistant                               PT OP Goals       12/26/17 1000       PT Short Term Goals    STG Date to Achieve 01/11/18  -CP     STG 1 Note a >/= 50% subjective improvement  -CP     STG 1 Progress Not Met  -CP     STG 2 LEFS score to be >/= 40/80  -CP     STG 2 Progress Not Met  -CP     STG 3 Increase R knee active extension by >/= 5 degrees.   -CP     STG 3 Progress Progressing  -CP     STG 4 Increase R knee active flexion to >/= 100 degrees  -CP     STG 4 Progress Met  -CP     STG 5 Increase B ankle DF to neutral or better  -CP     STG 5 Progress Partially Met  -CP     Long Term Goals    LTG Date to Achieve 02/01/18  -CP     LTG 1 Independent with HEP  -CP     LTG 1 Progress Ongoing  -CP     LTG 2 LEFS score to be >/= 55/80  -CP     LTG 2 Progress Not Met  -CP     LTG 3 R knee AROM WNLs  -CP     LTG 3 Progress Progressing  -CP     LTG 4 L ankle AROM WFLs  -CP     LTG 4 Progress Progressing  -CP      LTG 5 Demonstrate ability to ascend and descend 5 steps reciprocally  -CP     LTG 5 Progress Not Met  -CP     Time Calculation    PT Goal Re-Cert Due Date 01/11/18  -CP       User Key  (r) = Recorded By, (t) = Taken By, (c) = Cosigned By    Initials Name Provider Type    CP Sagrario Jacobsen PTA Physical Therapy Assistant          Therapy Education  Education Details: Left akle AROM all motion, left ankle circles  Given: HEP  Program: New  How Provided: Verbal, Demonstration, Written  Provided to: Patient  Level of Understanding: Verbalized, Demonstrated              Time Calculation:   Start Time: 0900  Stop Time: 0945  Time Calculation (min): 45 min  Total Timed Code Minutes- PT: 45 minute(s)    Therapy Charges for Today     Code Description Service Date Service Provider Modifiers Qty    70843613059 HC PT THER PROC EA 15 MIN 12/26/2017 Sagrario Jacobsen PTA GP 3                    Sagrario Jacobsen PTA  12/26/2017

## 2017-12-29 ENCOUNTER — HOSPITAL ENCOUNTER (OUTPATIENT)
Dept: PHYSICAL THERAPY | Facility: HOSPITAL | Age: 67
Setting detail: THERAPIES SERIES
Discharge: HOME | End: 2017-12-29

## 2017-12-29 DIAGNOSIS — S92.252D CLOSED DISPLACED FRACTURE OF NAVICULAR BONE OF LEFT FOOT WITH ROUTINE HEALING, SUBSEQUENT ENCOUNTER: ICD-10-CM

## 2017-12-29 DIAGNOSIS — S82.031D CLOSED DISPLACED TRANSVERSE FRACTURE OF RIGHT PATELLA WITH ROUTINE HEALING: Primary | ICD-10-CM

## 2017-12-29 PROCEDURE — 97110 THERAPEUTIC EXERCISES: CPT

## 2017-12-29 NOTE — THERAPY TREATMENT NOTE
Outpatient Physical Therapy Ortho Treatment Note  [facility]     Patient Name: Vangie Lopez  : 1950  MRN: [id number]  Today's Date: 2017      Visit Date: 2017    Subjective Improvement: 0   Visits Attended: 3/3   Visits Approved 14 until 2018   MD visit: 1/15/2018   Recert Date: 2018         Visit Dx:    ICD-10-CM ICD-9-CM   1. Closed displaced transverse fracture of right patella with routine healing S82.031D V54.16   2. Closed displaced fracture of navicular bone of left foot with routine healing, subsequent encounter S92.252D V54.19       Patient Active Problem List   Diagnosis   • Encounter for screening for malignant neoplasm of colon   • Closed displaced comminuted fracture of right patella   • Closed displaced fracture of navicular bone of left foot   • Closed dislocation of navicular bone of left foot   • Patella fracture   • Essential hypertension   • Mixed hyperlipidemia   • Osteoarthritis of hands, bilateral   • BPH (benign prostatic hyperplasia)   • Glaucoma   • Hyperlipidemia   • Closed fracture of navicular bone with dislocation of perilunate joint of left wrist   • Fracture of patella, right, closed   • Multiple trauma   • Encounter for rehabilitation   • History of kidney stones   • Recent onset of diabetes mellitus        Past Medical History:   Diagnosis Date   • BPH (benign prostatic hyperplasia)    • Cancer     skin cancer   • Closed fracture of navicular bone with dislocation of perilunate joint of left wrist 2017    closed reduction pinning  17   • Essential hypertension 2017   • Fracture of patella, right, closed 2017    orif 17   • Glaucoma     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 ENDOSCOPY;  Service:    • CYSTOSCOPY     • EXTERNAL FIXATION ANKLE FRACTURE Left 9/6/2017    Procedure: ANKLE EXTERNAL FIXATOR APPLICATION;  Surgeon: Simone Rivera DPM;  Location: [facility] OR;  Service:    • FOOT SURGERY     • KNEE SURGERY     • PATELLA OPEN REDUCTION INTERNAL FIXATION Right 9/8/2017    Procedure: OPEN REDUCTION INTERNAL FIXATION RIGHT PATELLA       (C-ARM#3);  Surgeon: Checo Rutherford MD;  Location: [facility] OR;  Service:    • SCAPHOID FRACTURE SURGERY Left 09/07/2017    fracture dislocation left foot, closed reduction with pinning   • SHOULDER SURGERY                               PT Assessment/Plan       12/29/17 0900       PT Assessment    Assessment Comments Pt not able to tolerate standing stretches well this date due to increase pain in L toe due to ingrown toenail.  -TW     PT Plan    PT Frequency 2x/week  -TW     Predicted Duration of Therapy Intervention (days/wks) 4-6 weeks  -TW     PT Plan Comments Cont with progression and monitor pt's reaction.  -TW       User Key  (r) = Recorded By, (t) = Taken By, (c) = Cosigned By    Initials Name Provider Type    TW Sree Calhoun PTA Physical Therapy Assistant                Modalities       12/29/17 0900          Subjective Pain    Post-Treatment Pain Level 1  -TW        User Key  (r) = Recorded By, (t) = Taken By, (c) = Cosigned By    Initials Name Provider Type    RACHELLE Calhoun PTA Physical Therapy Assistant                Exercises       12/29/17 0900          Subjective Comments    Subjective Comments Pt states his knee is doing good today but he has an ingrown toenail on the big toe of his L foot that is \"Killing him\".  -TW      Subjective Pain    Able to rate subjective pain? yes  -TW      Pre-Treatment Pain Level 3  -TW      Post-Treatment Pain Level 1  -TW      Exercise 1    Exercise Name 1 Pro II level 2 seat at 11  -TW      Time (Minutes) " 1 15'  -TW      Exercise 2    Exercise Name 2 DF strtch with strap  -TW      Sets 2 5  -TW      Time (Seconds) 2 30  -TW      Additional Comments bilateral  -TW      Exercise 3    Exercise Name 3 Seated HS Stretch  -TW      Sets 3 3  -TW      Time (Seconds) 3 30  -TW      Additional Comments bilateral  -TW      Exercise 4    Exercise Name 4 heel slides with strap  -TW      Reps 4 30  -TW      Time (Minutes) 4 bilateral  -TW      Exercise 5    Exercise Name 5 SLR  -TW      Sets 5 3  -TW      Reps 5 10  -TW      Time (Minutes) 5 bilateral  -TW      Exercise 6    Exercise Name 6 Left ankle AROM all motions  -TW      Sets 6 3  -TW      Reps 6 10  -TW      Exercise 7    Exercise Name 7 Left ankle circles  -TW      Reps 7 20  -TW      Time (Minutes) 7 CW/CCW  -TW      Exercise 8    Exercise Name 8 Incline   -TW      Additional Comments Not able to perform due to ingrown toenail  -TW        User Key  (r) = Recorded By, (t) = Taken By, (c) = Cosigned By    Initials Name Provider Type    TW Sree Calhoun PTA Physical Therapy Assistant                               PT OP Goals       12/29/17 1242 12/29/17 0900    PT Short Term Goals    STG Date to Achieve  01/11/18  -TW    STG 1  Note a >/= 50% subjective improvement  -TW    STG 1 Progress  Not Met  -TW    STG 2  LEFS score to be >/= 40/80  -TW    STG 2 Progress  Not Met  -TW    STG 3  Increase R knee active extension by >/= 5 degrees.   -TW    STG 3 Progress  Progressing  -TW    STG 4  Increase R knee active flexion to >/= 100 degrees  -TW    STG 4 Progress  Met  -TW    STG 5  Increase B ankle DF to neutral or better  -TW    STG 5 Progress  Partially Met  -TW    Long Term Goals    LTG Date to Achieve  02/01/18  -TW    LTG 1  Independent with HEP  -TW    LTG 1 Progress  Ongoing  -TW    LTG 2  LEFS score to be >/= 55/80  -TW    LTG 2 Progress  Not Met  -TW    LTG 3  R knee AROM WNLs  -TW    LTG 3 Progress  Progressing  -TW    LTG 4  L ankle AROM WFLs  -TW    LTG 4  Progress  Progressing  -TW    LTG 5  Demonstrate ability to ascend and descend 5 steps reciprocally  -TW    LTG 5 Progress  Not Met  -TW    Time Calculation    PT Goal Re-Cert Due Date 01/11/18  -TW       User Key  (r) = Recorded By, (t) = Taken By, (c) = Cosigned By    Initials Name Provider Type    TW Sree Calhoun PTA Physical Therapy Assistant                         Time Calculation:   Start Time: 0850  Stop Time: 0945  Time Calculation (min): 55 min  Total Timed Code Minutes- PT: 55 minute(s)    Therapy Charges for Today     Code Description Service Date Service Provider Modifiers Qty    90367154353 HC PT THER PROC EA 15 MIN 12/29/2017 Sree Calhoun PTA GP 4                    Sree Calhoun PTA  12/29/2017

## 2018-01-02 ENCOUNTER — HOSPITAL ENCOUNTER (OUTPATIENT)
Dept: PHYSICAL THERAPY | Facility: HOSPITAL | Age: 68
Setting detail: THERAPIES SERIES
Discharge: HOME | End: 2018-01-02

## 2018-01-02 DIAGNOSIS — S82.031D CLOSED DISPLACED TRANSVERSE FRACTURE OF RIGHT PATELLA WITH ROUTINE HEALING: Primary | ICD-10-CM

## 2018-01-02 DIAGNOSIS — S92.252D CLOSED DISPLACED FRACTURE OF NAVICULAR BONE OF LEFT FOOT WITH ROUTINE HEALING, SUBSEQUENT ENCOUNTER: ICD-10-CM

## 2018-01-02 PROCEDURE — 97110 THERAPEUTIC EXERCISES: CPT

## 2018-01-02 NOTE — THERAPY TREATMENT NOTE
Outpatient Physical Therapy Ortho Treatment Note  [facility]     Patient Name: Vangie Lopez  : 1950  MRN: [id number]  Today's Date: 2018      Visit Date: 2018    Subjective Improvement some better  Visits 4/4  Visits approved 14 until 2018  RDMD 1/15/2018  Recert 2018    Closed displaced fx of right patalla and navicular Left   Visit Dx:    ICD-10-CM ICD-9-CM   1. Closed displaced transverse fracture of right patella with routine healing S82.031D V54.16   2. Closed displaced fracture of navicular bone of left foot with routine healing, subsequent encounter S92.252D V54.19       Patient Active Problem List   Diagnosis   • Encounter for screening for malignant neoplasm of colon   • Closed displaced comminuted fracture of right patella   • Closed displaced fracture of navicular bone of left foot   • Closed dislocation of navicular bone of left foot   • Patella fracture   • Essential hypertension   • Mixed hyperlipidemia   • Osteoarthritis of hands, bilateral   • BPH (benign prostatic hyperplasia)   • Glaucoma   • Hyperlipidemia   • Closed fracture of navicular bone with dislocation of perilunate joint of left wrist   • Fracture of patella, right, closed   • Multiple trauma   • Encounter for rehabilitation   • History of kidney stones   • Recent onset of diabetes mellitus        Past Medical History:   Diagnosis Date   • BPH (benign prostatic hyperplasia)    • Cancer     skin cancer   • Closed fracture of navicular bone with dislocation of perilunate joint of left wrist 2017    closed reduction pinning  17   • Essential hypertension 2017   • Fracture of patella, right, closed 2017    orif 17   • Glaucoma     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   • Hyperlipidemia    • Multiple trauma 2017   • Osteoarthritis of hands, bilateral     retired due to arthritis,    • Recent onset of diabetes mellitus         Past Surgical History:   Procedure Laterality Date   •  COLONOSCOPY  12/08/2006   • COLONOSCOPY N/A 4/3/2017    Procedure: COLONOSCOPY;  Surgeon: Michael Ang MD;  Location: [facility] ENDOSCOPY;  Service:    • CYSTOSCOPY     • EXTERNAL FIXATION ANKLE FRACTURE Left 9/6/2017    Procedure: ANKLE EXTERNAL FIXATOR APPLICATION;  Surgeon: Simone Rivera DPM;  Location: [facility] OR;  Service:    • FOOT SURGERY     • KNEE SURGERY     • PATELLA OPEN REDUCTION INTERNAL FIXATION Right 9/8/2017    Procedure: OPEN REDUCTION INTERNAL FIXATION RIGHT PATELLA       (C-ARM#3);  Surgeon: Checo Rutherford MD;  Location: [facility] OR;  Service:    • SCAPHOID FRACTURE SURGERY Left 09/07/2017    fracture dislocation left foot, closed reduction with pinning   • SHOULDER SURGERY               PT Ortho       01/02/18 0900    MMT (Manual Muscle Testing)    General MMT Assessment Detail no quad lag with SLR bilateral  -CP      01/02/18 0800    Right Knee    Extension/Flexion AROM Deficit   -CP    Left Ankle    Dorsiflexion AROM Deficit 4  -CP      User Key  (r) = Recorded By, (t) = Taken By, (c) = Cosigned By    Initials Name Provider Type    CP Sagrario Jacobsen PTA Physical Therapy Assistant                            PT Assessment/Plan       01/02/18 0946       PT Assessment    Assessment Comments very good tolerance to new ther ex.  -CP     PT Plan    PT Frequency 2x/week  -CP     Predicted Duration of Therapy Intervention (days/wks) 4 weeks  -CP     PT Plan Comments cont with poc progressing as tolerated.  recheck scheduled for 1-  -CP       User Key  (r) = Recorded By, (t) = Taken By, (c) = Cosigned By    Initials Name Provider Type    CP Sagrario Jacobsen PTA Physical Therapy Assistant                    Exercises       01/02/18 0800          Subjective Comments    Subjective Comments States he was busy this past weekend.  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  Today reports no major pain  Main c/o is stifness  -CP      Subjective Pain    Able to rate subjective pain? yes  -CP      Pre-Treatment Pain  "Level 0  -CP      Post-Treatment Pain Level 0  -CP      Subjective Pain Comment Taking pain pills as needed.  Maybe one or two a week  -CP      Aquatics    Aquatics performed? No  -CP      Exercise 1    Exercise Name 1 Pro II level 2  -CP      Time (Minutes) 1 10  -CP      Exercise 2    Exercise Name 2 Incline Stretch  -CP      Sets 2 3  -CP      Time (Seconds) 2 30  -CP      Exercise 3    Exercise Name 3 Standing HS stretch  -CP      Sets 3 3  -CP      Time (Seconds) 3 30  -CP      Additional Comments bilateral  -CP      Exercise 4    Exercise Name 4 lunge stretch  -CP      Sets 4 3  -CP      Time (Seconds) 4 30  -CP      Additional Comments bilateral  -CP      Exercise 5    Exercise Name 5 heel/toe raises  -CP      Sets 5 2  -CP      Reps 5 10  -CP      Exercise 6    Exercise Name 6 Step up 4\"  -CP      Sets 6 2  -CP      Reps 6 10  -CP      Time (Minutes) 6 bilateral  -CP      Exercise 7    Exercise Name 7 standing hip AB  -CP      Reps 7 10  -CP      Time (Minutes) 7 bilateral  -CP      Exercise 8    Exercise Name 8 Heelslides R LE  -CP      Reps 8 30  -CP      Exercise 9    Exercise Name 9 DF stretch with strap  -CP      Sets 9 3  -CP      Time (Seconds) 9 30  -CP      Exercise 10    Exercise Name 10 L ankle tband 4 way  -CP      Sets 10 2  -CP      Reps 10 10  -CP      Time (Minutes) 10  red  -CP      Exercise 11    Exercise Name 11 SLR  -CP      Sets 11 3  -CP      Reps 11 10  -CP      Time (Minutes) 11 bilateral  -CP      Exercise 12    Exercise Name 12 solous stretch with strap  -CP      Sets 12 3  -CP      Time (Seconds) 12 30  -CP        User Key  (r) = Recorded By, (t) = Taken By, (c) = Cosigned By    Initials Name Provider Type    CP Sagrario Jacobsen PTA Physical Therapy Assistant                               PT OP Goals       01/02/18 0900       PT Short Term Goals    STG Date to Achieve 01/11/18  -CP     STG 1 Note a >/= 50% subjective improvement  -CP     STG 1 Progress Not Met  -CP     STG 2 LEFS " score to be >/= 40/80  -CP     STG 2 Progress Not Met  -CP     STG 3 Increase R knee active extension by >/= 5 degrees.   -CP     STG 3 Progress Progressing  -CP     STG 4 Increase R knee active flexion to >/= 100 degrees  -CP     STG 4 Progress Met  -CP     STG 5 Increase B ankle DF to neutral or better  -CP     STG 5 Progress Partially Met  -CP     Long Term Goals    LTG Date to Achieve 02/01/18  -CP     LTG 1 Independent with HEP  -CP     LTG 1 Progress Ongoing  -CP     LTG 2 LEFS score to be >/= 55/80  -CP     LTG 2 Progress Not Met  -CP     LTG 3 R knee AROM WNLs  -CP     LTG 3 Progress Progressing  -CP     LTG 4 L ankle AROM WFLs  -CP     LTG 4 Progress Progressing  -CP     LTG 5 Demonstrate ability to ascend and descend 5 steps reciprocally  -CP     LTG 5 Progress Not Met  -CP     Time Calculation    PT Goal Re-Cert Due Date 01/11/18  -CP       User Key  (r) = Recorded By, (t) = Taken By, (c) = Cosigned By    Initials Name Provider Type    CP Sagrario Jacobsen PTA Physical Therapy Assistant          Therapy Education  Education Details: tband ankle 4 way with red tband, soleus stretch with towel  Given: HEP  Program: New  How Provided: Verbal, Demonstration, Written  Provided to: Patient  Level of Understanding: Verbalized, Demonstrated              Time Calculation:   Start Time: 0800  Stop Time: 0910  Time Calculation (min): 70 min  Total Timed Code Minutes- PT: 70 minute(s)    Therapy Charges for Today     Code Description Service Date Service Provider Modifiers Qty    86277929859 HC PT THER PROC EA 15 MIN 1/2/2018 Sagrario Jacobsen PTA GP 5                    Sagrario Jacobsen PTA  1/2/2018

## 2018-01-04 ENCOUNTER — HOSPITAL ENCOUNTER (OUTPATIENT)
Dept: PHYSICAL THERAPY | Facility: HOSPITAL | Age: 68
Setting detail: THERAPIES SERIES
Discharge: HOME | End: 2018-01-04

## 2018-01-04 DIAGNOSIS — S92.252D CLOSED DISPLACED FRACTURE OF NAVICULAR BONE OF LEFT FOOT WITH ROUTINE HEALING, SUBSEQUENT ENCOUNTER: ICD-10-CM

## 2018-01-04 DIAGNOSIS — S82.031D CLOSED DISPLACED TRANSVERSE FRACTURE OF RIGHT PATELLA WITH ROUTINE HEALING: Primary | ICD-10-CM

## 2018-01-04 PROCEDURE — 97110 THERAPEUTIC EXERCISES: CPT

## 2018-01-04 NOTE — THERAPY TREATMENT NOTE
Outpatient Physical Therapy Ortho Treatment Note  [facility]     Patient Name: Vangie Lopez  : 1950  MRN: [id number]  Today's Date: 2018      Visit Date: 2018    Subjective Improvement 60%  Visits 5/5  Visits [id number] from 2018  To 2018  RTMD 1-  Recert 2018    Closed displaced Fx of right patella and Left navicular on 2018    Visit Dx:    ICD-10-CM ICD-9-CM   1. Closed displaced transverse fracture of right patella with routine healing S82.031D V54.16   2. Closed displaced fracture of navicular bone of left foot with routine healing, subsequent encounter S92.252D V54.19       Patient Active Problem List   Diagnosis   • Encounter for screening for malignant neoplasm of colon   • Closed displaced comminuted fracture of right patella   • Closed displaced fracture of navicular bone of left foot   • Closed dislocation of navicular bone of left foot   • Patella fracture   • Essential hypertension   • Mixed hyperlipidemia   • Osteoarthritis of hands, bilateral   • BPH (benign prostatic hyperplasia)   • Glaucoma   • Hyperlipidemia   • Closed fracture of navicular bone with dislocation of perilunate joint of left wrist   • Fracture of patella, right, closed   • Multiple trauma   • Encounter for rehabilitation   • History of kidney stones   • Recent onset of diabetes mellitus        Past Medical History:   Diagnosis Date   • BPH (benign prostatic hyperplasia)    • Cancer     skin cancer   • Closed fracture of navicular bone with dislocation of perilunate joint of left wrist 2017    closed reduction pinning  17   • Essential hypertension 2017   • Fracture of patella, right, closed 2017    orif 17   • Glaucoma     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   • Hyperlipidemia    • Multiple trauma 2017   • Osteoarthritis of hands, bilateral     retired due to arthritis,    • Recent onset of diabetes mellitus         Past Surgical History:   Procedure  Laterality Date   • COLONOSCOPY  12/08/2006   • COLONOSCOPY N/A 4/3/2017    Procedure: COLONOSCOPY;  Surgeon: Michael Ang MD;  Location: [facility] ENDOSCOPY;  Service:    • CYSTOSCOPY     • EXTERNAL FIXATION ANKLE FRACTURE Left 9/6/2017    Procedure: ANKLE EXTERNAL FIXATOR APPLICATION;  Surgeon: Simone Rivera DPM;  Location: [facility] OR;  Service:    • FOOT SURGERY     • KNEE SURGERY     • PATELLA OPEN REDUCTION INTERNAL FIXATION Right 9/8/2017    Procedure: OPEN REDUCTION INTERNAL FIXATION RIGHT PATELLA       (C-ARM#3);  Surgeon: Checo Rutherford MD;  Location: [facility] OR;  Service:    • SCAPHOID FRACTURE SURGERY Left 09/07/2017    fracture dislocation left foot, closed reduction with pinning   • SHOULDER SURGERY               PT Ortho       01/04/18 0900    Right Knee    Extension/Flexion AROM Deficit AROM 0-107  -CP      01/04/18 0800    Subjective Comments    Subjective Comments Patients states that he may have done too much the other day.  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 yes  -CP    Pre-Treatment Pain Level 5  -CP    Subjective Pain Comment 5/10 foot and the right knee is mainly stiff  -CP    Posture/Observations    Posture/Observations Comments antalgic gait  -CP      01/02/18 0900    MMT (Manual Muscle Testing)    General MMT Assessment Detail no quad lag with SLR bilateral  -CP      01/02/18 0800    Right Knee    Extension/Flexion AROM Deficit   -CP    Left Ankle    Dorsiflexion AROM Deficit 4  -CP      User Key  (r) = Recorded By, (t) = Taken By, (c) = Cosigned By    Initials Name Provider Type    CP Sagrairo Jacobsen PTA Physical Therapy Assistant                            PT Assessment/Plan       01/04/18 0940       PT Assessment    Assessment Comments Increase AROM this date.  Patient did have some increase pain in left ankle with side stepping on foam  -CP     PT Plan    PT Frequency 2x/week  -CP     Predicted Duration of Therapy  "Intervention (days/wks) 4 weeks  -CP     PT Plan Comments Cont with poc.  mutli hip bilateral.  lat step up 4\"  -CP       User Key  (r) = Recorded By, (t) = Taken By, (c) = Cosigned By    Initials Name Provider Type    CP Sagrario Jacobsen PTA Physical Therapy Assistant                    Exercises       01/04/18 0800          Subjective Comments    Subjective Comments Patients states that he may have done too much the other day.  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 yes  -CP      Pre-Treatment Pain Level 5  -CP      Subjective Pain Comment 5/10 foot and the right knee is mainly stiff  -CP      Aquatics    Aquatics performed? No  -CP      Exercise 1    Exercise Name 1 Pro II level 3  -CP      Time (Minutes) 1 10  -CP      Exercise 2    Exercise Name 2 Incline stretch  -CP      Sets 2 3  -CP      Time (Seconds) 2 30  -CP      Exercise 3    Exercise Name 3 Standing HS stetch  -CP      Sets 3 3  -CP      Time (Seconds) 3 30  -CP      Additional Comments bilateral   -CP      Exercise 4    Exercise Name 4 Soleus stretch  -CP      Sets 4 3  -CP      Time (Seconds) 4 30  -CP      Exercise 5    Exercise Name 5 Lunge stretch  -CP      Sets 5 3  -CP      Time (Seconds) 5 30  -CP      Additional Comments bilateral  -CP      Exercise 6    Exercise Name 6 CR/TR on airex  -CP      Sets 6 2  -CP      Reps 6 10  -CP      Exercise 7    Exercise Name 7 mini squats   -CP      Sets 7 2  -CP      Reps 7 10  -CP      Exercise 8    Exercise Name 8 step up 6\"  -CP      Sets 8 2  -CP      Reps 8 10  -CP      Time (Minutes) 8 bilateral  -CP      Exercise 9    Exercise Name 9 side stepping on foam  -CP      Reps 9 5  -CP      Exercise 10    Exercise Name 10 up and down ramp in rehab gym  -CP      Reps 10 5  -CP      Exercise 11    Exercise Name 11 seated LLPS R Knee fl stretch  -CP      Sets 11 3  -CP      Time (Seconds) 11 30  -CP      Exercise 12    Exercise Name 12 Left DF stretch with " strap  -CP      Sets 12 3  -CP      Time (Seconds) 12 30  -CP      Exercise 13    Exercise Name 13 Soleus stretch with strap  -CP      Sets 13 3  -CP      Time (Seconds) 13 30  -CP        User Key  (r) = Recorded By, (t) = Taken By, (c) = Cosigned By    Initials Name Provider Type    CP Sagrario Jacobsen PTA Physical Therapy Assistant                               PT OP Goals       01/04/18 0900       PT Short Term Goals    STG Date to Achieve 01/11/18  -CP     STG 1 Note a >/= 50% subjective improvement  -CP     STG 1 Progress Met  -CP     STG 2 LEFS score to be >/= 40/80  -CP     STG 2 Progress Not Met  -CP     STG 3 Increase R knee active extension by >/= 5 degrees.   -CP     STG 3 Progress Met  -CP     STG 4 Increase R knee active flexion to >/= 100 degrees  -CP     STG 4 Progress Met  -CP     STG 5 Increase B ankle DF to neutral or better  -CP     STG 5 Progress Met  -CP     Long Term Goals    LTG Date to Achieve 02/01/18  -CP     LTG 1 Independent with HEP  -CP     LTG 1 Progress Ongoing  -CP     LTG 2 LEFS score to be >/= 55/80  -CP     LTG 2 Progress Not Met  -CP     LTG 3 R knee AROM WNLs  -CP     LTG 3 Progress Progressing  -CP     LTG 4 L ankle AROM WFLs  -CP     LTG 4 Progress Progressing  -CP     LTG 5 Demonstrate ability to ascend and descend 5 steps reciprocally  -CP     LTG 5 Progress Not Met  -CP     Time Calculation    PT Goal Re-Cert Due Date 01/11/18  -CP       User Key  (r) = Recorded By, (t) = Taken By, (c) = Cosigned By    Initials Name Provider Type    CP Sagrario Jacobsen PTA Physical Therapy Assistant          Therapy Education  Given: HEP  Program: Reinforced  How Provided: Verbal  Provided to: Patient  Level of Understanding: Verbalized              Time Calculation:   Start Time: 0840  Stop Time: 0955  Time Calculation (min): 75 min  Total Timed Code Minutes- PT: 60 minute(s)    Therapy Charges for Today     Code Description Service Date Service Provider Modifiers Qty    [id number]   PT THER PROC EA 15 MIN 1/4/2018 Sagrario Jacobsen PTA GP 4    [id number]  PT THER SUPP EA 15 MIN 1/4/2018 Sagrario Jacobsen PTA GP 1                    Sagrario Jacobsen PTA  1/4/2018

## 2018-01-08 ENCOUNTER — HOSPITAL ENCOUNTER (OUTPATIENT)
Dept: PHYSICAL THERAPY | Facility: HOSPITAL | Age: 68
Setting detail: THERAPIES SERIES
Discharge: HOME | End: 2018-01-08

## 2018-01-08 DIAGNOSIS — S82.031D CLOSED DISPLACED TRANSVERSE FRACTURE OF RIGHT PATELLA WITH ROUTINE HEALING: Primary | ICD-10-CM

## 2018-01-08 DIAGNOSIS — S92.252D CLOSED DISPLACED FRACTURE OF NAVICULAR BONE OF LEFT FOOT WITH ROUTINE HEALING, SUBSEQUENT ENCOUNTER: ICD-10-CM

## 2018-01-08 PROCEDURE — 97110 THERAPEUTIC EXERCISES: CPT

## 2018-01-08 NOTE — THERAPY TREATMENT NOTE
Outpatient Physical Therapy Ortho Treatment Note  [facility]     Patient Name: Vangie Lopez  : 1950  MRN: [id number]  Today's Date: 2018      Visit Date: 2018     Subjective Improvement 60%  Visits 6/6  Visits approved 14 from 2018 to 2018  RTMD 1-  Recert 2018    Closed displaced FX of right patella and left navicular on 2018    Visit Dx:    ICD-10-CM ICD-9-CM   1. Closed displaced transverse fracture of right patella with routine healing S82.031D V54.16   2. Closed displaced fracture of navicular bone of left foot with routine healing, subsequent encounter S92.252D V54.19       Patient Active Problem List   Diagnosis   • Encounter for screening for malignant neoplasm of colon   • Closed displaced comminuted fracture of right patella   • Closed displaced fracture of navicular bone of left foot   • Closed dislocation of navicular bone of left foot   • Patella fracture   • Essential hypertension   • Mixed hyperlipidemia   • Osteoarthritis of hands, bilateral   • BPH (benign prostatic hyperplasia)   • Glaucoma   • Hyperlipidemia   • Closed fracture of navicular bone with dislocation of perilunate joint of left wrist   • Fracture of patella, right, closed   • Multiple trauma   • Encounter for rehabilitation   • History of kidney stones   • Recent onset of diabetes mellitus        Past Medical History:   Diagnosis Date   • BPH (benign prostatic hyperplasia)    • Cancer     skin cancer   • Closed fracture of navicular bone with dislocation of perilunate joint of left wrist 2017    closed reduction pinning  17   • Essential hypertension 2017   • Fracture of patella, right, closed 2017    orif 17   • Glaucoma     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   • Hyperlipidemia    • Multiple trauma 2017   • Osteoarthritis of hands, bilateral     retired due to arthritis,    • Recent onset of diabetes mellitus         Past Surgical History:   Procedure  Laterality Date   • COLONOSCOPY  12/08/2006   • COLONOSCOPY N/A 4/3/2017    Procedure: COLONOSCOPY;  Surgeon: Michael Ang MD;  Location: [facility] ENDOSCOPY;  Service:    • CYSTOSCOPY     • EXTERNAL FIXATION ANKLE FRACTURE Left 9/6/2017    Procedure: ANKLE EXTERNAL FIXATOR APPLICATION;  Surgeon: Simone Rivera DPM;  Location: [facility] OR;  Service:    • FOOT SURGERY     • KNEE SURGERY     • PATELLA OPEN REDUCTION INTERNAL FIXATION Right 9/8/2017    Procedure: OPEN REDUCTION INTERNAL FIXATION RIGHT PATELLA       (C-ARM#3);  Surgeon: Checo Rutherford MD;  Location: [facility] OR;  Service:    • SCAPHOID FRACTURE SURGERY Left 09/07/2017    fracture dislocation left foot, closed reduction with pinning   • SHOULDER SURGERY               PT Ortho       01/08/18 0800    Right Knee    Extension/Flexion AROM Deficit AROM 0-109  -CP    Right Ankle    Dorsiflexion AROM Deficit AROM 5  -CP    Gait Assessment/Treatment    Gait, [place]upe Level independent  -CP    Gait, Gait Deviations antalgic  -CP      User Key  (r) = Recorded By, (t) = Taken By, (c) = Cosigned By    Initials Name Provider Type    CP Sagrario Jaocbsen PTA Physical Therapy Assistant                            PT Assessment/Plan       01/08/18 0856       PT Assessment    Assessment Comments Increasing AROM in both knee and ankle.    -CP     PT Plan    PT Frequency 2x/week  -CP     Predicted Duration of Therapy Intervention (days/wks) 4 weeks  -CP     PT Plan Comments Recheck next visit.  Cybex hip AB/AD and possible cybex leg press  -CP       User Key  (r) = Recorded By, (t) = Taken By, (c) = Cosigned By    Initials Name Provider Type    CP Sagrario Jacobsen PTA Physical Therapy Assistant                    Exercises       01/08/18 0800          Subjective Comments    Subjective Comments Main c/o is stiff  -CP      Subjective Pain    Able to rate subjective pain? yes  -CP      Pre-Treatment Pain Level 0  -CP      Post-Treatment Pain Level 0  -CP      Subjective Pain  "Comment stiff  -CP      Aquatics    Aquatics performed? No  -CP      Exercise 1    Exercise Name 1 Pro II level 3  -CP      Time (Minutes) 1 10  -CP      Exercise 2    Exercise Name 2 Incline stretch  -CP      Sets 2 3  -CP      Time (Seconds) 2 30  -CP      Exercise 3    Exercise Name 3 Standing HS Stretch  -CP      Sets 3 3  -CP      Time (Seconds) 3 30  -CP      Exercise 4    Exercise Name 4 Soleus stretch  -CP      Sets 4 3  -CP      Time (Seconds) 4 30  -CP      Exercise 5    Exercise Name 5 Lunge stretch  -CP      Sets 5 3  -CP      Time (Seconds) 5 30  -CP      Exercise 6    Exercise Name 6 CR/TR on airex  -CP      Sets 6 2  -CP      Reps 6 10  -CP      Exercise 7    Exercise Name 7 step up 6\"  -CP      Sets 7 2  -CP      Reps 7 10  -CP      Additional Comments bilateral  -CP      Exercise 8    Exercise Name 8 mini squat on airex  -CP      Sets 8 2  -CP      Reps 8 10  -CP      Exercise 9    Exercise Name 9 Lat step up 4\"  -CP      Sets 9 2  -CP      Reps 9 10  -CP      Time (Minutes) 9 bilateral  -CP      Exercise 10    Exercise Name 10 up and down ramp in Rehab gym  -CP      Reps 10 5  -CP      Time (Minutes) 10 forward  -CP      Exercise 11    Exercise Name 11 up and down ramp in rehab gym  -CP      Reps 11 5  -CP      Time (Minutes) 11 retro  -CP      Exercise 12    Exercise Name 12 resisted walks forward and backward on CC  -CP      Reps 12 5  -CP      Time (Minutes) 12 3 plates  -CP      Exercise 13    Exercise Name 13 LLPS seated knee fl stretch  -CP      Sets 13 5  -CP      Time (Seconds) 13 30  -CP      Additional Comments Right Knee  -CP      Exercise 14    Exercise Name 14 heel slides with strap  -CP      Reps 14 20  -CP      Exercise 15    Exercise Name 15 DF stretch with strap  -CP      Reps 15 20  -CP      Exercise 16    Exercise Name 16 soleus stretch with strap  -CP      Reps 16 20  -CP        User Key  (r) = Recorded By, (t) = Taken By, (c) = Cosigned By    Initials Name Provider Type    CP " Sagrario Jacobsen PTA Physical Therapy Assistant                               PT OP Goals       01/08/18 0900       PT Short Term Goals    STG Date to Achieve 01/11/18  -CP     STG 1 Note a >/= 50% subjective improvement  -CP     STG 1 Progress Met  -CP     STG 2 LEFS score to be >/= 40/80  -CP     STG 2 Progress Not Met  -CP     STG 3 Increase R knee active extension by >/= 5 degrees.   -CP     STG 3 Progress Met  -CP     STG 4 Increase R knee active flexion to >/= 100 degrees  -CP     STG 4 Progress Met  -CP     STG 5 Increase B ankle DF to neutral or better  -CP     STG 5 Progress Met  -CP     Long Term Goals    LTG Date to Achieve 02/01/18  -CP     LTG 1 Independent with HEP  -CP     LTG 1 Progress Ongoing  -CP     LTG 2 LEFS score to be >/= 55/80  -CP     LTG 2 Progress Not Met  -CP     LTG 3 R knee AROM WNLs  -CP     LTG 3 Progress Progressing  -CP     LTG 4 L ankle AROM WFLs  -CP     LTG 4 Progress Progressing  -CP     LTG 5 Demonstrate ability to ascend and descend 5 steps reciprocally  -CP     LTG 5 Progress Not Met  -CP     Time Calculation    PT Goal Re-Cert Due Date 01/11/18  -CP       User Key  (r) = Recorded By, (t) = Taken By, (c) = Cosigned By    Initials Name Provider Type    CP Sagrario Jacobsen PTA Physical Therapy Assistant          Therapy Education  Given: HEP  Program: Reinforced  How Provided: Verbal, Demonstration  Provided to: Patient  Level of Understanding: Verbalized, Demonstrated              Time Calculation:   Start Time: 0800  Stop Time: 0900  Time Calculation (min): 60 min  Total Timed Code Minutes- PT: 60 minute(s)    Therapy Charges for Today     Code Description Service Date Service Provider Modifiers Qty    [id number] HC PT THER PROC EA 15 MIN 1/8/2018 Sagrario Jacobsen PTA GP 4                    Sagrario Jacobsen PTA  1/8/2018

## 2018-01-10 ENCOUNTER — APPOINTMENT (OUTPATIENT)
Dept: PHYSICAL THERAPY | Facility: HOSPITAL | Age: 68
End: 2018-01-10

## 2018-01-11 ENCOUNTER — HOSPITAL ENCOUNTER (OUTPATIENT)
Dept: PHYSICAL THERAPY | Facility: HOSPITAL | Age: 68
Setting detail: THERAPIES SERIES
Discharge: HOME | End: 2018-01-11

## 2018-01-11 DIAGNOSIS — S82.031D CLOSED DISPLACED TRANSVERSE FRACTURE OF RIGHT PATELLA WITH ROUTINE HEALING: Primary | ICD-10-CM

## 2018-01-11 DIAGNOSIS — S92.252D CLOSED DISPLACED FRACTURE OF NAVICULAR BONE OF LEFT FOOT WITH ROUTINE HEALING, SUBSEQUENT ENCOUNTER: ICD-10-CM

## 2018-01-11 PROCEDURE — 97110 THERAPEUTIC EXERCISES: CPT | Performed by: PHYSICAL THERAPIST

## 2018-01-11 NOTE — THERAPY PROGRESS REPORT/RE-CERT
Outpatient Physical Therapy Ortho Progress Note  [facility]     Patient Name: Vangie Lopez  : 1950  MRN: [id number]  Today's Date: 2018      Visit Date: 2018  Attendance:  (14 visits approved through 18)  Subjective Improvement: n/a  Next MD Appt: 1/15/18 - Dr. 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 ENDOSCOPY;  Service:    • CYSTOSCOPY     • EXTERNAL FIXATION ANKLE FRACTURE Left 2017    Procedure: ANKLE EXTERNAL FIXATOR APPLICATION;  Surgeon: Simone Rivera DPM;  Location: [facility] OR;  Service:    • FOOT SURGERY     • KNEE SURGERY     • PATELLA OPEN REDUCTION INTERNAL FIXATION Right 2017    Procedure: OPEN REDUCTION INTERNAL FIXATION RIGHT PATELLA       (C-ARM#3);  Surgeon: Checo Rutherford MD;  Location: [facility] OR;  Service:    • SCAPHOID FRACTURE SURGERY Left 2017    fracture dislocation left foot, closed reduction with pinning   • SHOULDER SURGERY         Visit Dx:     ICD-10-CM ICD-9-CM   1. Closed displaced transverse fracture of right patella with routine healing S82.031D V54.16    2. Closed displaced fracture of navicular bone of left foot with routine healing, subsequent encounter S92.252D V54.19     Changes in Medications: none noted  Changes in MD Orders: none noted  Number of Work Days Lost: n/a              PT Ortho       01/11/18 1000    Precautions and Contraindications    Precautions/Limitations no known precautions/limitations  -SS    Precautions none  -SS    Contraindications none  -SS    Subjective Pain    Post-Treatment Pain Level 0  -SS    Subjective Pain Comment patient declines modalities post-treatment  -SS    Posture/Observations    Posture/Observations Comments antalgic gait from both LEs sand back  -SS    Right Knee    Extension/Flexion AROM Deficit 0-100  -SS    Left Ankle    Dorsiflexion AROM Deficit 0  -SS    Plantarflexion AROM Deficit 45  -SS    Inversion AROM Deficit 25  -SS    Eversion AROM Deficit 15  -SS    Right Ankle    Dorsiflexion AROM Deficit 0  -SS    Plantarflexion AROM Deficit 50  -SS    Left Hip    Hip Flexion Gross Movement (5/5) normal  -SS    Hip Extension Gross Movement (5/5) normal  -SS    Hip ABduction Gross Movement (5/5) normal  -SS    Hip ADduction Gross Movement (5/5) normal  -SS    Hip Int (Medial) Rotation Gross Movement (5/5) normal  -SS    Hip Ext (Lat) Rotation Gross Movement (5/5) normal  -SS    Right Hip    Hip Flexion Gross Movement (5/5) normal  -SS    Hip Extension Gross Movement (5/5) normal  -SS    Hip ABduction Gross Movement (5/5) normal  -SS    Hip ADduction Gross Movement (5/5) normal  -SS    Hip Int (Medial) Rotation Gross Movement (5/5) normal  -SS    Hip Ext (Lat) Rotation Gross Movement (5/5) normal  -SS    Lower Extremity    Lower Ext Manual Muscle Testing Detail Rectus femoris: B 4+/5  -SS    Left Knee    Knee Extension Gross Movement (5/5) normal  -SS    Knee Flexion Gross Movement (5/5) normal  -SS    Right Knee    Knee Extension Gross Movement (5/5) normal  -SS    Knee Flexion Gross Movement (5/5) normal  -SS    Left  Ankle/Foot    Ankle PF Gross Movement (5/5) normal  -SS    Ankle Dorsiflexion Gross Movement (5/5) normal  -SS    Subtalar Inversion Gross Movement (4+/5) good plus  -SS    Subtalar Eversion Gross Movement (5/5) normal  -SS    Right Ankle/Foot    Ankle PF Gross Movement (5/5) normal  -SS    Ankle Dorsiflexion Gross Movement (5/5) normal  -SS    Subtalar Inversion Gross Movement (5/5) normal  -SS    Subtalar Eversion Gross Movement (5/5) normal  -SS      User Key  (r) = Recorded By, (t) = Taken By, (c) = Cosigned By    Initials Name Provider Type    SS Jarod Hernandez, PT DPT Physical Therapist                      Therapy Education  Given: HEP  Program: Reinforced  How Provided: Verbal  Provided to: Patient  Level of Understanding: Verbalized           PT OP Goals       01/11/18 1000       PT Short Term Goals    STG Date to Achieve 01/11/18   further STGs deferred  -     STG 1 Note a >/= 50% subjective improvement  -     STG 1 Progress Met  -     STG 2 LEFS score to be >/= 40/80  -     STG 2 Progress Not Met  -     STG 3 Increase R knee active extension by >/= 5 degrees.   -     STG 3 Progress Met  -     STG 4 Increase R knee active flexion to >/= 100 degrees  -     STG 4 Progress Met  -     STG 5 Increase B ankle DF to neutral or better  -     STG 5 Progress Met  -     Long Term Goals    LTG Date to Achieve 02/01/18  -     LTG 1 Independent with HEP  -     LTG 1 Progress Ongoing  -     LTG 2 LEFS score to be >/= 55/80  -     LTG 2 Progress Not Met;Ongoing  -     LTG 3 R knee AROM WNLs  -     LTG 3 Progress Not Met;Progressing;Ongoing  -     LTG 4 L ankle AROM WFLs  -     LTG 4 Progress Not Met;Progressing;Ongoing  -     LTG 5 Demonstrate ability to ascend and descend 5 steps reciprocally  -     LTG 5 Progress Not Met;Ongoing  -     Time Calculation    PT Goal Re-Cert Due Date 02/01/18  -       User Key  (r) = Recorded By, (t) = Taken By, (c) = Cosigned By     "Initials Name Provider Type    SS Jarod Hernandez, PT DPT Physical Therapist                PT Assessment/Plan       01/11/18 1000       PT Assessment    Functional Limitations Impaired gait;Limitations in community activities;Performance in leisure activities  -     Impairments Balance;Endurance;Gait;Pain;Range of motion;Muscle strength  -     Assessment Comments Improving ROM, function, and strength. Would benefit from additional P.T. to increase strength and R knee flexion.  -     Rehab Potential Good  -SS     Patient/caregiver participated in establishment of treatment plan and goals Yes  -SS     Patient would benefit from skilled therapy intervention Yes  -SS     PT Plan    PT Frequency 2x/week  -SS     Predicted Duration of Therapy Intervention (days/wks) 3 weeks  -     PT Plan Comments ROM, stretching, LE strengthening, stair training, heat or ice as needed for pain  -       User Key  (r) = Recorded By, (t) = Taken By, (c) = Cosigned By    Initials Name Provider Type    SS Jarod Hernandez, PT DPT Physical Therapist                  Exercises       01/11/18 1000          Subjective Comments    Subjective Comments Sore from working in the yard yesterday. He had to haul in some firewood and his lawnmower got stuck in the mud. He is having some back pain that is pre-existing and well as foot pain. Patient reports that his left foot will twitch really hard 1-2x every night and then it will be painful. This usually occurs right after he lies down in bed. 60% subjective improvement. Able to bend knee. Can almost get up without having to push up through the arms. \"I can tell my stability is a bit better when I'm walking.\"  -      Subjective Pain    Able to rate subjective pain? yes  -SS      Pre-Treatment Pain Level 5  -SS      Post-Treatment Pain Level 0  -SS      Subjective Pain Comment patient declines modalities post-treatment  -      Exercise 1    Exercise Name 1 Pro2, Seat 11, LE  -SS  "     Cueing 1 Verbal  -SS      Time (Minutes) 1 10 mins  -SS      Additional Comments Level 3  -SS      Exercise 2    Exercise Name 2 Incline calf stretch  -SS      Cueing 2 Verbal  -SS      Sets 2 3  -SS      Time (Seconds) 2 30 sec hold  -SS      Exercise 3    Exercise Name 3 Standing HS stretch  -SS      Cueing 3 Verbal  -SS      Sets 3 3  -SS      Time (Seconds) 3 30 sec hold  -SS      Additional Comments bilateral  -SS      Exercise 4    Exercise Name 4 Cybex Hip Abduction  -SS      Cueing 4 Verbal  -SS      Sets 4 2  -SS      Reps 4 10  -SS      Additional Comments 30#  -SS      Exercise 5    Exercise Name 5 Cybex Hip Adduction  -SS      Cueing 5 Verbal  -SS      Sets 5 2  -SS      Reps 5 10  -SS      Additional Comments 35#  -SS      Exercise 6    Exercise Name 6 Cybex Leg Press   Sled 4  -SS      Cueing 6 Verbal  -SS      Sets 6 2  -SS      Reps 6 10  -SS      Additional Comments 100#  -SS      Exercise 7    Exercise Name 7 Cybex Calf Raise on LP  -SS      Cueing 7 Verbal  -SS      Sets 7 2  -SS      Reps 7 10  -SS      Additional Comments 50#  -SS        User Key  (r) = Recorded By, (t) = Taken By, (c) = Cosigned By    Initials Name Provider Type    SS Jarod Hernandez, PT DPT Physical Therapist                        Outcome Measure Options: Lower Extremity Functional Scale (LEFS)  Lower Extremity Functional Index  Any of your usual work, housework or school activities: No difficulty  Your usual hobbies, recreational or sporting activities: Moderate difficulty  Getting into or out of the bath: A little bit of difficulty  Walking between rooms: No difficulty  Putting on your shoes or socks: A little bit of difficulty  Squatting: Moderate difficulty  Lifting an object, like a bag of groceries from the floor: Moderate difficulty  Performing light activities around your home: No difficulty  Performing heavy activities around your home: Moderate difficulty  Getting into or out of a car: A little bit of  difficulty  Walking 2 blocks: Moderate difficulty  Walking a mile: Extreme difficulty or unable to perform activity  Going up or down 10 stairs (about 1 flight of stairs): Extreme difficulty or unable to perform activity  Standing for 1 hour: Extreme difficulty or unable to perform activity  Sitting for 1 hour: No difficulty  Running on even ground: Extreme difficulty or unable to perform activity  Running on uneven ground: Extreme difficulty or unable to perform activity  Making sharp turns while running fast: Extreme difficulty or unable to perform activity  Hopping: Extreme difficulty or unable to perform activity  Rolling over in bed: No difficulty  Total: 39      Time Calculation:   Start Time: 1015  Stop Time: 1103  Time Calculation (min): 48 min     Therapy Charges for Today     Code Description Service Date Service Provider Modifiers Qty    70233199232 HC PT THER PROC EA 15 MIN 1/11/2018 Jarod Hernandez, PT DPT GP 3                   Jarod Hernandez, PT, DPT, CHT  1/11/2018

## 2018-01-15 ENCOUNTER — OFFICE VISIT (OUTPATIENT)
Dept: PODIATRY | Facility: CLINIC | Age: 68
End: 2018-01-15

## 2018-01-15 VITALS — WEIGHT: 184.08 LBS | OXYGEN SATURATION: 98 % | HEIGHT: 69 IN | HEART RATE: 88 BPM | BODY MASS INDEX: 27.27 KG/M2

## 2018-01-15 DIAGNOSIS — S92.252D CLOSED DISPLACED FRACTURE OF NAVICULAR BONE OF LEFT FOOT WITH ROUTINE HEALING, SUBSEQUENT ENCOUNTER: Primary | ICD-10-CM

## 2018-01-15 DIAGNOSIS — S91.302D UNSPECIFIED OPEN WOUND, LEFT FOOT, SUBSEQUENT ENCOUNTER: ICD-10-CM

## 2018-01-15 PROCEDURE — 99213 OFFICE O/P EST LOW 20 MIN: CPT | Performed by: PODIATRIST

## 2018-01-15 NOTE — PROGRESS NOTES
Vangie Lopez  1950  67 y.o. male     Patient presents today for recheck of his left foot.    Chief Complaint   Patient presents with   • Left Foot - Follow-up           History of Present Illness    Patient presents to clinic today for follow-up. He is doing very well today.  He is ambulating in regular shoe gear.  The wound to the top of his foot has completely healed.  He is no longer dressing it.  He does complain today of ingrowing toenail to his left great toe.  States that it causes him pain when pressure is applied.  He rates the pain as a 4 out of 10 at its worst.  Currently it is not bothering him.  He has no other pedal complaints.     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   • Hyperlipidemia    • Multiple trauma 09/06/2017   • Osteoarthritis of hands, bilateral     retired due to arthritis,    • Recent onset of diabetes mellitus          Past Surgical History:   Procedure Laterality Date   • COLONOSCOPY  12/08/2006   • COLONOSCOPY N/A 4/3/2017    Procedure: COLONOSCOPY;  Surgeon: Michael Ang MD;  Location: [facility] ENDOSCOPY;  Service:    • CYSTOSCOPY     • EXTERNAL FIXATION ANKLE FRACTURE Left 9/6/2017    Procedure: ANKLE EXTERNAL FIXATOR APPLICATION;  Surgeon: Simone Rivera DPM;  Location: [facility] OR;  Service:    • FOOT SURGERY     • KNEE SURGERY     • PATELLA OPEN REDUCTION INTERNAL FIXATION Right 9/8/2017    Procedure: OPEN REDUCTION INTERNAL FIXATION RIGHT PATELLA       (C-ARM#3);  Surgeon: Checo Rutherford MD;  Location: [facility] OR;  Service:    • SCAPHOID FRACTURE SURGERY Left 09/07/2017    fracture dislocation left foot, closed reduction with  pinning   • SHOULDER SURGERY           Family History   Problem Relation Age of Onset   • Alzheimer's disease Mother    • Heart attack Father          Social History     Social History   • Marital status:      Spouse name: N/A   • Number of children: N/A   • Years of education: N/A     Occupational History   •       retired due to arthritis in hands     Social History Main Topics   • Smoking status: Never Smoker   • Smokeless tobacco: Never Used   • Alcohol use Yes      Comment: social drinker/rarely uses alcohol   • Drug use: No   • Sexual activity: Defer     Other Topics Concern   • Not on file     Social History Narrative    Lives with wife, single story home, two steps to enter home, no ramp         Current Outpatient Prescriptions   Medication Sig Dispense Refill   • Blood Glucose Monitoring Suppl device Use as directed to monitor sugar 1 each 0   • Blood Glucose Monitoring Suppl device One lancet twice daily to check  Fingerstick  Sugar   E11.9 100 each 0   • ferrous sulfate 324 (65 Fe) MG tablet delayed-release EC tablet Take 1 tablet by mouth Daily With Breakfast. 30 tablet 0   • finasteride (PROSCAR) 5 MG tablet Take 5 mg by mouth Every Night.     • folic acid-vit B6-vit B12 (FOLBEE) 2.5-25-1 MG tablet tablet Take 1 tablet by mouth Daily. 30 tablet 0   • glucose blood test strip Check sugar twice a day 100 each 5   • hydrochlorothiazide (HYDRODIURIL) 25 MG tablet Take 12.5 mg by mouth Daily. Pt takes 1/2 (25 mg) tablet = 12.5 mg per dose.     • HYDROcodone-acetaminophen (NORCO) 7.5-325 MG per tablet Take 1 tablet by mouth Every 8 (Eight) Hours As Needed for Moderate Pain . 30 tablet 0   • metFORMIN ER (GLUCOPHAGE-XR) 500 MG 24 hr tablet Take 1 tablet by mouth 2 (Two) Times a Day With Meals. 60 tablet 1   • PHARMACY TO DOSE WARFARIN Continuous As Needed (twice weekly for 28 days). 1 each 0   • potassium citrate (UROCIT-K) 10 MEQ (1080 MG) CR tablet Take 10 mEq by mouth 2 (Two) Times a  "Day With Meals.     • pravastatin (PRAVACHOL) 40 MG tablet Take 20 mg by mouth Every Night. Pt takes 1/2 (40 mg) tablet = 20 mg per dose.     • sennosides-docusate sodium (SENOKOT-S) 8.6-50 MG tablet Take 1 tablet by mouth 2 (Two) Times a Day. 60 tablet 1   • sildenafil (VIAGRA) 100 MG tablet Take 100 mg by mouth Daily As Needed for erectile dysfunction.     • traZODone (DESYREL) 50 MG tablet Take 0.5 tablets by mouth Every Night. 30 tablet 0     No current facility-administered medications for this visit.          OBJECTIVE    Pulse 88  Ht 175.3 cm (69\")  Wt 83.5 kg (184 lb 1.4 oz)  SpO2 98%  BMI 27.18 kg/m2      Review of Systems   Constitutional: Negative for chills and fever.   Cardiovascular: Negative for chest pain.   Gastrointestinal: Negative for constipation, diarrhea, nausea and vomiting.          Constitutional: well developed, well nourished    HEENT: Normocephalic and atraumatic, normal hearing    Respiratory: Non labored respirations noted    Left lower extremity exam:    Dorsal left foot wound is healed.  No signs of infection. Muscle strength is 4/5. No pain on palpation. Left great toe nail is thickened and discolored and incurvated on the medial lateral border.  No signs of infection noted.    Psychiatric: A&O x 3 with normal mood and affect. NAD.         Procedures        ASSESSMENT AND PLAN    Vangie was seen today for follow-up.    Diagnoses and all orders for this visit:    Closed displaced fracture of navicular bone of left foot with routine healing, subsequent encounter  -     XR Foot 3+ View Left    Unspecified open wound, left foot, subsequent encounter    - Continue weightbearing in regular shoe gear  - continue PT to left lower extremity  - Brief discussion held patient regarding treatment of ingrowing toenails.  Will hold treatment this time.  - all questions were answered   - Return to clinic in 3 months             This document has been electronically signed by Simone Rivera DPM " on January 15, 2018 12:35 PM     1/15/2018  12:35 PM

## 2018-01-16 ENCOUNTER — APPOINTMENT (OUTPATIENT)
Dept: PHYSICAL THERAPY | Facility: HOSPITAL | Age: 68
End: 2018-01-16

## 2018-01-18 ENCOUNTER — HOSPITAL ENCOUNTER (OUTPATIENT)
Dept: PHYSICAL THERAPY | Facility: HOSPITAL | Age: 68
Setting detail: THERAPIES SERIES
Discharge: HOME | End: 2018-01-18

## 2018-01-18 DIAGNOSIS — S82.031D CLOSED DISPLACED TRANSVERSE FRACTURE OF RIGHT PATELLA WITH ROUTINE HEALING: Primary | ICD-10-CM

## 2018-01-18 DIAGNOSIS — S92.252D CLOSED DISPLACED FRACTURE OF NAVICULAR BONE OF LEFT FOOT WITH ROUTINE HEALING, SUBSEQUENT ENCOUNTER: ICD-10-CM

## 2018-01-18 PROCEDURE — 97110 THERAPEUTIC EXERCISES: CPT

## 2018-01-22 ENCOUNTER — HOSPITAL ENCOUNTER (OUTPATIENT)
Dept: PHYSICAL THERAPY | Facility: HOSPITAL | Age: 68
Setting detail: THERAPIES SERIES
Discharge: HOME | End: 2018-01-22

## 2018-01-22 DIAGNOSIS — S82.031D CLOSED DISPLACED TRANSVERSE FRACTURE OF RIGHT PATELLA WITH ROUTINE HEALING: Primary | ICD-10-CM

## 2018-01-22 DIAGNOSIS — S82.031D CLOSED DISPLACED TRANSVERSE FRACTURE OF RIGHT PATELLA WITH ROUTINE HEALING, SUBSEQUENT ENCOUNTER: Primary | ICD-10-CM

## 2018-01-22 PROCEDURE — 97110 THERAPEUTIC EXERCISES: CPT

## 2018-01-22 PROCEDURE — 97116 GAIT TRAINING THERAPY: CPT

## 2018-01-22 NOTE — THERAPY TREATMENT NOTE
Outpatient Physical Therapy Ortho Treatment Note  [facility]     Patient Name: Vangie Lopez  : 1950  MRN: [id number]  Today's Date: 2018      Visit Date: 2018  Pt. Has attended  visits  Approved 14 visits 17 through 18  MD 18 Rivera 18 Hetal  Subjective improvement 60 percent   MD NOTE NEXT VISIT  Visit Dx:    ICD-10-CM ICD-9-CM   1. Closed displaced transverse fracture of right patella with routine healing S82.031D V54.16       Patient Active Problem List   Diagnosis   • Encounter for screening for malignant neoplasm of colon   • Closed displaced comminuted fracture of right patella   • Closed displaced fracture of navicular bone of left foot   • Closed dislocation of navicular bone of left foot   • Patella fracture   • Essential hypertension   • Mixed hyperlipidemia   • Osteoarthritis of hands, bilateral   • BPH (benign prostatic hyperplasia)   • Glaucoma   • Hyperlipidemia   • Closed fracture of navicular bone with dislocation of perilunate joint of left wrist   • Fracture of patella, right, closed   • Multiple trauma   • Encounter for rehabilitation   • History of kidney stones   • Recent onset of diabetes mellitus        Past Medical History:   Diagnosis Date   • BPH (benign prostatic hyperplasia)    • Cancer     skin cancer   • Closed fracture of navicular bone with dislocation of perilunate joint of left wrist 2017    closed reduction pinning  17   • Essential hypertension 2017   • Fracture of patella, right, closed 2017    orif 17   • Glaucoma     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   • Hyperlipidemia    • Multiple trauma 2017   • Osteoarthritis of hands, bilateral     retired due to arthritis,    • Recent onset of diabetes mellitus         Past Surgical History:   Procedure Laterality Date   • COLONOSCOPY  2006   • COLONOSCOPY N/A 4/3/2017    Procedure: COLONOSCOPY;  Surgeon: Michael nAg MD;  Location: [facility] ENDOSCOPY;   Service:    • CYSTOSCOPY     • EXTERNAL FIXATION ANKLE FRACTURE Left 9/6/2017    Procedure: ANKLE EXTERNAL FIXATOR APPLICATION;  Surgeon: Simone Rivera DPM;  Location: [facility];  Service:    • FOOT SURGERY     • KNEE SURGERY     • PATELLA OPEN REDUCTION INTERNAL FIXATION Right 9/8/2017    Procedure: OPEN REDUCTION INTERNAL FIXATION RIGHT PATELLA       (C-ARM#3);  Surgeon: Checo Rutherford MD;  Location: [facility];  Service:    • SCAPHOID FRACTURE SURGERY Left 09/07/2017    fracture dislocation left foot, closed reduction with pinning   • SHOULDER SURGERY               PT Ortho       01/22/18 0800    Subjective Pain    Post-Treatment Pain Level 0  -SP    Right Knee    Extension/Flexion AROM Deficit 0-100 before MD and 0-110 after heat and exercise  -SP      User Key  (r) = Recorded By, (t) = Taken By, (c) = Cosigned By    Initials Name Provider Type    DANY Suarez PTA Physical Therapy Assistant                            PT Assessment/Plan       01/22/18 1030       PT Assessment    Assessment Comments Tolerates Rx well  Rom improved after MH  -SP     PT Plan    PT Plan Comments Will need to schedule recheck after next week befoer Feb 11, 2018  -SP       User Key  (r) = Recorded By, (t) = Taken By, (c) = Cosigned By    Initials Name Provider Type    DANY Suarez PTA Physical Therapy Assistant                Modalities       01/22/18 0900          Moist Heat    MH Applied Yes  -SP      Location --   left ankle and right knee  -SP      Rx Minutes 12 mins  -SP      MH Prior to Rx --   after bike but before ROM and cybex work  -SP        User Key  (r) = Recorded By, (t) = Taken By, (c) = Cosigned By    Initials Name Provider Type    DANY Suarez PTA Physical Therapy Assistant                Exercises       01/22/18 0800          Subjective Comments    Subjective Comments Raining stiff today  -SP      Subjective Pain    Able to rate subjective pain? yes  -SP      Pre-Treatment Pain Level 0  -SP       Post-Treatment Pain Level 0  -SP      Aquatics    Aquatics performed? No  -SP      Exercise 1    Exercise Name 1 Pro 2 level 4  -SP      Time (Minutes) 1 10 min  -SP      Exercise 2    Exercise Name 2 Incline S HS S  -SP      Reps 2 3  -SP      Time (Seconds) 2 30 sec  -SP      Exercise 3    Exercise Name 3 lunge S  -SP      Reps 3 3  -SP      Time (Seconds) 3 30 sec  -SP      Exercise 4    Exercise Name 4 ascend /descend 5 steps with handrail reciprocal up and step to down  -SP      Reps 4 3  -SP      Exercise 5    Exercise Name 5 tball ham curls  -SP      Reps 5 20  -SP      Exercise 6    Exercise Name 6 tall ham sets  -SP      Reps 6 10  -SP      Time (Seconds) 6 10 sec  -SP      Exercise 7    Exercise Name 7 wobble board PF/DF IV EV  -SP      Reps 7 20  -SP      Exercise 8    Exercise Name 8 DF strap stretch  -SP      Reps 8 3  -SP      Time (Seconds) 8 30 sec  -SP      Exercise 9    Exercise Name 9 cybex leg press   -SP      Reps 9 30  -SP      Exercise 10    Exercise Name 10 cybex toe raises  -SP      Reps 10 20  -SP      Exercise 11    Exercise Name 11 cybex hip abd/add  -SP      Reps 11 30  -SP      Additional Comments 30 pounds abd add 40 pounds  -SP      Exercise 12    Exercise Name 12 ramp walk fwd/retro   -SP      Reps 12 3  -SP      Exercise 13    Exercise Name 13 // bar walk with visual inputfwd walk and side steps  -SP      Reps 13 5  -SP        User Key  (r) = Recorded By, (t) = Taken By, (c) = Cosigned By    Initials Name Provider Type    DANY Suarez PTA Physical Therapy Assistant                               PT OP Goals       01/22/18 1032 01/22/18 0800    PT Short Term Goals    STG Date to Achieve  01/11/18   further STGs deferred  -SP    STG 1  Note a >/= 50% subjective improvement  -SP    STG 1 Progress  Met  -SP    STG 2  LEFS score to be >/= 40/80  -SP    STG 2 Progress  Not Met  -SP    STG 3  Increase R knee active extension by >/= 5 degrees.   -SP    STG 3 Progress  Met  -SP    STG  4  Increase R knee active flexion to >/= 100 degrees  -SP    STG 4 Progress  Met  -SP    STG 5  Increase B ankle DF to neutral or better  -SP    STG 5 Progress  Met  -SP    Long Term Goals    LTG Date to Achieve  02/01/18  -SP    LTG 1  Independent with HEP  -SP    LTG 1 Progress  Ongoing  -SP    LTG 2  LEFS score to be >/= 55/80  -SP    LTG 2 Progress  Ongoing  -SP    LTG 3  R knee AROM WNLs  -SP    LTG 3 Progress  Ongoing  -SP    LTG 3 Progress Comments  ROM improved after MH  -SP    LTG 4  L ankle AROM WFLs  -SP    LTG 4 Progress  Ongoing  -SP    LTG 5  Demonstrate ability to ascend and descend 5 steps reciprocally  -SP    LTG 5 Progress  Ongoing  -SP    Time Calculation    PT Goal Re-Cert Due Date 02/08/18  -SP 02/01/18  -SP      User Key  (r) = Recorded By, (t) = Taken By, (c) = Cosigned By    Initials Name Provider Type    SP Emily Suarez PTA Physical Therapy Assistant                         Time Calculation:   Start Time: 0845  Stop Time: 1015  Time Calculation (min): 90 min  PT Non-Billable Time (min): 15 min  Total Timed Code Minutes- PT: 60 minute(s)    Therapy Charges for Today     Code Description Service Date Service Provider Modifiers Qty    31725708039 HC PT THER SUPP EA 15 MIN 1/22/2018 Emily Suarez PTA GP 1    15133904386 HC PT THER PROC EA 15 MIN 1/22/2018 Emily Suarez PTA GP 3    87959855609 HC GAIT TRAINING EA 15 MIN 1/22/2018 Emily Suarez PTA GP 1                    Emily Suarez PTA  1/22/2018

## 2018-01-24 ENCOUNTER — HOSPITAL ENCOUNTER (OUTPATIENT)
Dept: PHYSICAL THERAPY | Facility: HOSPITAL | Age: 68
Setting detail: THERAPIES SERIES
End: 2018-01-24

## 2018-01-24 ENCOUNTER — OFFICE VISIT (OUTPATIENT)
Dept: ORTHOPEDIC SURGERY | Facility: CLINIC | Age: 68
End: 2018-01-24

## 2018-01-24 VITALS — WEIGHT: 180.6 LBS | HEIGHT: 69 IN | BODY MASS INDEX: 26.75 KG/M2

## 2018-01-24 DIAGNOSIS — S82.031D CLOSED DISPLACED TRANSVERSE FRACTURE OF RIGHT PATELLA WITH ROUTINE HEALING, SUBSEQUENT ENCOUNTER: Primary | ICD-10-CM

## 2018-01-24 PROCEDURE — 99213 OFFICE O/P EST LOW 20 MIN: CPT | Performed by: NURSE PRACTITIONER

## 2018-01-24 NOTE — PROGRESS NOTES
Vangie Lopez is a 67 y.o. male returns for     Chief Complaint   Patient presents with   • Right Knee - Follow-up     Repeat xray done today.        HISTORY OF PRESENT ILLNESS:  67-year-old  male in the room today for follow-up of right knee pain/patella fracture which was fixed by Dr. Rutherford in September.  He reports that he has not been experiencing any pain at this time however does have some stiffness.     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   • Hyperlipidemia    • Multiple trauma 09/06/2017   • Osteoarthritis of hands, bilateral     retired due to arthritis,    • Recent onset of diabetes mellitus        No Known Allergies      Current Outpatient Prescriptions:   •  ferrous sulfate 324 (65 Fe) MG tablet delayed-release EC tablet, Take 1 tablet by mouth Daily With Breakfast., Disp: 30 tablet, Rfl: 0  •  finasteride (PROSCAR) 5 MG tablet, Take 5 mg by mouth Every Night., Disp: , Rfl:   •  folic acid-vit B6-vit B12 (FOLBEE) 2.5-25-1 MG tablet tablet, Take 1 tablet by mouth Daily., Disp: 30 tablet, Rfl: 0  •  hydrochlorothiazide (HYDRODIURIL) 25 MG tablet, Take 12.5 mg by mouth Daily. Pt takes 1/2 (25 mg) tablet = 12.5 mg per dose., Disp: , Rfl:   •  HYDROcodone-acetaminophen (NORCO) 7.5-325 MG per tablet, Take 1 tablet by mouth Every 8 (Eight) Hours As Needed for Moderate Pain ., Disp: 30 tablet, Rfl: 0  •  metFORMIN ER (GLUCOPHAGE-XR) 500 MG 24 hr tablet, Take 1 tablet by mouth 2 (Two) Times a Day With Meals., Disp: 60 tablet, Rfl: 1  •  PHARMACY TO DOSE WARFARIN, Continuous As Needed (twice weekly for 28 days)., Disp: 1 each, Rfl:  "0  •  potassium citrate (UROCIT-K) 10 MEQ (1080 MG) CR tablet, Take 10 mEq by mouth 2 (Two) Times a Day With Meals., Disp: , Rfl:   •  pravastatin (PRAVACHOL) 40 MG tablet, Take 20 mg by mouth Every Night. Pt takes 1/2 (40 mg) tablet = 20 mg per dose., Disp: , Rfl:   •  sennosides-docusate sodium (SENOKOT-S) 8.6-50 MG tablet, Take 1 tablet by mouth 2 (Two) Times a Day., Disp: 60 tablet, Rfl: 1  •  sildenafil (VIAGRA) 100 MG tablet, Take 100 mg by mouth Daily As Needed for erectile dysfunction., Disp: , Rfl:   •  traZODone (DESYREL) 50 MG tablet, Take 0.5 tablets by mouth Every Night., Disp: 30 tablet, Rfl: 0  •  Blood Glucose Monitoring Suppl device, Use as directed to monitor sugar, Disp: 1 each, Rfl: 0  •  Blood Glucose Monitoring Suppl device, One lancet twice daily to check  Fingerstick  Sugar   E11.9, Disp: 100 each, Rfl: 0  •  glucose blood test strip, Check sugar twice a day, Disp: 100 each, Rfl: 5    Past Surgical History:   Procedure Laterality Date   • COLONOSCOPY  12/08/2006   • COLONOSCOPY N/A 4/3/2017    Procedure: COLONOSCOPY;  Surgeon: Michael Ang MD;  Location: [facility] ENDOSCOPY;  Service:    • CYSTOSCOPY     • EXTERNAL FIXATION ANKLE FRACTURE Left 9/6/2017    Procedure: ANKLE EXTERNAL FIXATOR APPLICATION;  Surgeon: Simone Rivera DPM;  Location: [facility] OR;  Service:    • FOOT SURGERY     • KNEE SURGERY     • PATELLA OPEN REDUCTION INTERNAL FIXATION Right 9/8/2017    Procedure: OPEN REDUCTION INTERNAL FIXATION RIGHT PATELLA       (C-ARM#3);  Surgeon: Checo Rutherford MD;  Location: [facility] OR;  Service:    • SCAPHOID FRACTURE SURGERY Left 09/07/2017    fracture dislocation left foot, closed reduction with pinning   • SHOULDER SURGERY         ROS  No fevers or chills.  No chest pain or shortness of air.  No GI or  disturbances.    PHYSICAL EXAMINATION:       Ht 175.3 cm (69\")  Wt 81.9 kg (180 lb 9.6 oz)  BMI 26.67 kg/m2    Physical Exam   Constitutional: He is oriented to person, place, and time. " Vital signs are normal. He appears well-developed and well-nourished. He is cooperative.   HENT:   Head: Normocephalic and atraumatic.   Neck: Trachea normal and phonation normal.   Pulmonary/Chest: Effort normal. No respiratory distress.   Abdominal: Soft. Normal appearance. He exhibits no distension.   Musculoskeletal:        Left knee: He exhibits no effusion.   Neurological: He is alert and oriented to person, place, and time. GCS eye subscore is 4. GCS verbal subscore is 5. GCS motor subscore is 6.   Skin: Skin is warm, dry and intact.   Psychiatric: He has a normal mood and affect. His speech is normal and behavior is normal. Judgment and thought content normal. Cognition and memory are normal.   Vitals reviewed.      GAIT:     [x]  Normal  []  Antalgic    Assistive device: []  None  []  Walker     []  Crutches  []  Cane     []  Wheelchair  []  Stretcher    Right Knee Exam   Right knee exam is normal.    Muscle Strength     The patient has normal right knee strength.      Left Knee Exam     Tenderness   The patient is experiencing no tenderness.         Range of Motion   Extension: normal   Left knee flexion: 110.     Other   Erythema: absent  Scars: present  Sensation: normal  Pulse: present  Swelling: mild  Effusion: no effusion present              Xr Foot 3+ View Left    Result Date: 1/15/2018  Narrative: PROCEDURE: Three views left foot COMPARISON: Previous left foot radiographs dating back to 9/6/2017, the most recent dated 12/12/2017. HISTORY:Fracture follow-up FINDINGS: No radiographic evidence of acute fracture is seen. There are degenerative changes of the talonavicular and the anterior navicular tarsal joint. Osseous densities along the dorsal aspect of this area are probably degenerative or possibly old fracture fragments. The bones appear demineralized, probably due to disuse osteopenia. Old healed fracture of the shaft of the first proximal phalanx.     Impression: CONCLUSION:  Changes related to  previous injuries as described above. Disuse osteopenia. No radiographic evidence of acute fracture Electronically signed by:  Beau Agudelo MD  1/15/2018 9:36 AM CST Workstation: [id number]            ASSESSMENT:    Diagnoses and all orders for this visit:    Closed displaced transverse fracture of right patella with routine healing, subsequent encounter          PLAN  Continue to progress range of motion and activity as tolerated based on pain and follow-up in 2 months for recheck and repeat xrays.   No Follow-up on file.    Nitesh Fong, APRN

## 2018-01-29 ENCOUNTER — HOSPITAL ENCOUNTER (OUTPATIENT)
Dept: PHYSICAL THERAPY | Facility: HOSPITAL | Age: 68
Setting detail: THERAPIES SERIES
Discharge: HOME | End: 2018-01-29

## 2018-01-29 DIAGNOSIS — S82.031D CLOSED DISPLACED TRANSVERSE FRACTURE OF RIGHT PATELLA WITH ROUTINE HEALING: Primary | ICD-10-CM

## 2018-01-29 PROCEDURE — 97110 THERAPEUTIC EXERCISES: CPT

## 2018-01-29 PROCEDURE — 97116 GAIT TRAINING THERAPY: CPT

## 2018-01-29 NOTE — THERAPY TREATMENT NOTE
Outpatient Physical Therapy Ortho Treatment Note  [facility]     Patient Name: Vangie Lopez  : 1950  MRN: [id number]  Today's Date: 2018      Visit Date: 2018  Pt. Has attended 10/13 visits  MD 18  Recert 18  14 visits approved through 18  Subjective improvement 60 percent  Visit Dx:    ICD-10-CM ICD-9-CM   1. Closed displaced transverse fracture of right patella with routine healing S82.031D V54.16       Patient Active Problem List   Diagnosis   • Encounter for screening for malignant neoplasm of colon   • Closed displaced comminuted fracture of right patella   • Closed displaced fracture of navicular bone of left foot   • Closed dislocation of navicular bone of left foot   • Patella fracture   • Essential hypertension   • Mixed hyperlipidemia   • Osteoarthritis of hands, bilateral   • BPH (benign prostatic hyperplasia)   • Glaucoma   • Hyperlipidemia   • Closed fracture of navicular bone with dislocation of perilunate joint of left wrist   • Fracture of patella, right, closed   • Multiple trauma   • Encounter for rehabilitation   • History of kidney stones   • Recent onset of diabetes mellitus        Past Medical History:   Diagnosis Date   • BPH (benign prostatic hyperplasia)    • Cancer     skin cancer   • Closed fracture of navicular bone with dislocation of perilunate joint of left wrist 2017    closed reduction pinning  17   • Essential hypertension 2017   • Fracture of patella, right, closed 2017    orif 17   • Glaucoma     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 ENDOSCOPY;  Service:    • CYSTOSCOPY     •  EXTERNAL FIXATION ANKLE FRACTURE Left 9/6/2017    Procedure: ANKLE EXTERNAL FIXATOR APPLICATION;  Surgeon: Simone Rivera DPM;  Location: [facility] OR;  Service:    • FOOT SURGERY     • KNEE SURGERY     • PATELLA OPEN REDUCTION INTERNAL FIXATION Right 9/8/2017    Procedure: OPEN REDUCTION INTERNAL FIXATION RIGHT PATELLA       (C-ARM#3);  Surgeon: Checo Rutherford MD;  Location: [facility];  Service:    • SCAPHOID FRACTURE SURGERY Left 09/07/2017    fracture dislocation left foot, closed reduction with pinning   • SHOULDER SURGERY                               PT Assessment/Plan       01/29/18 0939       PT Assessment    Assessment Comments Tolerates cybex work well slight increase in pain but reports it is his foot that is more painful  -SP     PT Plan    PT Frequency 2x/week  -SP     PT Plan Comments continue with POC recert next week  Measure ROM next visit  -SP       User Key  (r) = Recorded By, (t) = Taken By, (c) = Cosigned By    Initials Name Provider Type    SP Emily Suarez PTA Physical Therapy Assistant                    Exercises       01/29/18 0900          Subjective Comments    Subjective Comments Notes he has been to MD and MD notes that the R patella has not completely healed  -SP      Subjective Pain    Able to rate subjective pain? yes  -SP      Pre-Treatment Pain Level 0  -SP      Post-Treatment Pain Level 2  -SP      Aquatics    Aquatics performed? No  -SP      Exercise 1    Exercise Name 1 Pro 2 Level 4  -SP      Time (Minutes) 1 10 min  -SP      Exercise 2    Exercise Name 2 Incline S HS   -SP      Reps 2 3  -SP      Time (Seconds) 2 30 sec  -SP      Exercise 3    Exercise Name 3 lunge S  -SP      Reps 3 3  -SP      Time (Seconds) 3 30 sec  -SP      Exercise 4    Exercise Name 4 tball ham curls  -SP      Reps 4 3  -SP      Time (Seconds) 4 30 sec  -SP      Exercise 5    Exercise Name 5 tball ham sets  -SP      Reps 5 15  -SP      Time (Seconds) 5 10 sec  -SP      Exercise 6    Exercise Name 6  cybex hip abd/add  -SP      Reps 6 25  -SP      Additional Comments 30 # 40 # for add  -SP      Exercise 7    Exercise Name 7 leg press  -SP      Reps 7 25  -SP      Additional Comments 100 #  -SP      Exercise 8    Exercise Name 8 leg press toe raises  -SP      Reps 8 25  -SP      Additional Comments 80 #  -SP      Exercise 9    Exercise Name 9 ramp walk retro and fwd up and down carrying 4 pound ball  -SP      Reps 9 3  -SP      Exercise 10    Exercise Name 10 ramp walk side step with 4 pound ball  -SP      Reps 10 2  -SP      Exercise 11    Exercise Name 11 // bar ambulation with visual input   -SP      Time (Minutes) 11 3 min  -SP        User Key  (r) = Recorded By, (t) = Taken By, (c) = Cosigned By    Initials Name Provider Type    DANY Suarez, PTA Physical Therapy Assistant                               PT OP Goals       01/29/18 0940 01/29/18 0900    PT Short Term Goals    STG Date to Achieve  01/11/18   further STGs deferred  -SP    STG 1  Note a >/= 50% subjective improvement  -SP    STG 1 Progress  Met  -SP    STG 2  LEFS score to be >/= 40/80  -SP    STG 2 Progress  Not Met  -SP    STG 3  Increase R knee active extension by >/= 5 degrees.   -SP    STG 3 Progress  Met  -SP    STG 4  Increase R knee active flexion to >/= 100 degrees  -SP    STG 4 Progress  Met  -SP    STG 5  Increase B ankle DF to neutral or better  -SP    STG 5 Progress  Met  -SP    Long Term Goals    LTG Date to Achieve  02/01/18  -SP    LTG 1  Independent with HEP  -SP    LTG 1 Progress  Ongoing  -SP    LTG 2  LEFS score to be >/= 55/80  -SP    LTG 2 Progress  Ongoing  -SP    LTG 3  R knee AROM WNLs  -SP    LTG 3 Progress  Ongoing  -SP    LTG 4  L ankle AROM WFLs  -SP    LTG 4 Progress  Ongoing  -SP    LTG 5  Demonstrate ability to ascend and descend 5 steps reciprocally  -SP    LTG 5 Progress  Ongoing  -SP    Time Calculation    PT Goal Re-Cert Due Date 02/07/18  -SP 02/08/18  -SP      User Key  (r) = Recorded By, (t) = Taken By,  (c) = Cosigned By    Initials Name Provider Type    SP Emily Suarez PTA Physical Therapy Assistant                         Time Calculation:   Start Time: 0846  Stop Time: 0940  Time Calculation (min): 54 min  Total Timed Code Minutes- PT: 54 minute(s)    Therapy Charges for Today     Code Description Service Date Service Provider Modifiers Qty    11553105941 HC PT THER PROC EA 15 MIN 1/29/2018 Emily Suarez PTA GP 3    35588307155 HC GAIT TRAINING EA 15 MIN 1/29/2018 Emily Suarez PTA GP 1                    Emily Suarez PTA  1/29/2018

## 2018-01-31 ENCOUNTER — HOSPITAL ENCOUNTER (OUTPATIENT)
Dept: PHYSICAL THERAPY | Facility: HOSPITAL | Age: 68
Setting detail: THERAPIES SERIES
Discharge: HOME | End: 2018-01-31

## 2018-01-31 DIAGNOSIS — S82.031D CLOSED DISPLACED TRANSVERSE FRACTURE OF RIGHT PATELLA WITH ROUTINE HEALING: Primary | ICD-10-CM

## 2018-01-31 DIAGNOSIS — S92.252D CLOSED DISPLACED FRACTURE OF NAVICULAR BONE OF LEFT FOOT WITH ROUTINE HEALING, SUBSEQUENT ENCOUNTER: ICD-10-CM

## 2018-01-31 PROCEDURE — 97110 THERAPEUTIC EXERCISES: CPT

## 2018-02-05 ENCOUNTER — HOSPITAL ENCOUNTER (OUTPATIENT)
Dept: PHYSICAL THERAPY | Facility: HOSPITAL | Age: 68
Setting detail: THERAPIES SERIES
Discharge: HOME | End: 2018-02-05

## 2018-02-05 DIAGNOSIS — S82.031D CLOSED DISPLACED TRANSVERSE FRACTURE OF RIGHT PATELLA WITH ROUTINE HEALING: Primary | ICD-10-CM

## 2018-02-05 PROCEDURE — 97116 GAIT TRAINING THERAPY: CPT

## 2018-02-05 PROCEDURE — 97110 THERAPEUTIC EXERCISES: CPT

## 2018-02-05 NOTE — THERAPY TREATMENT NOTE
Outpatient Physical Therapy Ortho Treatment Note  [facility]     Patient Name: Vangie Lopez  : 1950  MRN: [id number]  Today's Date: 2018      Visit Date: 2018  Pt. Has attended  visits 14 approved 17 thrla 18  MD 18  Subjective improvement 60 percent  Visit Dx:    ICD-10-CM ICD-9-CM   1. Closed displaced transverse fracture of right patella with routine healing S82.031D V54.16       Patient Active Problem List   Diagnosis   • Encounter for screening for malignant neoplasm of colon   • Closed displaced comminuted fracture of right patella   • Closed displaced fracture of navicular bone of left foot   • Closed dislocation of navicular bone of left foot   • Patella fracture   • Essential hypertension   • Mixed hyperlipidemia   • Osteoarthritis of hands, bilateral   • BPH (benign prostatic hyperplasia)   • Glaucoma   • Hyperlipidemia   • Closed fracture of navicular bone with dislocation of perilunate joint of left wrist   • Fracture of patella, right, closed   • Multiple trauma   • Encounter for rehabilitation   • History of kidney stones   • Recent onset of diabetes mellitus        Past Medical History:   Diagnosis Date   • BPH (benign prostatic hyperplasia)    • Cancer     skin cancer   • Closed fracture of navicular bone with dislocation of perilunate joint of left wrist 2017    closed reduction pinning  17   • Essential hypertension 2017   • Fracture of patella, right, closed 2017    orif 17   • Glaucoma     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   • Hyperlipidemia    • Multiple trauma 2017   • Osteoarthritis of hands, bilateral     retired due to arthritis,    • Recent onset of diabetes mellitus         Past Surgical History:   Procedure Laterality Date   • COLONOSCOPY  2006   • COLONOSCOPY N/A 4/3/2017    Procedure: COLONOSCOPY;  Surgeon: Michael Ang MD;  Location: [facility] ENDOSCOPY;  Service:    • CYSTOSCOPY     • EXTERNAL FIXATION  ANKLE FRACTURE Left 9/6/2017    Procedure: ANKLE EXTERNAL FIXATOR APPLICATION;  Surgeon: Simone Rivera DPM;  Location: [facility] OR;  Service:    • FOOT SURGERY     • KNEE SURGERY     • PATELLA OPEN REDUCTION INTERNAL FIXATION Right 9/8/2017    Procedure: OPEN REDUCTION INTERNAL FIXATION RIGHT PATELLA       (C-ARM#3);  Surgeon: Checo Rutherford MD;  Location: [facility];  Service:    • SCAPHOID FRACTURE SURGERY Left 09/07/2017    fracture dislocation left foot, closed reduction with pinning   • SHOULDER SURGERY                               PT Assessment/Plan       02/05/18 0937       PT Assessment    Assessment Comments Continue with mild antalgia but notes that he had hit his knee while out in the yard couple days ago.  Also notes he has been walking on his treadmill at home with grade of 2.  suggest no grade and pt was aggreeable as it reproduced ess pain in his foot   -SP     PT Plan    PT Frequency 2x/week  -SP     PT Plan Comments Recheck next visit and has two remaining visits approved  -SP       User Key  (r) = Recorded By, (t) = Taken By, (c) = Cosigned By    Initials Name Provider Type    DANY Suarez PTA Physical Therapy Assistant                    Exercises       02/05/18 0800          Subjective Comments    Subjective Comments States he is stiff today  -SP      Subjective Pain    Able to rate subjective pain? yes  -SP      Pre-Treatment Pain Level 0  -SP      Post-Treatment Pain Level 0  -SP      Aquatics    Aquatics performed? No  -SP      Exercise 1    Exercise Name 1 Pro 2 Level 4  -SP      Time (Minutes) 1 10 min  -SP      Exercise 2    Exercise Name 2 Incline S HS S  -SP      Reps 2 3  -SP      Time (Seconds) 2 30 sec  -SP      Exercise 3    Exercise Name 3 tball ham curls  -SP      Sets 3 3  -SP      Reps 3 10  -SP      Exercise 4    Exercise Name 4 tball ham sets  -SP      Reps 4 10  -SP      Exercise 5    Exercise Name 5 tread mill walking no gradespeed 2.0   -SP      Additional Comments  distance .25 miles  -SP      Exercise 6    Exercise Name 6 hip abd 35 #  -SP      Sets 6 3  -SP      Reps 6 10  -SP      Exercise 7    Exercise Name 7 hip add #55  -SP      Reps 7 30  -SP      Exercise 8    Exercise Name 8 cybex leg press   -SP      Reps 8 30  -SP      Additional Comments 85#  -SP      Exercise 9    Exercise Name 9 stairs x 5 steps ascending with reciprocal and descending with step to gait  -SP      Reps 9 3  -SP        User Key  (r) = Recorded By, (t) = Taken By, (c) = Cosigned By    Initials Name Provider Type    DANY Suarez PTA Physical Therapy Assistant                               PT OP Goals       02/05/18 0939 02/05/18 0800    PT Short Term Goals    STG Date to Achieve  01/11/18   further STGs deferred  -SP    STG 1  Note a >/= 50% subjective improvement  -SP    STG 1 Progress  Met  -SP    STG 2  LEFS score to be >/= 40/80  -SP    STG 2 Progress  Not Met  -SP    STG 3  Increase R knee active extension by >/= 5 degrees.   -SP    STG 3 Progress  Met  -SP    STG 4  Increase R knee active flexion to >/= 100 degrees  -SP    STG 4 Progress  Met  -SP    STG 5  Increase B ankle DF to neutral or better  -SP    STG 5 Progress  Met  -SP    Long Term Goals    LTG Date to Achieve  02/01/18  -SP    LTG 1  Independent with HEP  -SP    LTG 1 Progress  Progressing  -SP    LTG 2  LEFS score to be >/= 55/80  -SP    LTG 2 Progress  Ongoing  -SP    LTG 3  R knee AROM WNLs  -SP    LTG 3 Progress  Progressing  -SP    LTG 4  L ankle AROM WFLs  -SP    LTG 4 Progress  Ongoing  -SP    LTG 5  Demonstrate ability to ascend and descend 5 steps reciprocally  -SP    LTG 5 Progress  Progressing  -SP    Time Calculation    PT Goal Re-Cert Due Date 02/07/18  -SP 02/07/18  -SP      User Key  (r) = Recorded By, (t) = Taken By, (c) = Cosigned By    Initials Name Provider Type    DANY Suarez PTA Physical Therapy Assistant                         Time Calculation:   Start Time: 0847  Stop Time: 0935  Time  Calculation (min): 48 min  Total Timed Code Minutes- PT: 48 minute(s)    Therapy Charges for Today     Code Description Service Date Service Provider Modifiers Qty    86813203290 HC GAIT TRAINING EA 15 MIN 2/5/2018 Emily Suarez PTA GP 1    72688191443 HC PT THER PROC EA 15 MIN 2/5/2018 Emily Suarez PTA GP 2                    Emily Suarez PTA  2/5/2018

## 2018-02-07 ENCOUNTER — HOSPITAL ENCOUNTER (OUTPATIENT)
Dept: PHYSICAL THERAPY | Facility: HOSPITAL | Age: 68
Setting detail: THERAPIES SERIES
Discharge: HOME | End: 2018-02-07

## 2018-02-07 DIAGNOSIS — S92.252D CLOSED DISPLACED FRACTURE OF NAVICULAR BONE OF LEFT FOOT WITH ROUTINE HEALING, SUBSEQUENT ENCOUNTER: ICD-10-CM

## 2018-02-07 DIAGNOSIS — S82.031D CLOSED DISPLACED TRANSVERSE FRACTURE OF RIGHT PATELLA WITH ROUTINE HEALING: Primary | ICD-10-CM

## 2018-02-07 PROCEDURE — 97110 THERAPEUTIC EXERCISES: CPT | Performed by: PHYSICAL THERAPIST

## 2018-02-12 ENCOUNTER — HOSPITAL ENCOUNTER (OUTPATIENT)
Dept: PHYSICAL THERAPY | Facility: HOSPITAL | Age: 68
Setting detail: THERAPIES SERIES
Discharge: HOME | End: 2018-02-12

## 2018-02-12 DIAGNOSIS — S82.031D CLOSED DISPLACED TRANSVERSE FRACTURE OF RIGHT PATELLA WITH ROUTINE HEALING: Primary | ICD-10-CM

## 2018-02-12 PROCEDURE — 97110 THERAPEUTIC EXERCISES: CPT

## 2018-02-12 NOTE — THERAPY DISCHARGE NOTE
Outpatient Physical Therapy Ortho Treatment Note/Discharge Summary  [facility]     Patient Name: Vangie Lopez  : 1950  MRN: [id number]  Today's Date: 2018      Visit Date: 2018  Pt. Has attended 14/14 visits  Subjective improvement 75 percent  Md April??  14 visits approved one month free fitness membership  Visit Dx:    ICD-10-CM ICD-9-CM   1. Closed displaced transverse fracture of right patella with routine healing S82.031D V54.16       Patient Active Problem List   Diagnosis   • Encounter for screening for malignant neoplasm of colon   • Closed displaced comminuted fracture of right patella   • Closed displaced fracture of navicular bone of left foot   • Closed dislocation of navicular bone of left foot   • Patella fracture   • Essential hypertension   • Mixed hyperlipidemia   • Osteoarthritis of hands, bilateral   • BPH (benign prostatic hyperplasia)   • Glaucoma   • Hyperlipidemia   • Closed fracture of navicular bone with dislocation of perilunate joint of left wrist   • Fracture of patella, right, closed   • Multiple trauma   • Encounter for rehabilitation   • History of kidney stones   • Recent onset of diabetes mellitus        Past Medical History:   Diagnosis Date   • BPH (benign prostatic hyperplasia)    • Cancer     skin cancer   • Closed fracture of navicular bone with dislocation of perilunate joint of left wrist 2017    closed reduction pinning  17   • Essential hypertension 2017   • Fracture of patella, right, closed 2017    orif 17   • Glaucoma     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   • Hyperlipidemia    • Multiple trauma 2017   • Osteoarthritis of hands, bilateral     retired due to arthritis,    • Recent onset of diabetes mellitus         Past Surgical History:   Procedure Laterality Date   • COLONOSCOPY  2006   • COLONOSCOPY N/A 4/3/2017    Procedure: COLONOSCOPY;  Surgeon: Michael Ang MD;  Location: [facility] ENDOSCOPY;  Service:    •  CYSTOSCOPY     • EXTERNAL FIXATION ANKLE FRACTURE Left 9/6/2017    Procedure: ANKLE EXTERNAL FIXATOR APPLICATION;  Surgeon: Siomne Rivera DPM;  Location: [facility];  Service:    • FOOT SURGERY     • KNEE SURGERY     • PATELLA OPEN REDUCTION INTERNAL FIXATION Right 9/8/2017    Procedure: OPEN REDUCTION INTERNAL FIXATION RIGHT PATELLA       (C-ARM#3);  Surgeon: Checo Rutherford MD;  Location: [facility];  Service:    • SCAPHOID FRACTURE SURGERY Left 09/07/2017    fracture dislocation left foot, closed reduction with pinning   • SHOULDER SURGERY                               PT Assessment/Plan       02/12/18 0857       PT Assessment    Assessment Comments Struggles some with heel to toe walking on foam beam but otherwise all goals met  -SP     PT Plan    PT Plan Comments D/C with HEP one month free fitness membership  -SP       User Key  (r) = Recorded By, (t) = Taken By, (c) = Cosigned By    Initials Name Provider Type    SP Emily Suarez, PTA Physical Therapy Assistant                    Exercises       02/12/18 0800          Subjective Comments    Subjective Comments Notes his knee is moving better  -SP      Subjective Pain    Able to rate subjective pain? yes  -SP      Pre-Treatment Pain Level 0  -SP      Post-Treatment Pain Level 0  -SP      Aquatics    Aquatics performed? No  -SP      Exercise 1    Exercise Name 1 Pro 2 seat 11  -SP      Time (Minutes) 1 10 min  -SP      Additional Comments Level 5  -SP      Exercise 2    Exercise Name 2 incline S HS S  -SP      Reps 2 3  -SP      Time (Seconds) 2 30 sec  -SP      Exercise 3    Exercise Name 3 heel to toe foam beam walk f  -SP      Reps 3 5  -SP      Exercise 4    Exercise Name 4 lateral foam beam steps  -SP      Reps 4 5  -SP      Exercise 5    Exercise Name 5 3 plane toe raises slow fast and airex  -SP      Sets 5 3  -SP      Reps 5 10  -SP      Exercise 6    Exercise Name 6 bosu lunges  -SP      Sets 6 2  -SP      Reps 6 10  -SP      Exercise 7    Exercise  Name 7 corinne shaper minisquats  -SP      Sets 7 2  -SP      Reps 7 10  -SP      Exercise 8    Exercise Name 8 cybex leg press  -SP      Sets 8 3  -SP      Reps 8 10  -SP      Exercise 9    Exercise Name 9 cybex hip abd/add  -SP      Sets 9 3  -SP      Reps 9 10  -SP      Additional Comments 60 #  -SP      Exercise 10    Exercise Name 10 cybex ham curls  -SP      Sets 10 3  -SP      Reps 10 10  -SP      Additional Comments 50 #  -SP        User Key  (r) = Recorded By, (t) = Taken By, (c) = Cosigned By    Initials Name Provider Type    DANY Suarez PTA Physical Therapy Assistant                               PT OP Goals       02/12/18 0927 02/12/18 0800    PT Short Term Goals    STG Date to Achieve  --   further STGs deferred  -SP    Long Term Goals    LTG Date to Achieve  02/28/18  -SP    LTG 1  Independent with HEP  -SP    LTG 1 Progress  Met  -SP    LTG 2  LEFS score to be >/= 55/80  -SP    LTG 2 Progress  Not Met  -SP    LTG 2 Progress Comments  score 43/80 had increased from 39 to 43  -SP    LTG 3  R knee AROM WNLs  -SP    LTG 3 Progress  Met  -SP    LTG 4  L ankle AROM WFLs  -SP    LTG 4 Progress  Met  -SP    LTG 5  Demonstrate ability to ascend and descend 5 steps reciprocally  -SP    LTG 5 Progress  Met  -SP    Time Calculation    PT Goal Re-Cert Due Date 02/14/18  -SP       User Key  (r) = Recorded By, (t) = Taken By, (c) = Cosigned By    Initials Name Provider Type    DANY Suarez PTA Physical Therapy Assistant                         Time Calculation:   Start Time: 0845  Stop Time: 0930  Time Calculation (min): 45 min  Total Timed Code Minutes- PT: 45 minute(s)              OP PT Discharge Summary  Date of Discharge: 02/12/18  Reason for Discharge: Maximum functional potential achieved  Outcomes Achieved: Patient able to partially achieve established goals (Achieved all but 1 LTG)  Discharge Destination: Home with home program      Jarod Hernandez, PT DPT  2/12/2018

## 2018-02-14 ENCOUNTER — APPOINTMENT (OUTPATIENT)
Dept: PHYSICAL THERAPY | Facility: HOSPITAL | Age: 68
End: 2018-02-14

## 2018-03-23 DIAGNOSIS — S82.041D CLOSED DISPLACED COMMINUTED FRACTURE OF RIGHT PATELLA WITH ROUTINE HEALING, SUBSEQUENT ENCOUNTER: Primary | ICD-10-CM

## 2018-03-26 ENCOUNTER — OFFICE VISIT (OUTPATIENT)
Dept: PODIATRY | Facility: CLINIC | Age: 68
End: 2018-03-26

## 2018-03-26 ENCOUNTER — TELEPHONE (OUTPATIENT)
Dept: PODIATRY | Facility: CLINIC | Age: 68
End: 2018-03-26

## 2018-03-26 ENCOUNTER — OFFICE VISIT (OUTPATIENT)
Dept: ORTHOPEDIC SURGERY | Facility: CLINIC | Age: 68
End: 2018-03-26

## 2018-03-26 VITALS — WEIGHT: 188 LBS | HEIGHT: 69 IN | BODY MASS INDEX: 27.85 KG/M2

## 2018-03-26 VITALS — HEIGHT: 69 IN | WEIGHT: 188 LBS | BODY MASS INDEX: 27.85 KG/M2

## 2018-03-26 DIAGNOSIS — S82.041D CLOSED DISPLACED COMMINUTED FRACTURE OF RIGHT PATELLA WITH ROUTINE HEALING, SUBSEQUENT ENCOUNTER: Primary | ICD-10-CM

## 2018-03-26 DIAGNOSIS — L60.0 INGROWN TOENAIL: ICD-10-CM

## 2018-03-26 DIAGNOSIS — M79.672 LEFT FOOT PAIN: Primary | ICD-10-CM

## 2018-03-26 PROCEDURE — 99214 OFFICE O/P EST MOD 30 MIN: CPT | Performed by: NURSE PRACTITIONER

## 2018-03-26 PROCEDURE — 99213 OFFICE O/P EST LOW 20 MIN: CPT | Performed by: PODIATRIST

## 2018-03-26 PROCEDURE — 11750 EXCISION NAIL&NAIL MATRIX: CPT | Performed by: PODIATRIST

## 2018-03-26 NOTE — PROGRESS NOTES
Vangie Galvannico  1950  67 y.o. male     Patient presents today for left hallux toenail problem.  Patient states that the left toenail came off as the new one is growing and believes part of the old one is still in there.     Chief Complaint   Patient presents with   • Left Foot - Nail Problem           History of Present Illness    Patient presents to clinic today chief complaint of left foot pain.  Pain is located to the great toe.  Specifically pain is located to the inside of the great toenail.  He states that recently the toenail came off and the new one is regrowing.  He states that it is growing into the skin and causing his toe to be red and swollen.  It is very tender to palpation.  He rates the pain as a 10 out of 10.  Pain is aggravated with pressure.  Denies any recent injuries to the toe.  He has no additional pedal complaints.    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   • Hyperlipidemia    • Multiple trauma 09/06/2017   • Osteoarthritis of hands, bilateral     retired due to arthritis,    • Recent onset of diabetes mellitus          Past Surgical History:   Procedure Laterality Date   • COLONOSCOPY  12/08/2006   • COLONOSCOPY N/A 4/3/2017    Procedure: COLONOSCOPY;  Surgeon: Michael Ang MD;  Location: [facility] ENDOSCOPY;  Service:    • CYSTOSCOPY     • EXTERNAL FIXATION ANKLE FRACTURE Left 9/6/2017    Procedure: ANKLE EXTERNAL FIXATOR APPLICATION;  Surgeon: Simone Rivera DPM;  Location: [facility] OR;  Service:    • FOOT SURGERY     • KNEE SURGERY     • PATELLA OPEN REDUCTION INTERNAL FIXATION Right 9/8/2017    Procedure: OPEN REDUCTION INTERNAL FIXATION  RIGHT PATELLA       (C-ARM#3);  Surgeon: Checo Rutherford MD;  Location: [facility];  Service:    • SCAPHOID FRACTURE SURGERY Left 09/07/2017    fracture dislocation left foot, closed reduction with pinning   • SHOULDER SURGERY           Family History   Problem Relation Age of Onset   • Alzheimer's disease Mother    • Heart attack Father          Social History     Social History   • Marital status:      Spouse name: N/A   • Number of children: N/A   • Years of education: N/A     Occupational History   •       retired due to arthritis in hands     Social History Main Topics   • Smoking status: Never Smoker   • Smokeless tobacco: Never Used   • Alcohol use Yes      Comment: social drinker/rarely uses alcohol   • Drug use: No   • Sexual activity: Defer     Other Topics Concern   • Not on file     Social History Narrative    Lives with wife, single story home, two steps to enter home, no ramp         Current Outpatient Prescriptions   Medication Sig Dispense Refill   • Blood Glucose Monitoring Suppl device Use as directed to monitor sugar 1 each 0   • Blood Glucose Monitoring Suppl device One lancet twice daily to check  Fingerstick  Sugar   E11.9 100 each 0   • ferrous sulfate 324 (65 Fe) MG tablet delayed-release EC tablet Take 1 tablet by mouth Daily With Breakfast. 30 tablet 0   • finasteride (PROSCAR) 5 MG tablet Take 5 mg by mouth Every Night.     • folic acid-vit B6-vit B12 (FOLBEE) 2.5-25-1 MG tablet tablet Take 1 tablet by mouth Daily. 30 tablet 0   • glucose blood test strip Check sugar twice a day 100 each 5   • hydrochlorothiazide (HYDRODIURIL) 25 MG tablet Take 12.5 mg by mouth Daily. Pt takes 1/2 (25 mg) tablet = 12.5 mg per dose.     • HYDROcodone-acetaminophen (NORCO) 7.5-325 MG per tablet Take 1 tablet by mouth Every 8 (Eight) Hours As Needed for Moderate Pain . 30 tablet 0   • metFORMIN ER (GLUCOPHAGE-XR) 500 MG 24 hr tablet Take 1 tablet by mouth 2 (Two) Times a Day With Meals. 60  "tablet 1   • PHARMACY TO DOSE WARFARIN Continuous As Needed (twice weekly for 28 days). 1 each 0   • potassium citrate (UROCIT-K) 10 MEQ (1080 MG) CR tablet Take 10 mEq by mouth 2 (Two) Times a Day With Meals.     • pravastatin (PRAVACHOL) 40 MG tablet Take 20 mg by mouth Every Night. Pt takes 1/2 (40 mg) tablet = 20 mg per dose.     • sennosides-docusate sodium (SENOKOT-S) 8.6-50 MG tablet Take 1 tablet by mouth 2 (Two) Times a Day. 60 tablet 1   • sildenafil (VIAGRA) 100 MG tablet Take 100 mg by mouth Daily As Needed for erectile dysfunction.     • traZODone (DESYREL) 50 MG tablet Take 0.5 tablets by mouth Every Night. 30 tablet 0     No current facility-administered medications for this visit.          OBJECTIVE    Ht 175.3 cm (69\")   Wt 85.3 kg (188 lb)   BMI 27.76 kg/m²       Review of Systems   Constitutional: Negative for chills and fever.   Cardiovascular: Negative for chest pain.   Gastrointestinal: Negative for constipation, diarrhea, nausea and vomiting.        Constitutional: well developed, well nourished    HEENT: Normocephalic and atraumatic, normal hearing    Respiratory: Non labored respirations noted    Cardiovascular:    DP/PT pulses palpable    CFT brisk  to all digits      Dermatological:   Left hallux nail is incurvated and ingrowing on the medial border.  There is erythema, edema and drainage noted.  The toenail is diffusely painful on palpation  Previous dorsal left foot wound has healed  No open wounds noted     Neurological:   Protective sensation intact    Sensation intact to light touch      Psychiatric: A&O x 3 with normal mood and affect. NAD.         Nail Removal  Date/Time: 3/26/2018 6:23 PM  Performed by: NGOZI SMITH  Authorized by: NGOZI SMITH   Consent: Verbal consent obtained. Written consent obtained.  Risks and benefits: risks, benefits and alternatives were discussed  Consent given by: patient  Patient understanding: patient states understanding of the procedure being " performed  Patient identity confirmed: verbally with patient  Location: left foot  Location details: left big toe    Anesthesia:  Local Anesthetic: lidocaine 2% without epinephrine  Preparation: skin prepped with Betadine  Amount removed: complete (Nail plate was  from underlying nailbed and removed with a hemostat)  Wedge excision of skin of nail fold: no  Nail bed sutured: no  Nail matrix removed: complete (phenol)  Dressing: antibiotic ointment and dressing applied  Patient tolerance: Patient tolerated the procedure well with no immediate complications              ASSESSMENT AND PLAN    Vangie was seen today for nail problem.    Diagnoses and all orders for this visit:    Left foot pain  -     XR Foot 3+ View Left    Ingrown toenail      - Diagnosis, prevention and treatment of ingrown toenails discussed with patient, including risks and potential benefits of nail avulsion both temporary and permanent versus simple debridement.  - Patient elected for a total permanent nail avulsion  - Dispensed aftercare instruction sheet  - All questions were answered and the patient is in agreement with the current treatment plan.  - RTC in 2 weeks              This document has been electronically signed by Simone Rivera DPM on March 26, 2018 6:21 PM     3/26/2018  6:21 PM

## 2018-03-26 NOTE — PROGRESS NOTES
Vangie Lopez is a 67 y.o. male returns for     Chief Complaint   Patient presents with   • Right Knee - Fracture       HISTORY OF PRESENT ILLNESS:     67-year-old  male in the office today for follow-up of his right patella fracture.  Fracture was repaired by Dr. Rutherford on September 6, 2017.  Patient reports that he has no pain however he still has some stiffness in the knee.  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   • Hyperlipidemia    • Multiple trauma 09/06/2017   • Osteoarthritis of hands, bilateral     retired due to arthritis,    • Recent onset of diabetes mellitus        No Known Allergies      Current Outpatient Prescriptions:   •  Blood Glucose Monitoring Suppl device, Use as directed to monitor sugar, Disp: 1 each, Rfl: 0  •  Blood Glucose Monitoring Suppl device, One lancet twice daily to check  Fingerstick  Sugar   E11.9, Disp: 100 each, Rfl: 0  •  ferrous sulfate 324 (65 Fe) MG tablet delayed-release EC tablet, Take 1 tablet by mouth Daily With Breakfast., Disp: 30 tablet, Rfl: 0  •  finasteride (PROSCAR) 5 MG tablet, Take 5 mg by mouth Every Night., Disp: , Rfl:   •  folic acid-vit B6-vit B12 (FOLBEE) 2.5-25-1 MG tablet tablet, Take 1 tablet by mouth Daily., Disp: 30 tablet, Rfl: 0  •  glucose blood test strip, Check sugar twice a day, Disp: 100 each, Rfl: 5  •  hydrochlorothiazide (HYDRODIURIL) 25 MG tablet, Take 12.5 mg by mouth Daily. Pt takes 1/2 (25 mg) tablet = 12.5 mg per dose., Disp: , Rfl:   •  HYDROcodone-acetaminophen (NORCO) 7.5-325 MG per  "tablet, Take 1 tablet by mouth Every 8 (Eight) Hours As Needed for Moderate Pain ., Disp: 30 tablet, Rfl: 0  •  metFORMIN ER (GLUCOPHAGE-XR) 500 MG 24 hr tablet, Take 1 tablet by mouth 2 (Two) Times a Day With Meals., Disp: 60 tablet, Rfl: 1  •  PHARMACY TO DOSE WARFARIN, Continuous As Needed (twice weekly for 28 days)., Disp: 1 each, Rfl: 0  •  potassium citrate (UROCIT-K) 10 MEQ (1080 MG) CR tablet, Take 10 mEq by mouth 2 (Two) Times a Day With Meals., Disp: , Rfl:   •  pravastatin (PRAVACHOL) 40 MG tablet, Take 20 mg by mouth Every Night. Pt takes 1/2 (40 mg) tablet = 20 mg per dose., Disp: , Rfl:   •  sennosides-docusate sodium (SENOKOT-S) 8.6-50 MG tablet, Take 1 tablet by mouth 2 (Two) Times a Day., Disp: 60 tablet, Rfl: 1  •  sildenafil (VIAGRA) 100 MG tablet, Take 100 mg by mouth Daily As Needed for erectile dysfunction., Disp: , Rfl:   •  traZODone (DESYREL) 50 MG tablet, Take 0.5 tablets by mouth Every Night., Disp: 30 tablet, Rfl: 0    Past Surgical History:   Procedure Laterality Date   • COLONOSCOPY  12/08/2006   • COLONOSCOPY N/A 4/3/2017    Procedure: COLONOSCOPY;  Surgeon: Michael Ang MD;  Location: [facility] ENDOSCOPY;  Service:    • CYSTOSCOPY     • EXTERNAL FIXATION ANKLE FRACTURE Left 9/6/2017    Procedure: ANKLE EXTERNAL FIXATOR APPLICATION;  Surgeon: Simone Rivera DPM;  Location: [facility] OR;  Service:    • FOOT SURGERY     • KNEE SURGERY     • PATELLA OPEN REDUCTION INTERNAL FIXATION Right 9/8/2017    Procedure: OPEN REDUCTION INTERNAL FIXATION RIGHT PATELLA       (C-ARM#3);  Surgeon: Checo Rutherford MD;  Location: [facility] OR;  Service:    • SCAPHOID FRACTURE SURGERY Left 09/07/2017    fracture dislocation left foot, closed reduction with pinning   • SHOULDER SURGERY         ROS  No fevers or chills.  No chest pain or shortness of air.  No GI or  disturbances.    PHYSICAL EXAMINATION:       Ht 175.3 cm (69\")   Wt 85.3 kg (188 lb)   BMI 27.76 kg/m²     Physical Exam   Constitutional: He is " oriented to person, place, and time. Vital signs are normal. He appears well-developed and well-nourished. He is cooperative.   HENT:   Head: Normocephalic and atraumatic.   Neck: Trachea normal and phonation normal.   Pulmonary/Chest: Effort normal. No respiratory distress.   Abdominal: Soft. Normal appearance. He exhibits no distension.   Musculoskeletal:        Right knee: He exhibits no effusion.   Neurological: He is alert and oriented to person, place, and time. GCS eye subscore is 4. GCS verbal subscore is 5. GCS motor subscore is 6.   Skin: Skin is warm, dry and intact.   Psychiatric: He has a normal mood and affect. His speech is normal and behavior is normal. Judgment and thought content normal. Cognition and memory are normal.   Vitals reviewed.      GAIT:     []  Normal  []  Antalgic    Assistive device: [x]  None  []  Walker     []  Crutches  []  Cane     []  Wheelchair  []  Stretcher    Right Knee Exam     Tenderness   The patient is experiencing no tenderness.         Range of Motion   Right knee extension: 3.   Right knee flexion: 110.     Other   Erythema: absent  Scars: present  Sensation: normal  Pulse: present  Swelling: mild  Other tests: no effusion present      Left Knee Exam   Left knee exam is normal.    Muscle Strength     The patient has normal left knee strength.              Xr Knee 1 Or 2 View Right    Result Date: 3/26/2018  Narrative: AP and lateral views of the right knee reveals a stable healing patella fracture with no other acute radiological abnormality noted.  There is retained surgical hardware noted in the patella with cerclage wire, there is no apparent failure of the surgical hardware 03/26/18 at 2:11 PM by LEIGHTON Rodriguez     Xr Foot 3+ View Left    Result Date: 3/26/2018  Narrative: PROCEDURE: Three views left foot COMPARISON: No comparison. HISTORY: Foot pain FINDINGS: No radiographic evidence of acute fracture. There are degenerative changes along the dorsal  aspect of the mid tarsal bones.     Impression: CONCLUSION:  No radiographic evidence of acute fracture Electronically signed by:  Beau Agudelo MD  3/26/2018 4:05 PM CDT Workstation: [id number]            ASSESSMENT:    Diagnoses and all orders for this visit:    Closed displaced comminuted fracture of right patella with routine healing, subsequent encounter          PLAN  Recommend continue progression range of motion and activity as tolerated, continued home exercises and follow-up as needed for exacerbations and pain or other problems.  No Follow-up on file.    Nitesh Fong, APRN

## 2018-04-09 ENCOUNTER — OFFICE VISIT (OUTPATIENT)
Dept: PODIATRY | Facility: CLINIC | Age: 68
End: 2018-04-09

## 2018-04-09 VITALS — BODY MASS INDEX: 27.85 KG/M2 | WEIGHT: 188 LBS | HEIGHT: 69 IN

## 2018-04-09 DIAGNOSIS — L60.0 INGROWN TOENAIL: Primary | ICD-10-CM

## 2018-04-09 PROCEDURE — 99212 OFFICE O/P EST SF 10 MIN: CPT | Performed by: PODIATRIST

## 2018-04-09 NOTE — PROGRESS NOTES
Vangie Lopez  1950  67 y.o. male     Patient presents today for left hallux toenail follow-up.      Chief Complaint   Patient presents with   • Left Foot - Follow-up           History of Present Illness    Patient presents to clinic today for follow-up of his left hallux toenail avulsion.  He has been soaking and dressing the toe as instructed.  He denies pain today.    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   • Hyperlipidemia    • Multiple trauma 09/06/2017   • Osteoarthritis of hands, bilateral     retired due to arthritis,    • Recent onset of diabetes mellitus          Past Surgical History:   Procedure Laterality Date   • COLONOSCOPY  12/08/2006   • COLONOSCOPY N/A 4/3/2017    Procedure: COLONOSCOPY;  Surgeon: Michael Ang MD;  Location: [facility] ENDOSCOPY;  Service:    • CYSTOSCOPY     • EXTERNAL FIXATION ANKLE FRACTURE Left 9/6/2017    Procedure: ANKLE EXTERNAL FIXATOR APPLICATION;  Surgeon: Simone Rivera DPM;  Location: [facility] OR;  Service:    • FOOT SURGERY     • KNEE SURGERY     • PATELLA OPEN REDUCTION INTERNAL FIXATION Right 9/8/2017    Procedure: OPEN REDUCTION INTERNAL FIXATION RIGHT PATELLA       (C-ARM#3);  Surgeon: Checo Rutherford MD;  Location: [facility] OR;  Service:    • SCAPHOID FRACTURE SURGERY Left 09/07/2017    fracture dislocation left foot, closed reduction with pinning   • SHOULDER SURGERY           Family History   Problem Relation Age of Onset   • Alzheimer's disease Mother    • Heart attack Father          Social History     Social History   • Marital status:      Spouse name: N/A   • Number of children: N/A   • Years of education:  N/A     Occupational History   •       retired due to arthritis in hands     Social History Main Topics   • Smoking status: Never Smoker   • Smokeless tobacco: Never Used   • Alcohol use Yes      Comment: social drinker/rarely uses alcohol   • Drug use: No   • Sexual activity: Defer     Other Topics Concern   • Not on file     Social History Narrative    Lives with wife, single story home, two steps to enter home, no ramp         Current Outpatient Prescriptions   Medication Sig Dispense Refill   • Blood Glucose Monitoring Suppl device Use as directed to monitor sugar 1 each 0   • Blood Glucose Monitoring Suppl device One lancet twice daily to check  Fingerstick  Sugar   E11.9 100 each 0   • ferrous sulfate 324 (65 Fe) MG tablet delayed-release EC tablet Take 1 tablet by mouth Daily With Breakfast. 30 tablet 0   • finasteride (PROSCAR) 5 MG tablet Take 5 mg by mouth Every Night.     • folic acid-vit B6-vit B12 (FOLBEE) 2.5-25-1 MG tablet tablet Take 1 tablet by mouth Daily. 30 tablet 0   • glucose blood test strip Check sugar twice a day 100 each 5   • hydrochlorothiazide (HYDRODIURIL) 25 MG tablet Take 12.5 mg by mouth Daily. Pt takes 1/2 (25 mg) tablet = 12.5 mg per dose.     • HYDROcodone-acetaminophen (NORCO) 7.5-325 MG per tablet Take 1 tablet by mouth Every 8 (Eight) Hours As Needed for Moderate Pain . 30 tablet 0   • metFORMIN ER (GLUCOPHAGE-XR) 500 MG 24 hr tablet Take 1 tablet by mouth 2 (Two) Times a Day With Meals. 60 tablet 1   • PHARMACY TO DOSE WARFARIN Continuous As Needed (twice weekly for 28 days). 1 each 0   • potassium citrate (UROCIT-K) 10 MEQ (1080 MG) CR tablet Take 10 mEq by mouth 2 (Two) Times a Day With Meals.     • pravastatin (PRAVACHOL) 40 MG tablet Take 20 mg by mouth Every Night. Pt takes 1/2 (40 mg) tablet = 20 mg per dose.     • sennosides-docusate sodium (SENOKOT-S) 8.6-50 MG tablet Take 1 tablet by mouth 2 (Two) Times a Day. 60 tablet 1   • sildenafil (VIAGRA) 100 MG  "tablet Take 100 mg by mouth Daily As Needed for erectile dysfunction.     • traZODone (DESYREL) 50 MG tablet Take 0.5 tablets by mouth Every Night. 30 tablet 0     No current facility-administered medications for this visit.          OBJECTIVE    Ht 175.3 cm (69\")   Wt 85.3 kg (188 lb)   BMI 27.76 kg/m²       Review of Systems   Constitutional: Negative for chills and fever.   Cardiovascular: Negative for chest pain.   Gastrointestinal: Negative for constipation, diarrhea, nausea and vomiting.        Constitutional: well developed, well nourished    HEENT: Normocephalic and atraumatic, normal hearing    Respiratory: Non labored respirations noted    Cardiovascular:    DP/PT pulses palpable    CFT brisk  to all digits    Dermatological:   Left hallux nail is absent with no signs of infection  Previous dorsal left foot wound has healed  No open wounds noted     Neurological:   Protective sensation intact    Sensation intact to light touch      Psychiatric: A&O x 3 with normal mood and affect. NAD.         Procedures        ASSESSMENT AND PLAN    Vangie was seen today for follow-up.    Diagnoses and all orders for this visit:    Ingrown toenail      - Continue soaking and dressing the toe until there is no drainage and a Band-Aid.  - All questions were answered   - RTC as needed              This document has been electronically signed by Simone Rivera DPM on April 9, 2018 5:32 PM     4/9/2018  5:32 PM       "

## 2018-08-25 NOTE — THERAPY TREATMENT NOTE
Outpatient Physical Therapy Ortho Treatment Note  [facility]     Patient Name: Vangie Lopez  : 1950  MRN: [id number]  Today's Date: 2018      Visit Date: 2018     Subjective Improvement 60%  Visits 8/10  Visits approved 14 from 2017 to 2018  RTMD 2018 Dr. Rivera 2018 Nitesh Fong  Recert 2018    Closed reduction percutaneous fixation L navicular fracture dislocation 2017 ORIF right patella fracture 2017      Visit Dx:    ICD-10-CM ICD-9-CM   1. Closed displaced transverse fracture of right patella with routine healing S82.031D V54.16   2. Closed displaced fracture of navicular bone of left foot with routine healing, subsequent encounter S92.252D V54.19       Patient Active Problem List   Diagnosis   • Encounter for screening for malignant neoplasm of colon   • Closed displaced comminuted fracture of right patella   • Closed displaced fracture of navicular bone of left foot   • Closed dislocation of navicular bone of left foot   • Patella fracture   • Essential hypertension   • Mixed hyperlipidemia   • Osteoarthritis of hands, bilateral   • BPH (benign prostatic hyperplasia)   • Glaucoma   • Hyperlipidemia   • Closed fracture of navicular bone with dislocation of perilunate joint of left wrist   • Fracture of patella, right, closed   • Multiple trauma   • Encounter for rehabilitation   • History of kidney stones   • Recent onset of diabetes mellitus        Past Medical History:   Diagnosis Date   • BPH (benign prostatic hyperplasia)    • Cancer     skin cancer   • Closed fracture of navicular bone with dislocation of perilunate joint of left wrist 2017    closed reduction pinning  17   • Essential hypertension 2017   • Fracture of patella, right, closed 2017    orif 17   • Glaucoma     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   • Hyperlipidemia    • Multiple trauma 2017   • Osteoarthritis of hands, bilateral     retired due to  Progress Note - Michelle Truong 72 y o  female MRN: [id number]    Unit/Bed#: [place] [address] -01 Encounter: [id number]    Assessment/Plan:    ESBL E coli UTI/pyelonephritis continue ertapenem per Infectious Disease flank pain improved    Hypothyroid    continue Synthroid    Neuropathy    pain controlled with Neurontin    Chronic back pain/spasm  relief with Zanaflex    MDD/anxiety    mood stable with Effexor and Klonopin    GERD     continue PPI for acid control    Hypertension    will start lisinopril blood pressure control    Subjective:   Feels much better left flank pain denies chest pain shortness of breath nausea vomiting diarrhea no fevers chills appetite is okay    Objective:     Vitals: Blood pressure 161/77, pulse 69, temperature 97 5 °F (36 4 °C), temperature source Temporal, resp  rate 18, height 5' 4" (1 626 m), weight 106 kg (234 lb), SpO2 97 %  ,Body mass index is 40 17 kg/m²          Results from last 7 days  Lab Units 08/22/18  1712 08/21/18  2207   WBC Thousand/uL 7 89 9 45   HEMOGLOBIN g/dL 13 0 12 8   HEMATOCRIT % 39 6 38 3   PLATELETS Thousands/uL 265 250   INR   --  0 95       Results from last 7 days  Lab Units 08/22/18  1712 08/21/18  2207   SODIUM mmol/L 139 139   POTASSIUM mmol/L 3 9 3 4*   CHLORIDE mmol/L 101 104   CO2 mmol/L 28 29   BUN mg/dL 11 13   CREATININE mg/dL 0 66 0 74   CALCIUM mg/dL 9 1 8 9   TOTAL PROTEIN g/dL  --  7 4   BILIRUBIN TOTAL mg/dL  --  0 36   ALK PHOS U/L  --  114   ALT U/L  --  25   AST U/L  --  20   GLUCOSE RANDOM mg/dL 85 120       Scheduled Meds:    Current Facility-Administered Medications:  acetaminophen 650 mg Oral Q6H PRN Ethan Ray, DO    budesonide-formoterol 1 puff Inhalation Daily Logn Ray, DO    clonazePAM 0 5 mg Oral BID PRN Ethan Ray, DO    dextran 70-hypromellose 1 drop Both Eyes Q12H [address] [facility], DO    enoxaparin 40 mg Subcutaneous Q24H [address] [place] Ray, DO    ertapenem 1,000 mg Intravenous Q24H Ethan Ray, DO Last Rate: Stopped (08/24/18 5963)   fluticasone 1 spray Each Nare Daily Alexsandra Simple, DO    gabapentin 300 mg Oral TID Alexsandra Simple, DO    hydrALAZINE 10 mg Intravenous Q6H PRN Alexsandra Simple, DO    levothyroxine 25 mcg Oral Daily Before [address], DO    loratadine 10 mg Oral Daily Alexsandra Simple, DO    pantoprazole 40 mg Oral Early Morning Alexsandra Simple, DO    tiZANidine 2 mg Oral Daily Alexsandra Simple, DO    traMADol 50 mg Oral Q6H PRN Alexsandra Simple, DO    venlafaxine 75 mg Oral Daily Alexsandra Simple, DO        Continuous Infusions:     Physical exam:  General appearance:  Alert no distress interaction appropriate   Head/Eyes:  Nonicteric PERRL EOMI  Neck:  Supple  Lungs: CTA bilateral no wheezing rhonchi or rales  Heart: normal S1 S2 regular  Abdomen: Soft nontender with bowel sounds  Extremities: no edema  Skin: no rash    Invasive Devices     Peripheral Intravenous Line            Peripheral IV 08/22/18 Right Antecubital 2 days                      Counseling / Coordination of Care  Total floor / unit time spent today  30   minutes  Greater than 50% of total time was spent with the patient and / or family counseling and / or coordination of care  A description of the counseling / coordination of care:  Discussed with Infectious Disease  arthritis,    • Recent onset of diabetes mellitus         Past Surgical History:   Procedure Laterality Date   • COLONOSCOPY  12/08/2006   • COLONOSCOPY N/A 4/3/2017    Procedure: COLONOSCOPY;  Surgeon: Michael Ang MD;  Location: [facility] ENDOSCOPY;  Service:    • CYSTOSCOPY     • EXTERNAL FIXATION ANKLE FRACTURE Left 9/6/2017    Procedure: ANKLE EXTERNAL FIXATOR APPLICATION;  Surgeon: Simone Rivera DPM;  Location: [facility] OR;  Service:    • FOOT SURGERY     • KNEE SURGERY     • PATELLA OPEN REDUCTION INTERNAL FIXATION Right 9/8/2017    Procedure: OPEN REDUCTION INTERNAL FIXATION RIGHT PATELLA       (C-ARM#3);  Surgeon: Checo Rutherford MD;  Location: [facility] OR;  Service:    • SCAPHOID FRACTURE SURGERY Left 09/07/2017    fracture dislocation left foot, closed reduction with pinning   • SHOULDER SURGERY               PT Ortho       01/18/18 0800    Precautions and Contraindications    Precautions/Limitations no known precautions/limitations  -CP    Posture/Observations    Posture/Observations Comments antalgic gait  -CP      User Key  (r) = Recorded By, (t) = Taken By, (c) = Cosigned By    Initials Name Provider Type    CP Sagrario Jacobsen PTA Physical Therapy Assistant                            PT Assessment/Plan       01/18/18 0913       PT Assessment    Assessment Comments Increased knee arom after ther ex and stretching  -CP     PT Plan    PT Frequency 2x/week  -CP     Predicted Duration of Therapy Intervention (days/wks) 4 weeks  -CP     PT Plan Comments cont with POC.  resisted walks on CC  -CP       User Key  (r) = Recorded By, (t) = Taken By, (c) = Cosigned By    Initials Name Provider Type    CP Sagrario Jacobsen PTA Physical Therapy Assistant                    Exercises       01/18/18 0800          Subjective Comments    Subjective Comments Main c/o is stiffness.  States he is able to do more.  Is now able to get into his truck.  -CP      Subjective Pain    Able to rate subjective pain? yes  -CP       Pre-Treatment Pain Level 0  -CP      Post-Treatment Pain Level 0  -CP      Subjective Pain Comment stiff  -CP      Aquatics    Aquatics performed? No  -CP      Exercise 1    Exercise Name 1 Pro II level 4  -CP      Time (Minutes) 1 10  -CP      Exercise 2    Exercise Name 2 incline stretch  -CP      Sets 2 3  -CP      Time (Seconds) 2 30  -CP      Exercise 3    Exercise Name 3 Lunge stretch  -CP      Sets 3 3  -CP      Time (Seconds) 3 30  -CP      Exercise 4    Exercise Name 4 Step up BOSU  -CP      Cueing 4 Demo  -CP      Sets 4 2  -CP      Reps 4 10  -CP      Time (Minutes) 4 bilateral  -CP      Exercise 5    Exercise Name 5 soleus stretch  -CP      Sets 5 3  -CP      Time (Seconds) 5 30  -CP      Exercise 6    Exercise Name 6 lat step up with opposite LE hip AB  -CP      Cueing 6 Demo  -CP      Sets 6 2  -CP      Reps 6 10  -CP      Exercise 7    Exercise Name 7 wall sits with hip AB squeezes  -CP      Reps 7 10  -CP      Time (Seconds) 7 15  -CP      Exercise 8    Exercise Name 8 cybex leg press  -CP      Sets 8 3  -CP      Reps 8 10  -CP      Time (Minutes) 8 100 lb  -CP      Exercise 9    Exercise Name 9 sit to stand independent  -CP      Reps 9 10  -CP      Exercise 10    Exercise Name 10 LLPS knee fl stretch  -CP      Sets 10 5  -CP      Time (Seconds) 10 30  -CP      Exercise 11    Exercise Name 11 Heelslides with strap  -CP      Reps 11 30  -CP      Exercise 12    Exercise Name 12 SL'ing bike  -CP      Reps 12 20  -CP      Time (Minutes) 12 bike  -CP      Exercise 13    Exercise Name 13 Rocker board for DF/PF  -CP      Reps 13 30  -CP        User Key  (r) = Recorded By, (t) = Taken By, (c) = Cosigned By    Initials Name Provider Type    CP Sagrario Jacobsen PTA Physical Therapy Assistant                               PT OP Goals       01/18/18 0900       PT Short Term Goals    STG Date to Achieve 01/11/18   further STGs deferred  -CP     STG 1 Note a >/= 50% subjective improvement  -CP     STG 1 Progress  Met  -CP     STG 2 LEFS score to be >/= 40/80  -CP     STG 2 Progress Not Met  -CP     STG 3 Increase R knee active extension by >/= 5 degrees.   -CP     STG 3 Progress Met  -CP     STG 4 Increase R knee active flexion to >/= 100 degrees  -CP     STG 4 Progress Met  -CP     STG 5 Increase B ankle DF to neutral or better  -CP     STG 5 Progress Met  -CP     Long Term Goals    LTG Date to Achieve 02/01/18  -CP     LTG 1 Independent with HEP  -CP     LTG 1 Progress Ongoing  -CP     LTG 2 LEFS score to be >/= 55/80  -CP     LTG 2 Progress Not Met;Ongoing  -CP     LTG 3 R knee AROM WNLs  -CP     LTG 3 Progress Not Met;Progressing;Ongoing  -CP     LTG 4 L ankle AROM WFLs  -CP     LTG 4 Progress Not Met;Progressing;Ongoing  -CP     LTG 5 Demonstrate ability to ascend and descend 5 steps reciprocally  -CP     LTG 5 Progress Not Met;Ongoing  -CP     Time Calculation    PT Goal Re-Cert Due Date 02/01/18  -CP       User Key  (r) = Recorded By, (t) = Taken By, (c) = Cosigned By    Initials Name Provider Type    CP Sagrario Jacobsen PTA Physical Therapy Assistant          Therapy Education  Given: HEP  Program: Reinforced  How Provided: Verbal, Demonstration  Provided to: Patient  Level of Understanding: Verbalized, Demonstrated              Time Calculation:   Start Time: 0805  Stop Time: 0910  Time Calculation (min): 65 min  Total Timed Code Minutes- PT: 65 minute(s)    Therapy Charges for Today     Code Description Service Date Service Provider Modifiers Qty    [id number] HC PT THER PROC EA 15 MIN 1/18/2018 Sagrario Jacobsen PTA GP 4                    Sagrario Jacobsen PTA  1/18/2018

## 2021-01-24 NOTE — THERAPY PROGRESS REPORT/RE-CERT
Outpatient Physical Therapy Ortho Progress Note  [facility]     Patient Name: Vangie Lopez  : 1950  MRN: [id number]  Today's Date: 2018      Visit Date: 2018  Attendance:  (14 approved)  Subjective Improvement: 70%  Next MD Appt: 18  Recert Date: 18    Therapy Diagnosis: 1) Closed reduction percutaneous fixation L navicular fracture-dislocation 17; 2) ORIF R patella fracture 17       Changes in Medications: none noted  Changes in MD Orders: none noted  Number of Work Days Lost: n/a         Past Medical History:   Diagnosis Date   • BPH (benign prostatic hyperplasia)    • Cancer     skin cancer   • Closed fracture of navicular bone with dislocation of perilunate joint of left wrist 2017    closed reduction pinning  17   • Essential hypertension 2017   • Fracture of patella, right, closed 2017    orif 17   • Glaucoma     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 [place] ENDOSCOPY;  Service:    • CYSTOSCOPY     • EXTERNAL FIXATION ANKLE FRACTURE Left 2017    Procedure: ANKLE EXTERNAL FIXATOR APPLICATION;  Surgeon: Simone Rivera DPM;  Location: [facility] [place] OR;  Service:    • FOOT SURGERY     • KNEE SURGERY     • PATELLA OPEN REDUCTION INTERNAL FIXATION Right 2017    Procedure: OPEN REDUCTION INTERNAL FIXATION RIGHT PATELLA       (C-ARM#3);  Surgeon: Checo Rutherford MD;  Location: [facility] [place] OR;  Service:    • SCAPHOID FRACTURE SURGERY Left 2017    fracture dislocation left foot, closed reduction with pinning   • SHOULDER SURGERY         Visit Dx:     ICD-10-CM ICD-9-CM   1. Closed displaced transverse fracture of right  Problem: Falls - Risk of:  Goal: Will remain free from falls  Description: Will remain free from falls  1/24/2021 0735 by Althea Moore RN  Outcome: Ongoing  Note: Fall risk assessment completed . Fall precautions in place, bed alarm on, side rails 2/4 up, call light in reach, educated pt on calling for assistance when needed, room clear of clutter. Pt verbalized understanding. 1/23/2021 2259 by Dixon Solano RN  Outcome: Ongoing  Note: Patient educated on fall prevention. Call light is within reach, bed locked in lowest position, personal items within reach, and bed alarm is on. Will round on patient per unit guidelines. Goal: Absence of physical injury  Description: Absence of physical injury  1/24/2021 0735 by Althea Moore RN  Outcome: Ongoing  1/23/2021 2259 by Dixon Solano RN  Outcome: Ongoing  Note: Pt is free of injury. No injury noted. Fall precautions in place. Call light within reach. Will monitor. Problem: Pain:  Goal: Pain level will decrease  Description: Pain level will decrease  1/24/2021 0735 by Althea Moore RN  Outcome: Ongoing  Note: Pain /discomfort being managed with PRN analgesics per MD orders. Patient able to express presence and absence of pain and rate pain appropriately using numerical scale. 1/23/2021 2259 by Dixon Solano RN  Outcome: Ongoing  Note: Pain /discomfort being managed with PRN analgesics per MD orders, rest, repositioning, emotional support. . . Patient able to express presence and absence of pain and rate pain appropriately using numerical scale. Goal: Control of acute pain  Description: Control of acute pain  1/24/2021 0735 by Althea Moore RN  Outcome: Ongoing  1/23/2021 2259 by Dixon Solano RN  Outcome: Ongoing  Note: Pain /discomfort being managed with PRN analgesics per MD orders, rest, repositioning, emotional support. . . Patient able to express presence and absence of pain and rate pain appropriately using numerical scale. patella with routine healing S82.031D V54.16   2. Closed displaced fracture of navicular bone of left foot with routine healing, subsequent encounter S92.252D V54.19                 PT Ortho       02/07/18 0800    Subjective Pain    Post-Treatment Pain Level 0  -SS    Subjective Pain Comment declines modalities  -SS    Right Knee    Extension/Flexion AROM Deficit 0-100  -SS    Left Ankle    Dorsiflexion AROM Deficit 10  -SS    Plantarflexion AROM Deficit 45  -SS    Inversion AROM Deficit 26   sore  -SS    Eversion AROM Deficit 10  -SS    Left Hip    Hip Flexion Gross Movement (5/5) normal  -SS    Hip Extension Gross Movement (5/5) normal  -SS    Hip ABduction Gross Movement (5/5) normal  -SS    Hip ADduction Gross Movement (5/5) normal  -SS    Hip Int (Medial) Rotation Gross Movement (5/5) normal  -SS    Hip Ext (Lat) Rotation Gross Movement (5/5) normal  -SS    Right Hip    Hip Flexion Gross Movement (5/5) normal  -SS    Hip Extension Gross Movement (5/5) normal  -SS    Hip ABduction Gross Movement (5/5) normal  -SS    Hip ADduction Gross Movement (5/5) normal  -SS    Hip Int (Medial) Rotation Gross Movement (5/5) normal  -SS    Hip Ext (Lat) Rotation Gross Movement (5/5) normal  -SS    Lower Extremity    Lower Ext Manual Muscle Testing Detail Rectus femoris - B 5/5  -SS    Left Knee    Knee Extension Gross Movement (5/5) normal  -SS    Knee Flexion Gross Movement (5/5) normal  -SS    Right Knee    Knee Extension Gross Movement (5/5) normal  -SS    Knee Flexion Gross Movement (5/5) normal  -SS    Left Ankle/Foot    Ankle PF Gross Movement (5/5) normal  -SS    Ankle Dorsiflexion Gross Movement (5/5) normal  -SS    Subtalar Inversion Gross Movement (5/5) normal  -SS    Subtalar Eversion Gross Movement (5/5) normal  -SS    Right Ankle/Foot    Ankle PF Gross Movement (5/5) normal  -SS    Ankle Dorsiflexion Gross Movement (5/5) normal  -SS    Subtalar Inversion Gross Movement (5/5) normal  -SS    Subtalar Eversion Gross  Movement (5/5) normal  -    Gait Assessment/Treatment    Gait, [place] Level independent  -    Stairs Assessment/Treatment    Number of Stairs 5  -    Stairs, Handrail Location right side (ascending)  -    Stairs, [place] Level independent  -    Stairs, Assistive Device --   none  -    Stairs, Technique Used step over step (ascending);step over step (descending)  -    Stairs, Comment uncomfortable  -      User Key  (r) = Recorded By, (t) = Taken By, (c) = Cosigned By    Initials Name Provider Type    VINNIE Hernandez, PT DPT Physical Therapist                      Therapy Education  Given: HEP  Program: Reinforced  How Provided: Verbal, Demonstration  Provided to: Patient  Level of Understanding: Verbalized, Demonstrated           PT OP Goals       02/07/18 0800       PT Short Term Goals    STG Date to Achieve --   further STGs deferred  -     STG 1 --  -     STG 1 Progress --  -SS     STG 2 --  -SS     STG 2 Progress --  -SS     STG 3 --  -     STG 3 Progress --  -     STG 4 --  -     STG 4 Progress --  -     STG 5 --  -     STG 5 Progress --  -     Long Term Goals    LTG Date to Achieve 02/28/18  -     LTG 1 Independent with HEP  -     LTG 1 Progress Progressing  -     LTG 2 LEFS score to be >/= 55/80  -     LTG 2 Progress Ongoing;Not Met  -     LTG 3 R knee AROM WNLs  -     LTG 3 Progress Progressing;Not Met  -     LTG 4 L ankle AROM WFLs  -     LTG 4 Progress Met  -     LTG 5 Demonstrate ability to ascend and descend 5 steps reciprocally  -     LTG 5 Progress Met  -     Time Calculation    PT Goal Re-Cert Due Date 02/28/18  -       User Key  (r) = Recorded By, (t) = Taken By, (c) = Cosigned By    Initials Name Provider Type    VINNIE Hernandez, PT DPT Physical Therapist                PT Assessment/Plan       02/07/18 0800       PT Assessment    Functional Limitations Impaired gait;Limitations in community activities;Performance in  "leisure activities  -SS     Impairments Balance;Endurance;Gait;Pain;Range of motion;Muscle strength  -     Assessment Comments Balance trouble. Improved strength. Continued knee flexion AROM limitations.  -SS     Rehab Potential Good  -SS     Patient/caregiver participated in establishment of treatment plan and goals Yes  -SS     Patient would benefit from skilled therapy intervention Yes  -SS     PT Plan    PT Frequency Other (comment)   see below  -SS     PT Plan Comments 1 more visit the [employer]. LE strengthening, balance training, ROM, stretching. Finalize HEP  -       User Key  (r) = Recorded By, (t) = Taken By, (c) = Cosigned By    Initials Name Provider Type    SS Jarod Hernandez, PT DPT Physical Therapist                  Exercises       02/07/18 0800          Subjective Comments    Subjective Comments Can stand and/or walk for about an hour before he gets tired and painful. 70% subjective improvement. Has not tried to run or squat yet. Has started back to walking on his treadmill. Patient reports \"a little trouble\" getting knee positioned when entering or exiting his car.   -      Subjective Pain    Able to rate subjective pain? yes  -SS      Pre-Treatment Pain Level 0  -SS      Post-Treatment Pain Level 0  -SS      Subjective Pain Comment declines modalities  -SS      Exercise 1    Exercise Name 1 Pro2, Seat 11  -SS      Cueing 1 Verbal  -SS      Time (Minutes) 1 10 mins  -SS      Additional Comments Level 5  -SS      Exercise 2    Exercise Name 2 Incline stretch  -SS      Cueing 2 Verbal  -SS      Sets 2 3  -SS      Time (Seconds) 2 30 sec hold  -SS      Exercise 3    Exercise Name 3 HS stretch  -SS      Cueing 3 Verbal  -SS      Sets 3 3  -SS      Time (Seconds) 3 30 sec hold  -SS      Exercise 4    Exercise Name 4 Wall sits  -SS      Cueing 4 Verbal;Demo  -SS      Sets 4 2  -SS      Reps 4 10  -SS      Time (Seconds) 4 5 sec hold  -SS      Exercise 5    Exercise Name 5 Cybex Hip Abd  " -SS      Cueing 5 Verbal  -SS      Sets 5 3  -SS      Reps 5 20  -SS      Exercise 6    Exercise Name 6 Cybex Hip Add  -SS      Cueing 6 Verbal  -SS      Sets 6 3  -SS      Reps 6 10  -SS      Additional Comments 60#  -SS      Exercise 7    Exercise Name 7 Cybex Ham Curl  -SS      Cueing 7 Verbal  -SS      Sets 7 3  -SS      Reps 7 10  -SS      Additional Comments 50#  -SS      Exercise 8    Exercise Name 8 Lunges - B lead  -SS      Cueing 8 --   verbal; demo  -SS      Sets 8 2  -SS      Reps 8 10  -SS      Exercise 9    Exercise Name 9 Sharpened [place]berg  -SS      Cueing 9 --   verbal; demo  -SS      Sets 9 1  -SS      Reps 9 10  -SS      Exercise 10    Exercise Name 10 SLS - B  -SS      Cueing 10 Verbal  -SS      Sets 10 1  -SS      Reps 10 10  -SS      Exercise 11    Exercise Name 11 Heel-toe walk  -SS      Cueing 11 --   verbal; demo  -SS      Reps 11 4  -SS      Additional Comments 10 steps  -SS        User Key  (r) = Recorded By, (t) = Taken By, (c) = Cosigned By    Initials Name Provider Type     Jarod Hernandez, PT DPT Physical Therapist                        Outcome Measure Options: Lower Extremity Functional Scale (LEFS)  Lower Extremity Functional Index  Any of your usual work, housework or school activities: A little bit of difficulty  Your usual hobbies, recreational or sporting activities: Moderate difficulty  Getting into or out of the bath: A little bit of difficulty  Walking between rooms: A little bit of difficulty  Putting on your shoes or socks: A little bit of difficulty  Squatting: Moderate difficulty  Lifting an object, like a bag of groceries from the floor: Moderate difficulty  Performing light activities around your home: Moderate difficulty  Performing heavy activities around your home: Moderate difficulty  Getting into or out of a car: A little bit of difficulty  Walking 2 blocks: Moderate difficulty  Walking a mile: Moderate difficulty  Going up or down 10 stairs (about 1 flight  of stairs): Moderate difficulty  Standing for 1 hour: Moderate difficulty  Sitting for 1 hour: A little bit of difficulty  Running on even ground: Quite a bit of difficulty  Running on uneven ground: Quite a bit of difficulty  Making sharp turns while running fast: Quite a bit of difficulty  Hopping: Quite a bit of difficulty  Rolling over in bed: A little bit of difficulty  Total: 43      Time Calculation:   Start Time: 0843  Stop Time: 0936  Time Calculation (min): 53 min     Therapy Charges for Today     Code Description Service Date Service Provider Modifiers Qty    55915518153 HC PT THER PROC EA 15 MIN 2/7/2018 Jarod Hernandez, PT DPT GP 4                   Jarod Hernandez, PT, DPT, CHT  2/7/2018

## 2021-02-12 ENCOUNTER — IMMUNIZATION (OUTPATIENT)
Dept: VACCINE CLINIC | Facility: HOSPITAL | Age: 71
End: 2021-02-12

## 2021-02-12 PROCEDURE — 0001A: CPT | Performed by: THORACIC SURGERY (CARDIOTHORACIC VASCULAR SURGERY)

## 2021-02-12 PROCEDURE — 91300 HC SARSCOV02 VAC 30MCG/0.3ML IM: CPT | Performed by: THORACIC SURGERY (CARDIOTHORACIC VASCULAR SURGERY)

## 2021-03-05 ENCOUNTER — IMMUNIZATION (OUTPATIENT)
Dept: VACCINE CLINIC | Facility: HOSPITAL | Age: 71
End: 2021-03-05

## 2021-03-05 PROCEDURE — 0002A: CPT | Performed by: THORACIC SURGERY (CARDIOTHORACIC VASCULAR SURGERY)

## 2021-03-05 PROCEDURE — 91300 HC SARSCOV02 VAC 30MCG/0.3ML IM: CPT | Performed by: THORACIC SURGERY (CARDIOTHORACIC VASCULAR SURGERY)

## 2023-02-07 ENCOUNTER — CLINICAL SUPPORT (OUTPATIENT)
Dept: AUDIOLOGY | Facility: CLINIC | Age: 73
End: 2023-02-07
Payer: MEDICARE

## 2023-02-07 ENCOUNTER — OFFICE VISIT (OUTPATIENT)
Dept: OTOLARYNGOLOGY | Facility: CLINIC | Age: 73
End: 2023-02-07
Payer: MEDICARE

## 2023-02-07 VITALS — BODY MASS INDEX: 25.98 KG/M2 | OXYGEN SATURATION: 100 % | WEIGHT: 175.4 LBS | HEIGHT: 69 IN

## 2023-02-07 DIAGNOSIS — H90.3 SENSORINEURAL HEARING LOSS (SNHL) OF BOTH EARS: Primary | ICD-10-CM

## 2023-02-07 DIAGNOSIS — H90.3 SENSORINEURAL HEARING LOSS OF BOTH EARS: Primary | ICD-10-CM

## 2023-02-07 PROCEDURE — 92567 TYMPANOMETRY: CPT | Performed by: AUDIOLOGIST

## 2023-02-07 PROCEDURE — 92557 COMPREHENSIVE HEARING TEST: CPT | Performed by: AUDIOLOGIST

## 2023-02-07 PROCEDURE — 99203 OFFICE O/P NEW LOW 30 MIN: CPT | Performed by: OTOLARYNGOLOGY

## 2023-02-07 RX ORDER — GLIPIZIDE 2.5 MG/1
TABLET, EXTENDED RELEASE ORAL
COMMUNITY
Start: 2023-01-05

## 2023-02-07 RX ORDER — ZINC GLUCONATE 50 MG
TABLET ORAL
COMMUNITY

## 2023-02-07 RX ORDER — ROSUVASTATIN CALCIUM 40 MG/1
40 TABLET, COATED ORAL DAILY
COMMUNITY
End: 2023-02-07

## 2023-02-07 NOTE — PROGRESS NOTES
STANDARD AUDIOMETRIC EVALUATION      Name:  Vangie Lopez  :  1950  Age:  72 y.o.  Date of Evaluation:  2023      HISTORY    Reason for visit:  Vangie Lopez is seen today for a hearing test at the request of Dr. Beau Ross.  Patient reports he has problems hearing.  He states he has trouble hearing his wife, and he has to ask her to repeat.        EVALUATION    See Audiogram    RESULTS        Otoscopy and Tympanometry 226 Hz :  Right Ear:  Otoscopy:  Testing completed after ears were examined by the ENT physician          Tympanometry:  Middle ear function within normal limits    Left Ear:   Otoscopy:  Testing completed after ears were examined by the ENT physician        Tympanometry:  Middle ear function within normal limits    Test technique:  Standard Audiometry     Pure Tone Audiometry:   Patient responded to pure tones at 25-55 dB for 250-8000 Hz in right ear, and at 25-60 dB for 250-8000 Hz in left ear.       Speech Audiometry:        Right Ear:  Speech Reception Threshold (SRT) was obtained at 30 dBHL                 Speech Discrimination scores were 72% in quiet when words were presented at 70 dBHL       Left Ear:  Speech Reception Threshold (SRT) was obtained at 30 dBHL                 Speech Discrimination scores were 72% in quiet when words were presented at 70 dBHL    Reliability:   good    IMPRESSIONS:  1.  Tympanometry results are consistent with Middle ear function within normal limits in both ears.  2.  Pure tone results are consistent with mild to moderate sloping sensorineural hearing loss  for both ears.       RECOMMENDATIONS:  Patient is seeing the Ear Nose and Throat physician immediately following this examination.  It was a pleasure seeing Vangie Lopez in Audiology today.  We would be happy to do further testing or discuss these test as necessary.          This document has been electronically signed by Ana Hernandez MS CCC-A on February  7, 2023 16:44 CST       Ana Hernandez, MS CCC-A  Licensed Audiologist

## 2023-02-10 NOTE — PROGRESS NOTES
"Subjective   Vangielisa Lopez is a 72 y.o. male.       History of Present Illness   Patient states he is here because \"my wife says I cannot hear\".  Does have a history of noise exposure.  Denies tinnitus.  No previous otologic surgery.  No otorrhea.      The following portions of the patient's history were reviewed and updated as appropriate: allergies, current medications, past family history, past medical history, past social history, past surgical history and problem list.     reports that he has never smoked. He has never used smokeless tobacco. He reports current alcohol use. He reports that he does not use drugs.   Patient is not a tobacco user and has not been counseled for use of tobacco products      Review of Systems        Objective   Physical Exam  Ears: External ears no deformity, canals no discharge, tympanic membranes intact clear and mobile bilaterally.    Audiogram is obtained and reviewed and shows a bilateral mild to moderate sensorineural hearing loss with type a tympanograms.  Discrimination scores are 72% bilaterally.         Assessment and Plan   Diagnoses and all orders for this visit:    1. Sensorineural hearing loss of both ears (Primary)               Plan: Explained to the patient that no medicine or surgery was likely to improve his hearing.  He should avoid unnecessary exposure to loud noise and use ear protection when unavoidably around loud noise.  He is medically cleared for amplification if he so desires.  Follow-up with me as needed.  "

## 2023-02-16 ENCOUNTER — OFFICE VISIT (OUTPATIENT)
Dept: CARDIOLOGY | Facility: CLINIC | Age: 73
End: 2023-02-16
Payer: MEDICARE

## 2023-02-16 VITALS
WEIGHT: 177.8 LBS | HEIGHT: 69 IN | SYSTOLIC BLOOD PRESSURE: 158 MMHG | DIASTOLIC BLOOD PRESSURE: 94 MMHG | HEART RATE: 83 BPM | OXYGEN SATURATION: 98 % | BODY MASS INDEX: 26.33 KG/M2 | TEMPERATURE: 97.9 F

## 2023-02-16 DIAGNOSIS — R94.31 ABNORMAL EKG: ICD-10-CM

## 2023-02-16 DIAGNOSIS — R07.9 CHEST PAIN, UNSPECIFIED TYPE: Primary | ICD-10-CM

## 2023-02-16 PROCEDURE — 99204 OFFICE O/P NEW MOD 45 MIN: CPT | Performed by: INTERNAL MEDICINE

## 2023-02-16 PROCEDURE — 93000 ELECTROCARDIOGRAM COMPLETE: CPT | Performed by: INTERNAL MEDICINE

## 2023-02-16 RX ORDER — BRIMONIDINE TARTRATE 2 MG/ML
1 SOLUTION/ DROPS OPHTHALMIC 2 TIMES DAILY
COMMUNITY

## 2023-02-16 RX ORDER — TIMOLOL MALEATE 5 MG/ML
1 SOLUTION/ DROPS OPHTHALMIC 2 TIMES DAILY
COMMUNITY

## 2023-02-16 RX ORDER — ROSUVASTATIN CALCIUM 40 MG/1
40 TABLET, COATED ORAL DAILY
COMMUNITY

## 2023-02-16 NOTE — PROGRESS NOTES
[facility] Cardiology  OFFICE NOTE    Cardiovascular Medicine  Esequiel Jimenez M.D., [facility], FSCAI, RPVI         Zahra CarmonaJosefina, APRN  [address] [place] [address]    Thank you for asking me to see Vangie Lopez for Chest pain.    History of Present Illness  This is a 72 y.o. male with:    1.  Hypertension  2.  Diabetes  3.  Hyperlipidemia    Vangie Lopez is a 72 y.o. male who presents for consultation today.  Patient reported that he has significant family history of premature coronary artery disease his father had a massive heart attack at the age of 45.  He has a history of hypertension, hyperlipidemia and diabetes.  Follows with primary care physician.  Recently started having intermittent chest pains and was reported to have an abnormal EKG at primary care physician's office was referred here for further evaluation.  Most of the episodes are happening when he is driving, he described the pain as aching in character, localized to the left side of the chest, would radiate to his left shoulder.  Gets worse when he is moving his left shoulder.  Occasionally also has pain when he is swallowing.  Denying any chest pain with exertion.    Review of Systems - ROS  Constitution: Negative for weakness, weight gain and weight loss.   HENT: Negative for congestion.    Eyes: Negative for blurred vision.   Cardiovascular: As mentioned above  Respiratory: Negative for cough and hemoptysis.    Endocrine: Negative for polydipsia and polyuria.   Hematologic/Lymphatic: Negative for bleeding problem. Does not bruise/bleed easily.   Skin: Negative for flushing.   Musculoskeletal: Negative for neck pain and stiffness.   Gastrointestinal: Negative for abdominal pain, diarrhea, jaundice, melena, nausea and vomiting.   Genitourinary: Negative for dysuria and hematuria.   Neurological: Negative for dizziness, focal weakness and numbness.   Psychiatric/Behavioral: Negative for altered mental  "status and depression.     I reviewed the ROS as documented here and confirmed the accuracy of it with the patient today. 2/16/2023        All other systems were reviewed and were negative.    family history includes Alzheimer's disease in his mother; Heart attack in his father.     reports that he has never smoked. He has never used smokeless tobacco. He reports current alcohol use. He reports that he does not use drugs.    No Known Allergies      Current Outpatient Medications:   •  finasteride (PROSCAR) 5 MG tablet, Take 5 mg by mouth Every Night., Disp: , Rfl:   •  glipizide (GLUCOTROL XL) 2.5 MG 24 hr tablet, , Disp: , Rfl:   •  hydrochlorothiazide (HYDRODIURIL) 25 MG tablet, Take 12.5 mg by mouth Daily. Pt takes 1/2 (25 mg) tablet = 12.5 mg per dose., Disp: , Rfl:   •  metFORMIN (GLUCOPHAGE) 1000 MG tablet, , Disp: , Rfl:   •  potassium citrate (UROCIT-K) 10 MEQ (1080 MG) CR tablet, Take 10 mEq by mouth 2 (Two) Times a Day With Meals., Disp: , Rfl:   •  rosuvastatin (CRESTOR) 40 MG tablet, Take 40 mg by mouth Daily. 1/2 TABLET DAILY, Disp: , Rfl:   •  sildenafil (VIAGRA) 100 MG tablet, Take 100 mg by mouth Daily As Needed for erectile dysfunction., Disp: , Rfl:   •  Turmeric (QC TUMERIC COMPLEX PO), Take  by mouth., Disp: , Rfl:   •  Zinc 50 MG tablet, Take  by mouth., Disp: , Rfl:     Physical Exam:  Vitals:    02/16/23 0919   BP: 158/94   BP Location: Left arm   Patient Position: Sitting   Cuff Size: Adult   Pulse: 83   Temp: 97.9 °F (36.6 °C)   SpO2: 98%   Weight: 80.6 kg (177 lb 12.8 oz)   Height: 175.3 cm (69\")     Current Pain Level: none  Pulse Ox: Normal  on room air  General: alert, appears stated age and cooperative     Body Habitus: well-nourished    HEENT: Head: Normocephalic, no lesions, without obvious abnormality. No arcus senilis, xanthelasma or xanthomas.    Neuro: alert, oriented x3  Pulses: 2+ and symmetric  JVP: Volume/Pulsation: Normal.  Normal waveforms.   Appropriate inspiratory " decrease.  No Kussmaul's. No Emile's.   Carotid Exam: no bruit normal pulsation bilaterally   Carotid Volume: normal.     Respirations: no increased work of breathing   Chest:  Normal    Pulmonary:Normal   Precordium: Normal impulses. P2 is not palpable.  RV Heave: absent  LV Heave: absent  [place]:  normal size and placement  Palpable S4: absent.  Heart rate: normal    Heart Rhythm: regular     Heart Sounds: S1: normal  S2: normal  S3: absent   S4: absent  Opening Snap: absent    Pericardial Rub:  Absent: .    Abdomen:   Appearance: normal .  Palpation: Soft, non-tender to palpation, bowel sounds positive in all four quadrants; no guarding or rebound tenderness  Extremity: no edema.   LE Skin: no rashes  LE Hair:  normal  LE Pulses: well perfused with normal pulses in the distal extremities  Pallor on elevation: Absent. Rubor on dependency: None    I have reexamined the patient and the results are consistent with the previously documented exam. Esequiel Jimenez MD         DATA REVIEWED:     EKG. I personally reviewed and interpreted the EKG.  normal EKG, normal sinus rhythm    ECG/EMG Results (all)     Procedure Component Value Units Date/Time    ECG 12 Lead [[id number]] Collected: 02/16/23 0915     Updated: 02/16/23 0932     QT Interval 370 ms      QTC Interval 434 ms     Narrative:      Test Reason : abn ekg  Blood Pressure :   */*   mmHG  Vent. Rate :  83 BPM     Atrial Rate :  83 BPM     P-R Int : 206 ms          QRS Dur :  90 ms      QT Int : 370 ms       P-R-T Axes :  50  91  22 degrees     QTc Int : 434 ms    Normal sinus rhythm  Rightward axis  Borderline ECG  When compared with ECG of 09-SEP-2017 02:05,  Vent. rate has decreased BY  46 BPM  T wave inversion no longer evident in Inferior leads  Nonspecific T wave abnormality no longer evident in Anterolateral leads    Referred By:            Confirmed By:          ---------------------------------------------------  TTE/GERALD:  ----------------------------------------------------      --------------------------------------------------------------------------------------------------  LABS:     The 10-year CVD risk score (Sheree et al., 2008) is: 49.6%    Values used to calculate the score:      Age: 72 years      Sex: Male      Diabetic: Yes      Tobacco smoker: No      Systolic Blood Pressure: 158 mmHg      Is BP treated: Yes      HDL Cholesterol: 40 mg/dL      Total Cholesterol: 127 mg/dL         Lab Results   Component Value Date    GLUCOSE 144 (H) 09/20/2017    BUN 13 01/03/2023    CREATININE 0.8 01/03/2023    EGFRIFNONA 95 09/20/2017    BCR 28.4 (H) 09/20/2017    K 4.3 01/03/2023    CO2 27 01/03/2023    CALCIUM 9.6 01/03/2023    ALBUMIN 4.5 01/03/2023    AST 16 01/03/2023    ALT 22 01/03/2023     Lab Results   Component Value Date    WBC 9.51 09/16/2017    HGB 12.7 (L) 09/16/2017    HCT 36.1 (L) 09/16/2017    MCV 87.4 09/16/2017     09/16/2017     Lab Results   Component Value Date    CHLPL 127 01/04/2023    TRIG 138 01/04/2023    HDL 40 01/04/2023    LDL 59 01/04/2023     Lab Results   Component Value Date    TSH 1.93 01/03/2023     No results found for: CKTOTAL, CKMB, CKMBINDEX, TROPONINI, TROPONINT  Lab Results   Component Value Date    HGBA1C 6.9 (H) 01/03/2023     No results found for: DDIMER  Lab Results   Component Value Date    ALT 22 01/03/2023     Lab Results   Component Value Date    HGBA1C 6.9 (H) 01/03/2023    HGBA1C 5.9 (H) 09/12/2017     Lab Results   Component Value Date    MICROALBUR 1.0 01/03/2023    CREATININE 0.8 01/03/2023     Lab Results   Component Value Date    IRON <10 (L) 09/12/2017    TIBC 249 (L) 09/12/2017     Lab Results   Component Value Date    INR 2.40 09/28/2017    INR 1.80 (H) 09/22/2017    INR 1.86 (H) 09/21/2017    PROTIME 21.0 (H) 09/22/2017    PROTIME 21.5 (H) 09/21/2017    PROTIME 20.9 (H) 09/20/2017        [unfilled]    1. Abnormal EKG and chest pain:  EKG at primary care physician's office reported to be normal.  Try to get a copy.  EKG in office showed no significant ischemic changes, nonspecific ST-T changes.  He has risk factors of hypertension, hyperlipidemia, diabetes and family history of premature coronary artery disease.  In the setting of chest pain with multiple risk factors and family history discussed options of further restratification patient opted for a CTA coronary angiogram.      2.  Hypertension:  On hydrochlorothiazide 12.5 mg,    3.  Hyperlipidemia:  On Crestor    4.  Diabetes: On metformin.      Prevention:  BMI is >= 25 and <30. (Overweight) The following options were offered after discussion;: exercise counseling/recommendations      Vangie Lopez  reports that he has never smoked. He has never used smokeless tobacco..        This document has been electronically signed by Esequiel Jimenez MD on February 16, 2023 09:32 CST

## 2023-02-22 LAB
QT INTERVAL: 370 MS
QTC INTERVAL: 434 MS

## 2023-03-15 ENCOUNTER — OFFICE VISIT (OUTPATIENT)
Dept: GASTROENTEROLOGY | Facility: CLINIC | Age: 73
End: 2023-03-15
Payer: MEDICARE

## 2023-03-15 VITALS
DIASTOLIC BLOOD PRESSURE: 70 MMHG | SYSTOLIC BLOOD PRESSURE: 162 MMHG | WEIGHT: 177 LBS | HEIGHT: 69 IN | BODY MASS INDEX: 26.22 KG/M2 | HEART RATE: 85 BPM

## 2023-03-15 DIAGNOSIS — Z12.11 ENCOUNTER FOR COLONOSCOPY DUE TO HISTORY OF COLONIC POLYP: Primary | ICD-10-CM

## 2023-03-15 DIAGNOSIS — Z86.010 ENCOUNTER FOR COLONOSCOPY DUE TO HISTORY OF COLONIC POLYP: Primary | ICD-10-CM

## 2023-03-15 PROCEDURE — 3078F DIAST BP <80 MM HG: CPT | Performed by: NURSE PRACTITIONER

## 2023-03-15 PROCEDURE — 1160F RVW MEDS BY RX/DR IN RCRD: CPT | Performed by: NURSE PRACTITIONER

## 2023-03-15 PROCEDURE — S0260 H&P FOR SURGERY: HCPCS | Performed by: NURSE PRACTITIONER

## 2023-03-15 PROCEDURE — 1159F MED LIST DOCD IN RCRD: CPT | Performed by: NURSE PRACTITIONER

## 2023-03-15 PROCEDURE — 3077F SYST BP >= 140 MM HG: CPT | Performed by: NURSE PRACTITIONER

## 2023-03-15 RX ORDER — DEXTROSE AND SODIUM CHLORIDE 5; .45 G/100ML; G/100ML
30 INJECTION, SOLUTION INTRAVENOUS CONTINUOUS PRN
OUTPATIENT
Start: 2023-03-15

## 2023-03-15 RX ORDER — SODIUM CHLORIDE 9 MG/ML
40 INJECTION, SOLUTION INTRAVENOUS AS NEEDED
OUTPATIENT
Start: 2023-03-15

## 2023-03-15 NOTE — PROGRESS NOTES
"                                                                                                                  Chief Complaint   Patient presents with   • Colon Cancer Screening       Subjective    Vangie Lopez is a 72 y.o. male. he is here today for follow-up.                                                                  Assessment & Plan                                     1. Encounter for colonoscopy due to history of colonic polyp      Plan; schedule patient for screening colonoscopy due to personal history of colonic polyp of sigmoid colon.    Follow-up: Return in about 4 weeks (around 4/12/2023) for Recheck.     HPI  72-year-old male presents to discuss screening colonoscopy. He has concurrent medical history of hypertension, hyperlipidemia, diabetes mellitus.  He denies any change in bowel habits reports bowel movements are about every 2 to 3 days with no melena or hematochezia.  Denies any known family history of colorectal cancer.  He underwent colonoscopy April 2017 noted hyperplastic polyp in the sigmoid with repeat recommended in 5 years for surveillance.      Review of Systems  Review of Systems   Constitutional: Negative for activity change, appetite change, chills, diaphoresis, fatigue, fever and unexpected weight change.   Respiratory: Negative for cough and shortness of breath.    Gastrointestinal: Negative for abdominal distention, abdominal pain, anal bleeding, blood in stool, constipation, diarrhea, nausea, rectal pain and vomiting.       /70 (BP Location: Left arm)   Pulse 85   Ht 175.3 cm (69\")   Wt 80.3 kg (177 lb)   BMI 26.14 kg/m²     Objective      Physical Exam  Constitutional:       General: He is not in acute distress.     Appearance: Normal appearance. He is normal weight. He is not ill-appearing.   HENT:      Head: Normocephalic and atraumatic.   Pulmonary:      Effort: Pulmonary effort is normal.   Abdominal:      General: Abdomen is flat. Bowel sounds are " normal. There is no distension.      Palpations: Abdomen is soft. There is no mass.      Tenderness: There is no abdominal tenderness.   Neurological:      Mental Status: He is alert.               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   • Hyperlipidemia    • Multiple trauma 09/06/2017   • Osteoarthritis of hands, bilateral     retired due to arthritis,    • Recent onset of diabetes mellitus (HCC)      Past Surgical History:   Procedure Laterality Date   • COLONOSCOPY  12/08/2006   • COLONOSCOPY N/A 4/3/2017    Procedure: COLONOSCOPY;  Surgeon: Michael Ang MD;  Location: [facility] ENDOSCOPY;  Service:    • CYSTOSCOPY     • EXTERNAL FIXATION ANKLE FRACTURE Left 9/6/2017    Procedure: ANKLE EXTERNAL FIXATOR APPLICATION;  Surgeon: Simone Rivera DPM;  Location: [facility] OR;  Service:    • FOOT SURGERY     • KNEE SURGERY     • PATELLA OPEN REDUCTION INTERNAL FIXATION Right 9/8/2017    Procedure: OPEN REDUCTION INTERNAL FIXATION RIGHT PATELLA       (C-ARM#3);  Surgeon: Checo Rutherford MD;  Location: [facility] OR;  Service:    • SCAPHOID FRACTURE SURGERY Left 09/07/2017    fracture dislocation left foot, closed reduction with pinning   • SHOULDER SURGERY       Family History   Problem Relation Age of Onset   • Alzheimer's disease Mother    • Heart attack Father        No Known Allergies  Social History     Socioeconomic History   • Marital status:    Tobacco Use   • Smoking status: Never   • Smokeless tobacco: Never   Substance and Sexual Activity   • Alcohol use: Not Currently      Comment: social drinker/rarely uses alcohol   • Drug use: Never   • Sexual activity: Yes     Partners: Female     Current Medications:  Prior to Admission medications    Medication Sig Start Date End Date Taking? Authorizing Provider   brimonidine (ALPHAGAN) 0.2 % ophthalmic solution Administer 1 drop to the right eye 2 (Two) Times a Day.   Yes Bari Tay MD   finasteride (PROSCAR) 5 MG tablet Take 1 tablet by mouth Every Night.   Yes Bari Tay MD   glipizide (GLUCOTROL XL) 2.5 MG 24 hr tablet  1/5/23  Yes Bari Tay MD   hydrochlorothiazide (HYDRODIURIL) 25 MG tablet Take 12.5 mg by mouth Daily. Pt takes 1/2 (25 mg) tablet = 12.5 mg per dose.   Yes Bari Tay MD   metFORMIN (GLUCOPHAGE) 1000 MG tablet  2/15/23  Yes Bari Tay MD   metoprolol tartrate (LOPRESSOR) 25 MG tablet Take 1 tablet by mouth 2 (Two) Times a Day. 2/16/23  Yes Esequiel Jimenez MD   potassium citrate (UROCIT-K) 10 MEQ (1080 MG) CR tablet Take 1 tablet by mouth 2 (Two) Times a Day With Meals.   Yes Bari Tay MD   rosuvastatin (CRESTOR) 40 MG tablet Take 1 tablet by mouth Daily. 1/2 TABLET DAILY   Yes Bari Tay MD   sildenafil (VIAGRA) 100 MG tablet Take 1 tablet by mouth Daily As Needed for Erectile Dysfunction.   Yes Bari Tay MD   timolol (TIMOPTIC) 0.5 % ophthalmic solution Administer 1 drop to the right eye 2 (Two) Times a Day.   Yes Bari Tay MD   Turmeric (QC TUMERIC COMPLEX PO) Take  by mouth.   Yes Bari Tay MD   Zinc 50 MG tablet Take  by mouth.   Yes Bari Tay MD     Orders placed during this encounter include:  Orders Placed This Encounter   Procedures   • Obtain Informed Consent     Standing Status:   Future     Order Specific Question:   Informed Consent Given For     Answer:   COLONOSCOPY     COLONOSCOPY (N/A)  No orders of the defined types were placed in this encounter.        Review and/or summary of lab  tests, radiology, procedures, medications. Review and summary of old records and obtaining of history. The risks and benefits of my recommendations, as well as other treatment options were discussed . Any questions/concerned were answered. Patient voiced understanding and agreement.          This document has been electronically signed by LEIGHTON Rizo on March 15, 2023 10:39 CDT                                               Results for orders placed or performed in visit on 02/16/23   ECG 12 Lead   Result Value Ref Range    QT Interval 370 ms    QTC Interval 434 ms   Results for orders placed or performed in visit on 09/28/17   Protime-INR    Specimen: Blood   Result Value Ref Range    INR 2.40 1.7 - 2.5   Results for orders placed or performed during the hospital encounter of 09/11/17   Green Top (Gel)   Result Value Ref Range    Extra Tube Hold for add-ons.    Green Top (Gel)   Result Value Ref Range    Extra Tube Hold for add-ons.    Green Top (Gel)   Result Value Ref Range    Extra Tube Hold for add-ons.    CBC Auto Differential    Specimen: Blood   Result Value Ref Range    WBC 9.51 3.20 - 9.80 10*3/mm3    RBC 4.13 (L) 4.37 - 5.74 10*6/mm3    Hemoglobin 12.7 (L) 13.7 - 17.3 g/dL    Hematocrit 36.1 (L) 39.0 - 49.0 %    MCV 87.4 80.0 - 98.0 fL    MCH 30.8 26.5 - 34.0 pg    MCHC 35.2 31.5 - 36.3 g/dL    RDW 12.3 11.5 - 14.5 %    RDW-SD 39.4 35.1 - 43.9 fl    MPV 8.6 8.0 - 12.0 fL    Platelets 338 150 - 450 10*3/mm3    Neutrophil % 66.7 37.0 - 80.0 %    Lymphocyte % 19.1 10.0 - 50.0 %    Monocyte % 8.8 0.0 - 12.0 %    Eosinophil % 4.8 0.0 - 7.0 %    Basophil % 0.2 0.0 - 2.0 %    Immature Grans % 0.4 0.0 - 0.5 %    Neutrophils, Absolute 6.33 2.00 - 8.60 10*3/mm3    Lymphocytes, Absolute 1.82 0.60 - 4.20 10*3/mm3    Monocytes, Absolute 0.84 0.00 - 0.90 10*3/mm3    Eosinophils, Absolute 0.46 0.00 - 0.70 10*3/mm3    Basophils, Absolute 0.02 0.00 - 0.20 10*3/mm3    Immature Grans, Absolute 0.04 (H) 0.00 - 0.02  10*3/mm3   CBC Auto Differential    Specimen: Blood   Result Value Ref Range    WBC 9.70 3.20 - 9.80 10*3/mm3    RBC 3.81 (L) 4.37 - 5.74 10*6/mm3    Hemoglobin 12.0 (L) 13.7 - 17.3 g/dL    Hematocrit 33.4 (L) 39.0 - 49.0 %    MCV 87.7 80.0 - 98.0 fL    MCH 31.5 26.5 - 34.0 pg    MCHC 35.9 31.5 - 36.3 g/dL    RDW 12.5 11.5 - 14.5 %    RDW-SD 39.9 35.1 - 43.9 fl    MPV 8.9 8.0 - 12.0 fL    Platelets 312 150 - 450 10*3/mm3    Neutrophil % 73.3 37.0 - 80.0 %    Lymphocyte % 10.9 10.0 - 50.0 %    Monocyte % 11.4 0.0 - 12.0 %    Eosinophil % 3.9 0.0 - 7.0 %    Basophil % 0.2 0.0 - 2.0 %    Immature Grans % 0.3 0.0 - 0.5 %    Neutrophils, Absolute 7.10 2.00 - 8.60 10*3/mm3    Lymphocytes, Absolute 1.06 0.60 - 4.20 10*3/mm3    Monocytes, Absolute 1.11 (H) 0.00 - 0.90 10*3/mm3    Eosinophils, Absolute 0.38 0.00 - 0.70 10*3/mm3    Basophils, Absolute 0.02 0.00 - 0.20 10*3/mm3    Immature Grans, Absolute 0.03 (H) 0.00 - 0.02 10*3/mm3    nRBC 0.0 0.0 - 0.0 /100 WBC   Lavender Top   Result Value Ref Range    Extra Tube hold for add-on    Lavender Top   Result Value Ref Range    Extra Tube hold for add-on    Lavender Top   Result Value Ref Range    Extra Tube hold for add-on    Iron Profile    Specimen: Blood   Result Value Ref Range    Iron <10 (L) 49 - 181 mcg/dL    TIBC 249 (L) 261 - 462 mcg/dL    Iron Saturation  20 - 55 %   Protime-INR    Specimen: Blood   Result Value Ref Range    Protime 21.0 (H) 11.1 - 15.3 Seconds    INR 1.80 (H) 0.80 - 1.20   Protime-INR    Specimen: Blood   Result Value Ref Range    Protime 21.5 (H) 11.1 - 15.3 Seconds    INR 1.86 (H) 0.80 - 1.20   Protime-INR    Specimen: Blood   Result Value Ref Range    Protime 20.9 (H) 11.1 - 15.3 Seconds    INR 1.79 (H) 0.80 - 1.20   Protime-INR    Specimen: Blood   Result Value Ref Range    Protime 23.4 (H) 11.1 - 15.3 Seconds    INR 2.07 (H) 0.80 - 1.20   Protime-INR    Specimen: Blood   Result Value Ref Range    Protime 22.8 (H) 11.1 - 15.3 Seconds    INR 2.00  (H) 0.80 - 1.20   Protime-INR    Specimen: Blood   Result Value Ref Range    Protime 24.5 (H) 11.1 - 15.3 Seconds    INR 2.19 (H) 0.80 - 1.20   Protime-INR    Specimen: Blood   Result Value Ref Range    Protime 24.4 (H) 11.1 - 15.3 Seconds    INR 2.18 (H) 0.80 - 1.20   Protime-INR    Specimen: Blood   Result Value Ref Range    Protime 26.0 (H) 11.1 - 15.3 Seconds    INR 2.36 (H) 0.80 - 1.20   Protime-INR    Specimen: Blood   Result Value Ref Range    Protime 26.8 (H) 11.1 - 15.3 Seconds    INR 2.45 (H) 0.80 - 1.20   Protime-INR    Specimen: Blood   Result Value Ref Range    Protime 25.8 (H) 11.1 - 15.3 Seconds    INR 2.33 (H) 0.80 - 1.20   Protime-INR    Specimen: Blood   Result Value Ref Range    Protime 22.9 (H) 11.1 - 15.3 Seconds    INR 2.01 (H) 0.80 - 1.20   POC Glucose Fingerstick    Specimen: Blood   Result Value Ref Range    Glucose 133 (H) 70 - 130 mg/dL   POC Glucose Fingerstick    Specimen: Blood   Result Value Ref Range    Glucose 124 70 - 130 mg/dL   POC Glucose Fingerstick    Specimen: Blood   Result Value Ref Range    Glucose 166 (H) 70 - 130 mg/dL   POC Glucose Fingerstick    Specimen: Blood   Result Value Ref Range    Glucose 113 70 - 130 mg/dL   POC Glucose Fingerstick    Specimen: Blood   Result Value Ref Range    Glucose 115 70 - 130 mg/dL   POC Glucose Fingerstick    Specimen: Blood   Result Value Ref Range    Glucose 123 70 - 130 mg/dL   POC Glucose Fingerstick    Specimen: Blood   Result Value Ref Range    Glucose 163 (H) 70 - 130 mg/dL   POC Glucose Fingerstick    Specimen: Blood   Result Value Ref Range    Glucose 128 70 - 130 mg/dL   POC Glucose Fingerstick    Specimen: Blood   Result Value Ref Range    Glucose 121 70 - 130 mg/dL   POC Glucose Fingerstick    Specimen: Blood   Result Value Ref Range    Glucose 153 (H) 70 - 130 mg/dL   POC Glucose Fingerstick    Specimen: Blood   Result Value Ref Range    Glucose 190 (H) 70 - 130 mg/dL   POC Glucose Fingerstick    Specimen: Blood   Result  Value Ref Range    Glucose 118 70 - 130 mg/dL   POC Glucose Fingerstick    Specimen: Blood   Result Value Ref Range    Glucose 126 70 - 130 mg/dL   POC Glucose Fingerstick    Specimen: Blood   Result Value Ref Range    Glucose 171 (H) 70 - 130 mg/dL   POC Glucose Fingerstick    Specimen: Blood   Result Value Ref Range    Glucose 179 (H) 70 - 130 mg/dL   POC Glucose Fingerstick    Specimen: Blood   Result Value Ref Range    Glucose 150 (H) 70 - 130 mg/dL   POC Glucose Fingerstick    Specimen: Blood   Result Value Ref Range    Glucose 138 (H) 70 - 130 mg/dL   POC Glucose Fingerstick    Specimen: Blood   Result Value Ref Range    Glucose 157 (H) 70 - 130 mg/dL   POC Glucose Fingerstick    Specimen: Blood   Result Value Ref Range    Glucose 204 (H) 70 - 130 mg/dL     *Note: Due to a large number of results and/or encounters for the requested time period, some results have not been displayed. A complete set of results can be found in Results Review.

## 2023-03-21 ENCOUNTER — TRANSCRIBE ORDERS (OUTPATIENT)
Dept: GENERAL RADIOLOGY | Facility: HOSPITAL | Age: 73
End: 2023-03-21
Payer: MEDICARE

## 2023-03-21 DIAGNOSIS — R94.31 ABNORMAL EKG: Primary | ICD-10-CM

## 2023-03-27 ENCOUNTER — LAB (OUTPATIENT)
Dept: LAB | Facility: HOSPITAL | Age: 73
End: 2023-03-27
Payer: MEDICARE

## 2023-03-27 ENCOUNTER — HOSPITAL ENCOUNTER (OUTPATIENT)
Dept: CT IMAGING | Facility: HOSPITAL | Age: 73
Discharge: HOME | End: 2023-03-27
Payer: MEDICARE

## 2023-03-27 DIAGNOSIS — R94.31 ABNORMAL EKG: ICD-10-CM

## 2023-03-27 LAB
ANION GAP SERPL CALCULATED.3IONS-SCNC: 10 MMOL/L (ref 5–15)
BUN SERPL-MCNC: 16 MG/DL (ref 8–23)
BUN/CREAT SERPL: 21.1 (ref 7–25)
CALCIUM SPEC-SCNC: 9.9 MG/DL (ref 8.6–10.5)
CHLORIDE SERPL-SCNC: 103 MMOL/L (ref 98–107)
CO2 SERPL-SCNC: 29 MMOL/L (ref 22–29)
CREAT SERPL-MCNC: 0.76 MG/DL (ref 0.76–1.27)
EGFRCR SERPLBLD CKD-EPI 2021: 96.1 ML/MIN/1.73
GLUCOSE SERPL-MCNC: 132 MG/DL (ref 65–99)
POTASSIUM SERPL-SCNC: 3.9 MMOL/L (ref 3.5–5.2)
SODIUM SERPL-SCNC: 142 MMOL/L (ref 136–145)

## 2023-03-27 PROCEDURE — 36415 COLL VENOUS BLD VENIPUNCTURE: CPT

## 2023-03-27 PROCEDURE — 25510000001 IOPAMIDOL PER 1 ML: Performed by: INTERNAL MEDICINE

## 2023-03-27 PROCEDURE — 75574 CT ANGIO HRT W/3D IMAGE: CPT

## 2023-03-27 PROCEDURE — 80048 BASIC METABOLIC PNL TOTAL CA: CPT

## 2023-03-27 RX ORDER — SODIUM CHLORIDE 9 MG/ML
100 INJECTION, SOLUTION INTRAVENOUS
Status: COMPLETED | OUTPATIENT
Start: 2023-03-27 | End: 2023-03-27

## 2023-03-27 RX ADMIN — SODIUM CHLORIDE 100 ML: 9 INJECTION, SOLUTION INTRAVENOUS at 08:30

## 2023-03-27 RX ADMIN — IOPAMIDOL 90 ML: 755 INJECTION, SOLUTION INTRAVENOUS at 08:29

## 2023-03-30 ENCOUNTER — TELEPHONE (OUTPATIENT)
Dept: CARDIOLOGY | Facility: CLINIC | Age: 73
End: 2023-03-30
Payer: MEDICARE

## 2023-03-30 NOTE — TELEPHONE ENCOUNTER
----- Message from Esequiel Jimenez MD sent at 3/29/2023  5:03 PM CDT -----  No significant CAD. Would have him follow with PCP for hepatic and rib lesion    ----- Message -----  From: Emma, Rad Results [place] [facility]  Sent: 3/28/2023   7:52 AM CDT  To: Esequiel Jimenez MD      Patient contacted regarding cta coronary. He was advised to contact pcp regarding hepatic and rib lesion.

## 2023-04-24 ENCOUNTER — ANESTHESIA EVENT (OUTPATIENT)
Dept: GASTROENTEROLOGY | Facility: HOSPITAL | Age: 73
End: 2023-04-24
Payer: MEDICARE

## 2023-04-24 ENCOUNTER — HOSPITAL ENCOUNTER (OUTPATIENT)
Facility: HOSPITAL | Age: 73
Setting detail: HOSPITAL OUTPATIENT SURGERY
Discharge: HOME | End: 2023-04-24
Attending: INTERNAL MEDICINE | Admitting: INTERNAL MEDICINE
Payer: MEDICARE

## 2023-04-24 ENCOUNTER — ANESTHESIA (OUTPATIENT)
Dept: GASTROENTEROLOGY | Facility: HOSPITAL | Age: 73
End: 2023-04-24
Payer: MEDICARE

## 2023-04-24 VITALS
SYSTOLIC BLOOD PRESSURE: 114 MMHG | HEART RATE: 67 BPM | BODY MASS INDEX: 24.88 KG/M2 | HEIGHT: 69 IN | WEIGHT: 168 LBS | RESPIRATION RATE: 18 BRPM | TEMPERATURE: 97.3 F | OXYGEN SATURATION: 98 % | DIASTOLIC BLOOD PRESSURE: 58 MMHG

## 2023-04-24 DIAGNOSIS — Z86.010 ENCOUNTER FOR COLONOSCOPY DUE TO HISTORY OF COLONIC POLYP: ICD-10-CM

## 2023-04-24 DIAGNOSIS — Z12.11 ENCOUNTER FOR COLONOSCOPY DUE TO HISTORY OF COLONIC POLYP: ICD-10-CM

## 2023-04-24 PROCEDURE — 25010000002 PROPOFOL 10 MG/ML EMULSION: Performed by: NURSE ANESTHETIST, CERTIFIED REGISTERED

## 2023-04-24 PROCEDURE — 45385 COLONOSCOPY W/LESION REMOVAL: CPT | Performed by: INTERNAL MEDICINE

## 2023-04-24 RX ORDER — PROPOFOL 10 MG/ML
VIAL (ML) INTRAVENOUS AS NEEDED
Status: DISCONTINUED | OUTPATIENT
Start: 2023-04-24 | End: 2023-04-24 | Stop reason: SURG

## 2023-04-24 RX ORDER — DEXTROSE AND SODIUM CHLORIDE 5; .45 G/100ML; G/100ML
30 INJECTION, SOLUTION INTRAVENOUS CONTINUOUS PRN
Status: DISCONTINUED | OUTPATIENT
Start: 2023-04-24 | End: 2023-04-24 | Stop reason: HOSPADM

## 2023-04-24 RX ORDER — LIDOCAINE HYDROCHLORIDE 20 MG/ML
INJECTION, SOLUTION INTRAVENOUS AS NEEDED
Status: DISCONTINUED | OUTPATIENT
Start: 2023-04-24 | End: 2023-04-24 | Stop reason: SURG

## 2023-04-24 RX ADMIN — PROPOFOL 20 MG: 10 INJECTION, EMULSION INTRAVENOUS at 11:23

## 2023-04-24 RX ADMIN — LIDOCAINE HYDROCHLORIDE 60 MG: 20 INJECTION, SOLUTION INTRAVENOUS at 11:14

## 2023-04-24 RX ADMIN — PROPOFOL 20 MG: 10 INJECTION, EMULSION INTRAVENOUS at 11:25

## 2023-04-24 RX ADMIN — PROPOFOL 80 MG: 10 INJECTION, EMULSION INTRAVENOUS at 11:14

## 2023-04-24 RX ADMIN — PROPOFOL 20 MG: 10 INJECTION, EMULSION INTRAVENOUS at 11:17

## 2023-04-24 RX ADMIN — PROPOFOL 20 MG: 10 INJECTION, EMULSION INTRAVENOUS at 11:15

## 2023-04-24 RX ADMIN — PROPOFOL 20 MG: 10 INJECTION, EMULSION INTRAVENOUS at 11:20

## 2023-04-24 RX ADMIN — DEXTROSE AND SODIUM CHLORIDE 30 ML/HR: 5; 450 INJECTION, SOLUTION INTRAVENOUS at 10:49

## 2023-04-24 RX ADMIN — PROPOFOL 20 MG: 10 INJECTION, EMULSION INTRAVENOUS at 11:18

## 2023-04-24 NOTE — ANESTHESIA PREPROCEDURE EVALUATION
Anesthesia Evaluation     Patient summary reviewed   NPO Solid Status: > 8 hours             Airway   Mallampati: II  TM distance: >3 FB  Neck ROM: full  Dental      Pulmonary - normal exam   Cardiovascular - normal exam    ECG reviewed    (+) hypertension, hyperlipidemia,     ROS comment: 2/16/23    Normal sinus rhythm  Rightward axis  Borderline ECG  When compared with ECG of 09-SEP-2017 02:05,  Vent. rate has decreased BY  46 BPM  T wave inversion no longer evident in Inferior leads  Nonspecific T wave abnormality no longer evident in Anterolateral leads    Neuro/Psych  GI/Hepatic/Renal/Endo    (+)   diabetes mellitus type 2,     Musculoskeletal     Abdominal  - normal exam   Substance History      OB/GYN          Other   arthritis,    history of cancer    ROS/Med Hx Other: skin                Anesthesia Plan    ASA 2     general   total IV anesthesia  intravenous induction     Anesthetic plan, risks, benefits, and alternatives have been provided, discussed and informed consent has been obtained with: patient.        CODE STATUS:        no

## 2023-04-24 NOTE — ANESTHESIA POSTPROCEDURE EVALUATION
Patient: Vangie Lopez    Procedure Summary     Date: 04/24/23 Room / Location: [facility] ENDOSCOPY 1 / [facility] ENDOSCOPY    Anesthesia Start: 1113 Anesthesia Stop: 1130    Procedure: COLONOSCOPY Diagnosis:       Encounter for colonoscopy due to history of colonic polyp      (Encounter for colonoscopy due to history of colonic polyp [Z12.11, Z86.010])    Surgeons: Michael Ang MD Provider: Joe Tellez CRNA    Anesthesia Type: general ASA Status: 2          Anesthesia Type: general    Vitals  No vitals data found for the desired time range.          Post Anesthesia Care and Evaluation    Patient location during evaluation: PHASE II  Patient participation: complete - patient participated  Level of consciousness: sleepy but conscious  Pain score: 0  Pain management: adequate    Airway patency: patent  Anesthetic complications: No anesthetic complications  PONV Status: none  Cardiovascular status: acceptable  Respiratory status: acceptable  Hydration status: acceptable    Comments: Hr 77, bp 127/58, rr 16, o2 sats 98%, temp 97.0F  No anesthesia care post op

## 2023-04-24 NOTE — H&P
No chief complaint on file.      Subjective    Vangie Lopez is a 72 y.o. male. he is here today for follow-up.                                                                  Assessment & Plan                                     No diagnosis found.  Plan; schedule patient for screening colonoscopy due to personal history of colonic polyp of sigmoid colon.    Follow-up: No follow-ups on file.     HPI I agree with the current note with no changes in the history.  Risks and benefits discussed with patient. Patient understands these and would like to proceed with procedure.    72-year-old male presents to discuss screening colonoscopy. He has concurrent medical history of hypertension, hyperlipidemia, diabetes mellitus.  He denies any change in bowel habits reports bowel movements are about every 2 to 3 days with no melena or hematochezia.  Denies any known family history of colorectal cancer.  He underwent colonoscopy April 2017 noted hyperplastic polyp in the sigmoid with repeat recommended in 5 years for surveillance.      Review of Systems  Review of Systems   Constitutional: Negative for activity change, appetite change, chills, diaphoresis, fatigue, fever and unexpected weight change.   Respiratory: Negative for cough and shortness of breath.    Gastrointestinal: Negative for abdominal distention, abdominal pain, anal bleeding, blood in stool, constipation, diarrhea, nausea, rectal pain and vomiting.       There were no vitals taken for this visit.    Objective      Physical Exam  Constitutional:       General: He is not in acute distress.     Appearance: Normal appearance. He is normal weight. He is not ill-appearing.   HENT:      Head: Normocephalic and atraumatic.   Pulmonary:      Effort: Pulmonary effort is normal.   Abdominal:      General: Abdomen is flat. Bowel sounds are normal. There  is no distension.      Palpations: Abdomen is soft. There is no mass.      Tenderness: There is no abdominal tenderness.   Neurological:      Mental Status: He is alert.               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   • Hyperlipidemia    • Multiple trauma 09/06/2017   • Osteoarthritis of hands, bilateral     retired due to arthritis,    • Recent onset of diabetes mellitus      Past Surgical History:   Procedure Laterality Date   • COLONOSCOPY  12/08/2006   • COLONOSCOPY N/A 4/3/2017    Procedure: COLONOSCOPY;  Surgeon: Michael Ang MD;  Location: [facility] ENDOSCOPY;  Service:    • CYSTOSCOPY     • EXTERNAL FIXATION ANKLE FRACTURE Left 9/6/2017    Procedure: ANKLE EXTERNAL FIXATOR APPLICATION;  Surgeon: Simone Rivera DPM;  Location: [facility] OR;  Service:    • FOOT SURGERY     • KNEE SURGERY     • PATELLA OPEN REDUCTION INTERNAL FIXATION Right 9/8/2017    Procedure: OPEN REDUCTION INTERNAL FIXATION RIGHT PATELLA       (C-ARM#3);  Surgeon: Checo Rutherford MD;  Location: [facility] OR;  Service:    • SCAPHOID FRACTURE SURGERY Left 09/07/2017    fracture dislocation left foot, closed reduction with pinning   • SHOULDER SURGERY       Family History   Problem Relation Age of Onset   • Alzheimer's disease Mother    • Heart attack Father        No Known Allergies  Social History     Socioeconomic History   • Marital status:    Tobacco Use   • Smoking status: Never   • Smokeless tobacco: Never   Vaping Use   • Vaping Use: Never used   Substance and Sexual Activity   • Alcohol use: Not  Currently     Comment: social drinker/rarely uses alcohol   • Drug use: Never   • Sexual activity: Yes     Partners: Female     Current Medications:  Prior to Admission medications    Medication Sig Start Date End Date Taking? Authorizing Provider   brimonidine (ALPHAGAN) 0.2 % ophthalmic solution Administer 1 drop to the right eye 2 (Two) Times a Day.   Yes Bari Tay MD   finasteride (PROSCAR) 5 MG tablet Take 1 tablet by mouth Every Night.   Yes Bari Tay MD   glipizide (GLUCOTROL XL) 2.5 MG 24 hr tablet  1/5/23  Yes Bari Tay MD   hydrochlorothiazide (HYDRODIURIL) 25 MG tablet Take 12.5 mg by mouth Daily. Pt takes 1/2 (25 mg) tablet = 12.5 mg per dose.   Yes Bari Tay MD   metFORMIN (GLUCOPHAGE) 1000 MG tablet  2/15/23  Yes Bari Tay MD   metoprolol tartrate (LOPRESSOR) 25 MG tablet Take 1 tablet by mouth 2 (Two) Times a Day. 2/16/23  Yes Esequiel Jimenez MD   potassium citrate (UROCIT-K) 10 MEQ (1080 MG) CR tablet Take 1 tablet by mouth 2 (Two) Times a Day With Meals.   Yes Bari Tay MD   rosuvastatin (CRESTOR) 40 MG tablet Take 1 tablet by mouth Daily. 1/2 TABLET DAILY   Yes Bari Tay MD   sildenafil (VIAGRA) 100 MG tablet Take 1 tablet by mouth Daily As Needed for Erectile Dysfunction.   Yes Bari Tay MD   timolol (TIMOPTIC) 0.5 % ophthalmic solution Administer 1 drop to the right eye 2 (Two) Times a Day.   Yes Bari Tay MD   Turmeric (QC TUMERIC COMPLEX PO) Take  by mouth.   Yes Bari Tay MD   Zinc 50 MG tablet Take  by mouth.   Yes Bari Tay MD     Orders placed during this encounter include:  No orders of the defined types were placed in this encounter.    COLONOSCOPY (N/A)  No orders of the defined types were placed in this encounter.        Review and/or summary of lab tests, radiology, procedures, medications. Review and summary of old records and obtaining of history. The  risks and benefits of my recommendations, as well as other treatment options were discussed . Any questions/concerned were answered. Patient voiced understanding and agreement.          This document has been electronically signed by Michael Ang MD on April 24, 2023 10:20 CDT                                               Results for orders placed or performed in visit on 03/27/23   Basic Metabolic Panel    Specimen: Blood   Result Value Ref Range    Glucose 132 (H) 65 - 99 mg/dL    BUN 16 8 - 23 mg/dL    Creatinine 0.76 0.76 - 1.27 mg/dL    Sodium 142 136 - 145 mmol/L    Potassium 3.9 3.5 - 5.2 mmol/L    Chloride 103 98 - 107 mmol/L    CO2 29.0 22.0 - 29.0 mmol/L    Calcium 9.9 8.6 - 10.5 mg/dL    BUN/Creatinine Ratio 21.1 7.0 - 25.0    Anion Gap 10.0 5.0 - 15.0 mmol/L    eGFR 96.1 >60.0 mL/min/1.73   Results for orders placed or performed in visit on 02/16/23   ECG 12 Lead   Result Value Ref Range    QT Interval 370 ms    QTC Interval 434 ms   Results for orders placed or performed in visit on 09/28/17   Protime-INR    Specimen: Blood   Result Value Ref Range    INR 2.40 1.7 - 2.5   Results for orders placed or performed during the hospital encounter of 09/11/17   Green Top (Gel)   Result Value Ref Range    Extra Tube Hold for add-ons.    Green Top (Gel)   Result Value Ref Range    Extra Tube Hold for add-ons.    Green Top (Gel)   Result Value Ref Range    Extra Tube Hold for add-ons.    CBC Auto Differential    Specimen: Blood   Result Value Ref Range    WBC 9.51 3.20 - 9.80 10*3/mm3    RBC 4.13 (L) 4.37 - 5.74 10*6/mm3    Hemoglobin 12.7 (L) 13.7 - 17.3 g/dL    Hematocrit 36.1 (L) 39.0 - 49.0 %    MCV 87.4 80.0 - 98.0 fL    MCH 30.8 26.5 - 34.0 pg    MCHC 35.2 31.5 - 36.3 g/dL    RDW 12.3 11.5 - 14.5 %    RDW-SD 39.4 35.1 - 43.9 fl    MPV 8.6 8.0 - 12.0 fL    Platelets 338 150 - 450 10*3/mm3    Neutrophil % 66.7 37.0 - 80.0 %    Lymphocyte % 19.1 10.0 - 50.0 %    Monocyte % 8.8 0.0 - 12.0 %    Eosinophil %  4.8 0.0 - 7.0 %    Basophil % 0.2 0.0 - 2.0 %    Immature Grans % 0.4 0.0 - 0.5 %    Neutrophils, Absolute 6.33 2.00 - 8.60 10*3/mm3    Lymphocytes, Absolute 1.82 0.60 - 4.20 10*3/mm3    Monocytes, Absolute 0.84 0.00 - 0.90 10*3/mm3    Eosinophils, Absolute 0.46 0.00 - 0.70 10*3/mm3    Basophils, Absolute 0.02 0.00 - 0.20 10*3/mm3    Immature Grans, Absolute 0.04 (H) 0.00 - 0.02 10*3/mm3   CBC Auto Differential    Specimen: Blood   Result Value Ref Range    WBC 9.70 3.20 - 9.80 10*3/mm3    RBC 3.81 (L) 4.37 - 5.74 10*6/mm3    Hemoglobin 12.0 (L) 13.7 - 17.3 g/dL    Hematocrit 33.4 (L) 39.0 - 49.0 %    MCV 87.7 80.0 - 98.0 fL    MCH 31.5 26.5 - 34.0 pg    MCHC 35.9 31.5 - 36.3 g/dL    RDW 12.5 11.5 - 14.5 %    RDW-SD 39.9 35.1 - 43.9 fl    MPV 8.9 8.0 - 12.0 fL    Platelets 312 150 - 450 10*3/mm3    Neutrophil % 73.3 37.0 - 80.0 %    Lymphocyte % 10.9 10.0 - 50.0 %    Monocyte % 11.4 0.0 - 12.0 %    Eosinophil % 3.9 0.0 - 7.0 %    Basophil % 0.2 0.0 - 2.0 %    Immature Grans % 0.3 0.0 - 0.5 %    Neutrophils, Absolute 7.10 2.00 - 8.60 10*3/mm3    Lymphocytes, Absolute 1.06 0.60 - 4.20 10*3/mm3    Monocytes, Absolute 1.11 (H) 0.00 - 0.90 10*3/mm3    Eosinophils, Absolute 0.38 0.00 - 0.70 10*3/mm3    Basophils, Absolute 0.02 0.00 - 0.20 10*3/mm3    Immature Grans, Absolute 0.03 (H) 0.00 - 0.02 10*3/mm3    nRBC 0.0 0.0 - 0.0 /100 WBC   Lavender Top   Result Value Ref Range    Extra Tube hold for add-on    Lavender Top   Result Value Ref Range    Extra Tube hold for add-on    Lavender Top   Result Value Ref Range    Extra Tube hold for add-on    Iron Profile    Specimen: Blood   Result Value Ref Range    Iron <10 (L) 49 - 181 mcg/dL    TIBC 249 (L) 261 - 462 mcg/dL    Iron Saturation  20 - 55 %   Protime-INR    Specimen: Blood   Result Value Ref Range    Protime 21.0 (H) 11.1 - 15.3 Seconds    INR 1.80 (H) 0.80 - 1.20   Protime-INR    Specimen: Blood   Result Value Ref Range    Protime 21.5 (H) 11.1 - 15.3 Seconds    INR  1.86 (H) 0.80 - 1.20   Protime-INR    Specimen: Blood   Result Value Ref Range    Protime 20.9 (H) 11.1 - 15.3 Seconds    INR 1.79 (H) 0.80 - 1.20   Protime-INR    Specimen: Blood   Result Value Ref Range    Protime 23.4 (H) 11.1 - 15.3 Seconds    INR 2.07 (H) 0.80 - 1.20   Protime-INR    Specimen: Blood   Result Value Ref Range    Protime 22.8 (H) 11.1 - 15.3 Seconds    INR 2.00 (H) 0.80 - 1.20   Protime-INR    Specimen: Blood   Result Value Ref Range    Protime 24.5 (H) 11.1 - 15.3 Seconds    INR 2.19 (H) 0.80 - 1.20   Protime-INR    Specimen: Blood   Result Value Ref Range    Protime 24.4 (H) 11.1 - 15.3 Seconds    INR 2.18 (H) 0.80 - 1.20   Protime-INR    Specimen: Blood   Result Value Ref Range    Protime 26.0 (H) 11.1 - 15.3 Seconds    INR 2.36 (H) 0.80 - 1.20   Protime-INR    Specimen: Blood   Result Value Ref Range    Protime 26.8 (H) 11.1 - 15.3 Seconds    INR 2.45 (H) 0.80 - 1.20   Protime-INR    Specimen: Blood   Result Value Ref Range    Protime 25.8 (H) 11.1 - 15.3 Seconds    INR 2.33 (H) 0.80 - 1.20   Protime-INR    Specimen: Blood   Result Value Ref Range    Protime 22.9 (H) 11.1 - 15.3 Seconds    INR 2.01 (H) 0.80 - 1.20   POC Glucose Fingerstick    Specimen: Blood   Result Value Ref Range    Glucose 133 (H) 70 - 130 mg/dL   POC Glucose Fingerstick    Specimen: Blood   Result Value Ref Range    Glucose 124 70 - 130 mg/dL   POC Glucose Fingerstick    Specimen: Blood   Result Value Ref Range    Glucose 166 (H) 70 - 130 mg/dL   POC Glucose Fingerstick    Specimen: Blood   Result Value Ref Range    Glucose 113 70 - 130 mg/dL   POC Glucose Fingerstick    Specimen: Blood   Result Value Ref Range    Glucose 115 70 - 130 mg/dL   POC Glucose Fingerstick    Specimen: Blood   Result Value Ref Range    Glucose 123 70 - 130 mg/dL   POC Glucose Fingerstick    Specimen: Blood   Result Value Ref Range    Glucose 163 (H) 70 - 130 mg/dL   POC Glucose Fingerstick    Specimen: Blood   Result Value Ref Range    Glucose  128 70 - 130 mg/dL     *Note: Due to a large number of results and/or encounters for the requested time period, some results have not been displayed. A complete set of results can be found in Results Review.

## 2023-04-26 LAB — REF LAB TEST METHOD: NORMAL

## 2023-05-11 ENCOUNTER — OFFICE VISIT (OUTPATIENT)
Dept: GASTROENTEROLOGY | Facility: CLINIC | Age: 73
End: 2023-05-11
Payer: MEDICARE

## 2023-05-11 VITALS
WEIGHT: 171.2 LBS | HEART RATE: 69 BPM | SYSTOLIC BLOOD PRESSURE: 132 MMHG | BODY MASS INDEX: 25.36 KG/M2 | DIASTOLIC BLOOD PRESSURE: 80 MMHG | HEIGHT: 69 IN

## 2023-05-11 DIAGNOSIS — D12.5 ADENOMATOUS POLYP OF SIGMOID COLON: Primary | ICD-10-CM

## 2023-05-11 DIAGNOSIS — K58.0 IRRITABLE BOWEL SYNDROME WITH DIARRHEA: ICD-10-CM

## 2023-05-11 PROCEDURE — 99213 OFFICE O/P EST LOW 20 MIN: CPT | Performed by: NURSE PRACTITIONER

## 2023-05-11 PROCEDURE — 3075F SYST BP GE 130 - 139MM HG: CPT | Performed by: NURSE PRACTITIONER

## 2023-05-11 PROCEDURE — 3079F DIAST BP 80-89 MM HG: CPT | Performed by: NURSE PRACTITIONER

## 2023-05-11 PROCEDURE — 1159F MED LIST DOCD IN RCRD: CPT | Performed by: NURSE PRACTITIONER

## 2023-05-11 PROCEDURE — 1160F RVW MEDS BY RX/DR IN RCRD: CPT | Performed by: NURSE PRACTITIONER

## 2023-05-11 RX ORDER — DICYCLOMINE HYDROCHLORIDE 10 MG/1
10 CAPSULE ORAL 4 TIMES DAILY PRN
Qty: 60 CAPSULE | Refills: 5 | Status: SHIPPED | OUTPATIENT
Start: 2023-05-11

## 2023-05-11 NOTE — PROGRESS NOTES
"                                                                                                                  Chief Complaint   Patient presents with   • Colon Polyps       Subjective    Vangie Lopez is a 72 y.o. male. he is here today for follow-up.                                                                  Assessment & Plan                                     1. Adenomatous polyp of sigmoid colon    2. Irritable bowel syndrome with diarrhea      plan; Repeat colonoscopy in 3 years due to sessile serrated adenoma found in sigmoid colon discussed high-fiber diet adequate fluid consumption daily.  Start patient on dicyclomine as needed for cramping or diarrhea issues.  Reports he will follow-up with PCP for refills return to GI office if needed    Follow-up: Return in about 4 weeks (around 6/8/2023) for Recheck, After test.     HPI  72-year-old male presents to discuss colonoscopy results.  States bowel movements vary between normal to intermittent lower abdominal cramping and diarrhea.  Denies any melena or hematochezia.  Takes Imodium if needed but typically does well with diet lifestyle modifications.  Colonoscopy completed 4/24/2023 noted normal perianal digital rectal exam small polyp was found in the sigmoid colon resection retrieval was complete external and internal hemorrhoids were found.  Polyp found to be sessile serrated polyp negative for cytologic atypia or dysplasia repeat recommended in 3 years for surveillance.    Review of Systems  Review of Systems   Constitutional: Negative for activity change, appetite change, chills, diaphoresis, fatigue, fever and unexpected weight change.   Respiratory: Negative for cough and shortness of breath.    Gastrointestinal: Negative for abdominal distention, abdominal pain, anal bleeding, blood in stool, constipation, diarrhea, nausea, rectal pain and vomiting.       /80 (BP Location: Left arm)   Pulse 69   Ht 175.3 cm (69\")   Wt 77.7 kg " (171 lb 3.2 oz)   BMI 25.28 kg/m²     Objective      Physical Exam  Constitutional:       General: He is not in acute distress.     Appearance: Normal appearance. He is normal weight. He is not ill-appearing or toxic-appearing.   HENT:      Head: Normocephalic and atraumatic.   Pulmonary:      Effort: Pulmonary effort is normal.   Abdominal:      General: Abdomen is flat. Bowel sounds are normal. There is no distension.      Palpations: Abdomen is soft. There is no mass.      Tenderness: There is no abdominal tenderness.   Neurological:      Mental Status: He is alert.               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   • Hyperlipidemia    • Multiple trauma 09/06/2017   • Osteoarthritis of hands, bilateral     retired due to arthritis,    • Recent onset of diabetes mellitus      Past Surgical History:   Procedure Laterality Date   • COLONOSCOPY  12/08/2006   • COLONOSCOPY N/A 4/3/2017    Procedure: COLONOSCOPY;  Surgeon: Michael Ang MD;  Location: [facility] ENDOSCOPY;  Service:    • COLONOSCOPY N/A 4/24/2023    Procedure: COLONOSCOPY;  Surgeon: Michael Ang MD;  Location: [facility] ENDOSCOPY;  Service: Gastroenterology;  Laterality: N/A;   • CYSTOSCOPY     • EXTERNAL FIXATION ANKLE FRACTURE Left 9/6/2017    Procedure: ANKLE EXTERNAL FIXATOR APPLICATION;  Surgeon: Simone Rivera DPM;  Location: [facility] OR;  Service:    • FOOT SURGERY     • KNEE SURGERY     • PATELLA OPEN REDUCTION INTERNAL FIXATION Right 9/8/2017    Procedure: OPEN REDUCTION INTERNAL FIXATION RIGHT PATELLA        (C-ARM#3);  Surgeon: Checo Rutherford MD;  Location: [facility];  Service:    • SCAPHOID FRACTURE SURGERY Left 09/07/2017    fracture dislocation left foot, closed reduction with pinning   • SHOULDER SURGERY       Family History   Problem Relation Age of Onset   • Alzheimer's disease Mother    • Heart attack Father        No Known Allergies  Social History     Socioeconomic History   • Marital status:    Tobacco Use   • Smoking status: Never   • Smokeless tobacco: Never   Vaping Use   • Vaping Use: Never used   Substance and Sexual Activity   • Alcohol use: Not Currently     Comment: social drinker/rarely uses alcohol   • Drug use: Never   • Sexual activity: Yes     Partners: Female     Current Medications:  Prior to Admission medications    Medication Sig Start Date End Date Taking? Authorizing Provider   Aspirin (ASPIR-LOW PO) Take  by mouth.   Yes Bari Tay MD   brimonidine (ALPHAGAN) 0.2 % ophthalmic solution Administer 1 drop to the right eye 2 (Two) Times a Day.   Yes Bari Tay MD   finasteride (PROSCAR) 5 MG tablet Take 1 tablet by mouth Every Night.   Yes Bari Tay MD   glipizide (GLUCOTROL XL) 2.5 MG 24 hr tablet Take 1 tablet by mouth Daily. 1/5/23  Yes Bari Tay MD   hydrochlorothiazide (HYDRODIURIL) 25 MG tablet Take 12.5 mg by mouth Daily. Pt takes 1/2 (25 mg) tablet = 12.5 mg per dose.   Yes Bari Tay MD   metFORMIN (GLUCOPHAGE) 1000 MG tablet Take 1 tablet by mouth 2 (Two) Times a Day With Meals. 2/15/23  Yes Bari Tay MD   metoprolol tartrate (LOPRESSOR) 25 MG tablet Take 1 tablet by mouth 2 (Two) Times a Day. 2/16/23  Yes Esequiel Jimenez MD   potassium citrate (UROCIT-K) 10 MEQ (1080 MG) CR tablet Take 1 tablet by mouth 2 (Two) Times a Day With Meals.   Yes Bari Tay MD   rosuvastatin (CRESTOR) 40 MG tablet Take 1 tablet by mouth Daily. 1/2 TABLET DAILY   Yes Bari Tay MD   sildenafil (VIAGRA) 100 MG  tablet Take 1 tablet by mouth Daily As Needed for Erectile Dysfunction.   Yes Provider, MD Bari   timolol (TIMOPTIC) 0.5 % ophthalmic solution Administer 1 drop to the right eye 2 (Two) Times a Day.   Yes ProviderBari MD   Turmeric (QC TUMERIC COMPLEX PO) Take  by mouth.   Yes Provider, MD Bari     Orders placed during this encounter include:  No orders of the defined types were placed in this encounter.    * Surgery not found *  New Medications Ordered This Visit   Medications   • dicyclomine (BENTYL) 10 MG capsule     Sig: Take 1 capsule by mouth 4 (Four) Times a Day As Needed (cramping or diarrhea).     Dispense:  60 capsule     Refill:  5         Review and/or summary of lab tests, radiology, procedures, medications. Review and summary of old records and obtaining of history. The risks and benefits of my recommendations, as well as other treatment options were discussed . Any questions/concerned were answered. Patient voiced understanding and agreement.          This document has been electronically signed by LEIGHTON Rizo on May 11, 2023 11:44 CDT                                               Results for orders placed or performed during the hospital encounter of 23   TISSUE EXAM, [facility] ([facility])    Specimen: Large Intestine, Sigmoid Colon; Polyp   Result Value Ref Range    Reference Lab Report       [facility]  [address] [place] [address]  Phone: [phone] or [phone]  Fax: [phone]  Renan Boyd M.D., Medical Director    PATIENT NAME                                     LABORATORY NO.  [id number]   LIA STEPHENSON.                           [id number]  [id number]                                 AGE                    SEX   SSN              CLIENT REF #  [facility]                   72        1950           [id number]      [id number]    [place]                               REQUESTING  M.D.           ATTENDING M.D.         COPY TO.  [address] [place] [address]                         ALE[place] [address]                     DATE COLLECTED            DATE RECEIVED          DATE REPORTED  04/24/2023 04/24/2023 04/26/2023    DIAGNOSIS:  SIGMOID COLON POLYP:  Sessile serrated lesion/polyp, negative for cytologic  atypia/dysplasia    [facility]    CLINICAL HISTORY:  Encounter for colonoscopy due to history of colonic polyp    SPECIMENS RECEIVED:  SIGMOID COLON POLYP    MICROSCOPIC DESCRIPTION:  Tissue blocks are prepared and slides are examined microscopically on all  specimens. See diagnosis for details.    Professional interpretation rendered by Mary Lopes D.O. at [facility], [address] [place] [id number].    GROSS DESCRIPTION:  Labeled sigmoid colon polyp are 2 portions of tan soft tissue measuring 0.5 x 0.4  x 0.2 cm in aggregate, submitted entirely in 1 block.  [facility]    REVIEWED, DIAGNOSED AND ELECTRONICALLY  SIGNED BY:    Mary Lopes D.O.  CPT CODES:  36951     Results for orders placed or performed in visit on 03/27/23   Basic Metabolic Panel    Specimen: Blood   Result Value Ref Range    Glucose 132 (H) 65 - 99 mg/dL    BUN 16 8 - 23 mg/dL    Creatinine 0.76 0.76 - 1.27 mg/dL    Sodium 142 136 - 145 mmol/L    Potassium 3.9 3.5 - 5.2 mmol/L    Chloride 103 98 - 107 mmol/L    CO2 29.0 22.0 - 29.0 mmol/L    Calcium 9.9 8.6 - 10.5 mg/dL    BUN/Creatinine Ratio 21.1 7.0 - 25.0    Anion Gap 10.0 5.0 - 15.0 mmol/L    eGFR 96.1 >60.0 mL/min/1.73   Results for orders placed or performed in visit on 02/16/23   ECG 12 Lead   Result Value Ref Range    QT Interval 370 ms    QTC Interval 434 ms   Results for orders placed or performed in visit on 09/28/17   Protime-INR    Specimen: Blood   Result Value Ref Range    INR 2.40 1.7 - 2.5   Results for orders placed or performed during the hospital encounter of 09/11/17   [facility]  (Gel)   Result Value Ref Range    Extra Tube Hold for add-ons.    Green Top (Gel)   Result Value Ref Range    Extra Tube Hold for add-ons.    Green Top (Gel)   Result Value Ref Range    Extra Tube Hold for add-ons.    CBC Auto Differential    Specimen: Blood   Result Value Ref Range    WBC 9.51 3.20 - 9.80 10*3/mm3    RBC 4.13 (L) 4.37 - 5.74 10*6/mm3    Hemoglobin 12.7 (L) 13.7 - 17.3 g/dL    Hematocrit 36.1 (L) 39.0 - 49.0 %    MCV 87.4 80.0 - 98.0 fL    MCH 30.8 26.5 - 34.0 pg    MCHC 35.2 31.5 - 36.3 g/dL    RDW 12.3 11.5 - 14.5 %    RDW-SD 39.4 35.1 - 43.9 fl    MPV 8.6 8.0 - 12.0 fL    Platelets 338 150 - 450 10*3/mm3    Neutrophil % 66.7 37.0 - 80.0 %    Lymphocyte % 19.1 10.0 - 50.0 %    Monocyte % 8.8 0.0 - 12.0 %    Eosinophil % 4.8 0.0 - 7.0 %    Basophil % 0.2 0.0 - 2.0 %    Immature Grans % 0.4 0.0 - 0.5 %    Neutrophils, Absolute 6.33 2.00 - 8.60 10*3/mm3    Lymphocytes, Absolute 1.82 0.60 - 4.20 10*3/mm3    Monocytes, Absolute 0.84 0.00 - 0.90 10*3/mm3    Eosinophils, Absolute 0.46 0.00 - 0.70 10*3/mm3    Basophils, Absolute 0.02 0.00 - 0.20 10*3/mm3    Immature Grans, Absolute 0.04 (H) 0.00 - 0.02 10*3/mm3   CBC Auto Differential    Specimen: Blood   Result Value Ref Range    WBC 9.70 3.20 - 9.80 10*3/mm3    RBC 3.81 (L) 4.37 - 5.74 10*6/mm3    Hemoglobin 12.0 (L) 13.7 - 17.3 g/dL    Hematocrit 33.4 (L) 39.0 - 49.0 %    MCV 87.7 80.0 - 98.0 fL    MCH 31.5 26.5 - 34.0 pg    MCHC 35.9 31.5 - 36.3 g/dL    RDW 12.5 11.5 - 14.5 %    RDW-SD 39.9 35.1 - 43.9 fl    MPV 8.9 8.0 - 12.0 fL    Platelets 312 150 - 450 10*3/mm3    Neutrophil % 73.3 37.0 - 80.0 %    Lymphocyte % 10.9 10.0 - 50.0 %    Monocyte % 11.4 0.0 - 12.0 %    Eosinophil % 3.9 0.0 - 7.0 %    Basophil % 0.2 0.0 - 2.0 %    Immature Grans % 0.3 0.0 - 0.5 %    Neutrophils, Absolute 7.10 2.00 - 8.60 10*3/mm3    Lymphocytes, Absolute 1.06 0.60 - 4.20 10*3/mm3    Monocytes, Absolute 1.11 (H) 0.00 - 0.90 10*3/mm3    Eosinophils, Absolute 0.38 0.00 -  0.70 10*3/mm3    Basophils, Absolute 0.02 0.00 - 0.20 10*3/mm3    Immature Grans, Absolute 0.03 (H) 0.00 - 0.02 10*3/mm3    nRBC 0.0 0.0 - 0.0 /100 WBC   Lavender Top   Result Value Ref Range    Extra Tube hold for add-on    Lavender Top   Result Value Ref Range    Extra Tube hold for add-on    Lavender Top   Result Value Ref Range    Extra Tube hold for add-on    Iron Profile    Specimen: Blood   Result Value Ref Range    Iron <10 (L) 49 - 181 mcg/dL    TIBC 249 (L) 261 - 462 mcg/dL    Iron Saturation  20 - 55 %   Protime-INR    Specimen: Blood   Result Value Ref Range    Protime 21.0 (H) 11.1 - 15.3 Seconds    INR 1.80 (H) 0.80 - 1.20   Protime-INR    Specimen: Blood   Result Value Ref Range    Protime 21.5 (H) 11.1 - 15.3 Seconds    INR 1.86 (H) 0.80 - 1.20   Protime-INR    Specimen: Blood   Result Value Ref Range    Protime 20.9 (H) 11.1 - 15.3 Seconds    INR 1.79 (H) 0.80 - 1.20   Protime-INR    Specimen: Blood   Result Value Ref Range    Protime 23.4 (H) 11.1 - 15.3 Seconds    INR 2.07 (H) 0.80 - 1.20   Protime-INR    Specimen: Blood   Result Value Ref Range    Protime 22.8 (H) 11.1 - 15.3 Seconds    INR 2.00 (H) 0.80 - 1.20   Protime-INR    Specimen: Blood   Result Value Ref Range    Protime 24.5 (H) 11.1 - 15.3 Seconds    INR 2.19 (H) 0.80 - 1.20   Protime-INR    Specimen: Blood   Result Value Ref Range    Protime 24.4 (H) 11.1 - 15.3 Seconds    INR 2.18 (H) 0.80 - 1.20   Protime-INR    Specimen: Blood   Result Value Ref Range    Protime 26.0 (H) 11.1 - 15.3 Seconds    INR 2.36 (H) 0.80 - 1.20   Protime-INR    Specimen: Blood   Result Value Ref Range    Protime 26.8 (H) 11.1 - 15.3 Seconds    INR 2.45 (H) 0.80 - 1.20   Protime-INR    Specimen: Blood   Result Value Ref Range    Protime 25.8 (H) 11.1 - 15.3 Seconds    INR 2.33 (H) 0.80 - 1.20   Protime-INR    Specimen: Blood   Result Value Ref Range    Protime 22.9 (H) 11.1 - 15.3 Seconds    INR 2.01 (H) 0.80 - 1.20   POC Glucose Fingerstick    Specimen: Blood    Result Value Ref Range    Glucose 133 (H) 70 - 130 mg/dL   POC Glucose Fingerstick    Specimen: Blood   Result Value Ref Range    Glucose 124 70 - 130 mg/dL   POC Glucose Fingerstick    Specimen: Blood   Result Value Ref Range    Glucose 166 (H) 70 - 130 mg/dL   POC Glucose Fingerstick    Specimen: Blood   Result Value Ref Range    Glucose 113 70 - 130 mg/dL   POC Glucose Fingerstick    Specimen: Blood   Result Value Ref Range    Glucose 115 70 - 130 mg/dL   POC Glucose Fingerstick    Specimen: Blood   Result Value Ref Range    Glucose 123 70 - 130 mg/dL   POC Glucose Fingerstick    Specimen: Blood   Result Value Ref Range    Glucose 163 (H) 70 - 130 mg/dL     *Note: Due to a large number of results and/or encounters for the requested time period, some results have not been displayed. A complete set of results can be found in Results Review.

## 2023-05-23 LAB
BH CV ECHO MEAS - ACS: 1.74 CM
BH CV ECHO MEAS - AO MAX PG: 3.2 MMHG
BH CV ECHO MEAS - AO MEAN PG: 2 MMHG
BH CV ECHO MEAS - AO ROOT DIAM: 3.2 CM
BH CV ECHO MEAS - AO V2 MAX: 89.3 CM/SEC
BH CV ECHO MEAS - AO V2 VTI: 21.3 CM
BH CV ECHO MEAS - AVA(I,D): 2.5 CM2
BH CV ECHO MEAS - EDV(CUBED): 91.7 ML
BH CV ECHO MEAS - EDV(MOD-SP2): 81.9 ML
BH CV ECHO MEAS - EDV(MOD-SP4): 85.9 ML
BH CV ECHO MEAS - EF(MOD-BP): 57.7 %
BH CV ECHO MEAS - EF(MOD-SP2): 60.2 %
BH CV ECHO MEAS - EF(MOD-SP4): 55.9 %
BH CV ECHO MEAS - ESV(CUBED): 32.5 ML
BH CV ECHO MEAS - ESV(MOD-SP2): 32.6 ML
BH CV ECHO MEAS - ESV(MOD-SP4): 37.9 ML
BH CV ECHO MEAS - FS: 29.2 %
BH CV ECHO MEAS - IVS/LVPW: 0.96 CM
BH CV ECHO MEAS - IVSD: 1.02 CM
BH CV ECHO MEAS - LA DIMENSION: 3.2 CM
BH CV ECHO MEAS - LAT PEAK E' VEL: 9.4 CM/SEC
BH CV ECHO MEAS - LV MASS(C)D: 162.4 GRAMS
BH CV ECHO MEAS - LV MAX PG: 1.94 MMHG
BH CV ECHO MEAS - LV MEAN PG: 1 MMHG
BH CV ECHO MEAS - LV V1 MAX: 69.7 CM/SEC
BH CV ECHO MEAS - LV V1 VTI: 15.5 CM
BH CV ECHO MEAS - LVIDD: 4.5 CM
BH CV ECHO MEAS - LVIDS: 3.2 CM
BH CV ECHO MEAS - LVOT AREA: 3.5 CM2
BH CV ECHO MEAS - LVOT DIAM: 2.11 CM
BH CV ECHO MEAS - LVPWD: 1.06 CM
BH CV ECHO MEAS - MED PEAK E' VEL: 7.4 CM/SEC
BH CV ECHO MEAS - MV A MAX VEL: 81.8 CM/SEC
BH CV ECHO MEAS - MV DEC TIME: 0.17 MSEC
BH CV ECHO MEAS - MV E MAX VEL: 103 CM/SEC
BH CV ECHO MEAS - MV E/A: 1.26
BH CV ECHO MEAS - MV MAX PG: 5 MMHG
BH CV ECHO MEAS - MV MEAN PG: 2.05 MMHG
BH CV ECHO MEAS - MV V2 VTI: 33.1 CM
BH CV ECHO MEAS - MVA(VTI): 1.64 CM2
BH CV ECHO MEAS - PA V2 MAX: 87.4 CM/SEC
BH CV ECHO MEAS - PI END-D VEL: 108.3 CM/SEC
BH CV ECHO MEAS - RAP SYSTOLE: 3 MMHG
BH CV ECHO MEAS - RV MAX PG: 1.15 MMHG
BH CV ECHO MEAS - RV V1 MAX: 53.7 CM/SEC
BH CV ECHO MEAS - RV V1 VTI: 15 CM
BH CV ECHO MEAS - RVSP: 13.4 MMHG
BH CV ECHO MEAS - SV(LVOT): 54.2 ML
BH CV ECHO MEAS - SV(MOD-SP2): 49.3 ML
BH CV ECHO MEAS - SV(MOD-SP4): 48 ML
BH CV ECHO MEAS - TAPSE (>1.6): 2.22 CM
BH CV ECHO MEAS - TR MAX PG: 10.4 MMHG
BH CV ECHO MEAS - TR MAX VEL: 161.3 CM/SEC
BH CV ECHO MEASUREMENTS AVERAGE E/E' RATIO: 12.26
BH CV XLRA - RV BASE: 4 CM
BH CV XLRA - RV MID: 3 CM
MAXIMAL PREDICTED HEART RATE: 149 BPM
SINUS: 3.1 CM
STRESS TARGET HR: 127 BPM

## 2023-06-16 ENCOUNTER — OFFICE VISIT (OUTPATIENT)
Dept: CARDIOLOGY | Facility: CLINIC | Age: 73
End: 2023-06-16
Payer: MEDICARE

## 2023-06-16 VITALS
SYSTOLIC BLOOD PRESSURE: 166 MMHG | OXYGEN SATURATION: 97 % | DIASTOLIC BLOOD PRESSURE: 74 MMHG | WEIGHT: 168.4 LBS | HEART RATE: 77 BPM | BODY MASS INDEX: 24.94 KG/M2 | HEIGHT: 69 IN

## 2023-06-16 DIAGNOSIS — I10 ESSENTIAL HYPERTENSION: Primary | Chronic | ICD-10-CM

## 2023-06-16 DIAGNOSIS — E78.2 MIXED HYPERLIPIDEMIA: Chronic | ICD-10-CM

## 2023-06-16 PROCEDURE — 1160F RVW MEDS BY RX/DR IN RCRD: CPT | Performed by: INTERNAL MEDICINE

## 2023-06-16 PROCEDURE — 1159F MED LIST DOCD IN RCRD: CPT | Performed by: INTERNAL MEDICINE

## 2023-06-16 PROCEDURE — 3077F SYST BP >= 140 MM HG: CPT | Performed by: INTERNAL MEDICINE

## 2023-06-16 PROCEDURE — 3078F DIAST BP <80 MM HG: CPT | Performed by: INTERNAL MEDICINE

## 2023-06-16 PROCEDURE — 99214 OFFICE O/P EST MOD 30 MIN: CPT | Performed by: INTERNAL MEDICINE

## 2023-06-16 NOTE — PROGRESS NOTES
[facility] Cardiology  OFFICE NOTE    Cardiovascular Medicine  Esequiel Jimenez M.D., [facility], FSCAI, RPVI         No referring provider defined for this encounter.    Thank you for asking me to see Vangie Lopez for Chest pain.    History of Present Illness  This is a 73 y.o. male with:    1.  Hypertension  2.  Diabetes  3.  Hyperlipidemia    Vangie Lopez is a 73 y.o. male who presents for consultation today.  Patient reported that he has significant family history of premature coronary artery disease his father had a massive heart attack at the age of 45.  He has a history of hypertension, hyperlipidemia and diabetes.  Follows with primary care physician.  Recently started having intermittent chest pains and was reported to have an abnormal EKG at primary care physician's office was referred here for further evaluation.  Most of the episodes are happening when he is driving, he described the pain as aching in character, localized to the left side of the chest, would radiate to his left shoulder.  Gets worse when he is moving his left shoulder.  Occasionally also has pain when he is swallowing.  Denying any chest pain with exertion.    We performed a CTA coronary angiogram which showed a calcium score of 25.  No significant obstructive coronary artery disease noted.  He was found to have a hepatic cyst.  Echocardiogram showed preserved LV systolic function.  Diastolic function is normal.    He has done well.  No further recurrence of chest pain.    Review of Systems - ROS  Constitution: Negative for weakness, weight gain and weight loss.   HENT: Negative for congestion.    Eyes: Negative for blurred vision.   Cardiovascular: As mentioned above  Respiratory: Negative for cough and hemoptysis.    Endocrine: Negative for polydipsia and polyuria.   Hematologic/Lymphatic: Negative for bleeding problem. Does not bruise/bleed easily.   Skin: Negative for flushing.   Musculoskeletal: Negative  for neck pain and stiffness.   Gastrointestinal: Negative for abdominal pain, diarrhea, jaundice, melena, nausea and vomiting.   Genitourinary: Negative for dysuria and hematuria.   Neurological: Negative for dizziness, focal weakness and numbness.   Psychiatric/Behavioral: Negative for altered mental status and depression.     I reviewed the ROS as documented here and confirmed the accuracy of it with the patient today. 6/16/2023        All other systems were reviewed and were negative.    family history includes Alzheimer's disease in his mother; Heart attack in his father.     reports that he has never smoked. He has never used smokeless tobacco. He reports that he does not currently use alcohol. He reports that he does not use drugs.    No Known Allergies      Current Outpatient Medications:     Aspirin (ASPIR-LOW PO), Take  by mouth., Disp: , Rfl:     dicyclomine (BENTYL) 10 MG capsule, Take 1 capsule by mouth 4 (Four) Times a Day As Needed (cramping or diarrhea)., Disp: 60 capsule, Rfl: 5    finasteride (PROSCAR) 5 MG tablet, Take 1 tablet by mouth Every Night., Disp: , Rfl:     glipizide (GLUCOTROL XL) 2.5 MG 24 hr tablet, Take 1 tablet by mouth Daily., Disp: , Rfl:     hydrochlorothiazide (HYDRODIURIL) 25 MG tablet, Take 12.5 mg by mouth Daily. Pt takes 1/2 (25 mg) tablet = 12.5 mg per dose., Disp: , Rfl:     metFORMIN (GLUCOPHAGE) 1000 MG tablet, Take 1 tablet by mouth 2 (Two) Times a Day With Meals., Disp: , Rfl:     metoprolol tartrate (LOPRESSOR) 25 MG tablet, Take 1 tablet by mouth 2 (Two) Times a Day., Disp: 60 tablet, Rfl: 11    potassium citrate (UROCIT-K) 10 MEQ (1080 MG) CR tablet, Take 1 tablet by mouth 2 (Two) Times a Day With Meals., Disp: , Rfl:     rosuvastatin (CRESTOR) 40 MG tablet, Take 1 tablet by mouth Daily. 1/2 TABLET DAILY, Disp: , Rfl:     sildenafil (VIAGRA) 100 MG tablet, Take 1 tablet by mouth Daily As Needed for Erectile Dysfunction., Disp: , Rfl:     Turmeric (QC TUMERIC COMPLEX  "PO), Take  by mouth., Disp: , Rfl:     Physical Exam:  Vitals:    06/16/23 0944   BP: 166/74   BP Location: Left arm   Patient Position: Sitting   Cuff Size: Adult   Pulse: 77   SpO2: 97%   Weight: 76.4 kg (168 lb 6.4 oz)   Height: 175.3 cm (69\")   PainSc: 0-No pain   PainLoc: Chest     Current Pain Level: none  Pulse Ox: Normal  on room air  General: alert, appears stated age and cooperative     Body Habitus: well-nourished    HEENT: Head: Normocephalic, no lesions, without obvious abnormality. No arcus senilis, xanthelasma or xanthomas.    Neuro: alert, oriented x3  Pulses: 2+ and symmetric  JVP: Volume/Pulsation: Normal.  Normal waveforms.   Appropriate inspiratory decrease.  No Kussmaul's. No Emile's.   Carotid Exam: no bruit normal pulsation bilaterally   Carotid Volume: normal.     Respirations: no increased work of breathing   Chest:  Normal    Pulmonary:Normal   Precordium: Normal impulses. P2 is not palpable.  RV Heave: absent  LV Heave: absent  [place]:  normal size and placement  Palpable S4: absent.  Heart rate: normal    Heart Rhythm: regular     Heart Sounds: S1: normal  S2: normal  S3: absent   S4: absent  Opening Snap: absent    Pericardial Rub:  Absent: .    Abdomen:   Appearance: normal .  Palpation: Soft, non-tender to palpation, bowel sounds positive in all four quadrants; no guarding or rebound tenderness  Extremity: no edema.   LE Skin: no rashes  LE Hair:  normal  LE Pulses: well perfused with normal pulses in the distal extremities  Pallor on elevation: Absent. Rubor on dependency: None    I have reexamined the patient and the results are consistent with the previously documented exam. Esequiel Jimenez MD         DATA REVIEWED:     EKG. I personally reviewed and interpreted the EKG.  normal EKG, normal sinus rhythm    ECG/EMG Results (all)       Procedure Component Value Units Date/Time    ECG 12 Lead [[id number]] Collected: 02/16/23 0915     Updated: 02/16/23 0932     QT Interval 370 ms      " QTC Interval 434 ms     Narrative:      Test Reason : abn ekg  Blood Pressure :   */*   mmHG  Vent. Rate :  83 BPM     Atrial Rate :  83 BPM     P-R Int : 206 ms          QRS Dur :  90 ms      QT Int : 370 ms       P-R-T Axes :  50  91  22 degrees     QTc Int : 434 ms    Normal sinus rhythm  Rightward axis  Borderline ECG  When compared with ECG of 09-SEP-2017 02:05,  Vent. rate has decreased BY  46 BPM  T wave inversion no longer evident in Inferior leads  Nonspecific T wave abnormality no longer evident in Anterolateral leads    Referred By:            Confirmed By:           ---------------------------------------------------  TTE/GERALD:  Results for orders placed in visit on 02/16/23    Adult Transthoracic Echo Complete W/ Cont if Necessary Per Protocol    Interpretation Summary    Left ventricular systolic function is normal. Left ventricular ejection fraction appears to be 56 - 60%.    Left ventricular diastolic function was normal.    Estimated right ventricular systolic pressure from tricuspid regurgitation is normal (<35 mmHg).  ----------------------------------------------------      --------------------------------------------------------------------------------------------------  LABS:     The 10-year CVD risk score (Sheree et al., 2008) is: 49.6%    Values used to calculate the score:      Age: 72 years      Sex: Male      Diabetic: Yes      Tobacco smoker: No      Systolic Blood Pressure: 158 mmHg      Is BP treated: Yes      HDL Cholesterol: 40 mg/dL      Total Cholesterol: 127 mg/dL         Lab Results   Component Value Date    GLUCOSE 132 (H) 03/27/2023    BUN 15 04/11/2023    CREATININE 0.8 04/11/2023    EGFRIFNONA 95 09/20/2017    BCR 21.1 03/27/2023    K 4.3 04/11/2023    CO2 32 (H) 04/11/2023    CALCIUM 9.8 04/11/2023    ALBUMIN 4.6 04/11/2023    AST 16 04/11/2023    ALT 22 04/11/2023     Lab Results   Component Value Date    WBC 9.51 09/16/2017    HGB 12.7 (L) 09/16/2017    HCT 36.1  (L) 09/16/2017    MCV 87.4 09/16/2017     09/16/2017     Lab Results   Component Value Date    CHLPL 127 01/04/2023    TRIG 138 01/04/2023    HDL 40 01/04/2023    LDL 59 01/04/2023     Lab Results   Component Value Date    TSH 1.93 01/03/2023     No results found for: CKTOTAL, CKMB, CKMBINDEX, TROPONINI, TROPONINT  Lab Results   Component Value Date    HGBA1C 5.8 04/11/2023     No results found for: DDIMER  Lab Results   Component Value Date    ALT 22 04/11/2023     Lab Results   Component Value Date    HGBA1C 5.8 04/11/2023    HGBA1C 6.9 (H) 01/03/2023    HGBA1C 5.9 (H) 09/12/2017     Lab Results   Component Value Date    MICROALBUR 1.0 01/03/2023    CREATININE 0.8 04/11/2023     Lab Results   Component Value Date    IRON <10 (L) 09/12/2017    TIBC 249 (L) 09/12/2017     Lab Results   Component Value Date    INR 2.40 09/28/2017    INR 1.80 (H) 09/22/2017    INR 1.86 (H) 09/21/2017    PROTIME 21.0 (H) 09/22/2017    PROTIME 21.5 (H) 09/21/2017    PROTIME 20.9 (H) 09/20/2017       [unfilled]    1. Abnormal EKG and chest pain: Resolved  EKG at primary care physician's office reported to be normal.  Try to get a copy.  EKG in office showed no significant ischemic changes, nonspecific ST-T changes.  He has risk factors of hypertension, hyperlipidemia, diabetes and family history of premature coronary artery disease.  In the setting of chest pain with multiple risk factors and family history discussed options of further restratification patient opted for a CTA coronary angiogram.    CT and echocardiogram are reassuring.  No significant stenosis noted.  Risk factor modification for secondary prevention.  Currently chest pain has resolved      2.  Hypertension:  On hydrochlorothiazide 12.5 mg,  Follow-up with primary care physician.    3.  Hyperlipidemia:  On Crestor  Target LDL less than 70.    4.  Diabetes: On metformin.    Follow-up with primary care physician.  Follow-up with cardiology on as-needed  basis.      Prevention:  BMI is >= 25 and <30. (Overweight) The following options were offered after discussion;: exercise counseling/recommendations      Vangie Lopez  reports that he has never smoked. He has never used smokeless tobacco..        This document has been electronically signed by Esequiel Jimenez MD on June 16, 2023 10:20 CDT

## 2024-04-25 NOTE — DISCHARGE INSTRUCTIONS
I referred Da to PT.   He could benefit from some stretching work to loosen his hamstrings.  He also seems to be having some growth pains, namely close to his heels.  He could use gel heel cups in his sneakers to help with this discomfort. Typically, he will feel more discomfort during and after activity.  We'll have to watch the hip discomfort.  If any of these continue to be problematic, I would recommend seeing ortho.     Please call with any questions or concerns.   Outpatient Instructions for Monitored Anesthesia Care (MAC)    1. You will be released from the hospital in the care of a responsible adult who should remain with you for at least 6 hours.    2. You are at an increased risk for falls following anesthesia. Use care when changing from a lying to a sitting position. Use your assistive devices ( example: cane, walker or family member).    3. You must NOT drive a car, climb high places such as a ladder, or operate equipment such as electric knives,stoves etc...for at least 12 hours. If you are dizzy for longer than 24 hours, notify your doctor.    4. DO NOT drink any alcoholic beverages for at least 24 hours. Anesthesia may impair your judgement.    5. If you smoke, do not smoke alone due to increased risk of burns/fires.    6. DO NOT undertake any legally binding commitment for at least 24 hours. Anesthesia may impair your judgement.

## (undated) DEVICE — GOWN,AURORA,NOREINF,RAGLAN,XL,STERILE: Brand: MEDLINE

## (undated) DEVICE — GLV SURG SENSICARE MICRO PF LF 7.5 STRL

## (undated) DEVICE — GOWN,PREVENTION PLUS,XLNG/XXLARGE,STRL: Brand: MEDLINE

## (undated) DEVICE — PEEL-AWAY TOGA, X-LARGE: Brand: FLYTE

## (undated) DEVICE — UNDRPD BREATH 23X36 BG/10

## (undated) DEVICE — GLV SURG TRIUMPH LT PF LTX 7.5 STRL

## (undated) DEVICE — SPNG LAP 18X18IN LF STRL PK/5

## (undated) DEVICE — DRAPE,U/ SHT,SPLIT,PLAS,STERIL: Brand: MEDLINE

## (undated) DEVICE — CVR SURG EQUIP BND RECTG 36X28

## (undated) DEVICE — SUT ETHLN 3-0 FS118IN 663H

## (undated) DEVICE — PK POD 60

## (undated) DEVICE — GLV SURG SENSICARE GREEN W/ALOE PF LF 8 STRL

## (undated) DEVICE — DRSNG WND GZ CURAD OIL EMULSION 3X8IN STRL PK/3

## (undated) DEVICE — GLV SURG SENSICARE GREEN W/ALOE PF LF 6 STRL

## (undated) DEVICE — SYR 10ML

## (undated) DEVICE — SUT VIC 2 CP 27IN UD VCP195H

## (undated) DEVICE — BNDG ELAS CO-FLEX SLF ADHR 6IN 5YD LF STRL

## (undated) DEVICE — GLV SURG TRIUMPH LT PF LTX 6 STRL

## (undated) DEVICE — 3M™ STERI-STRIP™ REINFORCED ADHESIVE SKIN CLOSURES, R1547, 1/2 IN X 4 IN (12 MM X 100 MM), 6 STRIPS/ENVELOPE: Brand: 3M™ STERI-STRIP™

## (undated) DEVICE — SPNG GZ WOVN 4X4IN 12PLY 10/BX STRL

## (undated) DEVICE — GLV SURG TRIUMPH LT PF LTX 7 STRL

## (undated) DEVICE — DRSNG TELFA PAD NONADH STR 1S 3X8IN

## (undated) DEVICE — GLV SURG SENSICARE ALOE LF PF SZ7.5 GRN

## (undated) DEVICE — PK EXTRM LF 60

## (undated) DEVICE — DISPOSABLE TOURNIQUET CUFF SINGLE BLADDER, DUAL PORT AND QUICK CONNECT CONNECTOR: Brand: COLOR CUFF

## (undated) DEVICE — GLV SURG SENSICARE GREEN W/ALOE PF LF 8.5 STRL

## (undated) DEVICE — PAD FM RESTON SLF/ADH 7 7/8X11 3/4X7/16

## (undated) DEVICE — PAD UNDERCAST WYTEX 6IN 4YD LF STRL

## (undated) DEVICE — Device: Brand: DISPOSABLE ELECTROSURGICAL SNARE

## (undated) DEVICE — GAUZE,SPONGE,FLUFF,6"X6.75",STRL,5/TRAY: Brand: MEDLINE

## (undated) DEVICE — CANN SMPL SOFTECH BIFLO ETCO2 A/M 7FT

## (undated) DEVICE — GLV SURG SENSICARE GREEN W/ALOE PF LF 6.5 STRL

## (undated) DEVICE — 3M™ IOBAN™ 2 ANTIMICROBIAL INCISE DRAPE 6651EZ: Brand: IOBAN™ 2

## (undated) DEVICE — SOL IRR NACL 0.9PCT BT 1000ML

## (undated) DEVICE — TOWEL,OR,DSP,ST,BLUE,DLX,4/PK,20PK/CS: Brand: MEDLINE

## (undated) DEVICE — SPNG DRN AMD EXCILON 6PLY 4X4IN PK/2

## (undated) DEVICE — BANDAGE,GAUZE,BULKEE II,4.5"X4.1YD,STRL: Brand: MEDLINE

## (undated) DEVICE — STPLR SKIN VISISTAT WD 35CT

## (undated) DEVICE — SINGLE-USE BIOPSY FORCEPS: Brand: RADIAL JAW 4

## (undated) DEVICE — APPL CHLORAPREP W/TINT 26ML ORNG

## (undated) DEVICE — GLV SURG TRIUMPH NATURAL W/ALOE PF LTX 6.5 STRL

## (undated) DEVICE — NDL HYPO PRECISIONGLIDE/REG 18G 1IN PNK

## (undated) DEVICE — TRAP SXN POLYP QUICKCATCH LF

## (undated) DEVICE — GLV SURG TRIUMPH ORTHO W/ALOE PF LTX 6.5 STRL

## (undated) DEVICE — GLV SURG TRIUMPH NATURAL W/ALOE PF LTX 7 STRL

## (undated) DEVICE — IMMOB KN 3PNL DLX CANVS 22IN BLU

## (undated) DEVICE — GLV SURG SENSICARE GREEN W/ALOE PF LF 7 STRL

## (undated) DEVICE — SHEET,DRAPE,53X77,STERILE: Brand: MEDLINE

## (undated) DEVICE — BNDG ELAS CO-FLEX SLF ADHR 3IN5YD LF2 STRL

## (undated) DEVICE — PENCL E/S HNDSWCH ROCKR CB

## (undated) DEVICE — GLV SURG TRIUMPH ORTHO W/ALOE PF LTX 8 STRL

## (undated) DEVICE — GOWN,PREVENTION PLUS,XLONG/XLARGE,STRL: Brand: MEDLINE

## (undated) DEVICE — BNDG ELAS ELITE V/CLOSE 6IN 5YD LF STRL

## (undated) DEVICE — ANTIBACTERIAL UNDYED BRAIDED (POLYGLACTIN 910), SYNTHETIC ABSORBABLE SUTURE: Brand: COATED VICRYL

## (undated) DEVICE — GLV SURG TRIUMPH ORTHO W/ALOE PF LTX 7.5 STRL

## (undated) DEVICE — NDL HYPO SFTY GLD 22G 1 1/2IN

## (undated) DEVICE — DRSNG GZ CURAD XEROFORM NONADHS 5X9IN STRL